# Patient Record
Sex: MALE | Race: WHITE | NOT HISPANIC OR LATINO | Employment: FULL TIME | URBAN - METROPOLITAN AREA
[De-identification: names, ages, dates, MRNs, and addresses within clinical notes are randomized per-mention and may not be internally consistent; named-entity substitution may affect disease eponyms.]

---

## 2017-01-06 ENCOUNTER — GENERIC CONVERSION - ENCOUNTER (OUTPATIENT)
Dept: PULMONOLOGY | Facility: CLINIC | Age: 57
End: 2017-01-06

## 2017-09-07 ENCOUNTER — ALLSCRIPTS OFFICE VISIT (OUTPATIENT)
Dept: OTHER | Facility: OTHER | Age: 57
End: 2017-09-07

## 2017-09-07 ENCOUNTER — TRANSCRIBE ORDERS (OUTPATIENT)
Dept: ADMINISTRATIVE | Facility: HOSPITAL | Age: 57
End: 2017-09-07

## 2017-09-07 DIAGNOSIS — R05.9 COUGH: Primary | ICD-10-CM

## 2017-09-07 DIAGNOSIS — R93.89 ABNORMAL RADIOLOGICAL FINDINGS IN SKIN AND SUBCUTANEOUS TISSUE: ICD-10-CM

## 2017-09-15 ENCOUNTER — HOSPITAL ENCOUNTER (OUTPATIENT)
Dept: RADIOLOGY | Facility: HOSPITAL | Age: 57
Discharge: HOME | End: 2017-09-15
Attending: RADIOLOGY
Payer: COMMERCIAL

## 2017-09-15 DIAGNOSIS — Z76.89 REFERRAL OF PATIENT WITHOUT EXAMINATION OR TREATMENT: ICD-10-CM

## 2017-09-18 ENCOUNTER — GENERIC CONVERSION - ENCOUNTER (OUTPATIENT)
Dept: PULMONOLOGY | Facility: CLINIC | Age: 57
End: 2017-09-18

## 2017-09-25 ENCOUNTER — HOSPITAL ENCOUNTER (OUTPATIENT)
Dept: PULMONOLOGY | Facility: HOSPITAL | Age: 57
Discharge: HOME | End: 2017-09-25
Attending: INTERNAL MEDICINE
Payer: COMMERCIAL

## 2017-09-25 ENCOUNTER — GENERIC CONVERSION - ENCOUNTER (OUTPATIENT)
Dept: OTHER | Facility: OTHER | Age: 57
End: 2017-09-25

## 2017-09-25 DIAGNOSIS — R05.9 COUGH: ICD-10-CM

## 2017-09-25 PROCEDURE — 94729 DIFFUSING CAPACITY: CPT

## 2017-09-25 PROCEDURE — 94070 EVALUATION OF WHEEZING: CPT

## 2017-09-25 PROCEDURE — 94726 PLETHYSMOGRAPHY LUNG VOLUMES: CPT

## 2017-09-25 PROCEDURE — 94760 N-INVAS EAR/PLS OXIMETRY 1: CPT

## 2017-09-25 RX ORDER — ALBUTEROL SULFATE 2.5 MG/3ML
2.5 SOLUTION RESPIRATORY (INHALATION) ONCE
Status: COMPLETED | OUTPATIENT
Start: 2017-09-25 | End: 2017-09-25

## 2017-09-25 RX ADMIN — ALBUTEROL SULFATE 2.5 MG: 2.5 SOLUTION RESPIRATORY (INHALATION) at 14:09

## 2017-09-25 RX ADMIN — METHACHOLINE CHLORIDE 5 BREATH: 100 POWDER, FOR SOLUTION RESPIRATORY (INHALATION) at 14:07

## 2017-09-28 ENCOUNTER — GENERIC CONVERSION - ENCOUNTER (OUTPATIENT)
Dept: OTHER | Facility: OTHER | Age: 57
End: 2017-09-28

## 2017-10-17 ENCOUNTER — HOSPITAL ENCOUNTER (OUTPATIENT)
Dept: RADIOLOGY | Age: 57
Discharge: HOME | End: 2017-10-17
Payer: COMMERCIAL

## 2017-10-17 ENCOUNTER — APPOINTMENT (OUTPATIENT)
Dept: LAB | Age: 57
End: 2017-10-17
Payer: COMMERCIAL

## 2017-10-17 ENCOUNTER — APPOINTMENT (OUTPATIENT)
Dept: RADIOLOGY | Age: 57
End: 2017-10-17
Payer: COMMERCIAL

## 2017-10-17 ENCOUNTER — TRANSCRIBE ORDERS (OUTPATIENT)
Dept: URGENT CARE | Age: 57
End: 2017-10-17

## 2017-10-17 ENCOUNTER — OFFICE VISIT (OUTPATIENT)
Dept: LAB | Age: 57
End: 2017-10-17
Payer: COMMERCIAL

## 2017-10-17 DIAGNOSIS — R59.1 LYMPHADENOPATHY: ICD-10-CM

## 2017-10-17 DIAGNOSIS — D38.1 NEOPLASM OF UNCERTAIN BEHAVIOR OF TRACHEA, BRONCHUS, AND LUNG: ICD-10-CM

## 2017-10-17 DIAGNOSIS — R59.1 LYMPHADENOPATHY: Primary | ICD-10-CM

## 2017-10-17 LAB
ALBUMIN SERPL BCP-MCNC: 3.6 G/DL (ref 3.5–5)
ALP SERPL-CCNC: 133 U/L (ref 46–116)
ALT SERPL W P-5'-P-CCNC: 23 U/L (ref 12–78)
ANION GAP SERPL CALCULATED.3IONS-SCNC: 8 MMOL/L (ref 4–13)
AST SERPL W P-5'-P-CCNC: 17 U/L (ref 5–45)
ATRIAL RATE: 71 BPM
BASOPHILS # BLD AUTO: 0.03 THOUSANDS/ΜL (ref 0–0.1)
BASOPHILS NFR BLD AUTO: 1 % (ref 0–1)
BILIRUB SERPL-MCNC: 0.49 MG/DL (ref 0.2–1)
BUN SERPL-MCNC: 22 MG/DL (ref 5–25)
CALCIUM ALBUM COR SERPL-MCNC: 10.6 MG/DL (ref 8.3–10.1)
CALCIUM SERPL-MCNC: 10.3 MG/DL (ref 8.3–10.1)
CHLORIDE SERPL-SCNC: 104 MMOL/L (ref 100–108)
CO2 SERPL-SCNC: 28 MMOL/L (ref 21–32)
CREAT SERPL-MCNC: 1.03 MG/DL (ref 0.6–1.3)
EOSINOPHIL # BLD AUTO: 0.28 THOUSAND/ΜL (ref 0–0.61)
EOSINOPHIL NFR BLD AUTO: 5 % (ref 0–6)
ERYTHROCYTE [DISTWIDTH] IN BLOOD BY AUTOMATED COUNT: 13.5 % (ref 11.6–15.1)
GFR SERPL CREATININE-BSD FRML MDRD: 81 ML/MIN/1.73SQ M
GLUCOSE P FAST SERPL-MCNC: 85 MG/DL (ref 65–99)
GLUCOSE SERPL-MCNC: 74 MG/DL (ref 65–140)
HCT VFR BLD AUTO: 42.6 % (ref 36.5–49.3)
HGB BLD-MCNC: 14.1 G/DL (ref 12–17)
LDH SERPL-CCNC: 174 U/L (ref 81–234)
LYMPHOCYTES # BLD AUTO: 1.25 THOUSANDS/ΜL (ref 0.6–4.47)
LYMPHOCYTES NFR BLD AUTO: 21 % (ref 14–44)
MCH RBC QN AUTO: 30.3 PG (ref 26.8–34.3)
MCHC RBC AUTO-ENTMCNC: 33.1 G/DL (ref 31.4–37.4)
MCV RBC AUTO: 92 FL (ref 82–98)
MONOCYTES # BLD AUTO: 0.85 THOUSAND/ΜL (ref 0.17–1.22)
MONOCYTES NFR BLD AUTO: 15 % (ref 4–12)
NEUTROPHILS # BLD AUTO: 3.46 THOUSANDS/ΜL (ref 1.85–7.62)
NEUTS SEG NFR BLD AUTO: 58 % (ref 43–75)
NRBC BLD AUTO-RTO: 0 /100 WBCS
P AXIS: 47 DEGREES
PLATELET # BLD AUTO: 285 THOUSANDS/UL (ref 149–390)
PMV BLD AUTO: 10.6 FL (ref 8.9–12.7)
POTASSIUM SERPL-SCNC: 4.1 MMOL/L (ref 3.5–5.3)
PR INTERVAL: 162 MS
PROT SERPL-MCNC: 7.7 G/DL (ref 6.4–8.2)
QRS AXIS: 9 DEGREES
QRSD INTERVAL: 94 MS
QT INTERVAL: 370 MS
QTC INTERVAL: 402 MS
RBC # BLD AUTO: 4.65 MILLION/UL (ref 3.88–5.62)
SODIUM SERPL-SCNC: 140 MMOL/L (ref 136–145)
T WAVE AXIS: 37 DEGREES
VENTRICULAR RATE: 71 BPM
WBC # BLD AUTO: 5.88 THOUSAND/UL (ref 4.31–10.16)

## 2017-10-17 PROCEDURE — 85025 COMPLETE CBC W/AUTO DIFF WBC: CPT

## 2017-10-17 PROCEDURE — 93005 ELECTROCARDIOGRAM TRACING: CPT

## 2017-10-17 PROCEDURE — 80053 COMPREHEN METABOLIC PANEL: CPT

## 2017-10-17 PROCEDURE — 71020 HB CHEST X-RAY 2VW FRONTAL&LATL: CPT

## 2017-10-17 PROCEDURE — 78815 PET IMAGE W/CT SKULL-THIGH: CPT

## 2017-10-17 PROCEDURE — 82164 ANGIOTENSIN I ENZYME TEST: CPT

## 2017-10-17 PROCEDURE — 82948 REAGENT STRIP/BLOOD GLUCOSE: CPT

## 2017-10-17 PROCEDURE — A9552 F18 FDG: HCPCS

## 2017-10-17 PROCEDURE — 83615 LACTATE (LD) (LDH) ENZYME: CPT

## 2017-10-17 PROCEDURE — 36415 COLL VENOUS BLD VENIPUNCTURE: CPT

## 2017-10-17 RX ADMIN — IOHEXOL 5 ML: 240 INJECTION, SOLUTION INTRATHECAL; INTRAVASCULAR; INTRAVENOUS; ORAL at 13:20

## 2017-10-18 LAB — ACE SERPL-CCNC: 138 U/L (ref 14–82)

## 2017-10-20 ENCOUNTER — HOSPITAL ENCOUNTER (EMERGENCY)
Facility: HOSPITAL | Age: 57
Discharge: HOME | End: 2017-10-21
Attending: EMERGENCY MEDICINE | Admitting: EMERGENCY MEDICINE
Payer: COMMERCIAL

## 2017-10-20 DIAGNOSIS — R79.89 ELEVATED SERUM CREATININE: ICD-10-CM

## 2017-10-20 DIAGNOSIS — R10.9 LEFT FLANK PAIN: Primary | ICD-10-CM

## 2017-10-21 VITALS
TEMPERATURE: 97.9 F | HEART RATE: 74 BPM | DIASTOLIC BLOOD PRESSURE: 78 MMHG | WEIGHT: 176 LBS | RESPIRATION RATE: 18 BRPM | OXYGEN SATURATION: 97 % | SYSTOLIC BLOOD PRESSURE: 130 MMHG

## 2017-10-21 LAB
ANION GAP SERPL CALCULATED.3IONS-SCNC: 5 MMOL/L (ref 4–13)
BACTERIA UR QL AUTO: ABNORMAL /HPF
BASOPHILS # BLD AUTO: 0.02 THOUSANDS/ΜL (ref 0–0.1)
BASOPHILS NFR BLD AUTO: 0 % (ref 0–1)
BILIRUB UR QL STRIP: ABNORMAL
BUN SERPL-MCNC: 20 MG/DL (ref 5–25)
CALCIUM SERPL-MCNC: 11.8 MG/DL (ref 8.3–10.1)
CAOX CRY URNS QL MICRO: ABNORMAL /HPF
CHLORIDE SERPL-SCNC: 101 MMOL/L (ref 100–108)
CLARITY UR: CLEAR
CO2 SERPL-SCNC: 34 MMOL/L (ref 21–32)
COLOR UR: YELLOW
COLOR, POC: YELLOW
CREAT SERPL-MCNC: 1.31 MG/DL (ref 0.6–1.3)
EOSINOPHIL # BLD AUTO: 0.28 THOUSAND/ΜL (ref 0–0.61)
EOSINOPHIL NFR BLD AUTO: 5 % (ref 0–6)
ERYTHROCYTE [DISTWIDTH] IN BLOOD BY AUTOMATED COUNT: 13.6 % (ref 11.6–15.1)
GFR SERPL CREATININE-BSD FRML MDRD: 61 ML/MIN/1.73SQ M
GLUCOSE SERPL-MCNC: 150 MG/DL (ref 65–140)
GLUCOSE UR STRIP-MCNC: ABNORMAL MG/DL
HCT VFR BLD AUTO: 42 % (ref 36.5–49.3)
HGB BLD-MCNC: 14.4 G/DL (ref 12–17)
HGB UR QL STRIP.AUTO: ABNORMAL
KETONES UR STRIP-MCNC: ABNORMAL MG/DL
LEUKOCYTE ESTERASE UR QL STRIP: NEGATIVE
LYMPHOCYTES # BLD AUTO: 1.42 THOUSANDS/ΜL (ref 0.6–4.47)
LYMPHOCYTES NFR BLD AUTO: 23 % (ref 14–44)
MCH RBC QN AUTO: 31.4 PG (ref 26.8–34.3)
MCHC RBC AUTO-ENTMCNC: 34.3 G/DL (ref 31.4–37.4)
MCV RBC AUTO: 92 FL (ref 82–98)
MONOCYTES # BLD AUTO: 0.75 THOUSAND/ΜL (ref 0.17–1.22)
MONOCYTES NFR BLD AUTO: 12 % (ref 4–12)
NEUTROPHILS # BLD AUTO: 3.61 THOUSANDS/ΜL (ref 1.85–7.62)
NEUTS SEG NFR BLD AUTO: 60 % (ref 43–75)
NITRITE UR QL STRIP: NEGATIVE
NON-SQ EPI CELLS URNS QL MICRO: ABNORMAL /HPF
NRBC BLD AUTO-RTO: 0 /100 WBCS
PH UR STRIP.AUTO: 5.5 [PH] (ref 4.5–8)
PLATELET # BLD AUTO: 272 THOUSANDS/UL (ref 149–390)
PMV BLD AUTO: 10.3 FL (ref 8.9–12.7)
POTASSIUM SERPL-SCNC: 3.4 MMOL/L (ref 3.5–5.3)
PROT UR STRIP-MCNC: ABNORMAL MG/DL
RBC # BLD AUTO: 4.58 MILLION/UL (ref 3.88–5.62)
RBC #/AREA URNS AUTO: ABNORMAL /HPF
SODIUM SERPL-SCNC: 140 MMOL/L (ref 136–145)
SP GR UR STRIP.AUTO: >=1.03 (ref 1–1.03)
UROBILINOGEN UR QL STRIP.AUTO: 0.2 E.U./DL
WBC # BLD AUTO: 6.08 THOUSAND/UL (ref 4.31–10.16)
WBC #/AREA URNS AUTO: ABNORMAL /HPF

## 2017-10-21 PROCEDURE — 87086 URINE CULTURE/COLONY COUNT: CPT

## 2017-10-21 PROCEDURE — 81002 URINALYSIS NONAUTO W/O SCOPE: CPT | Performed by: EMERGENCY MEDICINE

## 2017-10-21 PROCEDURE — 96361 HYDRATE IV INFUSION ADD-ON: CPT

## 2017-10-21 PROCEDURE — 96374 THER/PROPH/DIAG INJ IV PUSH: CPT

## 2017-10-21 PROCEDURE — 85025 COMPLETE CBC W/AUTO DIFF WBC: CPT | Performed by: EMERGENCY MEDICINE

## 2017-10-21 PROCEDURE — 80048 BASIC METABOLIC PNL TOTAL CA: CPT | Performed by: EMERGENCY MEDICINE

## 2017-10-21 PROCEDURE — 81001 URINALYSIS AUTO W/SCOPE: CPT

## 2017-10-21 PROCEDURE — 36415 COLL VENOUS BLD VENIPUNCTURE: CPT | Performed by: EMERGENCY MEDICINE

## 2017-10-21 PROCEDURE — 99284 EMERGENCY DEPT VISIT MOD MDM: CPT

## 2017-10-21 RX ORDER — OXYCODONE HYDROCHLORIDE AND ACETAMINOPHEN 5; 325 MG/1; MG/1
1 TABLET ORAL EVERY 4 HOURS PRN
Qty: 15 TABLET | Refills: 0 | Status: SHIPPED | OUTPATIENT
Start: 2017-10-21 | End: 2017-11-01 | Stop reason: ALTCHOICE

## 2017-10-21 RX ADMIN — HYDROMORPHONE HYDROCHLORIDE 1 MG: 1 INJECTION, SOLUTION INTRAMUSCULAR; INTRAVENOUS; SUBCUTANEOUS at 00:18

## 2017-10-21 RX ADMIN — SODIUM CHLORIDE 1000 ML: 0.9 INJECTION, SOLUTION INTRAVENOUS at 00:18

## 2017-10-21 NOTE — ED NOTES
Patient unable to provide urine sample at this time  Will continue to monitor       Mikhail Núñez RN  10/21/17 5571

## 2017-10-21 NOTE — DISCHARGE INSTRUCTIONS
Renal Colic   WHAT YOU NEED TO KNOW:   What is renal colic? Renal colic is severe pain in your lower back or sides  The pain is usually on one side, but may be on both sides of your lower back  Renal colic may start quickly, come and go, and become worse over time  What causes renal colic? Renal colic is caused by a blockage in your urinary tract  The urinary tract includes your kidneys, ureters, bladder, and urethra  Ureters carry urine from your kidneys to your bladder  The urethra carries urine to the outside when you urinate  The most common cause of a blockage in the urinary tract is a kidney stone  Blood clots, ureter spasms, and dead tissue may also block your urinary tract  What other signs and symptoms may occur with renal colic? · Severe low back, abdominal, or groin pain    · Pain when you urinate    · Nausea and vomiting    · Feeling the need to urinate often, or right away    · Urinating less than what is normal for you, or not at all    · Fever  How is renal colic diagnosed? · Blood and urine tests  may show infection or kidney function  · An x-ray, ultrasound, CT, or MRI  may show a kidney stone or other causes of your pain  You may be given contrast liquid to help your urinary tract show up better in the pictures  Tell the healthcare provider if you have ever had an allergic reaction to contrast liquid  Do not enter the MRI room with anything metal  Metal can cause serious injury  Tell the healthcare provider if you have any metal in or on your body  How is renal colic treated? · Medicines  may help decrease pain and muscle spasms  You may also need medicine to calm your stomach and stop vomiting  · Surgery  may be needed to remove a blockage  Surgery may also be needed if your kidneys are not working properly  How can I manage my symptoms? · Drink liquids as directed  to help decrease pain and flush blockages from your urinary tract   Ask how much liquid to drink each day and which liquids are best for you  You may need to drink about 3 liters (12 glasses) of liquids each day  Half of your total daily liquids should be water  Limit coffee, tea, and soda to 2 cups daily  Your urine should be pale and clear  · Strain your urine every time you urinate  Urinate into a strainer (funnel with a fine mesh on the bottom) or glass jar to collect kidney stones  Give the kidney stones to your healthcare provider at your next visit  · Eat a variety of healthy foods  Healthy foods include fruits, vegetables, whole-grain breads, low-fat dairy products, beans, lean meats, and fish  You may need to increase the amount of citrus fruit you eat, such as oranges  Ask your healthcare provider how much salt, calcium, and protein you should eat  · Avoid activity in hot temperatures  Heat may cause you to become dehydrated and urinate less  When should I seek immediate care? · You cannot stop vomiting  · You see new or increased bleeding when you urinate  · You are urinating less than usual, or not at all  · Your pain is not getting better even after treatment  When should I contact my healthcare provider? · You have fever  · You need to urinate more often than usual, or right away  · You see a stone in your urine strainer after you urinate  · You have questions or concerns about your condition or care  CARE AGREEMENT:   You have the right to help plan your care  Learn about your health condition and how it may be treated  Discuss treatment options with your caregivers to decide what care you want to receive  You always have the right to refuse treatment  The above information is an  only  It is not intended as medical advice for individual conditions or treatments  Talk to your doctor, nurse or pharmacist before following any medical regimen to see if it is safe and effective for you    © 2017 [employer] Information is for End User's use only and may not be sold, redistributed or otherwise used for commercial purposes  All illustrations and images included in CareNotes® are the copyrighted property of A [employer] M [employer]  or [employer]

## 2017-10-21 NOTE — ED PROVIDER NOTES
History  Chief Complaint   Patient presents with    Flank Pain     Pt has left sided flank pain; hx of kidney stones        History provided by:  Patient   used: No    Flank Pain   Pain location:  L flank  Pain quality: aching    Pain radiates to:  Does not radiate  Pain severity:  Mild  Onset quality:  Sudden  Duration:  1 hour  Timing:  Constant  Progression:  Waxing and waning  Chronicity:  Recurrent  Context comment:  Hx of Kidney stones  Relieved by:  Nothing  Worsened by:  Nothing  Ineffective treatments:  None tried  Associated symptoms: no chest pain, no chills, no cough, no diarrhea, no dysuria, no fever, no nausea, no shortness of breath, no sore throat and no vomiting    Risk factors comment:  Hx of Kidney stones      None       Past Medical History:   Diagnosis Date    Diabetes mellitus ([facility] [address] )        Past Surgical History:   Procedure Laterality Date    ABDOMINAL SURGERY      KNEE SURGERY Right     SHOULDER SURGERY         History reviewed  No pertinent family history  I have reviewed and agree with the history as documented  Social History   Substance Use Topics    Smoking status: Never Smoker    Smokeless tobacco: Not on file    Alcohol use No        Review of Systems   Constitutional: Negative for activity change, chills and fever  HENT: Negative for facial swelling, sore throat and trouble swallowing  Eyes: Negative for pain and visual disturbance  Respiratory: Negative for cough, chest tightness and shortness of breath  Cardiovascular: Negative for chest pain and leg swelling  Gastrointestinal: Negative for abdominal pain, blood in stool, diarrhea, nausea and vomiting  Genitourinary: Positive for flank pain  Negative for dysuria  Musculoskeletal: Negative for back pain, neck pain and neck stiffness  Skin: Negative for pallor and rash  Allergic/Immunologic: Negative for environmental allergies and immunocompromised state     Neurological: Negative for dizziness and headaches  Hematological: Negative for adenopathy  Does not bruise/bleed easily  Psychiatric/Behavioral: Negative for agitation and behavioral problems  All other systems reviewed and are negative  Physical Exam  ED Triage Vitals [10/20/17 2358]   Temperature Pulse Respirations Blood Pressure SpO2   97 9 °F (36 6 °C) 86 22 155/83 99 %      Temp Source Heart Rate Source Patient Position - Orthostatic VS BP Location FiO2 (%)   Temporal Monitor -- Right arm --      Pain Score       Worst Possible Pain           Physical Exam   Constitutional: He is oriented to person, place, and time  He appears well-developed and well-nourished  No distress  HENT:   Head: Normocephalic and atraumatic  Eyes: EOM are normal    Neck: Normal range of motion  Neck supple  Cardiovascular: Normal rate, regular rhythm, normal heart sounds and intact distal pulses  Pulmonary/Chest: Effort normal and breath sounds normal    Abdominal: Soft  Bowel sounds are normal  There is no tenderness  There is no rebound and no guarding  Left CVA tenderness   Musculoskeletal: Normal range of motion  Neurological: He is alert and oriented to person, place, and time  Skin: Skin is warm and dry  Psychiatric: He has a normal mood and affect  Nursing note and vitals reviewed        ED Medications  Medications   sodium chloride 0 9 % bolus 1,000 mL (1,000 mL Intravenous New Bag 10/21/17 0018)   HYDROmorphone (DILAUDID) 1 mg/mL injection 1 mg (1 mg Intravenous Given 10/21/17 0018)       Diagnostic Studies  Labs Reviewed   BASIC METABOLIC PANEL - Abnormal        Result Value Ref Range Status    Potassium 3 4 (*) 3 5 - 5 3 mmol/L Final    CO2 34 (*) 21 - 32 mmol/L Final    Creatinine 1 31 (*) 0 60 - 1 30 mg/dL Final    Comment: Standardized to IDMS reference method    Glucose 150 (*) 65 - 140 mg/dL Final    Comment:   If the patient is fasting, the ADA then defines impaired fasting glucose as > 100 mg/dL and diabetes as > or equal to 123 mg/dL  Specimen collection should occur prior to Sulfasalazine administration due to the potential for falsely depressed results  Specimen collection should occur prior to Sulfapyridine administration due to the potential for falsely elevated results  Calcium 11 8 (*) 8 3 - 10 1 mg/dL Final    Sodium 140  136 - 145 mmol/L Final    Chloride 101  100 - 108 mmol/L Final    Anion Gap 5  4 - 13 mmol/L Final    BUN 20  5 - 25 mg/dL Final    eGFR 61  ml/min/1 73sq m Final    Narrative:     National Kidney Disease Education Program recommendations are as follows:  GFR calculation is accurate only with a steady state creatinine  Chronic Kidney disease less than 60 ml/min/1 73 sq  meters  Kidney failure less than 15 ml/min/1 73 sq  meters  URINE MICROSCOPIC - Abnormal     RBC, UA 10-20 (*) None Seen, 0-5 /hpf Final    WBC, UA 10-20 (*) None Seen, 0-5, 5-55, 5-65 /hpf Final    Ca Oxalate Chen, UA Occasional (*) None Seen /hpf Final    Epithelial Cells Occasional  None Seen, Occasional /hpf Final    Bacteria, UA Occasional  None Seen, Occasional /hpf Final   POCT URINALYSIS DIPSTICK - Abnormal     Color, UA yellow   Final   ED URINE MACROSCOPIC - Abnormal     Protein, UA 30 (1+) (*) Negative mg/dl Final    Glucose,  (1/10%) (*) Negative mg/dl Final    Ketones, UA Trace (*) Negative mg/dl Final    Bilirubin, UA Interference- unable to analyze (*) Negative Final    Comment: The dipstick result may be falsely positive do to interfering substances  We recommend reliance upon serum bilirubin, liver & kidney function tests to guide patient care if clinically indicated      Blood, UA Large (*) Negative Final    Color, UA Yellow   Final    Clarity, UA Clear   Final    pH, UA 5 5  4 5 - 8 0 Final    Leukocytes, UA Negative  Negative Final    Nitrite, UA Negative  Negative Final    Urobilinogen, UA 0 2  0 2, 1 0 E U /dl E U /dl Final    Specific Gravity, UA >=1 030  1 003 - 1 030 Final    Narrative: CLINITEK RESULT   CBC AND DIFFERENTIAL - Normal    WBC 6 08  4 31 - 10 16 Thousand/uL Final    RBC 4 58  3 88 - 5 62 Million/uL Final    Hemoglobin 14 4  12 0 - 17 0 g/dL Final    Hematocrit 42 0  36 5 - 49 3 % Final    MCV 92  82 - 98 fL Final    MCH 31 4  26 8 - 34 3 pg Final    MCHC 34 3  31 4 - 37 4 g/dL Final    RDW 13 6  11 6 - 15 1 % Final    MPV 10 3  8 9 - 12 7 fL Final    Platelets 823  455 - 390 Thousands/uL Final    nRBC 0  /100 WBCs Final    Neutrophils Relative 60  43 - 75 % Final    Lymphocytes Relative 23  14 - 44 % Final    Monocytes Relative 12  4 - 12 % Final    Eosinophils Relative 5  0 - 6 % Final    Basophils Relative 0  0 - 1 % Final    Neutrophils Absolute 3 61  1 85 - 7 62 Thousands/µL Final    Lymphocytes Absolute 1 42  0 60 - 4 47 Thousands/µL Final    Monocytes Absolute 0 75  0 17 - 1 22 Thousand/µL Final    Eosinophils Absolute 0 28  0 00 - 0 61 Thousand/µL Final    Basophils Absolute 0 02  0 00 - 0 10 Thousands/µL Final   URINE CULTURE       No orders to display       Procedures  Procedures      Phone Contacts  ED Phone Contact    ED Course  ED Course as of Oct 21 0204   Sat Oct 21, 2017   0056 IVF running, patient re--evaluated, feels better, pain 5/10  Creatinine: (!) 1 31   0118 Leukocytes, UA: Negative   0122 Nitrite, UA: Negative   0122 No infection noted in UA  Patient is afebrile, WBC normal  Blood, UA: (!) Large   0155 Patient re-evaluated, feels better, ok going home; we will give pain meds, follow up with Urology  MDM  Number of Diagnoses or Management Options  Elevated serum creatinine: new and requires workup  Left flank pain: new and requires workup  Diagnosis management comments: Patient with c/o left flank pain, started about 1 hour back; has known 4 mm left renal calculus seen 5 days back on PET/CT on 10/17/17; denies fever, dysuria, chills, chest or anterior abdominl pain   On exam, patient appears uncomfortable, holding his left flank; has left CVA Tenderness  Impression: Left Ureteric colic, known 4 mm calculus on PET/CT for Lymphadenopathy workup  We will check labs, cbc, bmp, urine, give IVF, Pain meds  Patient has had CTs recently and would like to avoid further radiation; since he just had PET scan showing the stone 5 days back, new CT not indicated at this point  Amount and/or Complexity of Data Reviewed  Clinical lab tests: ordered and reviewed  Tests in the medicine section of CPT®: ordered and reviewed      CritCare Time    Disposition  Final diagnoses:   Left flank pain   Elevated serum creatinine     ED Disposition     ED Disposition Condition Comment    Discharge  [address] discharge to home/self care  Condition at discharge: Stable        Follow-up Information     Follow up With Specialties Details Why 65 Cheryl Cano MD    [facility]       [facility] [address]  [phone]        Patient's Medications   Discharge Prescriptions    OXYCODONE-ACETAMINOPHEN (PERCOCET) 5-325 MG PER TABLET    Take 1 tablet by mouth every 4 (four) hours as needed for moderate pain for up to 10 days Max Daily Amount: 6 tablets       Start Date: 10/21/2017End Date: 10/31/2017       Order Dose: 1 tablet       Quantity: 15 tablet    Refills: 0       Outpatient Discharge Orders  Basic metabolic panel   Standing Status: Future  Standing Exp   Date: 10/21/18         ED Provider  Electronically Signed by       Denis Pascal MD  10/21/17 9406

## 2017-10-21 NOTE — ED NOTES
Patient o2 stat noted to be 88%  Patient resting in room  Placed on 2L nasal cannula  Tolerating well at 96%  Dr Can made aware, will continue to monitor        Daniel Gonzalez RN  10/21/17 3397

## 2017-10-22 LAB — BACTERIA UR CULT: NORMAL

## 2017-10-23 ENCOUNTER — GENERIC CONVERSION - ENCOUNTER (OUTPATIENT)
Dept: PULMONOLOGY | Facility: CLINIC | Age: 57
End: 2017-10-23

## 2017-10-23 DIAGNOSIS — R93.89 ABNORMAL FINDINGS ON DIAGNOSTIC IMAGING OF OTHER SPECIFIED BODY STRUCTURES: ICD-10-CM

## 2017-10-26 NOTE — CONSULTS
Assessment  1  Abnormal chest CT (793 2) (R93 8)   2  Chronic cough (786 2) (R05)   3  RESENDIZ (dyspnea on exertion) (786 09) (R06 09)   4  Postnasal drip (784 91) (R09 82)   5  Snoring (786 09) (R06 83)    Plan  Chronic cough    · Complete PFT W Bronchoprovocation Testing Methacholine Challenge; Status:Hold For -  Scheduling; Requested NPQ:[id number];    Perform:[facility]; [id number]; Ordered; For:Chronic cough; Ordered By:Martin Chaves;   · Follow-up visit in 3 months Evaluation and Treatment  Follow-up  Status: Hold For -  Scheduling  Requested for: [id number]   Ordered; For: Chronic cough; Ordered By: Linsey Issa Performed:  Due: [id number]  Postnasal drip    · Fluticasone Propionate 50 MCG/ACT Nasal Suspension; USE 1 SPRAY IN EACH  NOSTRIL TWICE DAILY   Rx By: Linsey Issa; Dispense: 0 Days ; #:1 X 15 8 ML Bottle; Refill: 6;For: Postnasal drip; BRIAN = N; Verified Transmission to [facility]  [id number]; Last Updated By: System, [employer]; 9/7/2017 2:26:01 PM    Discussion/Summary  Discussion Summary:   - Abnormal chest CT scan, not available to review of this time, as per patient probably he had mediastinal lymphadenopathy, we requested records and the CD to review and then I will discuss with patients further workup or surveillance  But in general patient is at low risk for malignancy  Chronic cough with decision exertion, this could be a compliant asthma and also postnasal drips, I will start with Flonase BID and also check PFTs with methacholine challenge test  In future consider daily PPI if no improvement  Snorting with mild daytime sleepiness, will discuss the symptoms and future for possible sleep study if They become more prominent  Patient's Capacity to Self-Care: Patient is able to Self-Care  Medication SE Review and Pt Understands Tx: Possible side effects of new medications were reviewed with the patient/guardian today  The treatment plan was reviewed with the patient/guardian   The patient/guardian understands and agrees with the treatment plan      Chief Complaint  Chief Complaint Free Text Note Form: Abnormal CT scan of the chest      History of Present Illness  HPI: This is a 60-year-old gentleman with past medical history of diabetes, and no history of pulmonary disease was sent for evaluation of abnormal chest CT scan done at outside facility  Unfortunately at the time holding counter we didn't have the CT scan report or the CD available for review  As per patient he was told that he has large lymph nodes  He denies any family history of sarcoidosis or lymphoma  states that he had the CT scan done because of chronic cough  Patient has been having chronic cough for 2-3 years, intermittent during the day or even at night, triggered by talking or changing position specially bending down or laying down to sleep, when he lays down he has bowel off prolonged cough until it settles down than he is able to sleep and then after that the Cough doesn't wake him up, Sometimes his cough is productive of white sputum with no history of hemoptysis  Patient has also chronic dyspnea on exertion and sometimes associated with the cough  He denies any orthopnea or legs edema or paroxysmal nocturnal dyspnea  He denies any fever or chills or night sweats  He denies any weight loss  No history of cardiac or pulmonary disease  has what he calls hayfever with allergic rhinitis and congestion during the full-time, and also he has postnasal drips it has been worsening in the past several months without any treatment  has intermittent mild heartburn/reflux symptoms and he takes PRN omeprazole  denies any dysphagia or difficulty swallowing but sometimes he coughs when drinking liquids  never smoked cigarettes, does not drink alcohol, no history of recreational drugs, he works as inventory  with no history of occupational exposure  lives alone, he has snoring, sometimes he has daytime sleepiness and fatigue  father had asthma  Review of Systems  Complete-Male Pulm:   Constitutional: No fever or chills, feels well, no tiredness, no recent weight gain or weight loss  Eyes: no complaints of vision problems  ENT: as noted in HPI  Cardiovascular: no palpitations, no chest pain  Respiratory: as noted in HPI  Gastrointestinal: no complaints of esophageal reflux, no abdominal pain  Genitourinary: no urinary retention  Musculoskeletal: no arthralgias, no joint swelling, no myalgias  Integumentary: no rash, no lesions  Neurological: no headache, no fainting, no weakness  Psychiatric: no anxiety, no depression  Hematologic/Lymphatic: no complaints of swollen glands  ROS Reviewed:   ROS reviewed  Past Medical History  Active Problems And Past Medical History Reviewed: The active problems and past medical history were reviewed and updated today  Surgical History  1  History of Arthroscopy Knee Left   2  History of Exploratory Laparotomy   3  History of Shoulder Surgery  Surgical History Reviewed: The surgical history was reviewed and updated today  Family History  Mother    1  Family history of asthma (V17 5) (Z82 5)   2  Family history of lung cancer (V16 1) (Z80 1)   3  Family history of malignant neoplasm of breast (V16 3) (Z80 3)  Father    4  Family history of asthma (V17 5) (Z82 5)   5  Family history of lung cancer (V16 1) (Z80 1)  Sister    10  Family history of asthma (V17 5) (Z82 5)   7  Family history of lung cancer (V16 1) (Z80 1)   8  Family history of malignant neoplasm of breast (V16 3) (Z80 3)  Family History    9  Family history of lung cancer (V16 1) (Z80 1)  Family History Reviewed: The family history was reviewed and updated today  Social History   · Caffeine use (V49 89) (F15 90)   · Never a smoker   · No alcohol use   · No illicit drug use  Social History Reviewed: The social history was reviewed and updated today   The social history was reviewed and is unchanged  Current Meds   1  MetFORMIN HCl - 1000 MG Oral Tablet; Therapy: (Recorded:[id number]) to Recorded   2  Simvastatin 10 MG Oral Tablet; Therapy: (Recorded:07Sep2017) to Recorded  Medication List Reviewed: The medication list was reviewed and updated today  Allergies  1  No Known Drug Allergies    Vitals  Vital Signs    Recorded: 07Sep2017 01:43PM   Temperature 97 3 F   Heart Rate 70   Respiration 18   Systolic 161   Diastolic 64   Height 6 ft 1 in   Weight 183 lb 2 oz   BMI Calculated 24 16   BSA Calculated 2 07   O2 Saturation 100, Non-Rebreather     Physical Exam    Constitutional   General appearance: No acute distress, well appearing and well nourished  Ears, Nose, Mouth, and Throat   Nasal mucosa, septum, and turbinates: Normal without edema or erythema  Lips, teeth, and gums: Normal, good dentition  Oropharynx: Normal with no erythema, edema, exudate or lesions  Neck   Neck: Supple, symmetric, trachea midline, no masses  Jugular veins: Normal     Pulmonary   Auscultation of lungs: Clear to auscultation, no rales, no crackles, no wheezing  Cardiovascular   Auscultation of heart: Normal rate and rhythm, normal S1 and S2, no murmurs  Examination of extremities for edema and/or varicosities: Normal     Abdomen   Abdomen: Soft, non-tender  Lymphatic   Palpation of lymph nodes in neck: No lymphadenopathy  Musculoskeletal   Gait and station: Normal     Digits and nails: Normal without clubbing or cyanosis  Neurologic   Mental Status: Normal  Not confused, no evidence of dementia, good comprehension, good concentration  Skin   Skin and subcutaneous tissue: Limited exam shows no rash  Psychiatric   Orientation to person, place and time: Normal     Mood and affect: Normal        Signatures   Electronically signed by :  JODI Corado ; Sep  7 2017  2:39PM EST                       (Author)

## 2017-10-27 ENCOUNTER — GENERIC CONVERSION - ENCOUNTER (OUTPATIENT)
Dept: OTHER | Facility: OTHER | Age: 57
End: 2017-10-27

## 2017-11-01 RX ORDER — INSULIN GLARGINE 100 [IU]/ML
32 INJECTION, SOLUTION SUBCUTANEOUS
COMMUNITY
End: 2018-12-12 | Stop reason: SDUPTHER

## 2017-11-01 RX ORDER — SIMVASTATIN 10 MG
10 TABLET ORAL
COMMUNITY

## 2017-11-01 RX ORDER — FLUTICASONE PROPIONATE 50 MCG
1 SPRAY, SUSPENSION (ML) NASAL DAILY
COMMUNITY
End: 2018-08-06

## 2017-11-01 RX ORDER — MULTIVITAMIN
1 TABLET ORAL DAILY
COMMUNITY
End: 2018-08-06

## 2017-11-01 RX ORDER — HYDROCHLOROTHIAZIDE 12.5 MG/1
12.5 TABLET ORAL DAILY
COMMUNITY
End: 2018-08-06

## 2017-11-02 ENCOUNTER — ANESTHESIA EVENT (OUTPATIENT)
Dept: PERIOP | Facility: HOSPITAL | Age: 57
End: 2017-11-02
Payer: COMMERCIAL

## 2017-11-02 ENCOUNTER — HOSPITAL ENCOUNTER (OUTPATIENT)
Facility: HOSPITAL | Age: 57
Setting detail: OUTPATIENT SURGERY
Discharge: HOME | End: 2017-11-02
Attending: SURGERY | Admitting: SURGERY
Payer: COMMERCIAL

## 2017-11-02 ENCOUNTER — ANESTHESIA (OUTPATIENT)
Dept: PERIOP | Facility: HOSPITAL | Age: 57
End: 2017-11-02
Payer: COMMERCIAL

## 2017-11-02 VITALS
DIASTOLIC BLOOD PRESSURE: 78 MMHG | OXYGEN SATURATION: 98 % | RESPIRATION RATE: 16 BRPM | HEART RATE: 74 BPM | BODY MASS INDEX: 22.93 KG/M2 | TEMPERATURE: 97 F | SYSTOLIC BLOOD PRESSURE: 128 MMHG | HEIGHT: 73 IN | WEIGHT: 173 LBS

## 2017-11-02 DIAGNOSIS — R59.1 LYMPHADENOPATHY: ICD-10-CM

## 2017-11-02 LAB — GLUCOSE SERPL-MCNC: 198 MG/DL (ref 65–140)

## 2017-11-02 PROCEDURE — 88333 PATH CONSLTJ SURG CYTO XM 1: CPT | Performed by: SURGERY

## 2017-11-02 PROCEDURE — 88185 FLOWCYTOMETRY/TC ADD-ON: CPT

## 2017-11-02 PROCEDURE — 82948 REAGENT STRIP/BLOOD GLUCOSE: CPT

## 2017-11-02 PROCEDURE — 88312 SPECIAL STAINS GROUP 1: CPT | Performed by: SURGERY

## 2017-11-02 PROCEDURE — 88184 FLOWCYTOMETRY/ TC 1 MARKER: CPT | Performed by: SURGERY

## 2017-11-02 PROCEDURE — 88262 CHROMOSOME ANALYSIS 15-20: CPT | Performed by: SURGERY

## 2017-11-02 PROCEDURE — 88305 TISSUE EXAM BY PATHOLOGIST: CPT | Performed by: SURGERY

## 2017-11-02 PROCEDURE — 88237 TISSUE CULTURE BONE MARROW: CPT | Performed by: SURGERY

## 2017-11-02 RX ORDER — FENTANYL CITRATE/PF 50 MCG/ML
25 SYRINGE (ML) INJECTION
Status: DISCONTINUED | OUTPATIENT
Start: 2017-11-02 | End: 2017-11-02 | Stop reason: HOSPADM

## 2017-11-02 RX ORDER — SODIUM CHLORIDE, SODIUM LACTATE, POTASSIUM CHLORIDE, CALCIUM CHLORIDE 600; 310; 30; 20 MG/100ML; MG/100ML; MG/100ML; MG/100ML
20 INJECTION, SOLUTION INTRAVENOUS CONTINUOUS
Status: CANCELLED | OUTPATIENT
Start: 2017-11-02

## 2017-11-02 RX ORDER — MEPERIDINE HYDROCHLORIDE 25 MG/ML
12.5 INJECTION INTRAMUSCULAR; INTRAVENOUS; SUBCUTANEOUS 2 TIMES DAILY PRN
Status: DISCONTINUED | OUTPATIENT
Start: 2017-11-02 | End: 2017-11-02 | Stop reason: HOSPADM

## 2017-11-02 RX ORDER — LIDOCAINE HYDROCHLORIDE 10 MG/ML
INJECTION, SOLUTION INFILTRATION; PERINEURAL AS NEEDED
Status: DISCONTINUED | OUTPATIENT
Start: 2017-11-02 | End: 2017-11-02 | Stop reason: SURG

## 2017-11-02 RX ORDER — MIDAZOLAM HYDROCHLORIDE 1 MG/ML
INJECTION INTRAMUSCULAR; INTRAVENOUS AS NEEDED
Status: DISCONTINUED | OUTPATIENT
Start: 2017-11-02 | End: 2017-11-02 | Stop reason: SURG

## 2017-11-02 RX ORDER — PROPOFOL 10 MG/ML
INJECTION, EMULSION INTRAVENOUS AS NEEDED
Status: DISCONTINUED | OUTPATIENT
Start: 2017-11-02 | End: 2017-11-02 | Stop reason: SURG

## 2017-11-02 RX ORDER — FENTANYL CITRATE 50 UG/ML
INJECTION, SOLUTION INTRAMUSCULAR; INTRAVENOUS AS NEEDED
Status: DISCONTINUED | OUTPATIENT
Start: 2017-11-02 | End: 2017-11-02 | Stop reason: SURG

## 2017-11-02 RX ORDER — BUPIVACAINE HYDROCHLORIDE AND EPINEPHRINE 2.5; 5 MG/ML; UG/ML
INJECTION, SOLUTION INFILTRATION; PERINEURAL AS NEEDED
Status: DISCONTINUED | OUTPATIENT
Start: 2017-11-02 | End: 2017-11-02 | Stop reason: HOSPADM

## 2017-11-02 RX ORDER — SODIUM CHLORIDE, SODIUM LACTATE, POTASSIUM CHLORIDE, CALCIUM CHLORIDE 600; 310; 30; 20 MG/100ML; MG/100ML; MG/100ML; MG/100ML
INJECTION, SOLUTION INTRAVENOUS CONTINUOUS PRN
Status: DISCONTINUED | OUTPATIENT
Start: 2017-11-02 | End: 2017-11-02 | Stop reason: SURG

## 2017-11-02 RX ORDER — ONDANSETRON 2 MG/ML
4 INJECTION INTRAMUSCULAR; INTRAVENOUS EVERY 4 HOURS PRN
Status: DISCONTINUED | OUTPATIENT
Start: 2017-11-02 | End: 2017-11-02 | Stop reason: HOSPADM

## 2017-11-02 RX ORDER — ONDANSETRON 2 MG/ML
INJECTION INTRAMUSCULAR; INTRAVENOUS AS NEEDED
Status: DISCONTINUED | OUTPATIENT
Start: 2017-11-02 | End: 2017-11-02 | Stop reason: SURG

## 2017-11-02 RX ORDER — FEXOFENADINE HCL 180 MG/1
180 TABLET ORAL DAILY
COMMUNITY
End: 2019-03-11 | Stop reason: ALTCHOICE

## 2017-11-02 RX ADMIN — DEXAMETHASONE SODIUM PHOSPHATE 10 MG: 10 INJECTION INTRAMUSCULAR; INTRAVENOUS at 15:29

## 2017-11-02 RX ADMIN — PROPOFOL 200 MG: 10 INJECTION, EMULSION INTRAVENOUS at 15:24

## 2017-11-02 RX ADMIN — ONDANSETRON 4 MG: 2 INJECTION INTRAMUSCULAR; INTRAVENOUS at 15:49

## 2017-11-02 RX ADMIN — METRONIDAZOLE 500 MG: 500 INJECTION, SOLUTION INTRAVENOUS at 15:28

## 2017-11-02 RX ADMIN — FENTANYL CITRATE 50 MCG: 50 INJECTION INTRAMUSCULAR; INTRAVENOUS at 15:32

## 2017-11-02 RX ADMIN — SODIUM CHLORIDE, SODIUM LACTATE, POTASSIUM CHLORIDE, AND CALCIUM CHLORIDE: .6; .31; .03; .02 INJECTION, SOLUTION INTRAVENOUS at 14:46

## 2017-11-02 RX ADMIN — LIDOCAINE HYDROCHLORIDE 50 MG: 10 INJECTION, SOLUTION INFILTRATION; PERINEURAL at 15:24

## 2017-11-02 RX ADMIN — MIDAZOLAM HYDROCHLORIDE 2 MG: 1 INJECTION, SOLUTION INTRAMUSCULAR; INTRAVENOUS at 15:21

## 2017-11-02 RX ADMIN — CEFAZOLIN SODIUM 2000 MG: 2 SOLUTION INTRAVENOUS at 15:27

## 2017-11-02 NOTE — ANESTHESIA PREPROCEDURE EVALUATION
Review of Systems/Medical History  Patient summary reviewed  Chart reviewed      Cardiovascular  Exercise tolerance: good,     Pulmonary  ,   Comment: Chronic cough     GI/Hepatic  Negative GI/hepatic ROS          Kidney stones,        Endo/Other  Diabetes well controlled type 2 Insulin,      GYN       Hematology  Negative hematology ROS      Musculoskeletal  Negative musculoskeletal ROS        Neurology  Negative neurology ROS      Psychology   Negative psychology ROS            Physical Exam    Airway    Mallampati score: I  TM Distance: >3 FB  Neck ROM: full     Dental   Comment: No loose,     Cardiovascular  Rhythm: regular, Rate: normal,     Pulmonary  Breath sounds clear to auscultation,     Other Findings        Anesthesia Plan  ASA Score- 2       Anesthesia Type- general with ASA Monitors  Additional Monitors:   Airway Plan:           Induction- intravenous  Informed Consent- Anesthetic plan and risks discussed with patient  I personally reviewed this patient with the CRNA  Discussed and agreed on the Anesthesia Plan with the CRNA  Marry Thomas

## 2017-11-02 NOTE — OP NOTE
OPERATIVE REPORT  PATIENT NAME: Rose Vee    :  1960  MRN: [id number]  Pt Location: AN OR ROOM 02    SURGERY DATE: 2017    Surgeon(s) and Role:     * RIZWAN Arrieta MD - Primary    Preop Diagnosis:  Lymphadenopathy [R59 1]    Post-Op Diagnosis Codes:     * Lymphadenopathy [R59 1]    Procedure(s) (LRB):  INGUINAL LYMPH NODE BIOPSY (Right)    Specimen(s):  ID Type Source Tests Collected by Time Destination   1 : RIGHT INGUINAL LYMPH NODE Tissue Lymph Node CHROMOSOME ANALYSIS, LEUKEMIALYMPHOMA, TISSUE EXAM, LEUKEMIA/LYMPHOMA FLOW CYTOMETRY RIZWAN Arrieta MD 2017 1548        Estimated Blood Loss:   Minimal    Drains:       Anesthesia Type:   General    Operative Indications:  Lymphadenopathy [R59 1]       Operative Findings:  Pathologic clearance for enlarged lymph nodes    Complications:   None    Procedure and Technique:  Patient was identified in the preoperative holding area but is pending conversation brought to the procedure room placed supine on the table after adequate induction of general anesthesia, right inguinal area was prepped and draped in a sterile fashion PTS area overlying the right lateral inguinal lymphadenopathy was copiously infiltrated with 0 25% Marcaine with epinephrine  A small incision was made deepened through the subcutaneous tissue and the deep fascia are fascia mirlande of the thigh to expose the lateral-most transfers lymph node from the inguinal groove  This was excised in its entirety hemostatically  Specimen was sent fresh for histopathologic examination and lymphoma protocol  No complications blood loss was minimal procedure was tolerated well  Subcutaneous tissue was approximated using interrupted Vicryl  Skin edges are approximated using 4 0 Monocryl  Dermabond and dry sterile dressings applied over the wound     I was present for the entire procedure    Patient Disposition:  hemodynamically stable    SIGNATURE: Aleck Mohs, MD  DATE: November 2, 2017  TIME: 4:05 PM

## 2017-11-02 NOTE — ANESTHESIA POSTPROCEDURE EVALUATION
Post-Op Assessment Note      CV Status:  Stable    Mental Status:  Alert and awake    Hydration Status:  Euvolemic    PONV Controlled:  Controlled    Airway Patency:  Patent    Post Op Vitals Reviewed: Yes          Staff: CRNA           BP (P) 119/73 (11/02/17 1604)    Temp (!) (P) 97 1 °F (36 2 °C) (11/02/17 1604)    Pulse (P) 67 (11/02/17 1604)   Resp (P) 16 (11/02/17 1604)    SpO2 (P) 100 % (11/02/17 1604)

## 2017-11-02 NOTE — DISCHARGE SUMMARY
Discharge Summary - Surgical Oncology   Raymond Nunes 65 y o  male MRN: [id number]  Unit/Bed#: OR POOL Encounter: [id number]    Admission Date: 11/2/2017     Admitting Diagnosis: Lymphadenopathy [R59 1]    HPI:  Generalized lymphadenopathy for right inguinal lymph node biopsy    Procedures Performed: No orders of the defined types were placed in this encounter  Hospital Course:  Uneven full surgical procedure  Significant Findings, Care, Treatment and Services Provided:  Pathologic lymphadenopathy    Lab Results: I have personally reviewed pertinent lab results  Complications:  None  Discharge Diagnosis:  Same  Condition at Discharge: stable     Discharge instructions/Information to patient and family:   See after visit summary for information provided to patient and family  Provisions for Follow-Up Care:  See after visit summary for information related to follow-up care and any pertinent home health orders  Disposition: Home    Planned Readmission: No          Discharge Medications:  See after visit summary for reconciled discharge medications provided to patient and family

## 2017-11-02 NOTE — DISCHARGE INSTRUCTIONS
Remove outer dressing in 48 hours    No stenous was activity     advance diet as tolerated    Call office to be seen in a week

## 2017-11-03 LAB
GLUCOSE SERPL-MCNC: 171 MG/DL (ref 65–140)
SCAN RESULT: NORMAL

## 2017-11-09 ENCOUNTER — GENERIC CONVERSION - ENCOUNTER (OUTPATIENT)
Dept: PULMONOLOGY | Facility: CLINIC | Age: 57
End: 2017-11-09

## 2017-11-14 LAB — SCAN RESULT: NORMAL

## 2017-12-08 ENCOUNTER — ALLSCRIPTS OFFICE VISIT (OUTPATIENT)
Dept: OTHER | Facility: OTHER | Age: 57
End: 2017-12-08

## 2017-12-08 DIAGNOSIS — E11.29 TYPE 2 DIABETES MELLITUS WITH OTHER DIABETIC KIDNEY COMPLICATION (CODE): ICD-10-CM

## 2017-12-08 DIAGNOSIS — R59.0 LOCALIZED ENLARGED LYMPH NODES: ICD-10-CM

## 2017-12-09 NOTE — PROGRESS NOTES
Assessment  1  Abnormal chest CT (793 2) (R93 8)   2  Chronic cough (786 2) (R05)   3  Lymphadenopathy, mediastinal (785 6) (R59 0)   4  Sarcoidosis (135) (D86 9)    Plan  Lymphadenopathy, mediastinal    · (1) CBC/PLT/DIFF; Status:Active; Requested for:08Dec2017;    Perform:[address]; Due:89Rhk9364; Ordered; For:Lymphadenopathy, mediastinal; Ordered By:Austin Chaves;   · (1) COMPREHENSIVE METABOLIC PANEL; Status:Active; Requested for:[id number];    Perform:[address]; Due:41Zjh6672; Ordered;mediastinal; Ordered By:Austin Chaves;  Lymphadenopathy, mediastinal, Type 2 diabetes mellitus with microalbuminuria,unspecified long term insulin use status    · (1) PT WITH INR; Status:Active; Requested for:08Dec2017;    Perform:[address]; Due:45Llx8439; Ordered;mediastinal, Type 2 diabetes mellitus with microalbuminuria, unspecified long term insulin use status; Ordered By:Nain Chaves; Need for influenza vaccination    · Fluzone Quadrivalent 9 MCG/STRAIN Intradermal SuspensionPen-injector   For: Need for influenza vaccination; Ordered By:Nain Chaves; Effective Date:08Dec2017; Administered by: Perfecto Dunlap MA: 12/8/2017 3:41:00 PM; Last Updated By: Perfecto Dunlap; 12/8/2017 3:42:12 PM    Results/Data  Results Free Text Form [facility]:   Results  Other Inguinal lymph node pathology report: Final Diagnosis <imgLoc,3,LAB_COMMENT, Â alt='This value has a comment  Click the procedure for details ' title='This value has a comment  Click the procedure for details ' style='vertical-align:middle; border:none;' >Right inguinal lymph node:  - Noncaseasting granulomatous inflammation consistent with sarcoidosis in the appropriate clinical setting     - Special stains for acid fast bacilli, fungal organisms and spirochetes are negative  - No malignancy identified   - Flow cytometric analysis ([facility] report [id number]; interpreted by KATHY Maya MD):  -- Interpretation: There is no phenotypic evidence of non-Hodgkin lymphoma    CT Scan chest CT scan from outside hospital reviewed on CD: Mediastinal and bilateral hilar lymphadenopathy  PET Scan PET scan Reviewed on PACs: IMPRESSION:Multiple FDG avid lymph nodes in the bilateral neck, chest, abdomen and pelvis as noted above compatible with hypermetabolic malignancy  Given the distribution lymphoproliferative disease should be considered  Innumerable pulmonary nodules some with mild FDG uptake  Activity may be underestimated due to their small size, pulmonary metastasis is not excluded  Small focus of FDG uptake in a subcutaneous nodule inferior to the right shoulder, question subcutaneous metastasis  Intense FDG uptake at the spleen for which splenic involvement should be considered  A few mild foci of FDG uptake in the osseous structures for which osseous metastasis is not excluded including of the right posterior 12th rib and T10 vertebral body  Mildly prominent left extra renal pelvis which contains a 4 mm calculus  This does not appear to be obstructing  PFT Results v2:     Spirometry:   Post Bronchodilator Spirometry:   Lung Volumes:   DLCO:   PFT Interpretation:  Normal spirometry with no obstruction, no bronchodilator response, normal lung volumes, normal diffusion capacity  Discussion/Summary  Discussion Summary:   - This patient has diffuse lymphadenopathy with high evidence T on PET scan, no symptoms for lymphoma or other malignancy, inguinal lymph node pathology consistent with noncaseating granuloma favoring sarcoidosis which could be a diagnosis but I discussed with patient that it is better to confirm diagnosis from the mediastinal lymph nodes, I will do EBUS and FNA from the subcarinal lymph nodes, discussed with patient and he agreed and he understands risks and benefits  He has chronic cough which could be related to sarcoidosis, he has some dyspnea on exertion and also to a shows of syncope/ near syncope   Although I doubt neurosarcoidosis or cardiomyopathy due to sarcoid but I told him to get me the report of the brain MRI and also echocardiogram from the outside facility  I will order general labs including CMP and CBC with differential and also INR specially with his diffuse case of questionable sarcoidosis  he will follow up with his ophthalmologist in a month or so and I asked him to mention this to him for evaluation  He had recent evaluation a month ago that was normal  also patient gets these skin rashes that it is gone now it could be nothing but if hepatis is again out consider biopsy  Counseling Documentation With Imm: The patient was counseled regarding diagnostic results,-- prognosis,-- risks and benefits of treatment options  Goals and Barriers: The patient has the current Goals: To have a diagnosis of his lymphadenopathy  The patent has the current Barriers: None  Patient's Capacity to Self-Care: Patient is able to Self-Care  Medication SE Review and Pt Understands Tx: The treatment plan was reviewed with the patient/guardian  The patient/guardian understands and agrees with the treatment plan      Active Problems    1  Abnormal chest CT (793 2) (R93 8)   2  BPH (benign prostatic hyperplasia) (600 00) (N40 0)   3  Chronic cough (786 2) (R05)   4  RESENDIZ (dyspnea on exertion) (786 09) (R06 09)   5  GERD (gastroesophageal reflux disease) (530 81) (K21 9)   6  Hay fever (477 9) (J30 1)   7  MVP (mitral valve prolapse) (424 0) (I34 1)   8  Postnasal drip (784 91) (R09 82)   9  Snoring (786 09) (R06 83)   10   Type 2 diabetes mellitus with microalbuminuria, unspecified long term insulin use  status (250 40,791 0) (E11 29,R80 9)    Chief Complaint  Chief Complaint Free Text Note Form: Cough and abnormal CT scan      History of Present Illness  HPI: this is a 81-year-old gentleman with past medical history of diabetes who presented to office few more months ago due to abnormal CT scan, and after investigation and getting some more information patient had mediastinal lymphadenopathy on the CT [place] the workup of syncopal episode  At that time he had also MRI of the brain and echocardiogram and he states that he was not told anything abnormal  Patient also has postnasal drips and chronic cough usually dry and he noticed that when he breathes from his mouth he coughs more, he denies any weight loss or fever or chills, denies any chest pain or wheezing, he has mild dyspnea on exertion  No night sweats  when I saw the patient I did have the CT scan and after I got the CT scan by meal and reviewed I discovered that his main issue was diffuse mediastinal/hilar lymphadenopathy and also other enlarged lymph nodes including bilateral axillary and cervical lymph nodes and also inguinal and also right supra clavicular  I spoke to his primary care physician and with him and we arranged for excisional lymph node biopsy that was done a month ago but it was done for an inguinal lymph node that showed noncaseating granuloma  he presents today for follow-up feeling otherwise fine except for the above mentioned symptoms  His cough did improve much on Flonase  He never smoked cigarettes  No occupational exposure  Review of Systems  Complete-Male Pulm:  Constitutional: No fever or chills, feels well, no tiredness, no recent weight gain or weight loss  Eyes: no complaints of vision problems  ENT: no rhinitis, no PND, no epistaxis  Cardiovascular: no palpitations, no chest pain  Respiratory: as noted in HPI  Gastrointestinal: no complaints of esophageal reflux, no abdominal pain  Genitourinary: no urinary retention  Musculoskeletal: no arthralgias, no joint swelling, no myalgias  Integumentary: no rash, no lesions  Neurological: no headache, no fainting, no weakness  Psychiatric: no anxiety, no depression  Hematologic/Lymphatic: no complaints of swollen glands  ROS Reviewed:   ROS reviewed        Surgical History    1  History of Arthroscopy Knee Left   2  History of Exploratory Laparotomy   3  History of Shoulder Surgery  Surgical History Reviewed: The surgical history was reviewed and updated today  Family History  Mother    1  Family history of asthma (V17 5) (Z82 5)   2  Family history of lung cancer (V16 1) (Z80 1)   3  Family history of malignant neoplasm of breast (V16 3) (Z80 3)  Father    4  Family history of asthma (V17 5) (Z82 5)   5  Family history of lung cancer (V16 1) (Z80 1)  Sister    10  Family history of asthma (V17 5) (Z82 5)   7  Family history of lung cancer (V16 1) (Z80 1)   8  Family history of malignant neoplasm of breast (V16 3) (Z80 3)  Family History    9  Family history of lung cancer (V16 1) (Z80 1)  Family History Reviewed: The family history was reviewed and updated today  Social History     · Caffeine use (V49 89) (F15 90)   · Never a smoker   · No alcohol use   · No illicit drug use  Social History Reviewed: The social history was reviewed and updated today  The social history was reviewed and is unchanged  Current Meds   1  Daily Multivitamin CAPS; take 1 capsule daily; Therapy: (Recorded:07Sep2017) to Recorded   2  Fluticasone Propionate 50 MCG/ACT Nasal Suspension; USE 1 SPRAY IN EACH NOSTRIL TWICE DAILY; Therapy: 07Sep2017 to (Last Rx:07Sep2017)  Requested for: 07Sep2017 Ordered   3  HydroCHLOROthiazide 12 5 MG Oral Tablet; TAKE 1 TABLET DAILY; Therapy: (Recorded:07Sep2017) to Recorded   4  Lantus SOLN; USE AS DIRECTED; Therapy: (Recorded:07Sep2017) to Recorded   5  MetFORMIN HCl - 1000 MG Oral Tablet; TAKE 1 TABLET EVERY 12 HOURS DAILY; Therapy: (Recorded:07Sep2017) to Recorded   6  MetFORMIN HCl - 1000 MG Oral Tablet; Therapy: (Recorded:07Sep2017) to Recorded   7  NovoLOG FlexPen SOLN; INJECT SUBCUTANEOUSLY AS DIRECTED; Therapy: (Recorded:07Sep2017) to Recorded   8  Omeprazole 20 MG Oral Capsule Delayed Release; TAKE 1 CAPSULE  PRN;  Therapy: (Recorded:93Wot9532) to Recorded   9  Simvastatin 10 MG Oral Tablet; TAKE 1 TABLET DAILY AS DIRECTED; Therapy: (Recorded:84Ajc3022) to Recorded   10  Simvastatin 10 MG Oral Tablet; Therapy: (Recorded:94Kpy2300) to Recorded  Medication List Reviewed: The medication list was reviewed and updated today  Allergies  1  No Known Drug Allergies    Vitals  Vital Signs    Recorded: [id number] 01:53PM   Temperature 97 5 F, Tympanic   Heart Rate 75   Respiration 16   Systolic 616, LUE, Sitting   Diastolic 66, LUE, Sitting   Height 6 ft 1 in   Weight 179 lb    BMI Calculated 23 62   BSA Calculated 2 05   O2 Saturation 100       Physical Exam   Constitutional  General appearance: No acute distress, well appearing and well nourished  Ears, Nose, Mouth, and Throat  Nasal mucosa, septum, and turbinates: Normal without edema or erythema  Lips, teeth, and gums: Normal, good dentition  Oropharynx: Normal with no erythema, edema, exudate or lesions  Neck  Neck: Supple, symmetric, trachea midline, no masses  Jugular veins: Normal    Pulmonary  Chest: Normal    Auscultation of lungs: Clear to auscultation, no rales, no crackles, no wheezing  Cardiovascular  Auscultation of heart: Normal rate and rhythm, normal S1 and S2, no murmurs  Carotid pulses: 2+ bilaterally  Examination of extremities for edema and/or varicosities: Normal    Abdomen  Abdomen: Soft, non-tender  Lymphatic  Palpation of lymph nodes in neck: No lymphadenopathy  Musculoskeletal  Gait and station: Normal    Digits and nails: Normal without clubbing or cyanosis  Neurologic  Mental Status: Normal  Not confused, no evidence of dementia, good comprehension, good concentration  Skin  Skin and subcutaneous tissue: Limited exam shows no rash  Psychiatric  Orientation to person, place and time: Normal    Mood and affect: Normal        Signatures   Electronically signed by :  JODI Briseno ; Dec  8 2017  3:48PM EST (Author)

## 2017-12-10 LAB
A/G RATIO (HISTORICAL): 1.3 (CALC) (ref 1–2.5)
ALBUMIN SERPL BCP-MCNC: 3.7 G/DL (ref 3.6–5.1)
ALP SERPL-CCNC: 75 U/L (ref 40–115)
ALT SERPL W P-5'-P-CCNC: 18 U/L (ref 9–46)
AST SERPL W P-5'-P-CCNC: 22 U/L (ref 10–35)
BASOPHILS # BLD AUTO: 0.6 %
BASOPHILS # BLD AUTO: 29 CELLS/UL (ref 0–200)
BILIRUB SERPL-MCNC: 0.5 MG/DL (ref 0.2–1.2)
BUN SERPL-MCNC: 19 MG/DL (ref 7–25)
BUN/CREA RATIO (HISTORICAL): ABNORMAL (CALC) (ref 6–22)
CALCIUM SERPL-MCNC: 10 MG/DL (ref 8.6–10.3)
CHLORIDE SERPL-SCNC: 102 MMOL/L (ref 98–110)
CO2 SERPL-SCNC: 32 MMOL/L (ref 20–31)
CREAT SERPL-MCNC: 0.96 MG/DL (ref 0.7–1.33)
DEPRECATED RDW RBC AUTO: 12.5 % (ref 11–15)
EGFR AFRICAN AMERICAN (HISTORICAL): 102 ML/MIN/1.73M2
EGFR-AMERICAN CALC (HISTORICAL): 88 ML/MIN/1.73M2
EOSINOPHIL # BLD AUTO: 221 CELLS/UL (ref 15–500)
EOSINOPHIL # BLD AUTO: 4.5 %
GAMMA GLOBULIN (HISTORICAL): 2.9 G/DL (CALC) (ref 1.9–3.7)
GLUCOSE (HISTORICAL): 152 MG/DL (ref 65–99)
HCT VFR BLD AUTO: 35 % (ref 38.5–50)
HGB BLD-MCNC: 12 G/DL (ref 13.2–17.1)
INR PPP: 1.1
LYMPHOCYTES # BLD AUTO: 14.7 %
LYMPHOCYTES # BLD AUTO: 720 CELLS/UL (ref 850–3900)
MCH RBC QN AUTO: 31.1 PG (ref 27–33)
MCHC RBC AUTO-ENTMCNC: 34.3 G/DL (ref 32–36)
MCV RBC AUTO: 90.7 FL (ref 80–100)
MONOCYTES # BLD AUTO: 632 CELLS/UL (ref 200–950)
MONOCYTES (HISTORICAL): 12.9 %
NEUTROPHILS # BLD AUTO: 3298 CELLS/UL (ref 1500–7800)
NEUTROPHILS # BLD AUTO: 67.3 %
PLATELET # BLD AUTO: 269 THOUSAND/UL (ref 140–400)
PMV BLD AUTO: 10.7 FL (ref 7.5–12.5)
POTASSIUM SERPL-SCNC: 4.2 MMOL/L (ref 3.5–5.3)
PROTHROMBIN TIME: 10.9 SEC (ref 9–11.5)
RBC # BLD AUTO: 3.86 MILLION/UL (ref 4.2–5.8)
SODIUM SERPL-SCNC: 140 MMOL/L (ref 135–146)
TOTAL PROTEIN (HISTORICAL): 6.6 G/DL (ref 6.1–8.1)
WBC # BLD AUTO: 4.9 THOUSAND/UL (ref 3.8–10.8)

## 2017-12-15 ENCOUNTER — GENERIC CONVERSION - ENCOUNTER (OUTPATIENT)
Dept: OTHER | Facility: OTHER | Age: 57
End: 2017-12-15

## 2018-01-10 NOTE — MISCELLANEOUS
Message  Patient brought a CT scan of the chest and upper abdomen done recently for my review  The patient was on 1 of his work trips and he had syncopal episode in [place] where he end up going to the emergency room and had a CT scan of the chest that showed multiple lung nodules and mediastinal lymphadenopathy and also axillary lymphadenopathy large renal cyst, and few broken ribs  I called patient today to discuss the CT scan with him and he told me that he already discussed that with his primary care physician Dr John Laguerre at [place] [address]  Patient states that he had a syncopal episode in April as well  He is scheduled to have MRI of the brain  I told the patient that I will call and discuss the case with his primary care physician then we will get back to him about workup, I believe he may need PET scan and also probably easiest way is to do biopsy of 1 of the axillary lymph nodes  Signatures   Electronically signed by :  JODI Vaughn ; Oct  2 2017  2:46PM EST                       (Author)

## 2018-01-12 NOTE — MISCELLANEOUS
Message  I cold Dr Tobi Rodriguez from [place] [address]  (the PCP of Mr  Shayy Boyer) to discuss the CT scan findings with him, I believe this may represent sarcoidosis but all we have to rule out of our etiologies including lymphoma versus metastatic cancer, we both agreed on doing the PET scan and also Dr Tobi Rodriguez will refer patient for axillary lymph node excision by a surgeon at [place] [address]  since it is easier to access  If no diagnosis could be reached after that then we will plan according to that PET scan, he may need EBUS         Signatures   Electronically signed by :  JODI Vaughn ; Sep 28 2017  4:50PM EST                       (Author)

## 2018-01-15 VITALS
TEMPERATURE: 97.3 F | RESPIRATION RATE: 18 BRPM | DIASTOLIC BLOOD PRESSURE: 64 MMHG | WEIGHT: 183.13 LBS | SYSTOLIC BLOOD PRESSURE: 122 MMHG | OXYGEN SATURATION: 100 % | BODY MASS INDEX: 24.27 KG/M2 | HEIGHT: 73 IN | HEART RATE: 70 BPM

## 2018-01-16 NOTE — RESULT NOTES
Verified Results  * NM PET CT SKULL BASE TO MID THIGH [id number] 12:58PM Austin Chaves     Test Name Result Flag Reference   NM PET CT SKULL BASE TO MID THIGH (Report)     PET/CT SCAN     INDICATION: D38 1: Neoplasm of uncertain behavior of trachea, bronchus and lung  History taken directly from the electronic ordering system  Neoplasm versus lordosis  MODIFIER: PI        COMPARISON: Outside chest CT from [facility] dated 6/5/2017     CELL TYPE: N/A     TECHNIQUE:  8 1 mCi F-18-FDG administered IV  Multiplanar attenuation corrected and non attenuation corrected PET images are available for interpretation, and contiguous, low dose, axial CT sections were obtained from the skull base through the femurs    following the administration of oral contrast material (7 5 cc Omnipaque-240 in 300 cc water)  Intravenous contrast material was not utilized  This examination, like all CT scans performed in the [facility], was performed utilizing    techniques to minimize radiation dose exposure, including the use of iterative reconstruction and automated exposure control  Fasting serum glucose: 74 mg/dl     FINDINGS:      VISUALIZED BRAIN:     No acute abnormalities are seen  HEAD/NECK:     Scattered FDG avid lymph nodes identified in the bilateral neck and right supraclavicular region  Left upper cervical chain lymph node demonstrates SUV max of 8 6 measuring 1 9 x 1 6 cm  Several left periparotid FDG avid lymph nodes identified, SUV max   of 4 6 measuring 1 6 x 1 2 cm  This soft tissue nodule is at the inferior aspect of the left parotid gland  Right supraclavicular focus demonstrates SUV max of 7 2 where there is a 2 0 x 0 7 cm lymph node on CT   CT images: No additional findings  CHEST:     Multiple FDG avid lymph nodes identified in the mediastinal, bilateral perihilar, internal mammary, pericardiac and axillary regions   FDG avid right axillary lymph node demonstrates SUV max of 15 4 measuring 2 1 x 1 4 cm in size  FDG avid pretracheal    lymph node demonstrates SUV max of 6 2 measuring 2 3 x 1 5 cm in size  Right perihilar lymph node demonstrated SUV max of 5 1  A left perihilar lymph node demonstrates a SUV maximum of 6 0  Anterior pericardiac focus demonstrates SUV max of 2 6,    subcentimeter on CT  There is a subcutaneous nodule inferior to the right shoulder, SUV max of 1 7 corresponding to a 7 mm soft tissue nodule on CT, image 89 series 3  Innumerable pulmonary nodules noted bilaterally some of which demonstrate mild FDG uptake  A 5 mm nodule in the left upper lobe posteriorly along the oblique fissure demonstrates SUV max of 1 4  Activity may be underestimated due to its small size  CT images: No additional findings  ABDOMEN/PELVIS:     Multiple FDG avid lymph nodes noted in the upper abdomen, retroperitoneum, pelvis and inguinal regions compatible with hypermetabolic malignancy  A portal caval lymph node demonstrates SUV max of 12 9, image 146 series 12, located posterior to the gastric antrum measuring 2 1 x 1 4 cm  Aortocaval lymph node demonstrates SUV max of 9 3, image 169, measuring 1 4 x 1 1 cm  Multiple small mesenteric lymph nodes with elevated FDG uptake  Mesenteric lymph node in the right hemiabdomen measures 1 6 x 1 0 cm with SUV max 5 1, image 205  A lymph node in the right lower quadrant demonstrates SUV max of 3 3 measuring 1 3 x 0 8 cm,   image 212 series 3  Intense splenic activity is noted, SUV max of 9 4 suspicious for splenic involvement  FDG avid bilateral iliac chain lymph nodes identified  A right external iliac chain lymph node demonstrates SUV max of 18 0 measuring 2 7 x 1 4 cm in size  A left inguinal lymph node demonstrates SUV max of 15 8 and measures 1 6 x 1 3 cm in size  CT images: Right lower pole renal cyst measures up to 6 6 cm in size  There is a mildly prominent left extra renal pelvis   Extra renal pelvis contains a 4 mm calculus  There is no malinda hydronephrosis  Surgical material noted at the anterior    abdominal wall  OSSEOUS STRUCTURES:   Focal FDG uptake identified at the right posterior 12th rib, SUV max of 2 7  No definite osseous lesion noted here on CT  Focal FDG uptake at the left T10 vertebral body on the left, SUV max of 3 7  Focal FDG uptake identified at the left lower ribs anterolaterally corresponding to rib fractures on CT  This includes the left 6th, 7th, 8th, 9th 10th and 11th ribs  CT images: No additional significant findings  IMPRESSION:     1  Multiple FDG avid lymph nodes in the bilateral neck, chest, abdomen and pelvis as noted above compatible with hypermetabolic malignancy  Given the distribution lymphoproliferative disease should be considered  2  Innumerable pulmonary nodules some with mild FDG uptake  Activity may be underestimated due to their small size, pulmonary metastasis is not excluded  3  Small focus of FDG uptake in a subcutaneous nodule inferior to the right shoulder, question subcutaneous metastasis  4  Intense FDG uptake at the spleen for which splenic involvement should be considered  5  A few mild foci of FDG uptake in the osseous structures for which osseous metastasis is not excluded including of the right posterior 12th rib and T10 vertebral body  6  Mildly prominent left extra renal pelvis which contains a 4 mm calculus  This does not appear to be obstructing         ##sigslh##sigslh       Workstation performed: [id number]     Signed by:   Emmanuel Cook MD   10/17/17

## 2018-01-23 VITALS
BODY MASS INDEX: 23.72 KG/M2 | HEART RATE: 75 BPM | TEMPERATURE: 97.5 F | SYSTOLIC BLOOD PRESSURE: 104 MMHG | WEIGHT: 179 LBS | OXYGEN SATURATION: 100 % | HEIGHT: 73 IN | RESPIRATION RATE: 16 BRPM | DIASTOLIC BLOOD PRESSURE: 66 MMHG

## 2018-01-23 NOTE — MISCELLANEOUS
Message  Called pt 's ins  co  to check if Tariq Martin is required for EBUS> [phone] and [phone]  No pre-cert required for codes [phone] and [id number]      Active Problems    1  Abnormal chest CT (793 2) (R93 8)   2  BPH (benign prostatic hyperplasia) (600 00) (N40 0)   3  Chronic cough (786 2) (R05)   4  RESENDIZ (dyspnea on exertion) (786 09) (R06 09)   5  GERD (gastroesophageal reflux disease) (530 81) (K21 9)   6  Hay fever (477 9) (J30 1)   7  Lymphadenopathy, mediastinal (785 6) (R59 0)   8  MVP (mitral valve prolapse) (424 0) (I34 1)   9  Need for influenza vaccination (V04 81) (Z23)   10  Postnasal drip (784 91) (R09 82)   11  Sarcoidosis (135) (D86 9)   12  Snoring (786 09) (R06 83)   13  Syncope (780 2) (R55)   14  Type 2 diabetes mellitus with microalbuminuria, unspecified long term insulin use status    (250 40,791 0) (E11 29,R80 9)    Current Meds   1  Arnuity Ellipta 200 MCG/ACT Inhalation Aerosol Powder Breath Activated; USE 1   INHALATION ONCE DAILY; Therapy: 21YSG0823 to (Evaluate:64Gog5244)  Requested for: 63TGW3960; Last   Rx:[id number] Ordered   2  Arnuity Ellipta 200 MCG/ACT Inhalation Aerosol Powder Breath Activated; USE 1   INHALATION ONCE DAILY; Therapy: 89NDB2818 to (Evaluate:01Czd8708)  Requested for: 65WCI5219; Last   Rx:[id number] Ordered   3  Daily Multivitamin CAPS; take 1 capsule daily; Therapy: (Recorded:07Sep2017) to Recorded   4  Fluticasone Propionate 50 MCG/ACT Nasal Suspension; USE 1 SPRAY IN EACH   NOSTRIL TWICE DAILY; Therapy: 68Pck1249 to (Last Rx:93Cop6559)  Requested for: 07Sep2017 Ordered   5  HydroCHLOROthiazide 12 5 MG Oral Tablet; TAKE 1 TABLET DAILY; Therapy: (Recorded:07Sep2017) to Recorded   6  Lantus SOLN; USE AS DIRECTED; Therapy: (Recorded:07Sep2017) to Recorded   7  MetFORMIN HCl - 1000 MG Oral Tablet; TAKE 1 TABLET EVERY 12 HOURS DAILY; Therapy: (Recorded:07Sep2017) to Recorded   8  MetFORMIN HCl - 1000 MG Oral Tablet;    Therapy: (Recorded:07Sep2017) to Recorded   9  NovoLOG FlexPen SOLN; INJECT SUBCUTANEOUSLY AS DIRECTED; Therapy: (Recorded:07Sep2017) to Recorded   10  Omeprazole 20 MG Oral Capsule Delayed Release; TAKE 1 CAPSULE  PRN; Therapy: (Recorded:07Sep2017) to Recorded   11  Simvastatin 10 MG Oral Tablet; TAKE 1 TABLET DAILY AS DIRECTED; Therapy: (Recorded:07Sep2017) to Recorded   12  Simvastatin 10 MG Oral Tablet; Therapy: (Recorded:07Sep2017) to Recorded    Allergies    1   No Known Drug Allergies    Plan  Sarcoidosis, Syncope    · 1 - Lupillo Cummings DO  (Cardiology) Co-Management  recurrent syncope, new Dx of  nodular and lymphadenopathic sarcoidosis  Status: Active  Requested for: 70Jkk3188  08:00AM  Care Summary provided  : Yes    Signatures   Electronically signed by : Cira Saucedo, ; Dec 21 2017  9:08AM EST                       (Author)

## 2018-01-23 NOTE — MISCELLANEOUS
Message  I called patient and I told them that I discussed his case with my colleagues and we decided that the inguinal lymph node biopsy is enough to confirm his diagnosis of sarcoidosis, at this point his symptoms are mild and I do not want to start him on systemic steroids so I decided to give him inhaled corticosteroids and observe  I am concerned about his frequent syncopal episodes so I ordered referral to see electrophysiology Dr Walters Needs  Patient understands and he is going to follow up with Cardiology/EPS      Plan  Chronic cough    · Arnuity Ellipta 200 MCG/ACT Inhalation Aerosol Powder Breath Activated; USE 1  INHALATION ONCE DAILY  Sarcoidosis, Syncope    · 1 - Lupillo Gomez DO  (Cardiology) Co-Management  recurrent syncope, new Dx of  nodular and lymphadenopathic sarcoidosis  Status: Active  Requested for: 82MOD2134  Care Summary provided  : Yes    Signatures   Electronically signed by :  JODI Storm ; Dec 15 2017  2:59PM EST                       (Author)

## 2018-01-26 RX ORDER — SIMVASTATIN 10 MG
1 TABLET ORAL DAILY
COMMUNITY
End: 2018-01-29 | Stop reason: SDUPTHER

## 2018-01-26 RX ORDER — MV-MIN/FOLIC/VIT K/LYCOP/COQ10 200-100MCG
1 CAPSULE ORAL DAILY
COMMUNITY
End: 2018-01-29 | Stop reason: SDUPTHER

## 2018-01-26 RX ORDER — INSULIN GLARGINE 100 [IU]/ML
1 INJECTION, SOLUTION SUBCUTANEOUS DAILY
Refills: 1 | COMMUNITY
Start: 2018-01-04

## 2018-01-26 RX ORDER — FLUTICASONE PROPIONATE 50 MCG
1 SPRAY, SUSPENSION (ML) NASAL 2 TIMES DAILY
COMMUNITY
Start: 2017-09-07 | End: 2018-01-29 | Stop reason: SDUPTHER

## 2018-01-26 RX ORDER — FLUTICASONE FUROATE 200 UG/1
200 POWDER RESPIRATORY (INHALATION) DAILY
Refills: 6 | COMMUNITY
Start: 2018-01-10 | End: 2018-08-06

## 2018-01-26 RX ORDER — HYDROCHLOROTHIAZIDE 12.5 MG/1
12.5 CAPSULE, GELATIN COATED ORAL DAILY
Refills: 2 | COMMUNITY
Start: 2018-01-10 | End: 2018-01-29 | Stop reason: SDUPTHER

## 2018-01-29 ENCOUNTER — OFFICE VISIT (OUTPATIENT)
Dept: CARDIOLOGY CLINIC | Facility: CLINIC | Age: 58
End: 2018-01-29
Payer: COMMERCIAL

## 2018-01-29 VITALS
DIASTOLIC BLOOD PRESSURE: 70 MMHG | HEART RATE: 85 BPM | SYSTOLIC BLOOD PRESSURE: 112 MMHG | BODY MASS INDEX: 23.11 KG/M2 | OXYGEN SATURATION: 98 % | HEIGHT: 73 IN | WEIGHT: 174.4 LBS

## 2018-01-29 DIAGNOSIS — I34.1 MVP (MITRAL VALVE PROLAPSE): ICD-10-CM

## 2018-01-29 DIAGNOSIS — R55 SYNCOPE AND COLLAPSE: Primary | ICD-10-CM

## 2018-01-29 DIAGNOSIS — R06.00 DOE (DYSPNEA ON EXERTION): ICD-10-CM

## 2018-01-29 PROBLEM — K21.9 GERD (GASTROESOPHAGEAL REFLUX DISEASE): Status: ACTIVE | Noted: 2017-09-07

## 2018-01-29 PROBLEM — R80.9 TYPE 2 DIABETES MELLITUS WITH MICROALBUMINURIA (HCC): Status: ACTIVE | Noted: 2017-09-07

## 2018-01-29 PROBLEM — E11.29 TYPE 2 DIABETES MELLITUS WITH MICROALBUMINURIA (HCC): Status: ACTIVE | Noted: 2017-09-07

## 2018-01-29 PROBLEM — R06.09 DOE (DYSPNEA ON EXERTION): Status: ACTIVE | Noted: 2017-09-07

## 2018-01-29 PROBLEM — D86.9 SARCOIDOSIS: Status: ACTIVE | Noted: 2017-12-08

## 2018-01-29 PROCEDURE — 99244 OFF/OP CNSLTJ NEW/EST MOD 40: CPT | Performed by: INTERNAL MEDICINE

## 2018-01-29 PROCEDURE — 93000 ELECTROCARDIOGRAM COMPLETE: CPT | Performed by: INTERNAL MEDICINE

## 2018-01-29 RX ORDER — OMEPRAZOLE 20 MG/1
20 CAPSULE, DELAYED RELEASE ORAL DAILY
COMMUNITY
End: 2018-08-06

## 2018-01-29 NOTE — PATIENT INSTRUCTIONS
Syncope   AMBULATORY CARE:   Syncope  is also called fainting or passing out  Syncope is a sudden, temporary loss of consciousness, followed by a fall from a standing or sitting position  Syncope ranges from not serious to a sign of a more serious condition that needs to be treated  You can control some health conditions that cause syncope  Your healthcare providers can help you create a plan to manage syncope and prevent episodes  Signs and symptoms that may occur before syncope include the following:   · Cold, clammy, and sweaty skin     · Fast breathing and a racing, pounding heartbeat     · Feeling more tired than usual     · Nausea, a warm feeling, and sweating    · A headache, or feeling lightheaded or dizzy    · Tingling sensation or numbness     · Spots in front of your eyes, blurred vision, or double vision  Seek care immediately if:   · You are bleeding because you hit your head when you fainted  · You suddenly have double vision, difficulty speaking, numbness, and cannot move your arms or legs  · You have chest pain and trouble breathing  · You vomit blood or material that looks like coffee grounds  · You see blood in your bowel movement  Contact your healthcare provider if:   · You have new or worsening symptoms  · You have another syncope episode  · You have a headache, fast heartbeat, or feel too dizzy to stand up  · You have questions or concerns about your condition or care  Treatment for syncope  depends on the cause of your syncope  To prevent syncope from happening again, you may  need any of the following:  · Medicines  may be needed to treat any medical conditions that are causing your syncope  These may include medicines to help your heart pump strongly and regularly  Your healthcare provider may also make changes to any medicines that are causing syncope  · Tilt training  involves training yourself to stand for 10 to 30 minutes each day against a wall   This helps your body decrease the effects of posture changes and reduces the number of fainting spells  Manage syncope:   · Keep a record of your syncope episodes  Include your symptoms and your activity before and after the episode  The record can help your healthcare provider find the cause of your syncope and help you manage episodes  · Sit or lie down when needed  This includes when you feel dizzy, your throat is getting tight, and your vision changes  Raise your legs above the level of your heart  · Take slow, deep breaths if you start to breathe faster with anxiety or fear  This can help decrease dizziness and the feeling that you might faint  · Check your blood pressure often  This is important if you take medicine to lower your blood pressure  Check your blood pressure when you are lying down and when you are standing  Ask how often to check during the day  Keep a record of your blood pressure numbers  Your healthcare provider may use the record to help plan your treatment  Prevent a syncope episode:   · Move slowly and let yourself get used to one position before you move to another position  This is very important when you change from a lying or sitting position to a standing position  Take some deep breaths before you stand up from a lying position  Stand up slowly  Sudden movements may cause a fainting spell  Sit on the side of the bed or couch for a few minutes before you stand up  If you are on bedrest, try to be upright for about 2 hours each day, or as directed  Do not lock your legs if you are standing for a long period of time  Move your legs and bend your knees to keep blood flowing  · Follow your healthcare provider's recommendations  Your provider may  recommend that you drink more liquids to prevent dehydration  You may also need to have more salt to keep your blood pressure from dropping too low and causing syncope   Your provider will tell you how much liquid and sodium to have each day  · Watch for signs of low blood sugar  These include hunger, nervousness, sweating, and fast or fluttery heartbeats  Talk with your healthcare provider about ways to keep your blood sugar level steady  · Do not strain if you are constipated  You may faint if you strain to have a bowel movement  Walking is the best way to get your bowels moving  Eat foods high in fiber to make it easier to have a bowel movement  Good examples are high-fiber cereals, beans, vegetables, and whole-grain breads  Prune juice may help make bowel movements softer  · Be careful in hot weather  Heat can cause a syncope episode  Limit activity done outside on hot days  Physical activity in hot weather can lead to dehydration  This can cause an episode  Follow up with your healthcare provider as directed:  Write down your questions so you remember to ask them during your visits  © 2017 [employer]  Information is for End User's use only and may not be sold, redistributed or otherwise used for commercial purposes  All illustrations and images included in CareNotes® are the copyrighted property of A [employer]  or [employer]  The above information is an  only  It is not intended as medical advice for individual conditions or treatments  Talk to your doctor, nurse or pharmacist before following any medical regimen to see if it is safe and effective for you

## 2018-01-29 NOTE — PROGRESS NOTES
Cardiology Consultation     Ronak Goncalves  [id number]  1960  HEART & VASCULAR [facility] [address]    1  Syncope and collapse  POCT ECG   2  MVP (mitral valve prolapse)     3  RESENDIZ (dyspnea on exertion)       HPI: patient is here for evaluation and management of syncope  Last April, he woke up in the middle of the night and felt like he had a low sugar and need to go to the bathroom - got up and fainted  BS was 180's at the time  This happened one other time in the middle of the night when he gets out of bed  He wakes up feeling lightheaded and dizzy  He had no diaphoresis, shortness of breath, or chest pain at the time  He does not perspire well normally  No current exercise regimen  He tries to watch his carbs and his BS control has been pretty good  Diagnosed with MVP in 1980 - has had it looked at, since then, most recently last June at [address] [place] [address]  He also had a stress echo at the time as well - reportedly normal   He had a Holter monitor done as well - which was also reportedly normal     Patient Active Problem List   Diagnosis    RESENDIZ (dyspnea on exertion)    GERD (gastroesophageal reflux disease)    MVP (mitral valve prolapse)    Sarcoidosis    Type 2 diabetes mellitus with microalbuminuria ([facility] )     Past Medical History:   Diagnosis Date    Diabetes mellitus ([facility] )     Hyperlipidemia      Social History     Social History    Marital status: Single     Spouse name: N/A    Number of children: N/A    Years of education: N/A     Occupational History    Not on file  Social History Main Topics    Smoking status: Never Smoker    Smokeless tobacco: Never Used    Alcohol use No    Drug use: No    Sexual activity: Not on file     Other Topics Concern    Not on file     Social History Narrative    No narrative on file      No family history on file    Past Surgical History:   Procedure Laterality Date    ABDOMINAL SURGERY      CYSTOSCOPY W/ URETERAL STENT PLACEMENT Left     KNEE SURGERY Right     WA BIOPSY/EXCISION, LYMPH NODE(S) Right 11/2/2017    Procedure: INGUINAL LYMPH NODE BIOPSY;  Surgeon: Sarmad Boss MD;  Location: [facility];  Service: General    SHOULDER SURGERY         Current Outpatient Prescriptions:     ARNUITY ELLIPTA 200 MCG/ACT AEPB, Take 200 mcg by mouth daily, Disp: , Rfl: 6    fluticasone (FLONASE) 50 mcg/act nasal spray, 1 spray into each nostril daily, Disp: , Rfl:     hydrochlorothiazide (HYDRODIURIL) 12 5 mg tablet, Take 12 5 mg by mouth daily, Disp: , Rfl:     insulin aspart (NovoLOG) 100 units/mL injection, Inject under the skin 3 (three) times a day before meals, Disp: , Rfl:     insulin glargine (LANTUS) 100 units/mL subcutaneous injection, Inject 32 Units under the skin daily at bedtime, Disp: , Rfl:     LANTUS SOLOSTAR injection pen 100 units/mL, Inject 1 pen into the skin daily, Disp: , Rfl: 1    metFORMIN (GLUCOPHAGE) 1000 MG tablet, Take 1,000 mg by mouth 2 (two) times a day with meals, Disp: , Rfl:     Multiple Vitamin (MULTIVITAMIN) tablet, Take 1 tablet by mouth daily, Disp: , Rfl:     omeprazole (PriLOSEC) 20 mg delayed release capsule, Take 20 mg by mouth daily, Disp: , Rfl:     fexofenadine (ALLEGRA) 180 MG tablet, Take 180 mg by mouth daily, Disp: , Rfl:     simvastatin (ZOCOR) 10 mg tablet, Take 10 mg by mouth daily at bedtime, Disp: , Rfl:   Allergies   Allergen Reactions    Percocet [Oxycodone-Acetaminophen]      Angioedema on finger tips       Vitals:    01/29/18 0804   BP: 112/70   Pulse: 85   SpO2: 98%   Weight: 79 1 kg (174 lb 6 4 oz)   Height: 6' 1" (1 854 m)       Labs:  Admission on 11/02/2017, Discharged on 11/02/2017   Component Date Value    POC Glucose 11/02/2017 198*    Scan Result 11/02/2017 SEE WRITTEN REPORT     Case Report 11/02/2017                      Value:Surgical Pathology Report Case: [id number]                                   Authorizing Provider:  Katey Suarez MD      Collected:           11/02/2017 1548              Ordering Location:     [facility]        Received:            11/02/2017 [facility]                                                      Pathologist:           Belem Curry MD                                                         Intraop:               Belem Curry MD                                                         Specimen:    Lymph Node, RIGHT INGUINAL LYMPH NODE                                                      Final Diagnosis 11/02/2017                      Value: This result contains rich text formatting which cannot be displayed here   Additional Information 11/02/2017                      Value: This result contains rich text formatting which cannot be displayed here   Intraoperative Consultat* 11/02/2017                      Value: This result contains rich text formatting which cannot be displayed here  [employer] [employer] Description 11/02/2017                      Value: This result contains rich text formatting which cannot be displayed here      Scan Result 11/02/2017 SEE WRITTEN REPORT     POC Glucose 11/02/2017 171*   Admission on 10/20/2017, Discharged on 10/21/2017   Component Date Value    Color, UA 10/21/2017 yellow     WBC 10/21/2017 6 08     RBC 10/21/2017 4 58     Hemoglobin 10/21/2017 14 4     Hematocrit 10/21/2017 42 0     MCV 10/21/2017 92     MCH 10/21/2017 31 4     MCHC 10/21/2017 34 3     RDW 10/21/2017 13 6     MPV 10/21/2017 10 3     Platelets 09/38/9055 272     nRBC 10/21/2017 0     Neutrophils Relative 10/21/2017 60     Lymphocytes Relative 10/21/2017 23     Monocytes Relative 10/21/2017 12     Eosinophils Relative 10/21/2017 5     Basophils Relative 10/21/2017 0     Neutrophils Absolute 10/21/2017 3 61     Lymphocytes Absolute 10/21/2017 1 42     Monocytes Absolute 10/21/2017 0 75     Eosinophils Absolute 10/21/2017 0 28     Basophils Absolute 10/21/2017 0 02     Sodium 10/21/2017 140     Potassium 10/21/2017 3 4*    Chloride 10/21/2017 101     CO2 10/21/2017 34*    Anion Gap 10/21/2017 5     BUN 10/21/2017 20     Creatinine 10/21/2017 1 31*    Glucose 10/21/2017 150*    Calcium 10/21/2017 11 8*    eGFR 10/21/2017 61     Color, UA 10/21/2017 Yellow     Clarity, UA 10/21/2017 Clear     pH, UA 10/21/2017 5 5     Leukocytes, UA 10/21/2017 Negative     Nitrite, UA 10/21/2017 Negative     Protein, UA 10/21/2017 30 (1+)*    Glucose, UA 10/21/2017 100 (1/10%)*    Ketones, UA 10/21/2017 Trace*    Urobilinogen, UA 10/21/2017 0 2     Bilirubin, UA 10/21/2017 Interference- unable to analyze*    Blood, UA 10/21/2017 Large*    Specific Gravity, UA 10/21/2017 >=1 030     RBC, UA 10/21/2017 10-20*    WBC, UA 10/21/2017 10-20*    Epithelial Cells 10/21/2017 Occasional     Bacteria, UA 10/21/2017 Occasional     Ca Oxalate Chen, UA 10/21/2017 Occasional*    Urine Culture 10/21/2017 No Growth <1000 cfu/mL    Hospital Outpatient Visit on 10/17/2017   Component Date Value    POC Glucose 10/17/2017 74    Office Visit on 10/17/2017   Component Date Value    Ventricular Rate 10/17/2017 71     Atrial Rate 10/17/2017 71     WA Interval 10/17/2017 162     QRSD Interval 10/17/2017 94     QT Interval 10/17/2017 370     QTC Interval 10/17/2017 402     P Axis 10/17/2017 47     QRS Axis 10/17/2017 9     T Wave Axis 10/17/2017 37     LD 10/17/2017 174     WBC 10/17/2017 5 88     RBC 10/17/2017 4 65     Hemoglobin 10/17/2017 14 1     Hematocrit 10/17/2017 42 6     MCV 10/17/2017 92     MCH 10/17/2017 30 3     MCHC 10/17/2017 33 1     RDW 10/17/2017 13 5     MPV 10/17/2017 10 6     Platelets 68/21/9970 285     nRBC 10/17/2017 0     Neutrophils Relative 10/17/2017 58     Lymphocytes Relative 10/17/2017 21     Monocytes Relative 10/17/2017 15*    Eosinophils Relative 10/17/2017 5     Basophils Relative 10/17/2017 1     Neutrophils Absolute 10/17/2017 3 46     Lymphocytes Absolute 10/17/2017 1 25     Monocytes Absolute 10/17/2017 0 85     Eosinophils Absolute 10/17/2017 0 28     Basophils Absolute 10/17/2017 0 03     Sodium 10/17/2017 140     Potassium 10/17/2017 4 1     Chloride 10/17/2017 104     CO2 10/17/2017 28     Anion Gap 10/17/2017 8     BUN 10/17/2017 22     Creatinine 10/17/2017 1 03     Glucose, Fasting 10/17/2017 85     Calcium 10/17/2017 10 3*    Corrected Calcium 10/17/2017 10 6*    AST 10/17/2017 17     ALT 10/17/2017 23     Alkaline Phosphatase 10/17/2017 133*    Total Protein 10/17/2017 7 7     Albumin 10/17/2017 3 6     Total Bilirubin 10/17/2017 0 49     eGFR 10/17/2017 81     Angio Convert Enzyme 10/17/2017 138*     No results found for: CHOL, TRIG, HDL, LDLDIRECT  Imaging: No results found  EKG: Normal sinus rhythm  Nonspecific STT changes    Review of Systems:  Review of Systems   Constitutional: Negative for activity change, appetite change, fatigue and fever  HENT: Negative for nosebleeds and sore throat  Eyes: Negative for photophobia and visual disturbance  Respiratory: Positive for cough and shortness of breath  Negative for chest tightness and wheezing  Cardiovascular: Negative for chest pain, palpitations and leg swelling  Gastrointestinal: Negative for abdominal pain, diarrhea, nausea and vomiting  Endocrine: Negative for polyuria  Genitourinary: Negative for dysuria, frequency and hematuria  Musculoskeletal: Negative for arthralgias, back pain and gait problem  Skin: Negative for pallor and rash  Neurological: Positive for syncope  Negative for dizziness, speech difficulty and light-headedness  Hematological: Does not bruise/bleed easily  Psychiatric/Behavioral: Negative for agitation, behavioral problems and confusion         Physical Exam:  Physical Exam   Constitutional: He is oriented to person, place, and time  He appears well-developed and well-nourished  No distress  HENT:   Head: Normocephalic and atraumatic  Nose: Nose normal    Eyes: EOM are normal  Pupils are equal, round, and reactive to light  No scleral icterus  Neck: Normal range of motion  Neck supple  No JVD present  Cardiovascular: Normal rate, regular rhythm, S1 normal and S2 normal   Exam reveals no gallop, no S3, no distant heart sounds and no friction rub  No murmur heard  No systolic murmur is present   Pulmonary/Chest: Effort normal and breath sounds normal  No respiratory distress  He has no wheezes  He has no rales  Abdominal: Soft  Bowel sounds are normal  He exhibits no distension  Musculoskeletal: He exhibits no edema or deformity  Neurological: He is alert and oriented to person, place, and time  No cranial nerve deficit  Skin: Skin is warm and dry  He is not diaphoretic  No erythema  Psychiatric: He has a normal mood and affect  His behavior is normal    Vitals reviewed  Discussion/Summary:  1)Syncope: unclear etiology  Has risk factors of male, age >49, T2DM, HLD  He was screened for cardiac conditions with a stress test that ruled out ischemic etiology  He also had an echocardiogram that ruled out structural heart disease  He also had a Holter monitor to rule out arrhythmic cause  No further testing recommended at this time - will attempt to get records of prior testing  2) RESENDIZ:  As above  3) Mitral Valve Prolapse: recently evaluated severity with an echocardiogram within the past year

## 2018-08-01 LAB
BUN SERPL-MCNC: 28 MG/DL (ref 7–25)
BUN/CREAT SERPL: 16 (CALC) (ref 6–22)
CALCIUM SERPL-MCNC: 9.5 MG/DL (ref 8.6–10.3)
CHLORIDE SERPL-SCNC: 105 MMOL/L (ref 98–110)
CO2 SERPL-SCNC: 30 MMOL/L (ref 20–31)
CREAT SERPL-MCNC: 1.7 MG/DL (ref 0.7–1.33)
GLUCOSE SERPL-MCNC: 127 MG/DL (ref 65–139)
POTASSIUM SERPL-SCNC: 3.4 MMOL/L (ref 3.5–5.3)
SL AMB EGFR AFRICAN AMERICAN: 51 ML/MIN/1.73M2
SL AMB EGFR NON AFRICAN AMERICAN: 44 ML/MIN/1.73M2
SODIUM SERPL-SCNC: 143 MMOL/L (ref 135–146)

## 2018-08-02 ENCOUNTER — TELEPHONE (OUTPATIENT)
Dept: PULMONOLOGY | Facility: CLINIC | Age: 58
End: 2018-08-02

## 2018-08-02 NOTE — TELEPHONE ENCOUNTER
Called patient to see what day he wants to come in but could not get a good time due to his traveling schedule   Told him I will call again tomorrow

## 2018-08-02 NOTE — TELEPHONE ENCOUNTER
Called patient and he stated that this week is not good   Told him I will talk to the doctor and find out when he wants him to come in and will call him back tomorrow

## 2018-08-03 RX ORDER — FLASH GLUCOSE SENSOR
KIT MISCELLANEOUS
Refills: 0 | COMMUNITY
Start: 2018-06-16

## 2018-08-03 RX ORDER — BLOOD SUGAR DIAGNOSTIC
STRIP MISCELLANEOUS
Refills: 4 | COMMUNITY
Start: 2018-06-11 | End: 2021-12-13

## 2018-08-03 RX ORDER — FLASH GLUCOSE SENSOR
KIT MISCELLANEOUS
Refills: 0 | COMMUNITY
Start: 2018-07-09

## 2018-08-06 ENCOUNTER — OFFICE VISIT (OUTPATIENT)
Dept: PULMONOLOGY | Facility: CLINIC | Age: 58
End: 2018-08-06
Payer: COMMERCIAL

## 2018-08-06 VITALS
HEART RATE: 77 BPM | BODY MASS INDEX: 22.8 KG/M2 | WEIGHT: 172 LBS | TEMPERATURE: 97.2 F | HEIGHT: 73 IN | SYSTOLIC BLOOD PRESSURE: 115 MMHG | RESPIRATION RATE: 18 BRPM | OXYGEN SATURATION: 99 % | DIASTOLIC BLOOD PRESSURE: 62 MMHG

## 2018-08-06 DIAGNOSIS — D86.9 SARCOIDOSIS: Primary | ICD-10-CM

## 2018-08-06 DIAGNOSIS — R05.3 CHRONIC COUGH: ICD-10-CM

## 2018-08-06 DIAGNOSIS — E83.52 HYPERCALCEMIA: ICD-10-CM

## 2018-08-06 DIAGNOSIS — N17.9 AKI (ACUTE KIDNEY INJURY) (HCC): ICD-10-CM

## 2018-08-06 PROBLEM — R93.89 ABNORMAL CHEST CT: Status: ACTIVE | Noted: 2017-09-07

## 2018-08-06 PROCEDURE — 99215 OFFICE O/P EST HI 40 MIN: CPT | Performed by: INTERNAL MEDICINE

## 2018-08-06 RX ORDER — SULFAMETHOXAZOLE AND TRIMETHOPRIM 800; 160 MG/1; MG/1
1 TABLET ORAL 3 TIMES WEEKLY
Qty: 12 TABLET | Refills: 3 | Status: SHIPPED | OUTPATIENT
Start: 2018-08-06 | End: 2018-11-14 | Stop reason: SDUPTHER

## 2018-08-06 RX ORDER — PREDNISONE 20 MG/1
40 TABLET ORAL DAILY
Qty: 60 TABLET | Refills: 3 | Status: SHIPPED | OUTPATIENT
Start: 2018-08-06 | End: 2018-12-12

## 2018-08-06 RX ORDER — PREDNISONE 20 MG/1
20 TABLET ORAL
COMMUNITY
Start: 2018-05-18 | End: 2018-08-06

## 2018-08-06 NOTE — ASSESSMENT & PLAN NOTE
This is secondary to sarcoidosis, will treat with prednisone and monitor closely with laps  Also patient  Is counseled to keep himself hydrated with at least 2 L of fluids daily  And to avoid any nephrotoxic medications

## 2018-08-06 NOTE — ASSESSMENT & PLAN NOTE
Improved initially with Arnuity then resolved with prednisone as per patient  I believe it may be related to lymphadenopathy from sarcoidosis    At this time I will stop Arnuity  Since patient will be on high-dose steroids

## 2018-08-06 NOTE — PROGRESS NOTES
Progress note - Pulmonary Medicine   Janis Davalos 65 y o  male MRN: [id number]       Impression & Plan:     Sarcoidosis   Patient has extrapulmonary sarcoidosis with diffuse lymphadenopathy and now complicated with hypercalcemia leading to renal failure  He is currently on prednisone 40 mg and improving  We had long discussion about sarcoid management and manifestations  My plan will include the following:  -   Continue prednisone 40 mg for at least 6-8 weeks and then improving we will start tapering down very slowly by 5 mg every 2-4 weeks and then I will maintain him on lower dose for at least 1 year total of corticosteroids  Will decide on the lower dose needed to control his disease  - will check BMP next week and also in 6 weeks prior to his next visit  -  Will check ACE-inhibitor level next week and this may help monitoring his response to treatment  -  I asked him to notify his ophthalmologist next visit to check him for any sarcoidosis ophthalmic  Manifestations  -  Will start on Bactrim 3 times weekly for PCP prophylaxis and will keep monitoring his kidney function  Closely  -  I still would like to see echocardiogram report and I asked him to get me that to have it on records specially with his history of syncopal episode  If he has any syncope in the future I will consider doing cardiac MRI to rule out  Cardiac sarcoidosis  -  We can monitor PFTs next year    Hypercalcemia   This is secondary to sarcoidosis, will treat with prednisone and monitor closely with laps  Also patient  Is counseled to keep himself hydrated with at least 2 L of fluids daily  And to avoid any nephrotoxic medications  Chronic cough    Improved initially with Arnuity then resolved with prednisone as per patient  I believe it may be related to lymphadenopathy from sarcoidosis    At this time I will stop Arnuity  Since patient will be on high-dose steroids    BJ (acute kidney injury) ([facility] [address] )   Will continue to monitor his kidney function and worked together with his nephrologist   Continue prednisone for now  I cold is nephrologist office and spoke to Vinod Zamorano  His nurse practitioner and notified her about starting Bactrim 3 times weekly for  PCP prophylaxis and she  Agreed  Patient will call in the next 4-6 weeks to schedule a follow-up appointment, I told him I can see him whenever he is in the area  I spent more than 50 minutes with this patient reviewing his records and discussing /indicating him about his condition and also explaining management plan and also calling his nephrologist   ______________________________________________________________________    HPI:    Dariel Mccormick presents today for follow-up of Sarcoidosis with recent complications of renal failure and hypercalcemia  patient is known to our practice for the past several months initially presented with cough  That was initially attributed to postnasal drip/ GERD but also yet CT scan done  At outside facility and the report came back later with mediastinal lymph nodes, I sent him for PET scan that showed diffuse lymphadenopathy and he had inguinal lymph node excisional biopsy that came positive with noncaseating granulomatous disease consistent with sarcoidosis  Last visit was in January 2018 when patient was completely asymptomatic with a history of to syncopal episodes but had some workup outside facility but at that time I did have records with echocardiogram and also had MRI of the brain that was not available to me at that time  His pulmonary function test was completely normal   At that time I chose to treat him with inhaled corticosteroid with our new T4 his chronic cough that help decreasing the cough somehow as he states    Around May he had increased fatigue and lethargy and felt slightly confused and found to have hypercalcemia with calcium more than 13 and acute renal failure creatinine more than 3, he was admitted to the hospital and treated with IV fluids and started on steroids and was discharged on prednisone 20 mg daily  In mid July had lab work that showed persistent hypercalcemia and he was admitted to the hospital at [facility] incur and  Increase his prednisone to 40 mg daily  Since then he feels fine and he denies any complaints at this point  His cough is resolve completely, denies any shortness of breath or dyspnea on exertion, denies any weight loss, denies any chest pain or fevers or chills  Patient denies any skin lesions  He denies any headache or visual changes or any focal numbness or weakness  He denies any dizziness or syncopal episodes  Couple of weeks ago he noticed a lymph node in  The right side of his neck that resolved  Currently has been on prednisone 40 mg for about 3 weeks  He never smoked cigarettes  Review of Systems:  Review of Systems   Constitutional: Negative  HENT: Negative  Eyes: Negative  Respiratory: Negative  Cardiovascular: Negative  Gastrointestinal: Negative  Endocrine: Negative  Genitourinary: Negative  Musculoskeletal: Negative  Skin: Negative  Allergic/Immunologic: Negative  Neurological: Negative  Hematological: Negative  Psychiatric/Behavioral: Negative            Past medical history, surgical history, and family history were reviewed and updated as appropriate    Social history updates:  History   Smoking Status    Never Smoker   Smokeless Tobacco    Never Used       PhysicalExamination:  Vitals:   /62 (BP Location: Left arm, Patient Position: Sitting, Cuff Size: Adult)   Pulse 77   Temp (!) 97 2 °F (36 2 °C) (Tympanic)   Resp 18   Ht 6' 1" (1 854 m)   Wt 78 kg (172 lb)   SpO2 99%   BMI 22 69 kg/m²     General: alert, not in acute distress  HEENT: PERRL, no icteric sclera or cyanosis, no thrush  Neck:  Supple, no lymphadenopathy or thyromegaly, no JVD  Lungs:  Equal breath sounds and clear auscultations bilaterally, no wheezing or crackles  Heart: S1S2 regular, no murmures or gallops  Abdomen: soft, non-tender, bowel sounds  present  Extrimities: no edema, no clubbing or cyanosis  Neuro: Alert and oriented x 3, no focal neurodeficits   Skin: intact, no rashes    Diagnostic Data:  Labs: I personally reviewed the most recent laboratory data pertinent to today's visit    Lab Results   Component Value Date    WBC 4 9 12/09/2017    HGB 12 0 (L) 12/09/2017    HCT 35 0 (L) 12/09/2017    MCV 90 7 12/09/2017     12/09/2017     Lab Results   Component Value Date    GLUCOSE 150 (H) 10/21/2017    CALCIUM 10 0 12/09/2017     12/09/2017    K 4 2 12/09/2017    CO2 32 (H) 12/09/2017     12/09/2017    BUN 19 12/09/2017    CREATININE 0 96 12/09/2017     No results found for: IGE  Lab Results   Component Value Date    ALT 18 12/09/2017    AST 22 12/09/2017    ALKPHOS 75 12/09/2017    BILITOT 0 5 12/09/2017       PFT results: The most recent pulmonary function tests were reviewed  Completely normal pulmonary function test done in the past    Imaging:  I personally reviewed the images on the [facility] system pertinent to today's visit   reviewed CT scan with patient on PACs: IMPRESSION:   1   Multiple FDG avid lymph nodes in the bilateral neck, chest, abdomen and pelvis as noted above compatible with hypermetabolic malignancy  Given the distribution lymphoproliferative disease should be considered  2   Innumerable pulmonary nodules some with mild FDG uptake  Activity may be underestimated due to their small size, pulmonary metastasis is not excluded  3   Small focus of FDG uptake in a subcutaneous nodule inferior to the right shoulder, question subcutaneous metastasis  4  Intense FDG uptake at the spleen for which splenic involvement should be considered    5   A few mild foci of FDG uptake in the osseous structures for which osseous metastasis is not excluded including of the right posterior 12th rib and T10 vertebral body  6   Mildly prominent left extra renal pelvis which contains a 4 mm calculus    This does not appear to be obstructing        MRI of brain at outside facility report stating normal MRI with no abnormalities    Other studies:    EKG from February 2018 reviewed:  Normal sinus rhythm, no acute or specific ST /T-wave abnormalities, no conduction delay or abnormalities    Benjamín Francis MD

## 2018-08-06 NOTE — ASSESSMENT & PLAN NOTE
Patient has extrapulmonary sarcoidosis with diffuse lymphadenopathy and now complicated with hypercalcemia leading to renal failure  He is currently on prednisone 40 mg and improving  We had long discussion about sarcoid management and manifestations  My plan will include the following:  -   Continue prednisone 40 mg for at least 6-8 weeks and then improving we will start tapering down very slowly by 5 mg every 2-4 weeks and then I will maintain him on lower dose for at least 1 year total of corticosteroids  Will decide on the lower dose needed to control his disease  - will check BMP next week and also in 6 weeks prior to his next visit  -  Will check ACE-inhibitor level next week and this may help monitoring his response to treatment  -  I asked him to notify his ophthalmologist next visit to check him for any sarcoidosis ophthalmic  Manifestations  -  Will start on Bactrim 3 times weekly for PCP prophylaxis and will keep monitoring his kidney function  Closely  -  I still would like to see echocardiogram report and I asked him to get me that to have it on records specially with his history of syncopal episode  If he has any syncope in the future I will consider doing cardiac MRI to rule out  Cardiac sarcoidosis    -  We can monitor PFTs next year

## 2018-08-06 NOTE — ASSESSMENT & PLAN NOTE
Will continue to monitor his kidney function and worked together with his nephrologist   Continue prednisone for now  I cold is nephrologist office and spoke to Kayode Moya  His nurse practitioner and notified her about starting Bactrim 3 times weekly for  PCP prophylaxis and she  Agreed

## 2018-08-11 ENCOUNTER — APPOINTMENT (OUTPATIENT)
Dept: LAB | Facility: HOSPITAL | Age: 58
End: 2018-08-11
Attending: INTERNAL MEDICINE
Payer: COMMERCIAL

## 2018-08-11 DIAGNOSIS — D86.9 SARCOIDOSIS: ICD-10-CM

## 2018-08-11 DIAGNOSIS — N17.9 AKI (ACUTE KIDNEY INJURY) (HCC): ICD-10-CM

## 2018-08-11 DIAGNOSIS — E83.52 HYPERCALCEMIA: ICD-10-CM

## 2018-08-11 LAB
ANION GAP SERPL CALCULATED.3IONS-SCNC: 2 MMOL/L (ref 4–13)
BUN SERPL-MCNC: 26 MG/DL (ref 5–25)
CALCIUM SERPL-MCNC: 9.6 MG/DL (ref 8.3–10.1)
CHLORIDE SERPL-SCNC: 102 MMOL/L (ref 100–108)
CO2 SERPL-SCNC: 31 MMOL/L (ref 21–32)
CREAT SERPL-MCNC: 1.97 MG/DL (ref 0.6–1.3)
GFR SERPL CREATININE-BSD FRML MDRD: 37 ML/MIN/1.73SQ M
GLUCOSE P FAST SERPL-MCNC: 219 MG/DL (ref 65–99)
POTASSIUM SERPL-SCNC: 4.2 MMOL/L (ref 3.5–5.3)
SODIUM SERPL-SCNC: 135 MMOL/L (ref 136–145)

## 2018-08-11 PROCEDURE — 82164 ANGIOTENSIN I ENZYME TEST: CPT

## 2018-08-11 PROCEDURE — 36415 COLL VENOUS BLD VENIPUNCTURE: CPT

## 2018-08-11 PROCEDURE — 80048 BASIC METABOLIC PNL TOTAL CA: CPT

## 2018-08-13 LAB — ACE SERPL-CCNC: 59 U/L (ref 14–82)

## 2018-09-14 ENCOUNTER — OFFICE VISIT (OUTPATIENT)
Dept: PULMONOLOGY | Facility: CLINIC | Age: 58
End: 2018-09-14
Payer: COMMERCIAL

## 2018-09-14 VITALS
OXYGEN SATURATION: 99 % | WEIGHT: 170 LBS | HEIGHT: 73 IN | TEMPERATURE: 97.8 F | BODY MASS INDEX: 22.53 KG/M2 | DIASTOLIC BLOOD PRESSURE: 74 MMHG | SYSTOLIC BLOOD PRESSURE: 120 MMHG | HEART RATE: 70 BPM

## 2018-09-14 DIAGNOSIS — R80.9 TYPE 2 DIABETES MELLITUS WITH MICROALBUMINURIA, UNSPECIFIED WHETHER LONG TERM INSULIN USE (HCC): ICD-10-CM

## 2018-09-14 DIAGNOSIS — E11.29 TYPE 2 DIABETES MELLITUS WITH MICROALBUMINURIA, UNSPECIFIED WHETHER LONG TERM INSULIN USE (HCC): ICD-10-CM

## 2018-09-14 DIAGNOSIS — N17.9 AKI (ACUTE KIDNEY INJURY) (HCC): ICD-10-CM

## 2018-09-14 DIAGNOSIS — R05.3 CHRONIC COUGH: ICD-10-CM

## 2018-09-14 DIAGNOSIS — D86.9 SARCOIDOSIS: Primary | ICD-10-CM

## 2018-09-14 DIAGNOSIS — E83.52 HYPERCALCEMIA: ICD-10-CM

## 2018-09-14 PROCEDURE — 99214 OFFICE O/P EST MOD 30 MIN: CPT | Performed by: INTERNAL MEDICINE

## 2018-09-14 NOTE — PROGRESS NOTES
Progress note - Pulmonary Medicine   Anju Ramirez 65 y o  male MRN: [id number]       Impression & Plan:     Type 2 diabetes mellitus with microalbuminuria ([facility] [address] )  Currently with steroid induced hyperglycemia as well  I will try to wean steroids faster than I planned before and patient will continue his insulin and monitoring his blood sugar        BJ (acute kidney injury) ([facility] [address] )  Will follow on blood work in the near future when he sees his nephrologist and also I ordered BMP for early November prior to his next follow-up visit  I encouraged him to continue hydrating himself with at least 2-3 L of fluid every day    Sarcoidosis  Currently on corticosteroids with prednisone 40 mg daily  He finished 6-8 weeks of 40 mg and with significant hyperglycemia will start tapering to 30 mg daily starting tomorrow for another 4 weeks  In mid October he will start tapering to 20 mg for 2 weeks then 10 mg after that and he will stay on that until I see him next  If his kidney function and calcium remain controlled on 10 mg then will keep him on that dose for few months if possible given his hyperglycemia  Otherwise either we have to go up on the prednisone then to consider a steroid sparing agent such as methotrexate  Hypercalcemia  Last lab work his calcium was normal, continue to monitor while on steroids    Chronic cough  Resolved completely, probably was related to his sarcoidosis and improved currently on steroids  ______________________________________________________________________    HPI:    Anju Ramirez presents today for follow-up of sarcoidosis with lymphadenopathy and also complicated with hypercalcemia and acute kidney injury  Patient presents today for follow-up, he is doing fine, denies any complaints of shortness of breath or chest pain, denies any weight loss or weight gain, denies any fever or chills or night sweats  He used to have chronic cough that improved completely    Currently he is on prednisone 40 mg daily and this has been going for about 6-8 weeks  He has diabetes and his blood sugar has been running between 200-300 mg/dL while getting Lantus anyway between 35-45 units at night and sliding scale insulin  He is trying to stay hydrated with drinking at least 2 L of water every day plus some lemon juice that was prescribed to him by his urologist to prevent kidney stones  Otherwise patient denies any arthralgias or rashes, denies any blurred vision, denies any dizziness or syncopal episodes, denies any chest pain or palpitation or shortness of Breath  He has follow up with Nephrology in the next few weeks  He never smoked cigarettes  Review of Systems:  Review of Systems   Constitutional: Negative  HENT: Negative  Eyes: Negative  Respiratory:        As HPI   Cardiovascular: Negative  Gastrointestinal: Negative  Endocrine: Negative  Genitourinary: Negative  Musculoskeletal: Negative  Skin: Negative  Allergic/Immunologic: Negative  Neurological: Negative  Hematological: Negative  Psychiatric/Behavioral: Negative            Past medical history, surgical history, and family history were reviewed and updated as appropriate    Social history updates:  History   Smoking Status    Never Smoker   Smokeless Tobacco    Never Used       PhysicalExamination:  Vitals:   /74 (BP Location: Left arm, Patient Position: Sitting, Cuff Size: Standard)   Pulse 70   Temp 97 8 °F (36 6 °C) (Tympanic)   Ht 6' 1" (1 854 m)   Wt 77 1 kg (170 lb)   SpO2 99%   BMI 22 43 kg/m²     General: alert, not in acute distress  HEENT: PERRL, no icteric sclera or cyanosis, no thrush, mild moon facies   Neck:  Supple, no lymphadenopathy or thyromegaly, no JVD  Lungs:  Equal breath sounds and clear auscultations bilaterally, no wheezing or crackles  Heart: S1S2 regular, 2/6 systolic murmur at apex  Abdomen: soft, non-tender, bowel sounds  present  Extrimities: no edema, no clubbing or cyanosis  Neuro: Alert and oriented x 3, no focal neurodeficits   Skin: intact, no rashes    Diagnostic Data:  Labs:   I personally reviewed the most recent laboratory data pertinent to today's visit    Lab Results   Component Value Date    WBC 4 9 12/09/2017    HGB 12 0 (L) 12/09/2017    HCT 35 0 (L) 12/09/2017    MCV 90 7 12/09/2017     12/09/2017     Lab Results   Component Value Date    CALCIUM 9 6 08/11/2018     (L) 08/11/2018    K 4 2 08/11/2018    CO2 31 08/11/2018     08/11/2018    BUN 26 (H) 08/11/2018    CREATININE 1 97 (H) 08/11/2018     No results found for: IGE  Lab Results   Component Value Date    ALT 18 12/09/2017    AST 22 12/09/2017    ALKPHOS 75 12/09/2017    BILITOT 0 5 12/09/2017           Jose Mclean MD

## 2018-09-14 NOTE — ASSESSMENT & PLAN NOTE
Will follow on blood work in the near future when he sees his nephrologist and also I ordered BMP for early November prior to his next follow-up visit  I encouraged him to continue hydrating himself with at least 2-3 L of fluid every day

## 2018-09-14 NOTE — ASSESSMENT & PLAN NOTE
Currently with steroid induced hyperglycemia as well    I will try to wean steroids faster than I planned before and patient will continue his insulin and monitoring his blood sugar

## 2018-09-14 NOTE — ASSESSMENT & PLAN NOTE
Currently on corticosteroids with prednisone 40 mg daily  He finished 6-8 weeks of 40 mg and with significant hyperglycemia will start tapering to 30 mg daily starting tomorrow for another 4 weeks  In mid October he will start tapering to 20 mg for 2 weeks then 10 mg after that and he will stay on that until I see him next  If his kidney function and calcium remain controlled on 10 mg then will keep him on that dose for few months if possible given his hyperglycemia  Otherwise either we have to go up on the prednisone then to consider a steroid sparing agent such as methotrexate

## 2018-10-01 LAB
ALBUMIN SERPL-MCNC: 3.9 G/DL (ref 3.6–5.1)
APPEARANCE UR: CLEAR
BACTERIA UR QL AUTO: NORMAL /HPF
BASOPHILS # BLD AUTO: 31 CELLS/UL (ref 0–200)
BASOPHILS NFR BLD AUTO: 0.5 %
BILIRUB UR QL STRIP: NEGATIVE
BUN SERPL-MCNC: 33 MG/DL (ref 7–25)
BUN/CREAT SERPL: 20 (CALC) (ref 6–22)
CALCIUM SERPL-MCNC: 9.9 MG/DL (ref 8.6–10.3)
CALCIUM SERPL-MCNC: 9.9 MG/DL (ref 8.6–10.3)
CHLORIDE SERPL-SCNC: 104 MMOL/L (ref 98–110)
CO2 SERPL-SCNC: 29 MMOL/L (ref 20–32)
COLOR UR: YELLOW
CREAT SERPL-MCNC: 1.61 MG/DL (ref 0.7–1.33)
CREAT UR-MCNC: 131 MG/DL (ref 20–320)
EOSINOPHIL # BLD AUTO: 118 CELLS/UL (ref 15–500)
EOSINOPHIL NFR BLD AUTO: 1.9 %
ERYTHROCYTE [DISTWIDTH] IN BLOOD BY AUTOMATED COUNT: 12.4 % (ref 11–15)
GLUCOSE SERPL-MCNC: 147 MG/DL (ref 65–139)
GLUCOSE UR QL STRIP: NEGATIVE
HCT VFR BLD AUTO: 39.7 % (ref 38.5–50)
HGB BLD-MCNC: 13.3 G/DL (ref 13.2–17.1)
HGB UR QL STRIP: NEGATIVE
HYALINE CASTS #/AREA URNS LPF: NORMAL /LPF
KETONES UR QL STRIP: NEGATIVE
LEUKOCYTE ESTERASE UR QL STRIP: NEGATIVE
LYMPHOCYTES # BLD AUTO: 1637 CELLS/UL (ref 850–3900)
LYMPHOCYTES NFR BLD AUTO: 26.4 %
MAGNESIUM SERPL-MCNC: 1.9 MG/DL (ref 1.5–2.5)
MCH RBC QN AUTO: 31.5 PG (ref 27–33)
MCHC RBC AUTO-ENTMCNC: 33.5 G/DL (ref 32–36)
MCV RBC AUTO: 94.1 FL (ref 80–100)
MONOCYTES # BLD AUTO: 490 CELLS/UL (ref 200–950)
MONOCYTES NFR BLD AUTO: 7.9 %
NEUTROPHILS # BLD AUTO: 3925 CELLS/UL (ref 1500–7800)
NEUTROPHILS NFR BLD AUTO: 63.3 %
NITRITE UR QL STRIP: NEGATIVE
PH UR STRIP: 6.5 [PH] (ref 5–8)
PHOSPHATE SERPL-MCNC: 3 MG/DL (ref 2.5–4.5)
PLATELET # BLD AUTO: 276 THOUSAND/UL (ref 140–400)
PMV BLD REES-ECKER: 10.1 FL (ref 7.5–12.5)
POTASSIUM SERPL-SCNC: 3.5 MMOL/L (ref 3.5–5.3)
PROT UR QL STRIP: NEGATIVE
PROT UR-MCNC: 16 MG/DL (ref 5–25)
PROT/CREAT UR: 122 MG/G CREAT (ref 22–128)
PTH-INTACT SERPL-MCNC: 23 PG/ML (ref 14–64)
RBC # BLD AUTO: 4.22 MILLION/UL (ref 4.2–5.8)
RBC #/AREA URNS HPF: NORMAL /HPF
SL AMB EGFR AFRICAN AMERICAN: 55 ML/MIN/1.73M2
SL AMB EGFR NON AFRICAN AMERICAN: 47 ML/MIN/1.73M2
SODIUM SERPL-SCNC: 142 MMOL/L (ref 135–146)
SP GR UR STRIP: 1.02 (ref 1–1.03)
SQUAMOUS #/AREA URNS HPF: NORMAL /HPF
WBC # BLD AUTO: 6.2 THOUSAND/UL (ref 3.8–10.8)
WBC #/AREA URNS HPF: NORMAL /HPF

## 2018-11-14 DIAGNOSIS — E83.52 HYPERCALCEMIA: ICD-10-CM

## 2018-11-14 DIAGNOSIS — D86.9 SARCOIDOSIS: ICD-10-CM

## 2018-11-14 RX ORDER — SULFAMETHOXAZOLE AND TRIMETHOPRIM 800; 160 MG/1; MG/1
1 TABLET ORAL 3 TIMES WEEKLY
Qty: 12 TABLET | Refills: 0 | Status: SHIPPED | OUTPATIENT
Start: 2018-11-14 | End: 2018-12-12

## 2018-12-12 ENCOUNTER — OFFICE VISIT (OUTPATIENT)
Dept: PULMONOLOGY | Facility: CLINIC | Age: 58
End: 2018-12-12
Payer: COMMERCIAL

## 2018-12-12 VITALS
WEIGHT: 178 LBS | TEMPERATURE: 97.7 F | HEIGHT: 73 IN | SYSTOLIC BLOOD PRESSURE: 138 MMHG | RESPIRATION RATE: 18 BRPM | HEART RATE: 77 BPM | BODY MASS INDEX: 23.59 KG/M2 | DIASTOLIC BLOOD PRESSURE: 78 MMHG | OXYGEN SATURATION: 99 %

## 2018-12-12 DIAGNOSIS — R94.8 ABNORMAL POSITRON EMISSION TOMOGRAPHY (PET) SCAN: ICD-10-CM

## 2018-12-12 DIAGNOSIS — N17.9 AKI (ACUTE KIDNEY INJURY) (HCC): ICD-10-CM

## 2018-12-12 DIAGNOSIS — D86.9 SARCOIDOSIS: Primary | ICD-10-CM

## 2018-12-12 DIAGNOSIS — R05.3 CHRONIC COUGH: ICD-10-CM

## 2018-12-12 DIAGNOSIS — E83.52 HYPERCALCEMIA: ICD-10-CM

## 2018-12-12 DIAGNOSIS — Z23 NEED FOR INFLUENZA VACCINATION: ICD-10-CM

## 2018-12-12 DIAGNOSIS — R06.00 DOE (DYSPNEA ON EXERTION): ICD-10-CM

## 2018-12-12 PROBLEM — K21.9 GERD (GASTROESOPHAGEAL REFLUX DISEASE): Status: RESOLVED | Noted: 2017-09-07 | Resolved: 2018-12-12

## 2018-12-12 PROBLEM — R06.09 DOE (DYSPNEA ON EXERTION): Status: RESOLVED | Noted: 2017-09-07 | Resolved: 2018-12-12

## 2018-12-12 PROCEDURE — 99215 OFFICE O/P EST HI 40 MIN: CPT | Performed by: INTERNAL MEDICINE

## 2018-12-12 PROCEDURE — 90471 IMMUNIZATION ADMIN: CPT

## 2018-12-12 PROCEDURE — 90682 RIV4 VACC RECOMBINANT DNA IM: CPT

## 2018-12-12 RX ORDER — INSULIN ASPART 100 [IU]/ML
INJECTION, SOLUTION INTRAVENOUS; SUBCUTANEOUS
Refills: 2 | COMMUNITY
Start: 2018-11-05 | End: 2018-12-12 | Stop reason: SDUPTHER

## 2018-12-12 RX ORDER — PREDNISONE 10 MG/1
10 TABLET ORAL DAILY
Qty: 30 TABLET | Refills: 6 | Status: SHIPPED | OUTPATIENT
Start: 2018-12-12 | End: 2019-03-11 | Stop reason: SDUPTHER

## 2018-12-12 NOTE — ASSESSMENT & PLAN NOTE
Patient is asymptomatic currently with prednisone 20 mg, last lab work was 3 months ago that showed improvement in calcium and creatinine  I will order BMP and calcium for today, planning to decrease his prednisone to 10 mg daily and remained on that until next visit and he can stop the Bactrim for PCP prophylaxis  I will monitor his calcium and BMP prior to next visit in 3 months  I doubt any cardiac or neuro- sarcoid but  Will monitor his right foot numbness and if it gets worse he may need Neurology evaluation  I doubt his lightheadedness is related to sarcoidosis

## 2018-12-12 NOTE — PROGRESS NOTES
Progress note - Pulmonary Medicine   Alba Conn 63 y o  male MRN: [id number]       Impression & Plan:     BJ (acute kidney injury) [facility])   Last lab work showed some improvement in creatinine, will continue to monitor, I will send patient for BMP today and also in 3 months to monitor stability on 10 mg of prednisone  Continue to follow with Nephrology  Sarcoidosis    Patient is asymptomatic currently with prednisone 20 mg, last lab work was 3 months ago that showed improvement in calcium and creatinine  I will order BMP and calcium for today, planning to decrease his prednisone to 10 mg daily and remained on that until next visit and he can stop the Bactrim for PCP prophylaxis  I will monitor his calcium and BMP prior to next visit in 3 months  I doubt any cardiac or neuro- sarcoid but  Will monitor his right foot numbness and if it gets worse he may need Neurology evaluation  I doubt his lightheadedness is related to sarcoidosis  Hypercalcemia    Continue prednisone and monitor calcium as described above  Need for influenza vaccination    Will give influenza vaccination today    Abnormal positron emission tomography (PET) scan   I believe his diffuse lymphadenopathy secondary to sarcoidosis but also patient had some  PET positive bone lesions  Which may be secondary to sarcoidosis but also cannot rule out other etiology  I will repeat the PET scan to confirm stability/ possible resolution on treatment of sarcoidosis        ______________________________________________________________________    HPI:    Alba Conn presents today for follow-up of Sarcoidosis with diffuse lymphadenopathy complicated with hypercalcemia and acute renal failure     patient presents today feeling better, denying any complaints of shortness of breath and his cough is completely resolved, he denies any chest pain or fever or chills, denies any chest pain or syncopal episodes as in the past   He sometimes has mild dizziness when he stands up,  And also he has this feeling of " had fullness" after heavy meal sometimes  Otherwise he sometimes complain of chronic mild numbness at a small area in his  Right sole that gets worse after squatting for long time due to his work  Denies any chronic headache or blurry vision, denies any focal numbness or weakness in his extremities or face  Apart from what described  Above  Patient does not smoke cigarettes  He denies weight loss, actually he gained a few lb in the past few months  He denies any sleep disturbance  He has diabetes and currently on Lantus and aspirate insulin with fluctuating blood glucose  Review of Systems:  Review of Systems   Constitutional: Negative  HENT: Negative  Eyes: Negative  Respiratory: Negative  Cardiovascular: Negative  Gastrointestinal: Negative  Endocrine: Negative  Genitourinary: Negative  Musculoskeletal: Negative  Skin: Negative  Allergic/Immunologic: Negative  Neurological:          As HPI   Hematological: Negative  Psychiatric/Behavioral: Negative            Past medical history, surgical history, and family history were reviewed and updated as appropriate    Social history updates:  History   Smoking Status    Never Smoker   Smokeless Tobacco    Never Used       PhysicalExamination:  Vitals:   /78 (BP Location: Right arm, Patient Position: Sitting, Cuff Size: Adult)   Pulse 77   Temp 97 7 °F (36 5 °C) (Tympanic)   Resp 18   Ht 6' 1" (1 854 m)   Wt 80 7 kg (178 lb)   SpO2 99% Comment: ROOM AIR  BMI 23 48 kg/m²      General: alert, not in acute distress  HEENT: PERRL, no icteric sclera or cyanosis, no thrush  Neck:  Supple, no lymphadenopathy or thyromegaly, no JVD  Lungs:  Equal breath sounds and clear auscultations bilaterally, no wheezing or crackles  Heart: S1S2 regular, no murmures or gallops  Abdomen: soft, non-tender, bowel sounds  present  Extrimities: no edema, no clubbing or cyanosis  Neuro: Alert and oriented x 3, no focal neurodeficits   Skin: intact, no rashes    Diagnostic Data:  Labs: I personally reviewed the most recent laboratory data pertinent to today's visit    Lab Results   Component Value Date    WBC 6 2 09/29/2018    HGB 13 3 09/29/2018    HCT 39 7 09/29/2018    MCV 94 1 09/29/2018     09/29/2018     Lab Results   Component Value Date    SODIUM 142 09/29/2018    K 3 5 09/29/2018    CO2 29 09/29/2018     09/29/2018    BUN 33 (H) 09/29/2018    CREATININE 1 97 (H) 08/11/2018    CALCIUM 9 9 09/29/2018    CALCIUM 9 9 09/29/2018     No results found for: IGE  Lab Results   Component Value Date    ALT 18 12/09/2017    AST 22 12/09/2017    ALKPHOS 75 12/09/2017    BILITOT 0 5 12/09/2017      creatinine on 9/29/2018 was 1 61     PFT results: The most recent pulmonary function tests were reviewed  · Normal lung volumes  · Normal airflow on vital capacity  · Normal diffusion capacity  · Normal airway resistance as indicated by the specific airway conductance  · No bronchial hyperresponsiveness on the methacholine challenge test     Imaging:  I personally reviewed the images on the [facility] system pertinent to today's visit  IMPRESSION:   1   Multiple FDG avid lymph nodes in the bilateral neck, chest, abdomen and pelvis as noted above compatible with hypermetabolic malignancy  Given the distribution lymphoproliferative disease should be considered  2   Innumerable pulmonary nodules some with mild FDG uptake  Activity may be underestimated due to their small size, pulmonary metastasis is not excluded  3   Small focus of FDG uptake in a subcutaneous nodule inferior to the right shoulder, question subcutaneous metastasis  4  Intense FDG uptake at the spleen for which splenic involvement should be considered    5   A few mild foci of FDG uptake in the osseous structures for which osseous metastasis is not excluded including of the right posterior 12th rib and T10 vertebral body  6   Mildly prominent left extra renal pelvis which contains a 4 mm calculus  This does not appear to be obstructing        Zhanna Guidry MD

## 2018-12-12 NOTE — ASSESSMENT & PLAN NOTE
Last lab work showed some improvement in creatinine, will continue to monitor, I will send patient for BMP today and also in 3 months to monitor stability on 10 mg of prednisone  Continue to follow with Nephrology

## 2018-12-12 NOTE — ASSESSMENT & PLAN NOTE
I believe his diffuse lymphadenopathy secondary to sarcoidosis but also patient had some  PET positive bone lesions  Which may be secondary to sarcoidosis but also cannot rule out other etiology     I will repeat the PET scan to confirm stability/ possible resolution on treatment of sarcoidosis

## 2018-12-13 LAB
BUN SERPL-MCNC: 25 MG/DL (ref 7–25)
BUN/CREAT SERPL: 17 (CALC) (ref 6–22)
CA-I SERPL-MCNC: 5.6 MG/DL (ref 4.8–5.6)
CALCIUM SERPL-MCNC: 9.9 MG/DL (ref 8.6–10.3)
CHLORIDE SERPL-SCNC: 103 MMOL/L (ref 98–110)
CO2 SERPL-SCNC: 33 MMOL/L (ref 20–32)
CREAT SERPL-MCNC: 1.44 MG/DL (ref 0.7–1.33)
GLUCOSE SERPL-MCNC: 247 MG/DL (ref 65–139)
POTASSIUM SERPL-SCNC: 4.6 MMOL/L (ref 3.5–5.3)
SL AMB EGFR AFRICAN AMERICAN: 62 ML/MIN/1.73M2
SL AMB EGFR NON AFRICAN AMERICAN: 54 ML/MIN/1.73M2
SODIUM SERPL-SCNC: 139 MMOL/L (ref 135–146)

## 2018-12-20 ENCOUNTER — HOSPITAL ENCOUNTER (OUTPATIENT)
Dept: RADIOLOGY | Age: 58
Discharge: HOME | End: 2018-12-20

## 2018-12-20 DIAGNOSIS — R94.8 ABNORMAL POSITRON EMISSION TOMOGRAPHY (PET) SCAN: ICD-10-CM

## 2018-12-20 DIAGNOSIS — D86.9 SARCOIDOSIS: ICD-10-CM

## 2019-02-18 ENCOUNTER — HOSPITAL ENCOUNTER (OUTPATIENT)
Dept: RADIOLOGY | Age: 59
Discharge: HOME | End: 2019-02-18
Payer: COMMERCIAL

## 2019-02-18 LAB
BUN SERPL-MCNC: 24 MG/DL (ref 7–25)
BUN/CREAT SERPL: 15 (CALC) (ref 6–22)
CA-I SERPL-MCNC: 5.4 MG/DL (ref 4.8–5.6)
CALCIUM SERPL-MCNC: 9.6 MG/DL (ref 8.6–10.3)
CHLORIDE SERPL-SCNC: 104 MMOL/L (ref 98–110)
CO2 SERPL-SCNC: 29 MMOL/L (ref 20–32)
CREAT SERPL-MCNC: 1.62 MG/DL (ref 0.7–1.33)
GLUCOSE SERPL-MCNC: 197 MG/DL (ref 65–99)
GLUCOSE SERPL-MCNC: 215 MG/DL (ref 65–140)
GLUCOSE SERPL-MCNC: 219 MG/DL (ref 65–140)
POTASSIUM SERPL-SCNC: 3.9 MMOL/L (ref 3.5–5.3)
SL AMB EGFR AFRICAN AMERICAN: 54 ML/MIN/1.73M2
SL AMB EGFR NON AFRICAN AMERICAN: 46 ML/MIN/1.73M2
SODIUM SERPL-SCNC: 140 MMOL/L (ref 135–146)

## 2019-02-18 PROCEDURE — A9552 F18 FDG: HCPCS

## 2019-02-18 PROCEDURE — 78815 PET IMAGE W/CT SKULL-THIGH: CPT

## 2019-02-18 PROCEDURE — 82948 REAGENT STRIP/BLOOD GLUCOSE: CPT

## 2019-03-11 ENCOUNTER — OFFICE VISIT (OUTPATIENT)
Dept: PULMONOLOGY | Facility: CLINIC | Age: 59
End: 2019-03-11
Payer: COMMERCIAL

## 2019-03-11 VITALS
TEMPERATURE: 97.4 F | OXYGEN SATURATION: 97 % | WEIGHT: 179 LBS | HEIGHT: 73 IN | BODY MASS INDEX: 23.72 KG/M2 | HEART RATE: 72 BPM | RESPIRATION RATE: 18 BRPM | SYSTOLIC BLOOD PRESSURE: 98 MMHG | DIASTOLIC BLOOD PRESSURE: 62 MMHG

## 2019-03-11 DIAGNOSIS — E83.52 HYPERCALCEMIA: ICD-10-CM

## 2019-03-11 DIAGNOSIS — D86.9 SARCOIDOSIS: Primary | ICD-10-CM

## 2019-03-11 DIAGNOSIS — R94.8 ABNORMAL POSITRON EMISSION TOMOGRAPHY (PET) SCAN: ICD-10-CM

## 2019-03-11 DIAGNOSIS — N17.9 AKI (ACUTE KIDNEY INJURY) (HCC): ICD-10-CM

## 2019-03-11 PROCEDURE — 99215 OFFICE O/P EST HI 40 MIN: CPT | Performed by: INTERNAL MEDICINE

## 2019-03-11 RX ORDER — PREDNISONE 10 MG/1
10 TABLET ORAL DAILY
Qty: 30 TABLET | Refills: 6 | Status: SHIPPED | OUTPATIENT
Start: 2019-03-11 | End: 2019-09-16

## 2019-03-11 NOTE — ASSESSMENT & PLAN NOTE
Lymphadenopathy with bone lesions that is hypermetabolic on PET scan improved with prednisone therapy and consistent with the diagnosis of sarcoidosis

## 2019-03-11 NOTE — PROGRESS NOTES
Progress note - Pulmonary Medicine   Moshe Palacios 63 y o  male MRN: [id number]       Impression & Plan:     Sarcoidosis  Improved with prednisone therapy as evidenced by improvement of lymphadenopathy/bone lesions on PET scan and also clinical improvement  His calcium has been stable and creatinine is been stable but did not go to normal, which may be a new baseline for the patient  Will continue prednisone 10 mg daily for now for another 3-4 months to complete 1 year of prednisone therapy then to consider tapering off versus adding Plaquenil  Discussed plan with patient  Abnormal positron emission tomography (PET) scan  Lymphadenopathy with bone lesions that is hypermetabolic on PET scan improved with prednisone therapy and consistent with the diagnosis of sarcoidosis  Hypercalcemia  Stable currently on prednisone  Patient has follow up with Nephrology today and he will discuss with them adding potassium citrate as suggested by Urology  Continue low calcium diet  BJ (acute kidney injury) ([facility] [address] )  Stable creatinine, probably a new baseline for the patient, secondary to hypercalcemia from sarcoidosis but also possible diabetic nephropathy  Patient has follow up with Nephrology today  Will continue prednisone 10 mg daily  Will repeat BMP in 3 months prior to next visit  Encourage fluid intake         ______________________________________________________________________    HPI:    Moshe Palacios presents today for follow-up of sarcoidosis and hypercalcemia  Patient was diagnosed with sarcoidosis with lymphadenopathy and bone involvement and complicated with hypercalcemia, kidney stones and mild BJ, and he was started on prednisone that was tapered down to 10 mg and has been on 10 mg daily for the past 3-4 months, his calcium has been stable on this dose but his creatinine did not improve to baseline although remained stable    Patient initially had chronic cough and mild dyspnea on exertion but does symptoms resolved, he denies dyspnea on exertion or shortness of breath, he has very minimal sporadic intermittent nonproductive cough but otherwise denies any other complaints, denies chest pain or palpitation, denies fever or chills or night sweats, denies weight loss  Patient denies any dizziness or vertigo or syncopal episodes, denies any numbness or weakness in extremities except for the focal numbness at his right sole that he attributes to bone callus/position from work and this numbness has been stable and is very focal/small area  Patient follows with Urology were kidney stones and has been seen recently  Also has an appointment Nephrology later today  Patient has diabetes and his blood sugar has been running between 180-200 and he is currently on insulin and follows with Endocrinology  Review of Systems:  Review of Systems   Constitutional: Negative  HENT: Negative  Eyes: Negative  Respiratory:        As HPI   Cardiovascular: Negative  Gastrointestinal: Negative  Endocrine: Negative  Genitourinary: Negative  Musculoskeletal: Negative  Skin: Negative  Allergic/Immunologic: Negative  Neurological: Negative  Hematological: Negative  Psychiatric/Behavioral: Negative            Past medical history, surgical history, and family history were reviewed and updated as appropriate    Social history updates:  Social History     Tobacco Use   Smoking Status Never Smoker   Smokeless Tobacco Never Used       PhysicalExamination:  Vitals:   BP 98/62 (BP Location: Left arm, Patient Position: Sitting, Cuff Size: Standard)   Pulse 72   Temp (!) 97 4 °F (36 3 °C) (Tympanic)   Resp 18   Ht 6' 1" (1 854 m)   Wt 81 2 kg (179 lb)   SpO2 97%   BMI 23 62 kg/m²     General: alert, not in acute distress  HEENT: PERRL, no icteric sclera or cyanosis, no thrush  Neck:  Supple, no lymphadenopathy or thyromegaly, no JVD  Lungs:  Equal breath sounds and clear auscultations bilaterally, no wheezing or crackles  Heart: S1S2 regular, no murmures or gallops  Abdomen: soft, non-tender, bowel sounds  present  Extrimities: no edema, no clubbing or cyanosis  Neuro: Alert and oriented x 3, no focal neurodeficits   Skin: intact, no rashes    Diagnostic Data:  Labs: I personally reviewed the most recent laboratory data pertinent to today's visit    Lab Results   Component Value Date    WBC 6 2 09/29/2018    HGB 13 3 09/29/2018    HCT 39 7 09/29/2018    MCV 94 1 09/29/2018     09/29/2018     Lab Results   Component Value Date    SODIUM 140 02/16/2019    K 3 9 02/16/2019    CO2 29 02/16/2019     02/16/2019    BUN 24 02/16/2019    CREATININE 1 97 (H) 08/11/2018    CALCIUM 9 6 02/16/2019     No results found for: IGE  Lab Results   Component Value Date    ALT 18 12/09/2017    AST 22 12/09/2017    ALKPHOS 75 12/09/2017    BILITOT 0 5 12/09/2017       PFT results: The most recent pulmonary function tests were reviewed  · Normal lung volumes  · Normal airflow on vital capacity  · Normal diffusion capacity  · Normal airway resistance as indicated by the specific airway conductance  · No bronchial hyperresponsiveness on the methacholine challenge test     Imaging:  I personally reviewed the images on the [facility] system pertinent to today's visit  CT scan reviewed on PACs and compared to prior PET scan from 2017 and also showed to the patient in the room:  Significant improvement/resolution of hypermetabolism in lymphadenopathy and also resolution of hypermetabolism in bone lesions          Marly Novak MD

## 2019-03-11 NOTE — ASSESSMENT & PLAN NOTE
Stable creatinine, probably a new baseline for the patient, secondary to hypercalcemia from sarcoidosis but also possible diabetic nephropathy  Patient has follow up with Nephrology today  Will continue prednisone 10 mg daily  Will repeat BMP in 3 months prior to next visit  Encourage fluid intake

## 2019-03-11 NOTE — ASSESSMENT & PLAN NOTE
Stable currently on prednisone  Patient has follow up with Nephrology today and he will discuss with them adding potassium citrate as suggested by Urology  Continue low calcium diet

## 2019-03-11 NOTE — ASSESSMENT & PLAN NOTE
Improved with prednisone therapy as evidenced by improvement of lymphadenopathy/bone lesions on PET scan and also clinical improvement  His calcium has been stable and creatinine is been stable but did not go to normal, which may be a new baseline for the patient  Will continue prednisone 10 mg daily for now for another 3-4 months to complete 1 year of prednisone therapy then to consider tapering off versus adding Plaquenil  Discussed plan with patient

## 2019-06-09 LAB
BUN SERPL-MCNC: 29 MG/DL (ref 7–25)
BUN/CREAT SERPL: 17 (CALC) (ref 6–22)
CALCIUM SERPL-MCNC: 9.5 MG/DL (ref 8.6–10.3)
CHLORIDE SERPL-SCNC: 104 MMOL/L (ref 98–110)
CO2 SERPL-SCNC: 29 MMOL/L (ref 20–32)
CREAT SERPL-MCNC: 1.71 MG/DL (ref 0.7–1.33)
GLUCOSE SERPL-MCNC: 233 MG/DL (ref 65–99)
POTASSIUM SERPL-SCNC: 4.3 MMOL/L (ref 3.5–5.3)
SL AMB EGFR AFRICAN AMERICAN: 50 ML/MIN/1.73M2
SL AMB EGFR NON AFRICAN AMERICAN: 43 ML/MIN/1.73M2
SODIUM SERPL-SCNC: 140 MMOL/L (ref 135–146)

## 2019-06-24 ENCOUNTER — DOCUMENTATION (OUTPATIENT)
Dept: PULMONOLOGY | Facility: CLINIC | Age: 59
End: 2019-06-24

## 2019-06-24 ENCOUNTER — OFFICE VISIT (OUTPATIENT)
Dept: PULMONOLOGY | Facility: CLINIC | Age: 59
End: 2019-06-24
Payer: COMMERCIAL

## 2019-06-24 VITALS
DIASTOLIC BLOOD PRESSURE: 70 MMHG | BODY MASS INDEX: 23.88 KG/M2 | HEART RATE: 71 BPM | HEIGHT: 73 IN | WEIGHT: 180.2 LBS | RESPIRATION RATE: 18 BRPM | TEMPERATURE: 97.4 F | SYSTOLIC BLOOD PRESSURE: 130 MMHG | OXYGEN SATURATION: 98 %

## 2019-06-24 DIAGNOSIS — R20.2 PARESTHESIAS: ICD-10-CM

## 2019-06-24 DIAGNOSIS — R80.9 TYPE 2 DIABETES MELLITUS WITH MICROALBUMINURIA, UNSPECIFIED WHETHER LONG TERM INSULIN USE (HCC): ICD-10-CM

## 2019-06-24 DIAGNOSIS — N20.0 NEPHROLITHIASIS: ICD-10-CM

## 2019-06-24 DIAGNOSIS — N18.30 CKD (CHRONIC KIDNEY DISEASE) STAGE 3, GFR 30-59 ML/MIN (HCC): ICD-10-CM

## 2019-06-24 DIAGNOSIS — E83.52 HYPERCALCEMIA: ICD-10-CM

## 2019-06-24 DIAGNOSIS — E11.29 TYPE 2 DIABETES MELLITUS WITH MICROALBUMINURIA, UNSPECIFIED WHETHER LONG TERM INSULIN USE (HCC): ICD-10-CM

## 2019-06-24 DIAGNOSIS — D86.9 SARCOIDOSIS: Primary | ICD-10-CM

## 2019-06-24 PROBLEM — G45.9 TIA (TRANSIENT ISCHEMIC ATTACK): Status: ACTIVE | Noted: 2019-06-24

## 2019-06-24 PROCEDURE — 99215 OFFICE O/P EST HI 40 MIN: CPT | Performed by: INTERNAL MEDICINE

## 2019-06-24 RX ORDER — HYDROXYCHLOROQUINE SULFATE 200 MG/1
200 TABLET, FILM COATED ORAL
Qty: 90 TABLET | Refills: 3 | Status: SHIPPED | OUTPATIENT
Start: 2019-06-24 | End: 2021-01-03

## 2019-06-24 RX ORDER — TAMSULOSIN HYDROCHLORIDE 0.4 MG/1
CAPSULE ORAL
Refills: 0 | COMMUNITY
Start: 2019-04-03 | End: 2019-06-24 | Stop reason: ALTCHOICE

## 2019-06-24 RX ORDER — ASPIRIN 81 MG/1
81 TABLET ORAL DAILY
Qty: 90 TABLET | Refills: 3 | Status: SHIPPED | OUTPATIENT
Start: 2019-06-24 | End: 2019-09-16 | Stop reason: ALTCHOICE

## 2019-07-04 DIAGNOSIS — D86.9 SARCOIDOSIS: ICD-10-CM

## 2019-07-04 DIAGNOSIS — E83.52 HYPERCALCEMIA: ICD-10-CM

## 2019-07-08 RX ORDER — PREDNISONE 10 MG/1
TABLET ORAL
Qty: 30 TABLET | Refills: 0 | Status: SHIPPED | OUTPATIENT
Start: 2019-07-08 | End: 2019-09-16

## 2019-07-17 ENCOUNTER — TELEPHONE (OUTPATIENT)
Dept: PULMONOLOGY | Facility: CLINIC | Age: 59
End: 2019-07-17

## 2019-07-17 NOTE — TELEPHONE ENCOUNTER
Patient calling saying in the past he got angioedema and it was from medications  He said he started taking Plaquenil and Aspirin about 3 weeks ago that was ordered by Dr Tali Leija  He said on Monday he woke up with angioedema  He said it went away throughout the day and has not returned  He is still taking the medications  He wants to know which one it could be from and what to do

## 2019-07-17 NOTE — TELEPHONE ENCOUNTER
Can you let him know there is no way to know which it is from if he was taking them both together, but more likely would be from Plaquenil rather than ASA  May have just been his body adjusting to new medication and that's why it has not come back  Only option at present would be to stop Plaquenil and await Dr David Najera return to discuss other medication options  If this is the case, then he would need a sooner appt with him to discuss

## 2019-07-23 ENCOUNTER — TELEPHONE (OUTPATIENT)
Dept: PULMONOLOGY | Facility: CLINIC | Age: 59
End: 2019-07-23

## 2019-07-25 NOTE — TELEPHONE ENCOUNTER
I called patient to discuss with him the reported angioedema  He states that he had swelling in his thumb and he attributes that to aspirin as he had similar reaction to aspirin in the past   He stopped the aspirin on his own and he continued his Plaquenil without any issues  He has no complaints  He did not have airway edema or mouth/face edema

## 2019-08-26 ENCOUNTER — TELEPHONE (OUTPATIENT)
Dept: PULMONOLOGY | Facility: CLINIC | Age: 59
End: 2019-08-26

## 2019-08-26 NOTE — TELEPHONE ENCOUNTER
Kevin Galicia calling saying he finished his long steroid taper about a month ago  He was on 10 mg daily, then 5 mg daily, then 5 mg every other day  He was doing fine for a while  Last week he noticed his cough is coming back  He will cough a few times throughout the night and during the  Cough is non productive  Denies any other symptoms

## 2019-08-27 NOTE — TELEPHONE ENCOUNTER
With you please tell him to continue with the same plan with Plaquenil and stay off steroids until next appointment in few weeks    Thank you

## 2019-09-16 ENCOUNTER — OFFICE VISIT (OUTPATIENT)
Dept: PULMONOLOGY | Facility: CLINIC | Age: 59
End: 2019-09-16
Payer: COMMERCIAL

## 2019-09-16 VITALS
TEMPERATURE: 97.1 F | BODY MASS INDEX: 23.86 KG/M2 | HEIGHT: 73 IN | OXYGEN SATURATION: 97 % | HEART RATE: 85 BPM | SYSTOLIC BLOOD PRESSURE: 116 MMHG | DIASTOLIC BLOOD PRESSURE: 70 MMHG | RESPIRATION RATE: 18 BRPM | WEIGHT: 180 LBS

## 2019-09-16 DIAGNOSIS — N18.30 CKD (CHRONIC KIDNEY DISEASE) STAGE 3, GFR 30-59 ML/MIN (HCC): ICD-10-CM

## 2019-09-16 DIAGNOSIS — R60.0 EDEMA OF BOTH LEGS: ICD-10-CM

## 2019-09-16 DIAGNOSIS — D86.9 SARCOIDOSIS: Primary | ICD-10-CM

## 2019-09-16 DIAGNOSIS — N20.0 NEPHROLITHIASIS: ICD-10-CM

## 2019-09-16 DIAGNOSIS — R20.2 PARESTHESIAS: ICD-10-CM

## 2019-09-16 DIAGNOSIS — E83.52 HYPERCALCEMIA: ICD-10-CM

## 2019-09-16 PROCEDURE — 99215 OFFICE O/P EST HI 40 MIN: CPT | Performed by: INTERNAL MEDICINE

## 2019-09-16 NOTE — ASSESSMENT & PLAN NOTE
Continue low calcium/oxalate diet and follow with Urology as needed  Will check calcium level on BMP  And continue to hydrate with water

## 2019-09-16 NOTE — ASSESSMENT & PLAN NOTE
Has resolved completely but I told patient if he has any more paresthesias to contact his insurance and get referral to a neurologist that is accepted by his insurance  He verbalized understanding

## 2019-09-16 NOTE — ASSESSMENT & PLAN NOTE
This could be secondary to worsening kidney function versus proteinuria and low serum protein as he had protein urea in the past   I will send patient for BMP and urinalysis as well  He will follow with Nephrology if there is any abnormality  Meanwhile I told patient to restrict his sodium intake and we talked about salt loaded food to avoid

## 2019-09-16 NOTE — PROGRESS NOTES
Progress note - Pulmonary Medicine   Natasha Barahona 63 y o  male MRN: [id number]       Impression & Plan:     Sarcoidosis  Significant improvement with prednisone as evidence on PET scan improvement of lymph nodes and bone lesions but also improvement in hypercalcemia and resolution of cough  Now the cough is returning which could be related to sarcoidosis and what worries me is having to kidney stones recently  The patient was due for BMP to check his calcium but he has not done so I sent him again for BMP  For now will continue hydroxychloroquine and I gave patient inhaled corticosteroids with Asmanex 220 mcg to use as 1 puff twice daily then he can increase to 2 puffs twice daily and rinse his mouth if his cough does not improve  He also has inhaled corticosteroids at home  In the future if the sarcoid is relapsing then to consider switching to methotrexate but he may need a course of prednisone again  Hypercalcemia  Check BMP, continue hydroxychloroquine  Paresthesias  Has resolved completely but I told patient if he has any more paresthesias to contact his insurance and get referral to a neurologist that is accepted by his insurance  He verbalized understanding  Edema of both legs  This could be secondary to worsening kidney function versus proteinuria and low serum protein as he had protein urea in the past   I will send patient for BMP and urinalysis as well  He will follow with Nephrology if there is any abnormality  Meanwhile I told patient to restrict his sodium intake and we talked about salt loaded food to avoid  Nephrolithiasis  Continue low calcium/oxalate diet and follow with Urology as needed  Will check calcium level on BMP  And continue to hydrate with water  CKD (chronic kidney disease) stage 3, GFR 30-59 ml/min ([facility])  Follow with BMP and also Nephrology        Diagnoses and all orders for this visit:    Sarcoidosis  -     Basic metabolic panel; Future    Nephrolithiasis    CKD (chronic kidney disease) stage 3, GFR 30-59 ml/min ([facility])    Hypercalcemia  -     Basic metabolic panel; Future    Paresthesias    Edema of both legs  -     UA (URINE) with reflex to Microscopic  -     Basic metabolic panel; Future      ______________________________________________________________________    HPI:    Po Reed presents today for follow-up of sarcoidosis with hypercalcemia  Patient was diagnosed with sarcoidosis with a biopsy of lymph node after found to have diffuse lymphadenopathy and bone lesions on CT scan/PET scan  He had history of chronic cough that improved with inhalers initially but also had hypercalcemia secondary to sarcoidosis and also complicated with chronic kidney disease and multiple renal calculi  Patient was treated with about 1 year course of prednisone that was tapered off and his symptoms resolved including his cough and also his hypercalcemia  He was switched to hydro chloroquine few months ago which she tolerated well  He presents today complaining that his cough has returned recently, complains of mild intermittent dry cough but sometimes productive of clear sputum  Sometimes he has mild dyspnea but in general the cough is the only symptom  He denies fever or chills or night sweats  He denies wheezing  Denies chest pain  He denies weight loss  He denies orthopnea but he notice some edema in his feet  He denies postnasal drips or GERD symptoms  Patient states that he passed to renal stones over the past few months and he follows with Urology  He is currently following a low calcium/ oxalate diet as well  Last visit patient was complaining of right-sided paresthesias and I referred him for Neurology evaluation given his sarcoidosis, also he has aspirin and possibility of atherosclerotic vascular disease  I started him on aspirin but he could not tolerate any had mild angioedema similar to before    His appointment with Neurology was canceled due to insurance issue  But he reports today that his paresthesias has completely resolved and never had any other episodes  Current Medications:    Current Outpatient Medications:     ACCU-CHEK SATYA PLUS test strip, TEST TID, Disp: , Rfl: 4    Continuous Blood Gluc  (FREESTYLE RILEY READER) RADHA, Use as directed, Disp: , Rfl: 0    Continuous Blood Gluc Sensor ([address] [place] [address]) [facility], Use as directed, Disp: , Rfl: 0    hydroxychloroquine (PLAQUENIL) 200 mg tablet, Take 1 tablet (200 mg total) by mouth daily with breakfast for 90 days, Disp: 90 tablet, Rfl: 3    insulin aspart (NovoLOG) 100 units/mL injection, Inject under the skin 3 (three) times a day before meals, Disp: , Rfl:     LANTUS SOLOSTAR injection pen 100 units/mL, Inject 1 pen into the skin daily, Disp: , Rfl: 1    simvastatin (ZOCOR) 10 mg tablet, Take 10 mg by mouth daily at bedtime, Disp: , Rfl:     predniSONE 10 mg tablet, Take 1 tablet (10 mg total) by mouth daily (Patient not taking: Reported on 9/16/2019), Disp: 30 tablet, Rfl: 6    predniSONE 10 mg tablet, TAKE 1 TABLET(10 MG) BY MOUTH DAILY (Patient not taking: Reported on 9/16/2019), Disp: 30 tablet, Rfl: 0    Review of Systems:  Review of Systems   Constitutional: Negative  HENT: Negative  Eyes: Negative  Respiratory:        As HPI   Cardiovascular: Negative  Gastrointestinal: Negative  Endocrine: Negative  Genitourinary: Negative  Musculoskeletal: Negative  Skin: Negative  Allergic/Immunologic: Negative  Neurological: Negative  Hematological: Negative  Psychiatric/Behavioral: Negative          Past medical history, surgical history, and family history were reviewed and updated as appropriate    Social history updates:  Social History     Tobacco Use   Smoking Status Never Smoker   Smokeless Tobacco Never Used       PhysicalExamination:  Vitals:   /70 (BP Location: Right arm, Patient Position: Sitting, Cuff Size: Adult)   Pulse 85   Temp (!) 97 1 °F (36 2 °C) (Tympanic)   Resp 18   Ht 6' 1" (1 854 m)   Wt 81 6 kg (180 lb)   SpO2 97%   BMI 23 75 kg/m²     General: alert, not in acute distress  HEENT: PERRL, no icteric sclera or cyanosis, no thrush  Neck:  Supple, no lymphadenopathy or thyromegaly, no JVD  Lungs:  Equal breath sounds and clear auscultations bilaterally, no wheezing or crackles  Heart: S1S2 regular, no murmures or gallops  Abdomen: soft, non-tender, bowel sounds  present  Extrimities: + edema, no clubbing or cyanosis  Neuro: Alert and oriented x 3, no focal neurodeficits   Skin: intact, no rashes    Diagnostic Data:  Labs: I personally reviewed the most recent laboratory data pertinent to today's visit    Lab Results   Component Value Date    WBC 6 2 09/29/2018    HGB 13 3 09/29/2018    HCT 39 7 09/29/2018    MCV 94 1 09/29/2018     09/29/2018     Lab Results   Component Value Date    SODIUM 140 06/08/2019    K 4 3 06/08/2019    CO2 29 06/08/2019     06/08/2019    BUN 29 (H) 06/08/2019    CREATININE 1 71 (H) 06/08/2019    CALCIUM 9 5 06/08/2019       PFT results: The most recent pulmonary function tests were reviewed  Normal spirometry with no obstruction or restriction, normal vital capacity, normal lung volumes, normal diffusion capacity  No bronchial hyperresponsiveness on methacholine challenge test    Imaging:  I personally reviewed the images on the [facility] system pertinent to today's visit  PET scan from earlier this year:  Decrease size and hypermetabolism of diffused lymphadenopathy and resolution of bone lesions        Mike Cannon MD

## 2019-09-16 NOTE — ASSESSMENT & PLAN NOTE
Significant improvement with prednisone as evidence on PET scan improvement of lymph nodes and bone lesions but also improvement in hypercalcemia and resolution of cough  Now the cough is returning which could be related to sarcoidosis and what worries me is having to kidney stones recently  The patient was due for BMP to check his calcium but he has not done so I sent him again for BMP  For now will continue hydroxychloroquine and I gave patient inhaled corticosteroids with Asmanex 220 mcg to use as 1 puff twice daily then he can increase to 2 puffs twice daily and rinse his mouth if his cough does not improve  He also has inhaled corticosteroids at home  In the future if the sarcoid is relapsing then to consider switching to methotrexate but he may need a course of prednisone again

## 2019-09-21 LAB
APPEARANCE UR: CLEAR
BILIRUB UR QL STRIP: NEGATIVE
BUN SERPL-MCNC: 20 MG/DL (ref 7–25)
BUN/CREAT SERPL: 13 (CALC) (ref 6–22)
CALCIUM SERPL-MCNC: 10.4 MG/DL (ref 8.6–10.3)
CHLORIDE SERPL-SCNC: 104 MMOL/L (ref 98–110)
CO2 SERPL-SCNC: 31 MMOL/L (ref 20–32)
COLOR UR: YELLOW
CREAT SERPL-MCNC: 1.56 MG/DL (ref 0.7–1.33)
GLUCOSE SERPL-MCNC: 108 MG/DL (ref 65–99)
GLUCOSE UR QL STRIP: NEGATIVE
HGB UR QL STRIP: NEGATIVE
KETONES UR QL STRIP: NEGATIVE
LEUKOCYTE ESTERASE UR QL STRIP: NEGATIVE
NITRITE UR QL STRIP: NEGATIVE
PH UR STRIP: 5.5 [PH] (ref 5–8)
POTASSIUM SERPL-SCNC: 4 MMOL/L (ref 3.5–5.3)
PROT UR QL STRIP: NEGATIVE
SL AMB EGFR AFRICAN AMERICAN: 56 ML/MIN/1.73M2
SL AMB EGFR NON AFRICAN AMERICAN: 49 ML/MIN/1.73M2
SODIUM SERPL-SCNC: 141 MMOL/L (ref 135–146)
SP GR UR STRIP: 1.02 (ref 1–1.03)

## 2019-09-23 ENCOUNTER — TELEPHONE (OUTPATIENT)
Dept: PULMONOLOGY | Facility: CLINIC | Age: 59
End: 2019-09-23

## 2019-09-23 DIAGNOSIS — D86.9 SARCOIDOSIS: Primary | ICD-10-CM

## 2019-09-23 NOTE — TELEPHONE ENCOUNTER
I spoke to patient about his lab results showing hypercalcemia with stable creatinine/slightly improved from before  Also he has the relapsing mild dry cough  I believe his sarcoidosis activities relapsing and failed Plaquenil treatment  My next step will be starting on methotrexate but initially I will start prednisone 20 mg daily until I see him and 4-5 weeks, will order baseline CBC and LFTs and BMP

## 2019-10-15 DIAGNOSIS — J44.9 CHRONIC OBSTRUCTIVE PULMONARY DISEASE, UNSPECIFIED COPD TYPE (HCC): Primary | ICD-10-CM

## 2019-10-15 RX ORDER — PREDNISONE 20 MG/1
20 TABLET ORAL DAILY
Qty: 15 TABLET | Refills: 0 | Status: SHIPPED | OUTPATIENT
Start: 2019-10-15 | End: 2019-10-28 | Stop reason: SDUPTHER

## 2019-10-28 ENCOUNTER — OFFICE VISIT (OUTPATIENT)
Dept: PULMONOLOGY | Facility: CLINIC | Age: 59
End: 2019-10-28
Payer: COMMERCIAL

## 2019-10-28 VITALS
WEIGHT: 178 LBS | HEIGHT: 73 IN | DIASTOLIC BLOOD PRESSURE: 60 MMHG | OXYGEN SATURATION: 100 % | HEART RATE: 63 BPM | SYSTOLIC BLOOD PRESSURE: 110 MMHG | BODY MASS INDEX: 23.59 KG/M2 | TEMPERATURE: 97.8 F

## 2019-10-28 DIAGNOSIS — N18.30 CKD (CHRONIC KIDNEY DISEASE) STAGE 3, GFR 30-59 ML/MIN (HCC): ICD-10-CM

## 2019-10-28 DIAGNOSIS — R80.9 TYPE 2 DIABETES MELLITUS WITH MICROALBUMINURIA, UNSPECIFIED WHETHER LONG TERM INSULIN USE (HCC): ICD-10-CM

## 2019-10-28 DIAGNOSIS — J44.9 CHRONIC OBSTRUCTIVE PULMONARY DISEASE, UNSPECIFIED COPD TYPE (HCC): ICD-10-CM

## 2019-10-28 DIAGNOSIS — E83.52 HYPERCALCEMIA: ICD-10-CM

## 2019-10-28 DIAGNOSIS — N20.0 NEPHROLITHIASIS: ICD-10-CM

## 2019-10-28 DIAGNOSIS — D86.9 SARCOIDOSIS: Primary | ICD-10-CM

## 2019-10-28 DIAGNOSIS — E11.29 TYPE 2 DIABETES MELLITUS WITH MICROALBUMINURIA, UNSPECIFIED WHETHER LONG TERM INSULIN USE (HCC): ICD-10-CM

## 2019-10-28 DIAGNOSIS — Z23 NEED FOR INFLUENZA VACCINATION: ICD-10-CM

## 2019-10-28 PROCEDURE — 99214 OFFICE O/P EST MOD 30 MIN: CPT | Performed by: INTERNAL MEDICINE

## 2019-10-28 PROCEDURE — 90471 IMMUNIZATION ADMIN: CPT

## 2019-10-28 PROCEDURE — 90682 RIV4 VACC RECOMBINANT DNA IM: CPT

## 2019-10-28 RX ORDER — PREDNISONE 20 MG/1
20 TABLET ORAL DAILY
Qty: 30 TABLET | Refills: 0 | Status: SHIPPED | OUTPATIENT
Start: 2019-10-28 | End: 2020-09-25 | Stop reason: ALTCHOICE

## 2019-10-28 RX ORDER — PREDNISONE 1 MG/1
15 TABLET ORAL DAILY
Qty: 90 TABLET | Refills: 1 | Status: SHIPPED | OUTPATIENT
Start: 2019-12-01 | End: 2020-01-26

## 2019-10-30 NOTE — ASSESSMENT & PLAN NOTE
Improved symptoms with 20 mg prednisone  And also improved hypercalcemia and renal failure  At this time will continue prednisone 20 mg daily for another month then will taper to 15 mg daily starting December 1st until I see him next visit  I believe if we can control his symptoms with a low-dose of prednisone for longer period of time and probably weaned to 10 mg in the future then this will be the plan otherwise I will consider starting methotrexate  I will send patient for labs including CMP and CBC to have it as baseline before the next visit before starting methotrexate    We discussed the hyperglycemia due to steroids, patient has elevated A1c, he may need additional dose of insulin and probably to start low-dose of Lantus which is considered by his endocrinologist

## 2019-10-30 NOTE — ASSESSMENT & PLAN NOTE
As per patient his A1c was more than 8  He is currently on NovoLog insulin but his endocrinologist is considering to start low-dose Lantus in the morning

## 2019-10-30 NOTE — ASSESSMENT & PLAN NOTE
Multiple stones in the past due to hypercalcemia, continue hydration and low calcium/oxalate diet, and follow with Urology as needed

## 2019-10-30 NOTE — ASSESSMENT & PLAN NOTE
Most likely secondary to diabetes but fluctuation in renal failure could be secondary to hypercalcemia  Will continue to hydrate and treat sarcoidosis and follow with Nephrology and also Urology for his nephrolithiasis

## 2019-10-30 NOTE — PROGRESS NOTES
Progress note - Pulmonary Medicine   Joni Person 63 y o  male MRN: [id number]       Impression & Plan:     Sarcoidosis  Improved symptoms with 20 mg prednisone  And also improved hypercalcemia and renal failure  At this time will continue prednisone 20 mg daily for another month then will taper to 15 mg daily starting December 1st until I see him next visit  I believe if we can control his symptoms with a low-dose of prednisone for longer period of time and probably weaned to 10 mg in the future then this will be the plan otherwise I will consider starting methotrexate  I will send patient for labs including CMP and CBC to have it as baseline before the next visit before starting methotrexate  We discussed the hyperglycemia due to steroids, patient has elevated A1c, he may need additional dose of insulin and probably to start low-dose of Lantus which is considered by his endocrinologist     Hypercalcemia  Secondary to sarcoidosis, Improved with prednisone, will continue for now  CKD (chronic kidney disease) stage 3, GFR 30-59 ml/min (HCC)  Most likely secondary to diabetes but fluctuation in renal failure could be secondary to hypercalcemia  Will continue to hydrate and treat sarcoidosis and follow with Nephrology and also Urology for his nephrolithiasis  Type 2 diabetes mellitus with microalbuminuria (HCC)    As per patient his A1c was more than 8  He is currently on NovoLog insulin but his endocrinologist is considering to start low-dose Lantus in the morning  Need for influenza vaccination  Given influenza vaccine today    Nephrolithiasis  Multiple stones in the past due to hypercalcemia, continue hydration and low calcium/oxalate diet, and follow with Urology as needed  Diagnoses and all orders for this visit:    Sarcoidosis  -     predniSONE 5 mg tablet; Take 3 tablets (15 mg total) by mouth daily  -     Hepatic function panel; Future  -     Basic metabolic panel;  Future  - Calcium; Future  -     CBC and differential; Future    CKD (chronic kidney disease) stage 3, GFR 30-59 ml/min ([facility])  -     Basic metabolic panel; Future    Type 2 diabetes mellitus with microalbuminuria, unspecified whether long term insulin use ([facility])    Hypercalcemia  -     predniSONE 5 mg tablet; Take 3 tablets (15 mg total) by mouth daily  -     Basic metabolic panel; Future  -     Calcium; Future    Chronic obstructive pulmonary disease, unspecified COPD type ([facility])  -     predniSONE 20 mg tablet; Take 1 tablet (20 mg total) by mouth daily    Need for influenza vaccination  -     influenza vaccine, [id number], quadrivalent, recombinant, PF, 0 5 mL, for patients 18 yr+ ([facility])    Nephrolithiasis      ______________________________________________________________________    HPI:    Fly Briggs presents today for follow-up of sarcoidosis with hyperglycemia  Last visit patient started to have complaints of relapsing off dry cough and also his blood work recently showed relapsing of his hypercalcemia  He was treated with Plaquenil for the past few months, and that was stopped last visit due to relapse of symptoms and started again on prednisone 20 mg daily  Patient presents today feeling much better, his cough resolved completely, he denies any fatigue or body aches, denies any fever or chills or night sweats, denies any shortness of breath or chest pain  He denies any skin lesions or rashes  He had lab work done outside that showed improvement of his calcium  Otherwise his blood sugar has increased slightly on prednisone but he is currently on insulin regimen and follow up with his endocrinologist today  In the past patient had some paresthesias that resolved completely as he states today      Current Medications:    Current Outpatient Medications:     ACCU-CHEK SATYA PLUS test strip, TEST TID, Disp: , Rfl: 4    Continuous Blood Gluc  (FREESTYLE RILEY READER) RADHA, Use as directed, Disp: , Rfl: 0    Continuous Blood Gluc Sensor (FREESTYLE RILEY SENSOR SYSTEM) [facility], Use as directed, Disp: , Rfl: 0    insulin aspart (NovoLOG) 100 units/mL injection, Inject under the skin 3 (three) times a day before meals, Disp: , Rfl:     LANTUS SOLOSTAR injection pen 100 units/mL, Inject 1 pen into the skin daily, Disp: , Rfl: 1    predniSONE 20 mg tablet, Take 1 tablet (20 mg total) by mouth daily, Disp: 30 tablet, Rfl: 0    simvastatin (ZOCOR) 10 mg tablet, Take 10 mg by mouth daily at bedtime, Disp: , Rfl:     hydroxychloroquine (PLAQUENIL) 200 mg tablet, Take 1 tablet (200 mg total) by mouth daily with breakfast for 90 days, Disp: 90 tablet, Rfl: 3    [START ON 12/1/2019] predniSONE 5 mg tablet, Take 3 tablets (15 mg total) by mouth daily, Disp: 90 tablet, Rfl: 1    Review of Systems:  Review of Systems   Constitutional: Negative  HENT: Negative  Eyes: Negative  Respiratory:        As HPI   Cardiovascular: Negative  Gastrointestinal: Negative  Endocrine: Negative  Genitourinary: Negative  Musculoskeletal: Negative  Skin: Negative  Allergic/Immunologic: Negative  Neurological: Negative  Hematological: Negative  Psychiatric/Behavioral: Negative          Past medical history, surgical history, and family history were reviewed and updated as appropriate    Social history updates:  Social History     Tobacco Use   Smoking Status Never Smoker   Smokeless Tobacco Never Used       PhysicalExamination:  Vitals:   /60 (BP Location: Left arm, Patient Position: Sitting)   Pulse 63   Temp 97 8 °F (36 6 °C) (Tympanic)   Ht 6' 1" (1 854 m)   Wt 80 7 kg (178 lb)   SpO2 100%   BMI 23 48 kg/m²     General: alert, not in acute distress  HEENT: PERRL, no icteric sclera or cyanosis, no thrush  Neck:  Supple, no lymphadenopathy or thyromegaly, no JVD  Lungs:  Equal breath sounds and clear auscultations bilaterally, no wheezing or crackles  Heart: S1S2 regular, no murmures or gallops  Abdomen: soft, non-tender, bowel sounds  present  Extrimities: no edema, no clubbing or cyanosis  Neuro: Alert and oriented x 3, no focal neurodeficits   Skin: intact, no rashes    Diagnostic Data:  Labs:   I personally reviewed the most recent laboratory data pertinent to today's visit    Lab Results   Component Value Date    WBC 6 2 09/29/2018    HGB 13 3 09/29/2018    HCT 39 7 09/29/2018    MCV 94 1 09/29/2018     09/29/2018     Lab Results   Component Value Date    SODIUM 141 09/20/2019    K 4 0 09/20/2019    CO2 31 09/20/2019     09/20/2019    BUN 20 09/20/2019    CREATININE 1 56 (H) 09/20/2019    CALCIUM 10 4 (H) 09/20/2019       Lab work from outside facility reviewed on 10/24:  Creatinine 1 41, BUN 18, calcium 9 4, albumin 4 0        Gerome Cockayne, MD

## 2020-01-26 DIAGNOSIS — E83.52 HYPERCALCEMIA: ICD-10-CM

## 2020-01-26 DIAGNOSIS — D86.9 SARCOIDOSIS: ICD-10-CM

## 2020-01-26 RX ORDER — PREDNISONE 1 MG/1
TABLET ORAL
Qty: 90 TABLET | Refills: 0 | Status: SHIPPED | OUTPATIENT
Start: 2020-01-26 | End: 2020-02-24

## 2020-02-22 LAB — HBA1C MFR BLD HPLC: 9 %

## 2020-02-23 DIAGNOSIS — E83.52 HYPERCALCEMIA: ICD-10-CM

## 2020-02-23 DIAGNOSIS — D86.9 SARCOIDOSIS: ICD-10-CM

## 2020-02-23 LAB
ALBUMIN SERPL-MCNC: 4.1 G/DL (ref 3.6–5.1)
ALBUMIN/GLOB SERPL: 1.8 (CALC) (ref 1–2.5)
ALP SERPL-CCNC: 67 U/L (ref 35–144)
ALT SERPL-CCNC: 13 U/L (ref 9–46)
AST SERPL-CCNC: 13 U/L (ref 10–35)
BASOPHILS # BLD AUTO: 34 CELLS/UL (ref 0–200)
BASOPHILS NFR BLD AUTO: 0.4 %
BILIRUB DIRECT SERPL-MCNC: 0.2 MG/DL
BILIRUB INDIRECT SERPL-MCNC: 0.5 MG/DL (CALC) (ref 0.2–1.2)
BILIRUB SERPL-MCNC: 0.7 MG/DL (ref 0.2–1.2)
CALCIUM SERPL-MCNC: 9.8 MG/DL (ref 8.6–10.3)
EOSINOPHIL # BLD AUTO: 118 CELLS/UL (ref 15–500)
EOSINOPHIL NFR BLD AUTO: 1.4 %
ERYTHROCYTE [DISTWIDTH] IN BLOOD BY AUTOMATED COUNT: 12.2 % (ref 11–15)
GLOBULIN SER CALC-MCNC: 2.3 G/DL (CALC) (ref 1.9–3.7)
HCT VFR BLD AUTO: 45.1 % (ref 38.5–50)
HGB BLD-MCNC: 15.3 G/DL (ref 13.2–17.1)
LYMPHOCYTES # BLD AUTO: 1949 CELLS/UL (ref 850–3900)
LYMPHOCYTES NFR BLD AUTO: 23.2 %
MCH RBC QN AUTO: 31.6 PG (ref 27–33)
MCHC RBC AUTO-ENTMCNC: 33.9 G/DL (ref 32–36)
MCV RBC AUTO: 93.2 FL (ref 80–100)
MONOCYTES # BLD AUTO: 664 CELLS/UL (ref 200–950)
MONOCYTES NFR BLD AUTO: 7.9 %
NEUTROPHILS # BLD AUTO: 5636 CELLS/UL (ref 1500–7800)
NEUTROPHILS NFR BLD AUTO: 67.1 %
PLATELET # BLD AUTO: 232 THOUSAND/UL (ref 140–400)
PMV BLD REES-ECKER: 10.6 FL (ref 7.5–12.5)
PROT SERPL-MCNC: 6.4 G/DL (ref 6.1–8.1)
RBC # BLD AUTO: 4.84 MILLION/UL (ref 4.2–5.8)
WBC # BLD AUTO: 8.4 THOUSAND/UL (ref 3.8–10.8)

## 2020-02-24 RX ORDER — PREDNISONE 1 MG/1
TABLET ORAL
Qty: 90 TABLET | Refills: 0 | Status: SHIPPED | OUTPATIENT
Start: 2020-02-24 | End: 2020-09-25 | Stop reason: ALTCHOICE

## 2020-03-16 ENCOUNTER — TELEMEDICINE (OUTPATIENT)
Dept: PULMONOLOGY | Facility: CLINIC | Age: 60
End: 2020-03-16

## 2020-03-16 DIAGNOSIS — R80.9 TYPE 2 DIABETES MELLITUS WITH MICROALBUMINURIA, UNSPECIFIED WHETHER LONG TERM INSULIN USE (HCC): ICD-10-CM

## 2020-03-16 DIAGNOSIS — D86.9 SARCOIDOSIS: Primary | ICD-10-CM

## 2020-03-16 DIAGNOSIS — N18.30 CKD (CHRONIC KIDNEY DISEASE) STAGE 3, GFR 30-59 ML/MIN (HCC): ICD-10-CM

## 2020-03-16 DIAGNOSIS — N20.0 NEPHROLITHIASIS: ICD-10-CM

## 2020-03-16 DIAGNOSIS — E83.52 HYPERCALCEMIA: ICD-10-CM

## 2020-03-16 DIAGNOSIS — E11.29 TYPE 2 DIABETES MELLITUS WITH MICROALBUMINURIA, UNSPECIFIED WHETHER LONG TERM INSULIN USE (HCC): ICD-10-CM

## 2020-03-16 PROBLEM — R05.9 COUGH: Status: ACTIVE | Noted: 2020-03-16

## 2020-03-16 PROCEDURE — 99442 PR PHYS/QHP TELEPHONE EVALUATION 11-20 MIN: CPT | Performed by: INTERNAL MEDICINE

## 2020-03-16 RX ORDER — PREDNISONE 10 MG/1
10 TABLET ORAL DAILY
Qty: 30 TABLET | Refills: 2 | Status: SHIPPED | OUTPATIENT
Start: 2020-03-16 | End: 2020-06-29

## 2020-03-16 NOTE — PROGRESS NOTES
Virtual Check-in Visit    Reason for visit is follow-up on sarcoidosis associated with CKD and hypercalcemia and bone lesions  This virtual check-in was done via telephone  Encounter provider Silvia Young MD    Provider located at [address] [place] [address] [address] [facility] [id number]      Recent Visits  No visits were found meeting these conditions  Showing recent visits within past 7 days and meeting all other requirements     Future Appointments  No visits were found meeting these conditions  Showing future appointments within next 150 days and meeting all other requirements        Patient agrees to participate in a virtual check in via telephone or video visit instead of presenting to the office to address urgent/immediate medical needs  Patient is aware this is a billable service but at a lesser fee than in person care  After connecting through telephone, the patient was identified by name and date of birth  Bishop Zuñiga was informed that this was a telemedicine visit and that the exam was being conducted confidentially over secure lines  My office door was closed  No one else was in the room  He acknowledged consent and understanding of privacy and security of the virtual check-in visit  I informed the patient that I have reviewed his record in Epic and presented the opportunity for him to ask any questions regarding the visit today  The patient initiated communication and agreed to participate  Subjective  Bishop Zuñiga is a 63 y o  male with history of sarcoidosis mainly in the bone at associated also with chronic cough in the past and hypercalcemia with multiple kidney stones and chronic kidney disease  Patient was treated with corticosteroids for more than 1 5 years, and when we started tapering his prednisone and at 10 mg he started to have symptoms    In the past also we tried Plaquenil but it did not work and patient continued to have symptoms including recurrent hypercalcemia  Currently patient is on 15 mg daily of prednisone for the past few months and he reports no symptoms  He denies shortness of breath or cough  He denies any other complaints of chest pain or fever or chills or night sweats, denies weight loss  He has diabetes and continues to have hyperglycemia on insulin and he has follow-up with his Endocrinology today  Patient denies any recurrent episodes of syncope or near-syncope  Denies any facial numbness  He still has the right foot tingling sensation sometimes as before          Past Medical History:   Diagnosis Date    Diabetes mellitus ([address] )     Hyperlipidemia        Past Surgical History:   Procedure Laterality Date    ABDOMINAL SURGERY  1962    COLONOSCOPY  2017    CYSTOSCOPY W/ URETERAL STENT PLACEMENT Left     KNEE SURGERY Right 1997    [place] BIOPSY/EXCISION, LYMPH NODE(S) Right 11/2/2017    Procedure: INGUINAL LYMPH NODE BIOPSY;  Surgeon: Louie Garner MD;  [facility]: [facility];  Service: General    SHOULDER SURGERY  2005       Current Outpatient Medications   Medication Sig Dispense Refill    ACCU-CHEK SATYA PLUS test strip TEST TID  4    Continuous Blood Gluc  (FREESTYLE RILEY READER) RADHA Use as directed  0    Continuous Blood Gluc Sensor ([address] [place] [address]) MISC Use as directed  0    hydroxychloroquine (PLAQUENIL) 200 mg tablet Take 1 tablet (200 mg total) by mouth daily with breakfast for 90 days 90 tablet 3    insulin aspart (NovoLOG) 100 units/mL injection Inject under the skin 3 (three) times a day before meals      LANTUS SOLOSTAR injection pen 100 units/mL Inject 1 pen into the skin daily  1    predniSONE 20 mg tablet Take 1 tablet (20 mg total) by mouth daily 30 tablet 0    predniSONE 5 mg tablet TAKE 3 TABLETS(15 MG) BY MOUTH DAILY 90 tablet 0    simvastatin (ZOCOR) 10 mg tablet Take 10 mg by mouth daily at bedtime       No current facility-administered medications for this visit  Allergies   Allergen Reactions    Acetaminophen Swelling     tylenol    Dust Mite Extract     Molds & Smuts     Other     Percocet [Oxycodone-Acetaminophen]      Angioedema on finger tips         Assessment    Juan telephone assessment is Stable   Progress note - Pulmonary Medicine   Yosvany Reza 63 y o  male MRN: [id number]       Impression & Plan:     Sarcoidosis  with bone sarcoidosis:  Currently controlled with prednisone 50 mg  Patient was advised to taper to 10 mg and he will report if he has symptoms that will include his cough and also any changes in his lab work as below  If he is asymptomatic after follow-up then I may consider continue to taper prednisone completely  With any recurrence of symptoms I believe we should start methotrexate      Hypercalcemia   will monitor blood work at that is scheduled in a month from now, and patient has a follow-up with Nephrology after that as well    CKD (chronic kidney disease) stage 3, GFR 30-59 ml/min ([facility] )   will monitor blood work at that is scheduled in a month from now, and patient has a follow-up with Nephrology after that as well    Cough   currently resolved, secondary to sarcoidosis    Nephrolithiasis  probably secondary to hypercalcemia and sarcoidosis, follow with Urology as needed    Type 2 diabetes mellitus with microalbuminuria ([facility] )    Lab Results   Component Value Date    HGBA1C 9 0 02/22/2020    with hyperglycemia, hopefully will improve with tapering corticosteroids and patient will continue his insulin and follow with Endocrinology today        ____________________________________________________________________  Current Medications:    Current Outpatient Medications:     ACCU-CHEK SATYA PLUS test strip, TEST TID, Disp: , Rfl: 4    Continuous Blood Gluc  (FREESTYLE RILEY READER) RADHA, Use as directed, Disp: , Rfl: 0    Continuous Blood Gluc Sensor (FREESTYLE RILEY SENSOR SYSTEM) MISC, Use as directed, Disp: , Rfl: 0    hydroxychloroquine (PLAQUENIL) 200 mg tablet, Take 1 tablet (200 mg total) by mouth daily with breakfast for 90 days, Disp: 90 tablet, Rfl: 3    insulin aspart (NovoLOG) 100 units/mL injection, Inject under the skin 3 (three) times a day before meals, Disp: , Rfl:     LANTUS SOLOSTAR injection pen 100 units/mL, Inject 1 pen into the skin daily, Disp: , Rfl: 1    predniSONE 20 mg tablet, Take 1 tablet (20 mg total) by mouth daily, Disp: 30 tablet, Rfl: 0    predniSONE 5 mg tablet, TAKE 3 TABLETS(15 MG) BY MOUTH DAILY, Disp: 90 tablet, Rfl: 0    simvastatin (ZOCOR) 10 mg tablet, Take 10 mg by mouth daily at bedtime, Disp: , Rfl:     Review of Systems:  Review of Systems   Constitutional: Negative  HENT: Negative  Eyes: Negative  Respiratory: Negative  Cardiovascular: Negative  Gastrointestinal: Negative  Endocrine: Negative  Genitourinary: Negative  Musculoskeletal: Negative  Skin: Negative  Allergic/Immunologic: Negative  Neurological: Negative  Hematological: Negative  Psychiatric/Behavioral: Negative  Past medical history, surgical history, and family history were reviewed and updated as appropriate    Social history updates:  Social History     Tobacco Use   Smoking Status Never Smoker   Smokeless Tobacco Never Used         Diagnostic Data:  Labs: I personally reviewed the most recent laboratory data pertinent to today's visit    Lab Results   Component Value Date    WBC 8 4 02/22/2020    HGB 15 3 02/22/2020    HCT 45 1 02/22/2020    MCV 93 2 02/22/2020     02/22/2020     Lab Results   Component Value Date    SODIUM 141 09/20/2019    K 4 0 09/20/2019    CO2 31 09/20/2019     09/20/2019    BUN 20 09/20/2019    CREATININE 1 56 (H) 09/20/2019    CALCIUM 9 8 02/22/2020       Ramone Crespo MD    1  Sarcoidosis with bone sarcoidosis:  Currently controlled with prednisone 50 mg   Patient was advised to taper to 10 mg and he will report if he has symptoms that will include his cough and also any changes in his lab work as below  If he is asymptomatic after follow-up then I may consider continue to taper prednisone completely  With any recurrence of symptoms I believe we should start methotrexate  2  Hypercalcemia with CKD, will monitor blood work at that is scheduled in a month from now, and patient has a follow-up with Nephrology after that as well  3  Cough, currently resolved, secondary to sarcoidosis  4  Kidney stones probably secondary to hypercalcemia and sarcoidosis, follow with Urology as needed  5  Diabetes with hyperglycemia, hopefully will improve with tapering corticosteroids and patient will continue his insulin and follow with Endocrinology today       Disposition:    Follow-up in 6 weeks    I spent 20 minutes with the patient during this virtual check-in visit  COVID-19 Virtual Visit     This virtual check-in was done via telephone  Encounter provider Russell Cortez MD    Provider located at [address] [place] [id number]    Recent Visits  No visits were found meeting these conditions  Showing recent visits within past 7 days and meeting all other requirements     Future Appointments  No visits were found meeting these conditions  Showing future appointments within next 150 days and meeting all other requirements        Patient agrees to participate in a virtual check in via telephone or video visit instead of presenting to the office to address urgent/immediate medical needs  Patient is aware this is a billable service but at a lesser fee than in person care  After connecting through telephone, the patient was identified by name and date of birth  Wendy Saravia was informed that this was a telemedicine visit and that the exam was being conducted confidentially over secure lines  My office door was closed  No one else was in the room  Itzel Wesley acknowledged consent and understanding of privacy and security of the telemedicine visit  I informed the patient that I have reviewed his record in Epic and presented the opportunity for him to ask any questions regarding the visit today  The patient agreed to participate  Past Medical History:   Diagnosis Date    Diabetes mellitus ([address] )     Hyperlipidemia        Past Surgical History:   Procedure Laterality Date    ABDOMINAL SURGERY  1962    COLONOSCOPY  2017    CYSTOSCOPY W/ URETERAL STENT PLACEMENT Left     KNEE SURGERY Right 1997    [place] BIOPSY/EXCISION, LYMPH NODE(S) Right 11/2/2017    Procedure: INGUINAL LYMPH NODE BIOPSY;  Surgeon: Natasha Burks MD;  [facility];  Service: General    SHOULDER SURGERY  2005       Current Outpatient Medications   Medication Sig Dispense Refill    ACCU-CHEK SATYA PLUS test strip TEST TID  4    Continuous Blood Gluc  (FREESTYLE RILEY READER) RADHA Use as directed  0    Continuous Blood Gluc Sensor ([address] [place] [address]) MISC Use as directed  0    hydroxychloroquine (PLAQUENIL) 200 mg tablet Take 1 tablet (200 mg total) by mouth daily with breakfast for 90 days 90 tablet 3    insulin aspart (NovoLOG) 100 units/mL injection Inject under the skin 3 (three) times a day before meals      LANTUS SOLOSTAR injection pen 100 units/mL Inject 1 pen into the skin daily  1    predniSONE 20 mg tablet Take 1 tablet (20 mg total) by mouth daily 30 tablet 0    predniSONE 5 mg tablet TAKE 3 TABLETS(15 MG) BY MOUTH DAILY 90 tablet 0    simvastatin (ZOCOR) 10 mg tablet Take 10 mg by mouth daily at bedtime       No current facility-administered medications for this visit           Allergies   Allergen Reactions    Acetaminophen Swelling     tylenol    Dust Mite Extract     Molds & Smuts     Other     Percocet [Oxycodone-Acetaminophen]      Angioedema on finger tips

## 2020-03-16 NOTE — ASSESSMENT & PLAN NOTE
with bone sarcoidosis:  Currently controlled with prednisone 50 mg  Patient was advised to taper to 10 mg and he will report if he has symptoms that will include his cough and also any changes in his lab work as below  If he is asymptomatic after follow-up then I may consider continue to taper prednisone completely  With any recurrence of symptoms I believe we should start methotrexate

## 2020-03-16 NOTE — ASSESSMENT & PLAN NOTE
Lab Results   Component Value Date    HGBA1C 9 0 02/22/2020    with hyperglycemia, hopefully will improve with tapering corticosteroids and patient will continue his insulin and follow with Endocrinology today

## 2020-03-16 NOTE — ASSESSMENT & PLAN NOTE
will monitor blood work at that is scheduled in a month from now, and patient has a follow-up with Nephrology after that as well

## 2020-03-17 ENCOUNTER — TELEPHONE (OUTPATIENT)
Dept: PULMONOLOGY | Facility: CLINIC | Age: 60
End: 2020-03-17

## 2020-03-17 NOTE — TELEPHONE ENCOUNTER
LVM to pt, Dr Kathy Israel did a telemed call yesterday (3/16) & pt needs to schedule a 6 wk follow up appt

## 2020-06-26 DIAGNOSIS — D86.9 SARCOIDOSIS: ICD-10-CM

## 2020-06-29 RX ORDER — PREDNISONE 10 MG/1
TABLET ORAL
Qty: 30 TABLET | Refills: 2 | Status: SHIPPED | OUTPATIENT
Start: 2020-06-29 | End: 2020-09-25 | Stop reason: ALTCHOICE

## 2020-08-17 LAB
LEFT EYE DIABETIC RETINOPATHY: NORMAL
RIGHT EYE DIABETIC RETINOPATHY: NORMAL

## 2020-09-25 DIAGNOSIS — D86.9 SARCOIDOSIS: ICD-10-CM

## 2020-09-25 RX ORDER — PREDNISONE 10 MG/1
10 TABLET ORAL DAILY
Qty: 30 TABLET | Refills: 5 | Status: SHIPPED | OUTPATIENT
Start: 2020-09-25 | End: 2021-01-12 | Stop reason: HOSPADM

## 2020-12-28 ENCOUNTER — TELEPHONE (OUTPATIENT)
Dept: PULMONOLOGY | Facility: CLINIC | Age: 60
End: 2020-12-28

## 2020-12-28 DIAGNOSIS — Z20.822 ENCOUNTER FOR LABORATORY TESTING FOR COVID-19 VIRUS: Primary | ICD-10-CM

## 2020-12-31 DIAGNOSIS — Z20.822 ENCOUNTER FOR LABORATORY TESTING FOR COVID-19 VIRUS: ICD-10-CM

## 2020-12-31 PROCEDURE — U0003 INFECTIOUS AGENT DETECTION BY NUCLEIC ACID (DNA OR RNA); SEVERE ACUTE RESPIRATORY SYNDROME CORONAVIRUS 2 (SARS-COV-2) (CORONAVIRUS DISEASE [COVID-19]), AMPLIFIED PROBE TECHNIQUE, MAKING USE OF HIGH THROUGHPUT TECHNOLOGIES AS DESCRIBED BY CMS-2020-01-R: HCPCS | Performed by: INTERNAL MEDICINE

## 2020-12-31 RX ORDER — PREDNISONE 10 MG/1
10 TABLET ORAL DAILY
Qty: 20 TABLET | Refills: 0 | Status: SHIPPED | OUTPATIENT
Start: 2020-12-31 | End: 2021-01-12 | Stop reason: HOSPADM

## 2021-01-01 LAB — SARS-COV-2 RNA SPEC QL NAA+PROBE: DETECTED

## 2021-01-03 ENCOUNTER — APPOINTMENT (EMERGENCY)
Dept: RADIOLOGY | Facility: HOSPITAL | Age: 61
End: 2021-01-03
Payer: COMMERCIAL

## 2021-01-03 ENCOUNTER — TELEPHONE (OUTPATIENT)
Dept: OTHER | Facility: OTHER | Age: 61
End: 2021-01-03

## 2021-01-03 ENCOUNTER — TELEPHONE (OUTPATIENT)
Dept: PULMONOLOGY | Facility: CLINIC | Age: 61
End: 2021-01-03

## 2021-01-03 ENCOUNTER — HOSPITAL ENCOUNTER (EMERGENCY)
Facility: HOSPITAL | Age: 61
Discharge: HOME | End: 2021-01-03
Attending: EMERGENCY MEDICINE
Payer: COMMERCIAL

## 2021-01-03 VITALS
HEART RATE: 85 BPM | OXYGEN SATURATION: 95 % | DIASTOLIC BLOOD PRESSURE: 66 MMHG | SYSTOLIC BLOOD PRESSURE: 106 MMHG | TEMPERATURE: 101.6 F | RESPIRATION RATE: 16 BRPM

## 2021-01-03 DIAGNOSIS — U07.1 PNEUMONIA DUE TO COVID-19 VIRUS: Primary | ICD-10-CM

## 2021-01-03 DIAGNOSIS — R53.83 FATIGUE: ICD-10-CM

## 2021-01-03 DIAGNOSIS — J12.82 PNEUMONIA DUE TO COVID-19 VIRUS: Primary | ICD-10-CM

## 2021-01-03 DIAGNOSIS — R06.02 SOB (SHORTNESS OF BREATH): ICD-10-CM

## 2021-01-03 LAB
ABO GROUP BLD: NORMAL
ALBUMIN SERPL BCP-MCNC: 3.2 G/DL (ref 3.5–5)
ALP SERPL-CCNC: 95 U/L (ref 46–116)
ALT SERPL W P-5'-P-CCNC: 27 U/L (ref 12–78)
ANION GAP SERPL CALCULATED.3IONS-SCNC: 8 MMOL/L (ref 4–13)
AST SERPL W P-5'-P-CCNC: 28 U/L (ref 5–45)
ATRIAL RATE: 78 BPM
BASOPHILS # BLD AUTO: 0 THOUSANDS/ΜL (ref 0–0.1)
BASOPHILS NFR BLD AUTO: 0 % (ref 0–1)
BILIRUB SERPL-MCNC: 0.82 MG/DL (ref 0.2–1)
BUN SERPL-MCNC: 20 MG/DL (ref 5–25)
CALCIUM ALBUM COR SERPL-MCNC: 9.3 MG/DL (ref 8.3–10.1)
CALCIUM SERPL-MCNC: 8.7 MG/DL (ref 8.3–10.1)
CHLORIDE SERPL-SCNC: 98 MMOL/L (ref 100–108)
CK SERPL-CCNC: 50 U/L (ref 39–308)
CO2 SERPL-SCNC: 28 MMOL/L (ref 21–32)
CREAT SERPL-MCNC: 1.55 MG/DL (ref 0.6–1.3)
CRP SERPL QL: 23.6 MG/L
D DIMER PPP FEU-MCNC: 0.56 UG/ML FEU
EOSINOPHIL # BLD AUTO: 0 THOUSAND/ΜL (ref 0–0.61)
EOSINOPHIL NFR BLD AUTO: 0 % (ref 0–6)
ERYTHROCYTE [DISTWIDTH] IN BLOOD BY AUTOMATED COUNT: 12.5 % (ref 11.6–15.1)
FERRITIN SERPL-MCNC: 305 NG/ML (ref 8–388)
GFR SERPL CREATININE-BSD FRML MDRD: 48 ML/MIN/1.73SQ M
GLUCOSE SERPL-MCNC: 212 MG/DL (ref 65–140)
HBV CORE AB SER QL: NORMAL
HBV CORE IGM SER QL: NORMAL
HBV SURFACE AG SER QL: NORMAL
HCT VFR BLD AUTO: 47 % (ref 36.5–49.3)
HCV AB SER QL: NORMAL
HGB BLD-MCNC: 15.4 G/DL (ref 12–17)
HIV 1+2 AB+HIV1 P24 AG SERPL QL IA: NORMAL
HIV1 P24 AG SER QL: NORMAL
IMM GRANULOCYTES # BLD AUTO: 0.03 THOUSAND/UL (ref 0–0.2)
IMM GRANULOCYTES NFR BLD AUTO: 1 % (ref 0–2)
LACTATE SERPL-SCNC: 1.5 MMOL/L (ref 0.5–2)
LYMPHOCYTES # BLD AUTO: 0.61 THOUSANDS/ΜL (ref 0.6–4.47)
LYMPHOCYTES NFR BLD AUTO: 16 % (ref 14–44)
MCH RBC QN AUTO: 30.7 PG (ref 26.8–34.3)
MCHC RBC AUTO-ENTMCNC: 32.8 G/DL (ref 31.4–37.4)
MCV RBC AUTO: 94 FL (ref 82–98)
MONOCYTES # BLD AUTO: 0.3 THOUSAND/ΜL (ref 0.17–1.22)
MONOCYTES NFR BLD AUTO: 8 % (ref 4–12)
NEUTROPHILS # BLD AUTO: 2.78 THOUSANDS/ΜL (ref 1.85–7.62)
NEUTS SEG NFR BLD AUTO: 75 % (ref 43–75)
NRBC BLD AUTO-RTO: 0 /100 WBCS
NT-PROBNP SERPL-MCNC: 51 PG/ML
P AXIS: 33 DEGREES
PLATELET # BLD AUTO: 159 THOUSANDS/UL (ref 149–390)
PMV BLD AUTO: 9.9 FL (ref 8.9–12.7)
POTASSIUM SERPL-SCNC: 3.6 MMOL/L (ref 3.5–5.3)
PR INTERVAL: 140 MS
PROCALCITONIN SERPL-MCNC: 0.09 NG/ML
PROT SERPL-MCNC: 7.1 G/DL (ref 6.4–8.2)
QRS AXIS: 27 DEGREES
QRSD INTERVAL: 78 MS
QT INTERVAL: 354 MS
QTC INTERVAL: 403 MS
RBC # BLD AUTO: 5.01 MILLION/UL (ref 3.88–5.62)
RH BLD: POSITIVE
SODIUM SERPL-SCNC: 134 MMOL/L (ref 136–145)
T WAVE AXIS: -5 DEGREES
TROPONIN I SERPL-MCNC: <0.02 NG/ML
VENTRICULAR RATE: 78 BPM
WBC # BLD AUTO: 3.72 THOUSAND/UL (ref 4.31–10.16)

## 2021-01-03 PROCEDURE — 87340 HEPATITIS B SURFACE AG IA: CPT | Performed by: EMERGENCY MEDICINE

## 2021-01-03 PROCEDURE — 85379 FIBRIN DEGRADATION QUANT: CPT | Performed by: EMERGENCY MEDICINE

## 2021-01-03 PROCEDURE — 86705 HEP B CORE ANTIBODY IGM: CPT | Performed by: EMERGENCY MEDICINE

## 2021-01-03 PROCEDURE — 71045 X-RAY EXAM CHEST 1 VIEW: CPT

## 2021-01-03 PROCEDURE — 86803 HEPATITIS C AB TEST: CPT | Performed by: EMERGENCY MEDICINE

## 2021-01-03 PROCEDURE — 86140 C-REACTIVE PROTEIN: CPT | Performed by: EMERGENCY MEDICINE

## 2021-01-03 PROCEDURE — 86901 BLOOD TYPING SEROLOGIC RH(D): CPT | Performed by: EMERGENCY MEDICINE

## 2021-01-03 PROCEDURE — 80053 COMPREHEN METABOLIC PANEL: CPT | Performed by: EMERGENCY MEDICINE

## 2021-01-03 PROCEDURE — 93005 ELECTROCARDIOGRAM TRACING: CPT

## 2021-01-03 PROCEDURE — 84145 PROCALCITONIN (PCT): CPT | Performed by: EMERGENCY MEDICINE

## 2021-01-03 PROCEDURE — 87040 BLOOD CULTURE FOR BACTERIA: CPT | Performed by: EMERGENCY MEDICINE

## 2021-01-03 PROCEDURE — 85025 COMPLETE CBC W/AUTO DIFF WBC: CPT | Performed by: EMERGENCY MEDICINE

## 2021-01-03 PROCEDURE — 87806 HIV AG W/HIV1&2 ANTB W/OPTIC: CPT | Performed by: EMERGENCY MEDICINE

## 2021-01-03 PROCEDURE — 86900 BLOOD TYPING SEROLOGIC ABO: CPT | Performed by: EMERGENCY MEDICINE

## 2021-01-03 PROCEDURE — 83605 ASSAY OF LACTIC ACID: CPT | Performed by: EMERGENCY MEDICINE

## 2021-01-03 PROCEDURE — 83880 ASSAY OF NATRIURETIC PEPTIDE: CPT | Performed by: EMERGENCY MEDICINE

## 2021-01-03 PROCEDURE — 99285 EMERGENCY DEPT VISIT HI MDM: CPT | Performed by: EMERGENCY MEDICINE

## 2021-01-03 PROCEDURE — 82550 ASSAY OF CK (CPK): CPT | Performed by: EMERGENCY MEDICINE

## 2021-01-03 PROCEDURE — 84484 ASSAY OF TROPONIN QUANT: CPT | Performed by: EMERGENCY MEDICINE

## 2021-01-03 PROCEDURE — 82728 ASSAY OF FERRITIN: CPT | Performed by: EMERGENCY MEDICINE

## 2021-01-03 PROCEDURE — 36415 COLL VENOUS BLD VENIPUNCTURE: CPT | Performed by: EMERGENCY MEDICINE

## 2021-01-03 PROCEDURE — 93010 ELECTROCARDIOGRAM REPORT: CPT | Performed by: INTERNAL MEDICINE

## 2021-01-03 PROCEDURE — 86704 HEP B CORE ANTIBODY TOTAL: CPT | Performed by: EMERGENCY MEDICINE

## 2021-01-03 PROCEDURE — 99285 EMERGENCY DEPT VISIT HI MDM: CPT

## 2021-01-03 RX ORDER — SUB-Q INSULIN DEVICE, 40 UNIT
EACH MISCELLANEOUS
COMMUNITY

## 2021-01-03 RX ORDER — IBUPROFEN 600 MG/1
600 TABLET ORAL ONCE
Status: COMPLETED | OUTPATIENT
Start: 2021-01-03 | End: 2021-01-03

## 2021-01-03 RX ADMIN — IBUPROFEN 600 MG: 600 TABLET, FILM COATED ORAL at 10:37

## 2021-01-03 NOTE — DISCHARGE INSTRUCTIONS

## 2021-01-03 NOTE — TELEPHONE ENCOUNTER
Pt called at around 7a today  Pt had mild to moderate difficulty with breathing  Still febrile up to 100 7 per him  Pulse ox reading @ 95% per him  I recommended ED evaluation for SOB      Rose Ca MD

## 2021-01-03 NOTE — TELEPHONE ENCOUNTER
Pt: Benjamin Arias : [birth year] # [phone] Reason: Pt is requested pulmonary o-ncall, he is stating his oxygen is at 93 his blood sugar is at 62, fever of 100 7 and he has diarrhea   Patient is stating he does not want to go to the emergency room and wants a call back from on-call

## 2021-01-03 NOTE — ED NOTES
Pt ambulated about 15 feet with a steady gait  Starting SpO2 was 94% w/ a pulse of 99  Ending SpO2 was 95% w/ a pulse of 105  Pt denied CP, SOB, and dizziness  Pt states he feels "tired"        Xiomara García  01/03/21 1842

## 2021-01-03 NOTE — ED PROVIDER NOTES
History  Chief Complaint   Patient presents with    Shortness of Breath     Patient reports SOB and low blood sugar readings for the last week  Covid test positive from thursday  This is a 62-year-old male with a history of diabetes, hyperlipidemia, sarcoidosis who presents with shortness of breath  Approximately 1 week ago, the patient has been experiencing shortness of breath specially on exertion, dry cough, fatigue, body aches  He called his family doctor and he received a COVID test on 12/31/20  The test has subsequently come back positive  The patient was unaware of this  Starting yesterday, the patient has been spiking fevers as high as 100 7  Also starting yesterday, the patient has been experiencing intermittent episodes of diarrhea and low blood sugar  States that his blood sugar was as low as 50 last night  Denies nausea/vomiting, lightheadedness/dizziness, numbness/weakness, headache, change in vision, neck pain, chest pain, palpitations, back pain, flank pain, abdominal pain, hematochezia, melena, dysuria, hematuria  Upon entering the room, the patient is sitting comfortably on the stretcher, playing with his phone  No respiratory distress or tachypnea when entering the room  However, when I start talking to the patient, he started to become tachypneic  Prior to Admission Medications   Prescriptions Last Dose Informant Patient Reported? Taking?    ACCU-CHEK SATYA PLUS test strip  Self Yes No   Sig: TEST TID   Continuous Blood Gluc  (FREESTYLE RILEY READER) RADHA  Self Yes No   Sig: Use as directed   Continuous Blood Gluc Sensor ([address] [place] [address]) MISC  Self Yes No   Sig: Use as directed   Insulin Disposable Pump (V-Go 20) KIT   Yes Yes   Sig: Use   LANTUS SOLOSTAR injection pen 100 units/mL  Self Yes No   Sig: Inject 1 pen into the skin daily   insulin aspart (NovoLOG) 100 units/mL injection  Self Yes No   Sig: Inject under the skin 3 (three) times a day before meals   metFORMIN (GLUCOPHAGE) 500 mg tablet   Yes Yes   Sig: Take 500 mg by mouth 2 (two) times a day with meals   predniSONE 10 mg tablet   No Yes   Sig: Take 1 tablet (10 mg total) by mouth daily   Patient taking differently: Take 40 mg by mouth daily Until 1/5/21   predniSONE 10 mg tablet   No Yes   Sig: Take 1 tablet (10 mg total) by mouth daily   simvastatin (ZOCOR) 10 mg tablet  Self Yes Yes   Sig: Take 10 mg by mouth daily at bedtime      Facility-Administered Medications: None       Past Medical History:   Diagnosis Date    Diabetes mellitus ([address] )     Hyperlipidemia     Sarcoidosis        Past Surgical History:   Procedure Laterality Date    ABDOMINAL SURGERY  1962    COLONOSCOPY  2017    CYSTOSCOPY W/ URETERAL STENT PLACEMENT Left     KNEE SURGERY Right 1997    NC BIOPSY/EXCISION, LYMPH NODE(S) Right 11/2/2017    Procedure: INGUINAL LYMPH NODE BIOPSY;  Surgeon: Jason Muir MD;  [facility];  Service: General    SHOULDER SURGERY  2005       Family History   Problem Relation Age of Onset    Heart disease Father     Diabetes Father     Hyperlipidemia Father     Cancer Mother     Arthritis Mother     Diabetes Sister     Cancer Sister     Hyperlipidemia Brother      I have reviewed and agree with the history as documented  E-Cigarette/Vaping     E-Cigarette/Vaping Substances     Social History     Tobacco Use    Smoking status: Never Smoker    Smokeless tobacco: Never Used   Substance Use Topics    Alcohol use: No    Drug use: No       Review of Systems   Constitutional: Positive for chills, fatigue and fever  Body aches   HENT: Negative for rhinorrhea, sore throat and trouble swallowing  Eyes: Negative for photophobia and visual disturbance  Respiratory: Positive for cough and shortness of breath  Negative for chest tightness  Cardiovascular: Negative for chest pain, palpitations and leg swelling  Gastrointestinal: Positive for diarrhea   Negative for abdominal pain, blood in stool, nausea and vomiting  Endocrine: Negative for polyuria  Genitourinary: Negative for dysuria, flank pain and hematuria  Musculoskeletal: Negative for back pain and neck pain  Skin: Negative for color change and rash  Allergic/Immunologic: Negative for immunocompromised state  Neurological: Negative for dizziness, weakness, light-headedness, numbness and headaches  All other systems reviewed and are negative  Physical Exam  Physical Exam  Constitutional:       General: He is not in acute distress  Appearance: Normal appearance  He is well-developed  HENT:      Mouth/Throat:      Pharynx: Uvula midline  Eyes:      General: Lids are normal       Conjunctiva/sclera: Conjunctivae normal       Pupils: Pupils are equal, round, and reactive to light  Neck:      Thyroid: No thyroid mass or thyromegaly  Trachea: Trachea normal    Cardiovascular:      Rate and Rhythm: Normal rate and regular rhythm  Pulses: Normal pulses  Heart sounds: Normal heart sounds  No murmur  Pulmonary:      Effort: Pulmonary effort is normal  Tachypnea present  Breath sounds: Normal breath sounds  Comments: Patient able to speak in full sentences, but does intermittently become tachypneic  Abdominal:      General: Bowel sounds are normal       Palpations: Abdomen is soft  Tenderness: There is no abdominal tenderness  There is no guarding or rebound  Negative signs include Stearns's sign  Skin:     General: Skin is warm and dry  Neurological:      Mental Status: He is alert  Psychiatric:         Speech: Speech normal          Behavior: Behavior normal  Behavior is cooperative  Thought Content:  Thought content normal          Vital Signs  ED Triage Vitals   Temperature Pulse Respirations Blood Pressure SpO2   01/03/21 0756 01/03/21 0756 01/03/21 0756 01/03/21 0756 01/03/21 0756   97 7 °F (36 5 °C) 84 (!) 30 124/78 98 %      Temp Source Heart Rate Source Patient Position - Orthostatic VS BP Location FiO2 (%)   01/03/21 0756 01/03/21 0756 01/03/21 0756 01/03/21 0756 --   Temporal Monitor Lying Left arm       Pain Score       01/03/21 0904       6           Vitals:    01/03/21 0904 01/03/21 0930 01/03/21 1014 01/03/21 1045   BP: 145/77  132/81 107/62   Pulse: 80 78 81 84   Patient Position - Orthostatic VS: Lying  Lying Lying         Visual Acuity      ED Medications  Medications   ibuprofen (MOTRIN) tablet 600 mg (600 mg Oral Given 1/3/21 1037)       Diagnostic Studies  Results Reviewed     Procedure Component Value Units Date/Time    C-reactive protein [[id number]]  (Abnormal) Collected: 01/03/21 0929    Lab Status: Final result Specimen: Blood from Arm, Right Updated: 01/03/21 1048     CRP 23 6 mg/L     NT-BNP PRO [[id number]]  (Normal) Collected: 01/03/21 0929    Lab Status: Final result Specimen: Blood from Arm, Right Updated: 01/03/21 1048     NT-proBNP 51 pg/mL     CK (with reflex to MB) [[id number]]  (Normal) Collected: 01/03/21 0929    Lab Status: Final result Specimen: Blood from Arm, Right Updated: 01/03/21 1045     Total CK 50 U/L     Rapid HIV 1/2 AB-AG Combo [[id number]]  (Normal) Collected: 01/03/21 0930    Lab Status: Final result Specimen: Blood from Arm, Right Updated: 01/03/21 1028     Rapid HIV 1 AND 2 Non-Reactive     HIV-1 P24 Ag Screen Non-Reactive    Narrative:      Negative for HIV-1 p24 Antigen  Negative for HIV-1 and/or HIV-2 Antibody  D-dimer, quantitative [[id number]]  (Abnormal) Collected: 01/03/21 0930    Lab Status: Final result Specimen: Blood from Arm, Right Updated: 01/03/21 1017     D-Dimer, Quant 0 56 ug/ml FEU     Lactic acid, plasma [[id number]]  (Normal) Collected: 01/03/21 0929    Lab Status: Final result Specimen: Blood from Arm, Right Updated: 01/03/21 1016     LACTIC ACID 1 5 mmol/L     Narrative:      Result may be elevated if tourniquet was used during collection      Comprehensive metabolic panel [[id number]] (Abnormal) Collected: 01/03/21 0929    Lab Status: Final result Specimen: Blood from Arm, Right Updated: 01/03/21 1007     Sodium 134 mmol/L      Potassium 3 6 mmol/L      Chloride 98 mmol/L      CO2 28 mmol/L      ANION GAP 8 mmol/L      BUN 20 mg/dL      Creatinine 1 55 mg/dL      Glucose 212 mg/dL      Calcium 8 7 mg/dL      Corrected Calcium 9 3 mg/dL      AST 28 U/L      ALT 27 U/L      Alkaline Phosphatase 95 U/L      Total Protein 7 1 g/dL      Albumin 3 2 g/dL      Total Bilirubin 0 82 mg/dL      eGFR 48 ml/min/1 73sq m     Narrative:      [facility] guidelines for Chronic Kidney Disease (CKD):     Stage 1 with normal or high GFR (GFR > 90 mL/min/1 73 square meters)    Stage 2 Mild CKD (GFR = 60-89 mL/min/1 73 square meters)    Stage 3A Moderate CKD (GFR = 45-59 mL/min/1 73 square meters)    Stage 3B Moderate CKD (GFR = 30-44 mL/min/1 73 square meters)    Stage 4 Severe CKD (GFR = 15-29 mL/min/1 73 square meters)    Stage 5 End Stage CKD (GFR <15 mL/min/1 73 square meters)  Note: GFR calculation is accurate only with a steady state creatinine    Troponin I [[id number]]  (Normal) Collected: 01/03/21 0929    Lab Status: Final result Specimen: Blood from Arm, Right Updated: 01/03/21 1007     Troponin I <0 02 ng/mL     CBC and differential [[id number]]  (Abnormal) Collected: 01/03/21 0929    Lab Status: Final result Specimen: Blood from Arm, Right Updated: 01/03/21 0949     WBC 3 72 Thousand/uL      RBC 5 01 Million/uL      Hemoglobin 15 4 g/dL      Hematocrit 47 0 %      MCV 94 fL      MCH 30 7 pg      MCHC 32 8 g/dL      RDW 12 5 %      MPV 9 9 fL      Platelets 780 Thousands/uL      nRBC 0 /100 WBCs      Neutrophils Relative 75 %      Immat GRANS % 1 %      Lymphocytes Relative 16 %      Monocytes Relative 8 %      Eosinophils Relative 0 %      Basophils Relative 0 %      Neutrophils Absolute 2 78 Thousands/µL      Immature Grans Absolute 0 03 Thousand/uL      Lymphocytes Absolute 0 61 Thousands/µL      Monocytes Absolute 0 30 Thousand/µL      Eosinophils Absolute 0 00 Thousand/µL      Basophils Absolute 0 00 Thousands/µL     Ferritin [[id number]] Collected: 01/03/21 0929    Lab Status: In process Specimen: Blood from Arm, Right Updated: 01/03/21 0946    Chronic Hepatitis Panel [[id number]] Collected: 01/03/21 0929    Lab Status: In process Specimen: Blood from Arm, Right Updated: 01/03/21 0946    Procalcitonin with AM Reflex [[id number]] Collected: 01/03/21 0929    Lab Status: In process Specimen: Blood from Arm, Right Updated: 01/03/21 0946    Blood culture #2 [[id number]] Collected: 01/03/21 0930    Lab Status: In process Specimen: Blood from Hand, Right Updated: 01/03/21 0944    Blood culture #1 [[id number]] Collected: 01/03/21 0918    Lab Status: In process Specimen: Blood from Hand, Left Updated: 01/03/21 0931                 XR chest 1 view portable    (Results Pending)              Procedures  ECG 12 Lead Documentation Only    Date/Time: 1/3/2021 9:31 AM  Performed by: Amari Bianchi MD  Authorized by: Amari Bianchi MD     ECG reviewed by me, the ED Provider: yes    Patient location:  ED  Previous ECG:     Previous ECG:  Compared to current    Comparison ECG info:  10/17/17    Similarity:  No change    Comparison to cardiac monitor: Yes    Interpretation:     Interpretation: non-specific    Rate:     ECG rate:  78    ECG rate assessment: normal    Rhythm:     Rhythm: sinus rhythm    Ectopy:     Ectopy: none    QRS:     QRS axis:  Normal    QRS intervals:  Normal  Conduction:     Conduction: normal    ST segments:     ST segments:  Normal  T waves:     T waves: normal               ED Course  ED Course as of Jan 03 1137   Sun Jan 03, 2021   1002 Re-evaluated patient  Currently, sleeping comfortably  Saturating well on room air while sleeping  1007 Stable from 1 year ago   Creatinine(!): 1 55   1018 No concern for PE  Inflammatory marker for COVID    However, under limit for age adjusted D-dimer  We will use an age-adjusted d-dimer cut off to rule out PE     ADJUST-PE Trial (TIMO  2014;311(11):5401-7679  Doi:10 1001/timo  8052 2540)   [address] [facility] 2016 Feb;67(2):249-57  doi: 10 1016/j annemergmed  2015 07 026  Epub 2015 Aug 29 )     D-Dimer, Quant(!): 0 56                                           MDM  Number of Diagnoses or Management Options  Diagnosis management comments: Symptoms related to COVID  Unsure if his tachypnea is related to a component of anxiety given his level of comfort when I entered the room  Vitals all unremarkable except for respiratory rate intermittently  Will check labs, EKG, chest x-ray  If unremarkable, will check ambulatory pulse ox  Disposition pending results  Disposition  Final diagnoses:   Pneumonia due to COVID-19 virus   SOB (shortness of breath)   Fatigue     Time reflects when diagnosis was documented in both MDM as applicable and the Disposition within this note     Time User Action Codes Description Comment    1/3/2021 11:36 AM Mirian Guaman P Add [U07 1,  J12 82] Pneumonia due to COVID-19 virus     1/3/2021 11:36 AM Lila Arauz Add [R06 02] SOB (shortness of breath)     1/3/2021 11:36 AM Sangeeta Dobson Add [R53 83] Fatigue       ED Disposition     ED Disposition Condition Date/Time Comment    Discharge Stable Sun Pelon 3, 2021 11:36 AM Elly Wilson discharge to home/self care              Follow-up Information     Follow up With Specialties Details Why Contact Info Additional [address], DO Internal Medicine Schedule an appointment as soon as possible for a visit   [address] [place] [address]  [address] [place] [address],[address]  [facility] [address]  [phone]       [address] [facility] Emergency Department Emergency Medicine Go to  If symptoms worsen [address] [address] [phone] [place] ED, [address]  , [facility], [place], [address]          Patient's Medications   Discharge Prescriptions    No medications on file     No discharge procedures on file      PDMP Review     None          ED Provider  Electronically Signed by           Roderick Johnson MD  01/03/21 3597

## 2021-01-05 ENCOUNTER — HOSPITAL ENCOUNTER (INPATIENT)
Facility: HOSPITAL | Age: 61
LOS: 7 days | Discharge: HOME | DRG: 871 | End: 2021-01-12
Attending: INTERNAL MEDICINE | Admitting: INTERNAL MEDICINE
Payer: COMMERCIAL

## 2021-01-05 ENCOUNTER — HOSPITAL ENCOUNTER (EMERGENCY)
Facility: HOSPITAL | Age: 61
End: 2021-01-05
Attending: EMERGENCY MEDICINE | Admitting: EMERGENCY MEDICINE
Payer: COMMERCIAL

## 2021-01-05 ENCOUNTER — APPOINTMENT (EMERGENCY)
Dept: RADIOLOGY | Facility: HOSPITAL | Age: 61
End: 2021-01-05
Payer: COMMERCIAL

## 2021-01-05 VITALS
HEART RATE: 72 BPM | OXYGEN SATURATION: 95 % | TEMPERATURE: 98.3 F | RESPIRATION RATE: 20 BRPM | DIASTOLIC BLOOD PRESSURE: 62 MMHG | SYSTOLIC BLOOD PRESSURE: 106 MMHG

## 2021-01-05 DIAGNOSIS — J12.82 PNEUMONIA DUE TO COVID-19 VIRUS: Primary | ICD-10-CM

## 2021-01-05 DIAGNOSIS — U07.1 PNEUMONIA DUE TO COVID-19 VIRUS: Primary | ICD-10-CM

## 2021-01-05 DIAGNOSIS — R09.02 HYPOXIA: ICD-10-CM

## 2021-01-05 DIAGNOSIS — R06.02 SOB (SHORTNESS OF BREATH): ICD-10-CM

## 2021-01-05 DIAGNOSIS — D86.9 SARCOIDOSIS: ICD-10-CM

## 2021-01-05 PROBLEM — J96.01 ACUTE RESPIRATORY FAILURE WITH HYPOXIA (HCC): Status: ACTIVE | Noted: 2021-01-05

## 2021-01-05 PROBLEM — A41.9 SEPSIS (HCC): Status: ACTIVE | Noted: 2021-01-05

## 2021-01-05 LAB
ABO GROUP BLD: NORMAL
ALBUMIN SERPL BCP-MCNC: 3.3 G/DL (ref 3.5–5)
ALP SERPL-CCNC: 104 U/L (ref 46–116)
ALT SERPL W P-5'-P-CCNC: 25 U/L (ref 12–78)
ANION GAP SERPL CALCULATED.3IONS-SCNC: 10 MMOL/L (ref 4–13)
AST SERPL W P-5'-P-CCNC: 25 U/L (ref 5–45)
ATRIAL RATE: 83 BPM
BASOPHILS # BLD AUTO: 0.01 THOUSANDS/ΜL (ref 0–0.1)
BASOPHILS NFR BLD AUTO: 0 % (ref 0–1)
BILIRUB SERPL-MCNC: 0.63 MG/DL (ref 0.2–1)
BLD GP AB SCN SERPL QL: NEGATIVE
BUN SERPL-MCNC: 23 MG/DL (ref 5–25)
CALCIUM ALBUM COR SERPL-MCNC: 9.5 MG/DL (ref 8.3–10.1)
CALCIUM SERPL-MCNC: 8.9 MG/DL (ref 8.3–10.1)
CHLORIDE SERPL-SCNC: 99 MMOL/L (ref 100–108)
CK SERPL-CCNC: 62 U/L (ref 39–308)
CO2 SERPL-SCNC: 26 MMOL/L (ref 21–32)
CREAT SERPL-MCNC: 1.51 MG/DL (ref 0.6–1.3)
CRP SERPL QL: 58.4 MG/L
D DIMER PPP FEU-MCNC: 11.82 UG/ML FEU
EOSINOPHIL # BLD AUTO: 0 THOUSAND/ΜL (ref 0–0.61)
EOSINOPHIL NFR BLD AUTO: 0 % (ref 0–6)
ERYTHROCYTE [DISTWIDTH] IN BLOOD BY AUTOMATED COUNT: 12.6 % (ref 11.6–15.1)
FERRITIN SERPL-MCNC: 440 NG/ML (ref 8–388)
GFR SERPL CREATININE-BSD FRML MDRD: 50 ML/MIN/1.73SQ M
GLUCOSE SERPL-MCNC: 151 MG/DL (ref 65–140)
GLUCOSE SERPL-MCNC: 153 MG/DL (ref 65–140)
GLUCOSE SERPL-MCNC: 155 MG/DL (ref 65–140)
GLUCOSE SERPL-MCNC: 197 MG/DL (ref 65–140)
HCT VFR BLD AUTO: 48.1 % (ref 36.5–49.3)
HGB BLD-MCNC: 15.9 G/DL (ref 12–17)
IMM GRANULOCYTES # BLD AUTO: 0.02 THOUSAND/UL (ref 0–0.2)
IMM GRANULOCYTES NFR BLD AUTO: 0 % (ref 0–2)
LYMPHOCYTES # BLD AUTO: 0.59 THOUSANDS/ΜL (ref 0.6–4.47)
LYMPHOCYTES NFR BLD AUTO: 11 % (ref 14–44)
MAGNESIUM SERPL-MCNC: 2.1 MG/DL (ref 1.6–2.6)
MCH RBC QN AUTO: 30.9 PG (ref 26.8–34.3)
MCHC RBC AUTO-ENTMCNC: 33.1 G/DL (ref 31.4–37.4)
MCV RBC AUTO: 93 FL (ref 82–98)
MONOCYTES # BLD AUTO: 0.24 THOUSAND/ΜL (ref 0.17–1.22)
MONOCYTES NFR BLD AUTO: 4 % (ref 4–12)
NEUTROPHILS # BLD AUTO: 4.7 THOUSANDS/ΜL (ref 1.85–7.62)
NEUTS SEG NFR BLD AUTO: 85 % (ref 43–75)
NRBC BLD AUTO-RTO: 0 /100 WBCS
P AXIS: 52 DEGREES
PHOSPHATE SERPL-MCNC: 2 MG/DL (ref 2.7–4.5)
PLATELET # BLD AUTO: 183 THOUSANDS/UL (ref 149–390)
PMV BLD AUTO: 10.3 FL (ref 8.9–12.7)
POTASSIUM SERPL-SCNC: 3.9 MMOL/L (ref 3.5–5.3)
PR INTERVAL: 142 MS
PROT SERPL-MCNC: 7.3 G/DL (ref 6.4–8.2)
QRS AXIS: 42 DEGREES
QRSD INTERVAL: 80 MS
QT INTERVAL: 354 MS
QTC INTERVAL: 415 MS
RBC # BLD AUTO: 5.15 MILLION/UL (ref 3.88–5.62)
RH BLD: POSITIVE
SODIUM SERPL-SCNC: 135 MMOL/L (ref 136–145)
SPECIMEN EXPIRATION DATE: NORMAL
T WAVE AXIS: 13 DEGREES
TROPONIN I SERPL-MCNC: <0.02 NG/ML
VENTRICULAR RATE: 83 BPM
WBC # BLD AUTO: 5.56 THOUSAND/UL (ref 4.31–10.16)

## 2021-01-05 PROCEDURE — 96361 HYDRATE IV INFUSION ADD-ON: CPT

## 2021-01-05 PROCEDURE — 96365 THER/PROPH/DIAG IV INF INIT: CPT

## 2021-01-05 PROCEDURE — 99223 1ST HOSP IP/OBS HIGH 75: CPT | Performed by: INTERNAL MEDICINE

## 2021-01-05 PROCEDURE — 99285 EMERGENCY DEPT VISIT HI MDM: CPT

## 2021-01-05 PROCEDURE — 86901 BLOOD TYPING SEROLOGIC RH(D): CPT | Performed by: EMERGENCY MEDICINE

## 2021-01-05 PROCEDURE — 93010 ELECTROCARDIOGRAM REPORT: CPT | Performed by: INTERNAL MEDICINE

## 2021-01-05 PROCEDURE — 86140 C-REACTIVE PROTEIN: CPT | Performed by: EMERGENCY MEDICINE

## 2021-01-05 PROCEDURE — 71045 X-RAY EXAM CHEST 1 VIEW: CPT

## 2021-01-05 PROCEDURE — 82728 ASSAY OF FERRITIN: CPT | Performed by: EMERGENCY MEDICINE

## 2021-01-05 PROCEDURE — 84484 ASSAY OF TROPONIN QUANT: CPT | Performed by: EMERGENCY MEDICINE

## 2021-01-05 PROCEDURE — 86850 RBC ANTIBODY SCREEN: CPT | Performed by: EMERGENCY MEDICINE

## 2021-01-05 PROCEDURE — 82948 REAGENT STRIP/BLOOD GLUCOSE: CPT

## 2021-01-05 PROCEDURE — 82550 ASSAY OF CK (CPK): CPT | Performed by: EMERGENCY MEDICINE

## 2021-01-05 PROCEDURE — 83735 ASSAY OF MAGNESIUM: CPT | Performed by: EMERGENCY MEDICINE

## 2021-01-05 PROCEDURE — 80053 COMPREHEN METABOLIC PANEL: CPT | Performed by: EMERGENCY MEDICINE

## 2021-01-05 PROCEDURE — XW033E5 INTRODUCTION OF REMDESIVIR ANTI-INFECTIVE INTO PERIPHERAL VEIN, PERCUTANEOUS APPROACH, NEW TECHNOLOGY GROUP 5: ICD-10-PCS | Performed by: INTERNAL MEDICINE

## 2021-01-05 PROCEDURE — 85025 COMPLETE CBC W/AUTO DIFF WBC: CPT | Performed by: EMERGENCY MEDICINE

## 2021-01-05 PROCEDURE — 99285 EMERGENCY DEPT VISIT HI MDM: CPT | Performed by: EMERGENCY MEDICINE

## 2021-01-05 PROCEDURE — 84100 ASSAY OF PHOSPHORUS: CPT | Performed by: EMERGENCY MEDICINE

## 2021-01-05 PROCEDURE — 36415 COLL VENOUS BLD VENIPUNCTURE: CPT | Performed by: EMERGENCY MEDICINE

## 2021-01-05 PROCEDURE — 86900 BLOOD TYPING SEROLOGIC ABO: CPT | Performed by: EMERGENCY MEDICINE

## 2021-01-05 PROCEDURE — 93005 ELECTROCARDIOGRAM TRACING: CPT

## 2021-01-05 PROCEDURE — 85379 FIBRIN DEGRADATION QUANT: CPT | Performed by: EMERGENCY MEDICINE

## 2021-01-05 PROCEDURE — 96375 TX/PRO/DX INJ NEW DRUG ADDON: CPT

## 2021-01-05 RX ORDER — ASCORBIC ACID 500 MG
1000 TABLET ORAL ONCE
Status: COMPLETED | OUTPATIENT
Start: 2021-01-05 | End: 2021-01-05

## 2021-01-05 RX ORDER — INSULIN GLARGINE 100 [IU]/ML
38 INJECTION, SOLUTION SUBCUTANEOUS EVERY MORNING
Status: DISCONTINUED | OUTPATIENT
Start: 2021-01-05 | End: 2021-01-05

## 2021-01-05 RX ORDER — ONDANSETRON 2 MG/ML
4 INJECTION INTRAMUSCULAR; INTRAVENOUS ONCE
Status: COMPLETED | OUTPATIENT
Start: 2021-01-05 | End: 2021-01-05

## 2021-01-05 RX ORDER — MELATONIN
2000 ONCE
Status: COMPLETED | OUTPATIENT
Start: 2021-01-05 | End: 2021-01-05

## 2021-01-05 RX ORDER — ZINC SULFATE 50(220)MG
220 CAPSULE ORAL DAILY
Status: COMPLETED | OUTPATIENT
Start: 2021-01-05 | End: 2021-01-11

## 2021-01-05 RX ORDER — DEXAMETHASONE SODIUM PHOSPHATE 4 MG/ML
6 INJECTION, SOLUTION INTRA-ARTICULAR; INTRALESIONAL; INTRAMUSCULAR; INTRAVENOUS; SOFT TISSUE ONCE
Status: COMPLETED | OUTPATIENT
Start: 2021-01-05 | End: 2021-01-05

## 2021-01-05 RX ORDER — PRAVASTATIN SODIUM 20 MG
20 TABLET ORAL
Status: DISCONTINUED | OUTPATIENT
Start: 2021-01-05 | End: 2021-01-05

## 2021-01-05 RX ORDER — INSULIN GLARGINE 100 [IU]/ML
10 INJECTION, SOLUTION SUBCUTANEOUS EVERY MORNING
Status: DISCONTINUED | OUTPATIENT
Start: 2021-01-05 | End: 2021-01-12 | Stop reason: HOSPADM

## 2021-01-05 RX ORDER — DEXAMETHASONE SODIUM PHOSPHATE 10 MG/ML
6 INJECTION, SOLUTION INTRAMUSCULAR; INTRAVENOUS EVERY 24 HOURS
Status: DISCONTINUED | OUTPATIENT
Start: 2021-01-05 | End: 2021-01-12 | Stop reason: HOSPADM

## 2021-01-05 RX ORDER — INSULIN GLARGINE 100 [IU]/ML
10 INJECTION, SOLUTION SUBCUTANEOUS EVERY MORNING
Status: DISCONTINUED | OUTPATIENT
Start: 2021-01-06 | End: 2021-01-05

## 2021-01-05 RX ORDER — ZINC SULFATE 50(220)MG
220 CAPSULE ORAL ONCE
Status: COMPLETED | OUTPATIENT
Start: 2021-01-05 | End: 2021-01-05

## 2021-01-05 RX ORDER — MELATONIN
2000 DAILY
Status: DISCONTINUED | OUTPATIENT
Start: 2021-01-05 | End: 2021-01-12 | Stop reason: HOSPADM

## 2021-01-05 RX ORDER — FAMOTIDINE 20 MG/1
20 TABLET, FILM COATED ORAL ONCE
Status: COMPLETED | OUTPATIENT
Start: 2021-01-05 | End: 2021-01-05

## 2021-01-05 RX ORDER — ASCORBIC ACID 500 MG
1000 TABLET ORAL EVERY 12 HOURS SCHEDULED
Status: DISPENSED | OUTPATIENT
Start: 2021-01-05 | End: 2021-01-12

## 2021-01-05 RX ADMIN — INSULIN LISPRO 10 UNITS: 100 INJECTION, SOLUTION INTRAVENOUS; SUBCUTANEOUS at 16:35

## 2021-01-05 RX ADMIN — FAMOTIDINE 20 MG: 20 TABLET ORAL at 03:45

## 2021-01-05 RX ADMIN — INSULIN LISPRO 10 UNITS: 100 INJECTION, SOLUTION INTRAVENOUS; SUBCUTANEOUS at 12:08

## 2021-01-05 RX ADMIN — INSULIN GLARGINE 10 UNITS: 100 INJECTION, SOLUTION SUBCUTANEOUS at 12:23

## 2021-01-05 RX ADMIN — ENOXAPARIN SODIUM 30 MG: 100 INJECTION SUBCUTANEOUS at 21:03

## 2021-01-05 RX ADMIN — ONDANSETRON 4 MG: 2 INJECTION INTRAMUSCULAR; INTRAVENOUS at 02:13

## 2021-01-05 RX ADMIN — REMDESIVIR 200 MG: 100 INJECTION, POWDER, LYOPHILIZED, FOR SOLUTION INTRAVENOUS at 03:51

## 2021-01-05 RX ADMIN — ZINC SULFATE 220 MG (50 MG) CAPSULE 220 MG: CAPSULE at 03:45

## 2021-01-05 RX ADMIN — ZINC SULFATE 220 MG (50 MG) CAPSULE 220 MG: CAPSULE at 11:58

## 2021-01-05 RX ADMIN — ENOXAPARIN SODIUM 30 MG: 100 INJECTION SUBCUTANEOUS at 11:59

## 2021-01-05 RX ADMIN — INSULIN LISPRO 1 UNITS: 100 INJECTION, SOLUTION INTRAVENOUS; SUBCUTANEOUS at 16:35

## 2021-01-05 RX ADMIN — OXYCODONE HYDROCHLORIDE AND ACETAMINOPHEN 1000 MG: 500 TABLET ORAL at 21:03

## 2021-01-05 RX ADMIN — DEXAMETHASONE SODIUM PHOSPHATE 6 MG: 10 INJECTION, SOLUTION INTRAMUSCULAR; INTRAVENOUS at 11:59

## 2021-01-05 RX ADMIN — DEXAMETHASONE SODIUM PHOSPHATE 6 MG: 4 INJECTION, SOLUTION INTRAMUSCULAR; INTRAVENOUS at 03:46

## 2021-01-05 RX ADMIN — Medication 2000 UNITS: at 03:45

## 2021-01-05 RX ADMIN — INSULIN LISPRO 1 UNITS: 100 INJECTION, SOLUTION INTRAVENOUS; SUBCUTANEOUS at 21:03

## 2021-01-05 RX ADMIN — INSULIN LISPRO 1 UNITS: 100 INJECTION, SOLUTION INTRAVENOUS; SUBCUTANEOUS at 12:08

## 2021-01-05 RX ADMIN — SODIUM CHLORIDE 1000 ML: 0.9 INJECTION, SOLUTION INTRAVENOUS at 02:13

## 2021-01-05 RX ADMIN — OXYCODONE HYDROCHLORIDE AND ACETAMINOPHEN 1000 MG: 500 TABLET ORAL at 03:45

## 2021-01-05 NOTE — PLAN OF CARE
Problem: Potential for Falls  Goal: Patient will remain free of falls  Description: INTERVENTIONS:  - Assess patient frequently for physical needs  -  Identify cognitive and physical deficits and behaviors that affect risk of falls    -  [place] fall precautions as indicated by assessment   - Educate patient/family on patient safety including physical limitations  - Instruct patient to call for assistance with activity based on assessment  - Modify environment to reduce risk of injury  - Consider OT/PT consult to assist with strengthening/mobility  1/5/2021 1341 by Kevin Petit RN  Outcome: Progressing  1/5/2021 1013 by Kevin Petit RN  Outcome: Progressing     Problem: NEUROSENSORY - ADULT  Goal: Achieves stable or improved neurological status  Description: INTERVENTIONS  - Monitor and report changes in neurological status  - Monitor vital signs such as temperature, blood pressure, glucose, and any other labs ordered   - Initiate measures to prevent increased intracranial pressure  - Monitor for seizure activity and implement precautions if appropriate      1/5/2021 1341 by Kevin Petit RN  Outcome: Progressing  1/5/2021 1013 by Kevin Petit RN  Outcome: Progressing  Goal: Remains free of injury related to seizures activity  Description: INTERVENTIONS  - Maintain airway, patient safety  and administer oxygen as ordered  - Monitor patient for seizure activity, document and report duration and description of seizure to physician/advanced practitioner  - If seizure occurs,  ensure patient safety during seizure  - Reorient patient post seizure  - Seizure pads on all 4 side rails  - Instruct patient/family to notify RN of any seizure activity including if an aura is experienced  - Instruct patient/family to call for assistance with activity based on nursing assessment  - Administer anti-seizure medications if ordered    1/5/2021 1341 by Kevin Petit RN  Outcome: Progressing  1/5/2021 1013 by Kevin Petit RN  Outcome: Progressing  Goal: Achieves maximal functionality and self care  Description: INTERVENTIONS  - Monitor swallowing and airway patency with patient fatigue and changes in neurological status  - Encourage and assist patient to increase activity and self care     - Encourage visually impaired, hearing impaired and aphasic patients to use assistive/communication devices  1/5/2021 1341 by Karina Lovelace RN  Outcome: Progressing  1/5/2021 1013 by Karina Lovelace RN  Outcome: Progressing     Problem: CARDIOVASCULAR - ADULT  Goal: Maintains optimal cardiac output and hemodynamic stability  Description: INTERVENTIONS:  - Monitor I/O, vital signs and rhythm  - Monitor for S/S and trends of decreased cardiac output  - Administer and titrate ordered vasoactive medications to optimize hemodynamic stability  - Assess quality of pulses, skin color and temperature  - Assess for signs of decreased coronary artery perfusion  - Instruct patient to report change in severity of symptoms  1/5/2021 1341 by Karina Lovelace RN  Outcome: Progressing  1/5/2021 1013 by Karina Lovelace RN  Outcome: Progressing  Goal: Absence of cardiac dysrhythmias or at baseline rhythm  Description: INTERVENTIONS:  - Continuous cardiac monitoring, vital signs, obtain 12 lead EKG if ordered  - Administer antiarrhythmic and heart rate control medications as ordered  - Monitor electrolytes and administer replacement therapy as ordered  1/5/2021 1341 by Karina Lovelace RN  Outcome: Progressing  1/5/2021 1013 by Karina Lovelace RN  Outcome: Progressing     Problem: RESPIRATORY - ADULT  Goal: Achieves optimal ventilation and oxygenation  Description: INTERVENTIONS:  - Assess for changes in respiratory status  - Assess for changes in mentation and behavior  - Position to facilitate oxygenation and minimize respiratory effort  - Oxygen administered by appropriate delivery if ordered  - Initiate smoking cessation education as indicated  - Encourage broncho-pulmonary hygiene including cough, deep breathe, Incentive Spirometry  - Assess the need for suctioning and aspirate as needed  - Assess and instruct to report SOB or any respiratory difficulty  - Respiratory Therapy support as indicated  1/5/2021 1341 by Twan Ramirez RN  Outcome: Progressing  1/5/2021 1013 by Twan Ramirez RN  Outcome: Progressing     Problem: GASTROINTESTINAL - ADULT  Goal: Minimal or absence of nausea and/or vomiting  Description: INTERVENTIONS:  - Administer IV fluids if ordered to ensure adequate hydration  - Maintain NPO status until nausea and vomiting are resolved  - Nasogastric tube if ordered  - Administer ordered antiemetic medications as needed  - Provide nonpharmacologic comfort measures as appropriate  - Advance diet as tolerated, if ordered  - Consider nutrition services referral to assist patient with adequate nutrition and appropriate food choices  1/5/2021 1341 by Twan Ramirez RN  Outcome: Progressing  1/5/2021 1013 by Twan Ramirez RN  Outcome: Progressing  Goal: Maintains or returns to baseline bowel function  Description: INTERVENTIONS:  - Assess bowel function  - Encourage oral fluids to ensure adequate hydration  - Administer IV fluids if ordered to ensure adequate hydration  - Administer ordered medications as needed  - Encourage mobilization and activity  - Consider nutritional services referral to assist patient with adequate nutrition and appropriate food choices  1/5/2021 1341 by Twan Ramirez RN  Outcome: Progressing  1/5/2021 1013 by Twan Ramirez RN  Outcome: Progressing  Goal: Maintains adequate nutritional intake  Description: INTERVENTIONS:  - Monitor percentage of each meal consumed  - Identify factors contributing to decreased intake, treat as appropriate  - Assist with meals as needed  - Monitor I&O, weight, and lab values if indicated  - Obtain nutrition services referral as needed  1/5/2021 1341 by Twan Ramirez RN  Outcome: Progressing  1/5/2021 1013 by Mac Novak RN  Outcome: Progressing  Goal: Establish and maintain optimal ostomy function  Description: INTERVENTIONS:  - Assess bowel function  - Encourage oral fluids to ensure adequate hydration  - Administer IV fluids if ordered to ensure adequate hydration   - Administer ordered medications as needed  - Encourage mobilization and activity  - Nutrition services referral to assist patient with appropriate food choices  - Assess stoma site  - Consider wound care consult   1/5/2021 1341 by Mac Novak RN  Outcome: Progressing  1/5/2021 1013 by Mac Novak RN  Outcome: Progressing     Problem: GENITOURINARY - ADULT  Goal: Maintains or returns to baseline urinary function  Description: INTERVENTIONS:  - Assess urinary function  - Encourage oral fluids to ensure adequate hydration if ordered  - Administer IV fluids as ordered to ensure adequate hydration  - Administer ordered medications as needed  - Offer frequent toileting  - Follow urinary retention protocol if ordered  1/5/2021 1341 by Mac Novak RN  Outcome: Progressing  1/5/2021 1013 by Mac Novak RN  Outcome: Progressing  Goal: Absence of urinary retention  Description: INTERVENTIONS:  - Assess patients ability to void and empty bladder  - Monitor I/O  - Bladder scan as needed  - Discuss with physician/AP medications to alleviate retention as needed  - Discuss catheterization for long term situations as appropriate  1/5/2021 1341 by Mac Novak RN  Outcome: Progressing  1/5/2021 1013 by Mac Novak RN  Outcome: Progressing  Goal: Urinary catheter remains patent  Description: INTERVENTIONS:  - Assess patency of urinary catheter  - If patient has a chronic solorzano, consider changing catheter if non-functioning  - Follow guidelines for intermittent irrigation of non-functioning urinary catheter  1/5/2021 1341 by Mac Novak RN  Outcome: Progressing  1/5/2021 1013 by Mac Novak RN  Outcome: Progressing Problem: METABOLIC, FLUID AND ELECTROLYTES - ADULT  Goal: Electrolytes maintained within normal limits  Description: INTERVENTIONS:  - Monitor labs and assess patient for signs and symptoms of electrolyte imbalances  - Administer electrolyte replacement as ordered  - Monitor response to electrolyte replacements, including repeat lab results as appropriate  - Instruct patient on fluid and nutrition as appropriate  1/5/2021 1341 by Patsy Crane RN  Outcome: Progressing  1/5/2021 1013 by Patsy Crane RN  Outcome: Progressing  Goal: Fluid balance maintained  Description: INTERVENTIONS:  - Monitor labs   - Monitor I/O and WT  - Instruct patient on fluid and nutrition as appropriate  - Assess for signs & symptoms of volume excess or deficit  1/5/2021 1341 by Patsy Crane RN  Outcome: Progressing  1/5/2021 1013 by Patsy Crane RN  Outcome: Progressing  Goal: Glucose maintained within target range  Description: INTERVENTIONS:  - Monitor Blood Glucose as ordered  - Assess for signs and symptoms of hyperglycemia and hypoglycemia  - Administer ordered medications to maintain glucose within target range  - Assess nutritional intake and initiate nutrition service referral as needed  1/5/2021 1341 by Patsy Crane RN  Outcome: Progressing  1/5/2021 1013 by Patsy Crane RN  Outcome: Progressing     Problem: SKIN/TISSUE INTEGRITY - ADULT  Goal: Skin integrity remains intact  Description: INTERVENTIONS  - Identify patients at risk for skin breakdown  - Assess and monitor skin integrity  - Assess and monitor nutrition and hydration status  - Monitor labs (i e  albumin)  - Assess for incontinence   - Turn and reposition patient  - Assist with mobility/ambulation  - Relieve pressure over bony prominences  - Avoid friction and shearing  - Provide appropriate hygiene as needed including keeping skin clean and dry  - Evaluate need for skin moisturizer/barrier cream  - Collaborate with interdisciplinary team (i e  Nutrition, Rehabilitation, etc )   - Patient/family teaching  1/5/2021 1341 by Karina Lovelace RN  Outcome: Progressing  1/5/2021 1013 by Karina Lovelace RN  Outcome: Progressing  Goal: Incision(s), wounds(s) or drain site(s) healing without S/S of infection  Description: INTERVENTIONS  - Assess and document risk factors for skin impairment   - Assess and document dressing, incision, wound bed, drain sites and surrounding tissue  - Consider nutrition services referral as needed  - Oral mucous membranes remain intact  - Provide patient/ family education  1/5/2021 1341 by Karina Lovelace RN  Outcome: Progressing  1/5/2021 1013 by Karina Lovelace RN  Outcome: Progressing  Goal: Oral mucous membranes remain intact  Description: INTERVENTIONS  - Assess oral mucosa and hygiene practices  - Implement preventative oral hygiene regimen  - Implement oral medicated treatments as ordered  - Initiate Nutrition services referral as needed  1/5/2021 1341 by Karina Lovelace RN  Outcome: Progressing  1/5/2021 1013 by Karina Lovelace RN  Outcome: Progressing     Problem: HEMATOLOGIC - ADULT  Goal: Maintains hematologic stability  Description: INTERVENTIONS  - Assess for signs and symptoms of bleeding or hemorrhage  - Monitor labs  - Administer supportive blood products/factors as ordered and appropriate  1/5/2021 1341 by Karina Lovelace RN  Outcome: Progressing  1/5/2021 1013 by Karina Lovelace RN  Outcome: Progressing     Problem: MUSCULOSKELETAL - ADULT  Goal: Maintain or return mobility to safest level of function  Description: INTERVENTIONS:  - Assess patient's ability to carry out ADLs; assess patient's baseline for ADL function and identify physical deficits which impact ability to perform ADLs (bathing, care of mouth/teeth, toileting, grooming, dressing, etc )  - Assess/evaluate cause of self-care deficits   - Assess range of motion  - Assess patient's mobility  - Assess patient's need for assistive devices and provide as appropriate  - Encourage maximum independence but intervene and supervise when necessary  - Involve family in performance of ADLs  - Assess for home care needs following discharge   - Consider OT consult to assist with ADL evaluation and planning for discharge  - Provide patient education as appropriate  1/5/2021 1341 by Twan Ramirez RN  Outcome: Progressing  1/5/2021 1013 by Twan Ramirez RN  Outcome: Progressing  Goal: Maintain proper alignment of affected body part  Description: INTERVENTIONS:  - Support, maintain and protect limb and body alignment  - Provide patient/ family with appropriate education  1/5/2021 1341 by Twan Ramirez RN  Outcome: Progressing  1/5/2021 1013 by Twan Ramirez RN  Outcome: Progressing     Problem: Nutrition/Hydration-ADULT  Goal: Nutrient/Hydration intake appropriate for improving, restoring or maintaining nutritional needs  Description: Monitor and assess patient's nutrition/hydration status for malnutrition  Collaborate with interdisciplinary team and initiate plan and interventions as ordered  Monitor patient's weight and dietary intake as ordered or per policy  Utilize nutrition screening tool and intervene as necessary  Determine patient's food preferences and provide high-protein, high-caloric foods as appropriate       INTERVENTIONS:  - Monitor oral intake, urinary output, labs, and treatment plans  - Assess nutrition and hydration status and recommend course of action  - Evaluate amount of meals eaten  - Assist patient with eating if necessary   - Allow adequate time for meals  - Recommend/ encourage appropriate diets, oral nutritional supplements, and vitamin/mineral supplements  - Order, calculate, and assess calorie counts as needed  - Recommend, monitor, and adjust tube feedings and TPN/PPN based on assessed needs  - Assess need for intravenous fluids  - Provide specific nutrition/hydration education as appropriate  - Include patient/family/caregiver in decisions related to nutrition  Outcome: Progressing

## 2021-01-05 NOTE — ASSESSMENT & PLAN NOTE
Lab Results   Component Value Date    HGBA1C 9 0 02/22/2020       No results for input(s): POCGLU in the last 72 hours  Blood Sugar Average: Last 72 hrs:     Patient has insulin pump   Hold off on insulin pump    lantus 10 units am  humalog 10 units TID with meals

## 2021-01-05 NOTE — H&P
H&P- Riva Shone 1960, 62 y o  male MRN: [id number]    Unit/Bed#: 7T [facility] 716-01 Encounter: [id number]    Primary Care Provider: Mayte Garcia DO   Date and time admitted to hospital: 1/5/2021 10:01 AM        * Pneumonia due to COVID-19 virus  Assessment & Plan  Diagnosed with COVID on 12/31  Presented now hypoxic requiring 2 L nasal cannula  Currently receiving intravenous steroids, day 1 out of ten  Currently receiving intravenous remdesivir day 1 out of 5  Continue treated for mild COVID pathway-continue zinc, vitamin-C  Will hold the vitamin-D given patient has hypercalcemia from sarcoidosis  Titrate down oxygen as needed  D-dimer-11 9  No suspicion for PE  Continue Lovenox  If D-dimer is elevated will obtain CTA    Acute respiratory failure with hypoxia [facility])  Assessment & Plan  Requiring 2L NC  Currently no on any home O2  Titrate down as able  Sepsis [facility])  Assessment & Plan  Resolved  No growth from blood cultures x2 for 48 hours  No role in antibiotics at this time  Continue treatment for COVID pneumonia    CKD (chronic kidney disease) stage 3, GFR 30-59 ml/min [facility])  Assessment & Plan  Lab Results   Component Value Date    EGFR 50 01/05/2021    EGFR 48 01/03/2021    EGFR 37 08/11/2018    CREATININE 1 51 (H) 01/05/2021    CREATININE 1 55 (H) 01/03/2021    CREATININE 1 56 (H) 09/20/2019       At baseline    Type 2 diabetes mellitus with microalbuminuria [facility])  Assessment & Plan  Lab Results   Component Value Date    HGBA1C 9 0 02/22/2020       No results for input(s): POCGLU in the last 72 hours  Blood Sugar Average: Last 72 hrs:     Patient has insulin pump  Hold off on insulin pump    lantus 10 units am  humalog 10 units TID with meals     Sarcoidosis  Assessment & Plan  Patient takes prednisone for sarcoidosis has been stable   Avoid vitamin d due to hypercalcemia      VTE Prophylaxis: Enoxaparin (Lovenox)  / sequential compression device   Code Status: Level 1   POLST: POLST form is not discussed and not completed at this time  Discussion with family: none    Anticipated Length of Stay:  Patient will be admitted on an Inpatient basis with an anticipated length of stay of  more 2 midnights  Justification for Hospital Stay:   Total Time for Visit, including Counseling / Coordination of Care: 1 hour  Greater than 50% of this total time spent on direct patient counseling and coordination of care  Chief Complaint:       History of Present Illness:     Riva Shone is a 62 y o  male who presents with shortness of breath, cough and chest pain  Patient says that he was 1st diagnosed on 12/31st   He was sent off with steroids  Patient states that he is normally on steroids for his sarcoidosis  Patient's that he on more short of breath  Patient has a pulse ox and measured 85% in the pulse ox  Patient felt very short of breath  Patient called the ambulance notes presented for hypoxia  Patient was started on reduction severe as well as intravenous steroids patient still says he has cough, shortness of breath  He has poor appetite  Other than that  Patient had loose stools  Review of Systems:    Review of Systems   Constitutional: Negative for chills and fever  HENT: Negative for ear pain and sore throat  Eyes: Negative for pain and visual disturbance  Respiratory: Positive for cough and shortness of breath  Cardiovascular: Negative for chest pain and palpitations  Gastrointestinal: Negative for abdominal pain and vomiting  Genitourinary: Negative for dysuria and hematuria  Musculoskeletal: Negative for arthralgias and back pain  Skin: Negative for color change and rash  Neurological: Negative for seizures and syncope  All other systems reviewed and are negative        Past Medical and Surgical History:     Past Medical History:   Diagnosis Date    Diabetes mellitus ([facility] [address] )     Hyperlipidemia     Sarcoidosis        Past Surgical History: Procedure Laterality Date    ABDOMINAL SURGERY  1962    COLONOSCOPY  2017    CYSTOSCOPY W/ URETERAL STENT PLACEMENT Left     KNEE SURGERY Right 1997    [place] BIOPSY/EXCISION, LYMPH NODE(S) Right 11/2/2017    Procedure: INGUINAL LYMPH NODE BIOPSY;  Surgeon: Jackelin Rivera MD;  [facility];  Service: General    SHOULDER SURGERY  2005       Meds/Allergies:    Prior to Admission medications    Medication Sig Start Date End Date Taking? Authorizing Provider   ACCU-CHEK SATYA PLUS test strip TEST TID 6/11/18  Yes Historical Provider, MD   Continuous Blood Gluc  (FREESTYLE RILEY READER) RADHA Use as directed 6/16/18  Yes Historical Provider, MD   Continuous Blood Gluc Sensor ([address] [place] [address]) MISC Use as directed 7/9/18  Yes Historical Provider, MD   insulin aspart (NovoLOG) 100 units/mL injection Inject under the skin 3 (three) times a day before meals   Yes Historical Provider, MD   Insulin Disposable Pump (V-Go 20) KIT Use   Yes Historical Provider, MD   LANTUS SOLOSTAR injection pen 100 units/mL Inject 1 pen into the skin daily 1/4/18  Yes Historical Provider, MD   metFORMIN (GLUCOPHAGE) 500 mg tablet Take 500 mg by mouth 2 (two) times a day with meals    Historical Provider, MD   predniSONE 10 mg tablet Take 1 tablet (10 mg total) by mouth daily  Patient taking differently: Take 40 mg by mouth daily Until 1/5/21 9/25/20   GUSTAVO Butler   predniSONE 10 mg tablet Take 1 tablet (10 mg total) by mouth daily 12/31/20   GUSTAVO Livingston   simvastatin (ZOCOR) 10 mg tablet Take 10 mg by mouth daily at bedtime    Historical Provider, MD     I have reviewed home medications with patient personally  Allergies:    Allergies   Allergen Reactions    Acetaminophen Swelling     tylenol    Dust Mite Extract     Molds & Smuts     Other     Percocet [Oxycodone-Acetaminophen]      Angioedema on finger tips         Social History:     Marital Status: Single   Patient Pre-hospital Living Situation: independent   Patient Pre-hospital Level of Mobility: independent   Patient Pre-hospital Diet Restrictions: regular   Substance Use History:   Social History     Substance and Sexual Activity   Alcohol Use Never    Frequency: Never     Social History     Tobacco Use   Smoking Status Never Smoker   Smokeless Tobacco Never Used     Social History     Substance and Sexual Activity   Drug Use No       Family History:    non-contributory    Physical Exam:     Vitals:   Blood Pressure: 104/68 (01/05/21 1500)  Pulse: 73 (01/05/21 1500)  Temperature: (!) 96 5 °F (35 8 °C) (01/05/21 1500)  Temp Source: Temporal (01/05/21 1500)  Respirations: 18 (01/05/21 1500)  Height: 6' 1" (185 4 cm) (01/05/21 1006)  Weight - Scale: 80 6 kg (177 lb 11 1 oz) (01/05/21 1006)  SpO2: 96 % (01/05/21 1500)    Physical Exam  Constitutional:       General: He is not in acute distress  Appearance: Normal appearance  HENT:      Head: Normocephalic and atraumatic  Eyes:      General:         Right eye: No discharge  Left eye: No discharge  Cardiovascular:      Rate and Rhythm: Normal rate and regular rhythm  Pulses: Normal pulses  Heart sounds: No murmur  Pulmonary:      Effort: Pulmonary effort is normal  No respiratory distress  Breath sounds: Rhonchi present  No wheezing  Musculoskeletal:      Right lower leg: No edema  Left lower leg: No edema  Skin:     General: Skin is warm and dry  Neurological:      General: No focal deficit present  Mental Status: He is alert and oriented to person, place, and time  Mental status is at baseline  Psychiatric:         Mood and Affect: Mood normal          Additional Data:     Lab Results: I have personally reviewed pertinent reports        Results from last 7 days   Lab Units 01/05/21  0213   WBC Thousand/uL 5 56   HEMOGLOBIN g/dL 15 9   HEMATOCRIT % 48 1   PLATELETS Thousands/uL 183   NEUTROS PCT % 85*   LYMPHS PCT % 11* MONOS PCT % 4   EOS PCT % 0     Results from last 7 days   Lab Units 01/05/21  0213   SODIUM mmol/L 135*   POTASSIUM mmol/L 3 9   CHLORIDE mmol/L 99*   CO2 mmol/L 26   BUN mg/dL 23   CREATININE mg/dL 1 51*   ANION GAP mmol/L 10   CALCIUM mg/dL 8 9   ALBUMIN g/dL 3 3*   TOTAL BILIRUBIN mg/dL 0 63   ALK PHOS U/L 104   ALT U/L 25   AST U/L 25   GLUCOSE RANDOM mg/dL 197*         Results from last 7 days   Lab Units 01/05/21  1558 01/05/21  1152   POC GLUCOSE mg/dl 153* 151*         Results from last 7 days   Lab Units 01/03/21  0929   LACTIC ACID mmol/L 1 5   PROCALCITONIN ng/ml 0 09       Imaging: I have personally reviewed pertinent reports  No orders to display       EKG, Pathology, and Other Studies Reviewed on Admission:   · EKG: none    Allscripts / Epic Records Reviewed: Yes     ** Please Note: This note has been constructed using a voice recognition system   **

## 2021-01-05 NOTE — PLAN OF CARE
Problem: Potential for Falls  Goal: Patient will remain free of falls  Description: INTERVENTIONS:  - Assess patient frequently for physical needs  -  Identify cognitive and physical deficits and behaviors that affect risk of falls  -  [place] fall precautions as indicated by assessment   - Educate patient/family on patient safety including physical limitations  - Instruct patient to call for assistance with activity based on assessment  - Modify environment to reduce risk of injury  - Consider OT/PT consult to assist with strengthening/mobility  Outcome: Progressing     Problem: NEUROSENSORY - ADULT  Goal: Achieves stable or improved neurological status  Description: INTERVENTIONS  - Monitor and report changes in neurological status  - Monitor vital signs such as temperature, blood pressure, glucose, and any other labs ordered   - Initiate measures to prevent increased intracranial pressure  - Monitor for seizure activity and implement precautions if appropriate      Outcome: Progressing  Goal: Remains free of injury related to seizures activity  Description: INTERVENTIONS  - Maintain airway, patient safety  and administer oxygen as ordered  - Monitor patient for seizure activity, document and report duration and description of seizure to physician/advanced practitioner  - If seizure occurs,  ensure patient safety during seizure  - Reorient patient post seizure  - Seizure pads on all 4 side rails  - Instruct patient/family to notify RN of any seizure activity including if an aura is experienced  - Instruct patient/family to call for assistance with activity based on nursing assessment  - Administer anti-seizure medications if ordered    Outcome: Progressing  Goal: Achieves maximal functionality and self care  Description: INTERVENTIONS  - Monitor swallowing and airway patency with patient fatigue and changes in neurological status  - Encourage and assist patient to increase activity and self care     - Encourage visually impaired, hearing impaired and aphasic patients to use assistive/communication devices  Outcome: Progressing     Problem: CARDIOVASCULAR - ADULT  Goal: Maintains optimal cardiac output and hemodynamic stability  Description: INTERVENTIONS:  - Monitor I/O, vital signs and rhythm  - Monitor for S/S and trends of decreased cardiac output  - Administer and titrate ordered vasoactive medications to optimize hemodynamic stability  - Assess quality of pulses, skin color and temperature  - Assess for signs of decreased coronary artery perfusion  - Instruct patient to report change in severity of symptoms  Outcome: Progressing  Goal: Absence of cardiac dysrhythmias or at baseline rhythm  Description: INTERVENTIONS:  - Continuous cardiac monitoring, vital signs, obtain 12 lead EKG if ordered  - Administer antiarrhythmic and heart rate control medications as ordered  - Monitor electrolytes and administer replacement therapy as ordered  Outcome: Progressing     Problem: RESPIRATORY - ADULT  Goal: Achieves optimal ventilation and oxygenation  Description: INTERVENTIONS:  - Assess for changes in respiratory status  - Assess for changes in mentation and behavior  - Position to facilitate oxygenation and minimize respiratory effort  - Oxygen administered by appropriate delivery if ordered  - Initiate smoking cessation education as indicated  - Encourage broncho-pulmonary hygiene including cough, deep breathe, Incentive Spirometry  - Assess the need for suctioning and aspirate as needed  - Assess and instruct to report SOB or any respiratory difficulty  - Respiratory Therapy support as indicated  Outcome: Progressing     Problem: GASTROINTESTINAL - ADULT  Goal: Minimal or absence of nausea and/or vomiting  Description: INTERVENTIONS:  - Administer IV fluids if ordered to ensure adequate hydration  - Maintain NPO status until nausea and vomiting are resolved  - Nasogastric tube if ordered  - Administer ordered antiemetic medications as needed  - Provide nonpharmacologic comfort measures as appropriate  - Advance diet as tolerated, if ordered  - Consider nutrition services referral to assist patient with adequate nutrition and appropriate food choices  Outcome: Progressing  Goal: Maintains or returns to baseline bowel function  Description: INTERVENTIONS:  - Assess bowel function  - Encourage oral fluids to ensure adequate hydration  - Administer IV fluids if ordered to ensure adequate hydration  - Administer ordered medications as needed  - Encourage mobilization and activity  - Consider nutritional services referral to assist patient with adequate nutrition and appropriate food choices  Outcome: Progressing  Goal: Maintains adequate nutritional intake  Description: INTERVENTIONS:  - Monitor percentage of each meal consumed  - Identify factors contributing to decreased intake, treat as appropriate  - Assist with meals as needed  - Monitor I&O, weight, and lab values if indicated  - Obtain nutrition services referral as needed  Outcome: Progressing  Goal: Establish and maintain optimal ostomy function  Description: INTERVENTIONS:  - Assess bowel function  - Encourage oral fluids to ensure adequate hydration  - Administer IV fluids if ordered to ensure adequate hydration   - Administer ordered medications as needed  - Encourage mobilization and activity  - Nutrition services referral to assist patient with appropriate food choices  - Assess stoma site  - Consider wound care consult   Outcome: Progressing     Problem: GENITOURINARY - ADULT  Goal: Maintains or returns to baseline urinary function  Description: INTERVENTIONS:  - Assess urinary function  - Encourage oral fluids to ensure adequate hydration if ordered  - Administer IV fluids as ordered to ensure adequate hydration  - Administer ordered medications as needed  - Offer frequent toileting  - Follow urinary retention protocol if ordered  Outcome: Progressing  Goal: Absence of urinary retention  Description: INTERVENTIONS:  - Assess patients ability to void and empty bladder  - Monitor I/O  - Bladder scan as needed  - Discuss with physician/AP medications to alleviate retention as needed  - Discuss catheterization for long term situations as appropriate  Outcome: Progressing  Goal: Urinary catheter remains patent  Description: INTERVENTIONS:  - Assess patency of urinary catheter  - If patient has a chronic solorzano, consider changing catheter if non-functioning  - Follow guidelines for intermittent irrigation of non-functioning urinary catheter  Outcome: Progressing     Problem: METABOLIC, FLUID AND ELECTROLYTES - ADULT  Goal: Electrolytes maintained within normal limits  Description: INTERVENTIONS:  - Monitor labs and assess patient for signs and symptoms of electrolyte imbalances  - Administer electrolyte replacement as ordered  - Monitor response to electrolyte replacements, including repeat lab results as appropriate  - Instruct patient on fluid and nutrition as appropriate  Outcome: Progressing  Goal: Fluid balance maintained  Description: INTERVENTIONS:  - Monitor labs   - Monitor I/O and WT  - Instruct patient on fluid and nutrition as appropriate  - Assess for signs & symptoms of volume excess or deficit  Outcome: Progressing  Goal: Glucose maintained within target range  Description: INTERVENTIONS:  - Monitor Blood Glucose as ordered  - Assess for signs and symptoms of hyperglycemia and hypoglycemia  - Administer ordered medications to maintain glucose within target range  - Assess nutritional intake and initiate nutrition service referral as needed  Outcome: Progressing     Problem: SKIN/TISSUE INTEGRITY - ADULT  Goal: Skin integrity remains intact  Description: INTERVENTIONS  - Identify patients at risk for skin breakdown  - Assess and monitor skin integrity  - Assess and monitor nutrition and hydration status  - Monitor labs (i e  albumin)  - Assess for incontinence - Turn and reposition patient  - Assist with mobility/ambulation  - Relieve pressure over bony prominences  - Avoid friction and shearing  - Provide appropriate hygiene as needed including keeping skin clean and dry  - Evaluate need for skin moisturizer/barrier cream  - Collaborate with interdisciplinary team (i e  Nutrition, Rehabilitation, etc )   - Patient/family teaching  Outcome: Progressing  Goal: Incision(s), wounds(s) or drain site(s) healing without S/S of infection  Description: INTERVENTIONS  - Assess and document risk factors for skin impairment   - Assess and document dressing, incision, wound bed, drain sites and surrounding tissue  - Consider nutrition services referral as needed  - Oral mucous membranes remain intact  - Provide patient/ family education  Outcome: Progressing  Goal: Oral mucous membranes remain intact  Description: INTERVENTIONS  - Assess oral mucosa and hygiene practices  - Implement preventative oral hygiene regimen  - Implement oral medicated treatments as ordered  - Initiate Nutrition services referral as needed  Outcome: Progressing     Problem: HEMATOLOGIC - ADULT  Goal: Maintains hematologic stability  Description: INTERVENTIONS  - Assess for signs and symptoms of bleeding or hemorrhage  - Monitor labs  - Administer supportive blood products/factors as ordered and appropriate  Outcome: Progressing     Problem: MUSCULOSKELETAL - ADULT  Goal: Maintain or return mobility to safest level of function  Description: INTERVENTIONS:  - Assess patient's ability to carry out ADLs; assess patient's baseline for ADL function and identify physical deficits which impact ability to perform ADLs (bathing, care of mouth/teeth, toileting, grooming, dressing, etc )  - Assess/evaluate cause of self-care deficits   - Assess range of motion  - Assess patient's mobility  - Assess patient's need for assistive devices and provide as appropriate  - Encourage maximum independence but intervene and supervise when necessary  - Involve family in performance of ADLs  - Assess for home care needs following discharge   - Consider OT consult to assist with ADL evaluation and planning for discharge  - Provide patient education as appropriate  Outcome: Progressing  Goal: Maintain proper alignment of affected body part  Description: INTERVENTIONS:  - Support, maintain and protect limb and body alignment  - Provide patient/ family with appropriate education  Outcome: Progressing

## 2021-01-05 NOTE — ED PROVIDER NOTES
History  Chief Complaint   Patient presents with    Respiratory Distress     Increasing dyspnea and hypoxia since recent Covid dx  SaO2 in the low 80s on RA for EMS  63 yo male who lives alone and began with concerning symptoms for COVID just after chepe, tested LEIDY and found out he was positive 12 when he came to ER due to fever and continued SOB, especially on exertion, dry cough, fatigue, body aches  Pt was evaluated and did not meet criteria for admission  Sent home and has continued with SOB, worsened all day and called EMS  Sats for EMS "low 80's" and place on 2 L NC - sats 95-97% on 2L but remains conversationally dyspneic  History provided by:  Patient and EMS personnel   used: No    Shortness of Breath  Severity:  Moderate  Onset quality:  Gradual  Duration:  9 days  Timing:  Constant  Progression:  Worsening  Chronicity:  New  Relieved by:  Nothing  Worsened by:  Exertion  Ineffective treatments:  None tried  Associated symptoms: cough    Associated symptoms: no abdominal pain, no chest pain, no fever, no headaches, no neck pain, no rash, no sore throat, no vomiting and no wheezing    Cough:     Cough characteristics:  Dry    Severity:  Mild    Duration:  9 days      Prior to Admission Medications   Prescriptions Last Dose Informant Patient Reported? Taking?    ACCU-CHEK SATYA PLUS test strip  Self Yes No   Sig: TEST TID   Continuous Blood Gluc  (FREESTYLE RILEY READER) RADHA  Self Yes No   Sig: Use as directed   Continuous Blood Gluc Sensor ([address] [place] [address]) [facility] [place]  Self Yes No   Sig: Use as directed   Insulin Disposable Pump (V-Go 20) KIT   Yes No   Sig: Use   LANTUS SOLOSTAR injection pen 100 units/mL  Self Yes No   Sig: Inject 1 pen into the skin daily   insulin aspart (NovoLOG) 100 units/mL injection  Self Yes No   Sig: Inject under the skin 3 (three) times a day before meals   metFORMIN (GLUCOPHAGE) 500 mg tablet   Yes No   Sig: Take 500 mg by mouth 2 (two) times a day with meals   predniSONE 10 mg tablet   No No   Sig: Take 1 tablet (10 mg total) by mouth daily   Patient taking differently: Take 40 mg by mouth daily Until 1/5/21   predniSONE 10 mg tablet   No No   Sig: Take 1 tablet (10 mg total) by mouth daily   simvastatin (ZOCOR) 10 mg tablet  Self Yes No   Sig: Take 10 mg by mouth daily at bedtime      Facility-Administered Medications: None       Past Medical History:   Diagnosis Date    Diabetes mellitus ([address] )     Hyperlipidemia     Sarcoidosis        Past Surgical History:   Procedure Laterality Date    ABDOMINAL SURGERY  1962    COLONOSCOPY  2017    CYSTOSCOPY W/ URETERAL STENT PLACEMENT Left     KNEE SURGERY Right 1997    MO BIOPSY/EXCISION, LYMPH NODE(S) Right 11/2/2017    Procedure: INGUINAL LYMPH NODE BIOPSY;  Surgeon: Laurel Salas MD;  [facility];  Service: General    SHOULDER SURGERY  2005       Family History   Problem Relation Age of Onset    Heart disease Father     Diabetes Father     Hyperlipidemia Father     Cancer Mother     Arthritis Mother     Diabetes Sister     Cancer Sister     Hyperlipidemia Brother      I have reviewed and agree with the history as documented  E-Cigarette/Vaping     E-Cigarette/Vaping Substances     Social History     Tobacco Use    Smoking status: Never Smoker    Smokeless tobacco: Never Used   Substance Use Topics    Alcohol use: No    Drug use: No       Review of Systems   Constitutional: Positive for appetite change and fatigue  Negative for chills and fever  HENT: Negative for congestion and sore throat  Eyes: Negative for visual disturbance  Respiratory: Positive for cough and shortness of breath  Negative for wheezing  Cardiovascular: Negative for chest pain and palpitations  Gastrointestinal: Negative for abdominal pain, diarrhea, nausea and vomiting  Genitourinary: Negative for dysuria  Musculoskeletal: Negative for neck pain and neck stiffness  Skin: Negative for pallor and rash  Neurological: Negative for headaches  Psychiatric/Behavioral: Negative for confusion  All other systems reviewed and are negative  Physical Exam  Physical Exam  Vitals signs and nursing note reviewed  Constitutional:       General: He is in acute distress (mild resp distress)  Appearance: He is well-developed  HENT:      Head: Normocephalic and atraumatic  Right Ear: External ear normal       Left Ear: External ear normal       Mouth/Throat:      Comments: MM tachy  Eyes:      Extraocular Movements: Extraocular movements intact  Pupils: Pupils are equal, round, and reactive to light  Neck:      Musculoskeletal: Neck supple  Cardiovascular:      Rate and Rhythm: Normal rate and regular rhythm  Heart sounds: No murmur  Pulmonary:      Effort: Respiratory distress (mild conversational dyspnea and tachypnea noted) present  Breath sounds: Normal breath sounds  Abdominal:      General: Bowel sounds are normal  There is no distension  Palpations: Abdomen is soft  Tenderness: There is no abdominal tenderness  Musculoskeletal: Normal range of motion  Skin:     General: Skin is warm  Coloration: Skin is not pale  Findings: No rash  Neurological:      Mental Status: He is alert and oriented to person, place, and time     Psychiatric:         Behavior: Behavior normal          Vital Signs  ED Triage Vitals [01/05/21 0141]   Temperature Pulse Respirations Blood Pressure SpO2   98 3 °F (36 8 °C) 82 22 136/72 97 %      Temp Source Heart Rate Source Patient Position - Orthostatic VS BP Location FiO2 (%)   Oral Monitor Lying Right arm --      Pain Score       3           Vitals:    01/05/21 0141   BP: 136/72   Pulse: 82   Patient Position - Orthostatic VS: Lying         Visual Acuity      ED Medications  Medications   cholecalciferol (VITAMIN D3) tablet 2,000 Units (has no administration in time range)   ascorbic acid (VITAMIN C) tablet 1,000 mg (has no administration in time range)   zinc sulfate (ZINCATE) capsule 220 mg (has no administration in time range)   famotidine (PEPCID) tablet 20 mg (has no administration in time range)   dexamethasone (DECADRON) injection 6 mg (has no administration in time range)   remdesivir (Veklury) 200 mg in sodium chloride 0 9 % 250 mL IVPB (has no administration in time range)     Followed by   remdesivir Bretta Jean Carlos) 100 mg in sodium chloride 0 9 % 250 mL IVPB (has no administration in time range)   ondansetron (ZOFRAN) injection 4 mg (4 mg Intravenous Given 1/5/21 0213)   sodium chloride 0 9 % bolus 1,000 mL (1,000 mL Intravenous New Bag 1/5/21 0213)       Diagnostic Studies  Results Reviewed     Procedure Component Value Units Date/Time    D-Dimer [[id number]]  (Abnormal) Collected: 01/05/21 0213    Lab Status: Final result Specimen: Blood from Arm, Left Updated: 01/05/21 0250     D-Dimer, Quant 11 82 ug/ml FEU     Troponin I [[id number]]  (Normal) Collected: 01/05/21 0213    Lab Status: Final result Specimen: Blood from Arm, Left Updated: 01/05/21 0248     Troponin I <0 02 ng/mL     Comprehensive metabolic panel [[id number]]  (Abnormal) Collected: 01/05/21 0213    Lab Status: Final result Specimen: Blood from Arm, Left Updated: 01/05/21 0245     Sodium 135 mmol/L      Potassium 3 9 mmol/L      Chloride 99 mmol/L      CO2 26 mmol/L      ANION GAP 10 mmol/L      BUN 23 mg/dL      Creatinine 1 51 mg/dL      Glucose 197 mg/dL      Calcium 8 9 mg/dL      Corrected Calcium 9 5 mg/dL      AST 25 U/L      ALT 25 U/L      Alkaline Phosphatase 104 U/L      Total Protein 7 3 g/dL      Albumin 3 3 g/dL      Total Bilirubin 0 63 mg/dL      eGFR 50 ml/min/1 73sq m     Soraya:      Lee guidelines for Chronic Kidney Disease (CKD):     Stage 1 with normal or high GFR (GFR > 90 mL/min/1 73 square meters)    Stage 2 Mild CKD (GFR = 60-89 mL/min/1 73 square meters)    Stage 3A Moderate CKD (GFR = 45-59 mL/min/1 73 square meters)    Stage 3B Moderate CKD (GFR = 30-44 mL/min/1 73 square meters)    Stage 4 Severe CKD (GFR = 15-29 mL/min/1 73 square meters)    Stage 5 End Stage CKD (GFR <15 mL/min/1 73 square meters)  Note: GFR calculation is accurate only with a steady state creatinine    CBC and differential [[id number]]  (Abnormal) Collected: 01/05/21 0213    Lab Status: Final result Specimen: Blood from Arm, Left Updated: 01/05/21 0225     WBC 5 56 Thousand/uL      RBC 5 15 Million/uL      Hemoglobin 15 9 g/dL      Hematocrit 48 1 %      MCV 93 fL      MCH 30 9 pg      MCHC 33 1 g/dL      RDW 12 6 %      MPV 10 3 fL      Platelets 712 Thousands/uL      nRBC 0 /100 WBCs      Neutrophils Relative 85 %      Immat GRANS % 0 %      Lymphocytes Relative 11 %      Monocytes Relative 4 %      Eosinophils Relative 0 %      Basophils Relative 0 %      Neutrophils Absolute 4 70 Thousands/µL      Immature Grans Absolute 0 02 Thousand/uL      Lymphocytes Absolute 0 59 Thousands/µL      Monocytes Absolute 0 24 Thousand/µL      Eosinophils Absolute 0 00 Thousand/µL      Basophils Absolute 0 01 Thousands/µL     Ferritin [[id number]] Collected: 01/05/21 4262    Lab Status: In process Specimen: Blood from Arm, Left Updated: 01/05/21 0222    Phosphorus [[id number]] Collected: 01/05/21 0213    Lab Status: In process Specimen: Blood from Arm, Left Updated: 01/05/21 0222    Magnesium [[id number]] Collected: 01/05/21 0213    Lab Status: In process Specimen: Blood from Arm, Left Updated: 01/05/21 0222    CK Total with Reflex CKMB [[id number]] Collected: 01/05/21 1708    Lab Status: In process Specimen: Blood from Arm, Left Updated: 01/05/21 0222    C-reactive protein [[id number]] Collected: 01/05/21 0368    Lab Status:  In process Specimen: Blood from Arm, Left Updated: 01/05/21 0222                 XR chest 1 view portable    (Results Pending)              Procedures  ECG 12 Lead Documentation Only    Date/Time: 1/5/2021 1:48 AM  Performed by: Nacho Tabor DO  Authorized by: Nacho Tabor DO     Indications / Diagnosis:  SOB  Patient location:  ED  Previous ECG:     Previous ECG:  Compared to current    Comparison ECG info:  Pelon 3 2021    Similarity:  No change  Interpretation:     Interpretation: normal    Rate:     ECG rate:  83    ECG rate assessment: normal    Rhythm:     Rhythm: sinus rhythm    Ectopy:     Ectopy: none    QRS:     QRS axis:  Normal    QRS intervals:  Normal  Conduction:     Conduction: normal    ST segments:     ST segments:  Non-specific  T waves:     T waves: non-specific               ED Course  ED Course as of Jan 05 0316   Tue Jan 05, 2021   0136 Pt seen and examined  63 yo male who lives alone and began with concerning symptoms for COVID just after chepe, tested LEIDY and found out he was positive 12 when he came to ER due to fever and continued SOB, especially on exertion, dry cough, fatigue, body aches  Pt was evaluated and did not meet criteria for admission  Sent home and has continued with SOB, worsened all day and called EMS  Sats for EMS "low 80's" and place on 2 L NC - sats 95-97% on 2L but remains conversationally dyspneic  Will check mild pathway COViD labs, repeat chest xray and continue supplemental O2  Will also give IVF and zofran as pt appears dehydrated, admits to mild nausea and not eating or drinking well today  0245 Chest xray c/w COVID pneumonia  CBC WNL   Creat 1 5 - baseline for pt        0302 Spoke with [facility] [place] - will transfer pt to [facility] when bed opens after 0700 this am                                               MDM    Disposition  Final diagnoses:   Pneumonia due to COVID-19 virus   Hypoxia   SOB (shortness of breath)     Time reflects when diagnosis was documented in both MDM as applicable and the Disposition within this note     Time User Action Codes Description Comment    1/5/2021  3:01 AM Perry ZAPATA Add [U07 1  J12 82] Pneumonia due to COVID-19 virus     1/5/2021  3:02 AM Edinson ZAPATA Add [R09 02] Hypoxia     1/5/2021  3:02 AM Edinson ZAPATA Add [R06 02] SOB (shortness of breath)       ED Disposition     ED Disposition Condition Date/Time Comment    Transfer to Another Facility-In Network  Tue Jan 5, 2021  3:02 AM Fly Briggs should be transferred out to [facility]  Follow-up Information    None         Patient's Medications   Discharge Prescriptions    No medications on file     No discharge procedures on file      PDMP Review     None          ED Provider  Electronically Signed by           Shayne Rivas DO  01/05/21 0227

## 2021-01-05 NOTE — LETTER
[address] & [facility]TH[facility] SURGICAL UNIT  [address] [place] [address]  [phone]  Dept: [phone]    January 12, 2021     Patient: Itzel Wesley   YOB: 1960   Date of Visit: 1/5/2021       To Whom it May Concern:    Pamela Bernard is under my professional care  He was seen in the hospital from 1/5/2021   to 01/12/21  He may return to work on 01/16/2021 without limitations  If you have any questions or concerns, please don't hesitate to call           Sincerely,          Jayy Cyr MD

## 2021-01-05 NOTE — ED NOTES
Attempted to call report twice to [facility] heart       Carlos Bryson RN  01/05/21 5576

## 2021-01-05 NOTE — ASSESSMENT & PLAN NOTE
Lab Results   Component Value Date    EGFR 50 01/05/2021    EGFR 48 01/03/2021    EGFR 37 08/11/2018    CREATININE 1 51 (H) 01/05/2021    CREATININE 1 55 (H) 01/03/2021    CREATININE 1 56 (H) 09/20/2019       At baseline

## 2021-01-05 NOTE — EMTALA/ACUTE CARE TRANSFER
[facility]  [address] [id number]  Dept: [phone]      EMTALA TRANSFER CONSENT    NAME Renetta Boykin                                         1960                              MRN [id number]    I have been informed of my rights regarding examination, treatment, and transfer   by Dr Declan Finnegan DO    Benefits: Other benefits (Include comment)_______________________(bed availability)    Risks: Potential deterioration of medical condition      Consent for Transfer:  I acknowledge that my medical condition has been evaluated and explained to me by the emergency department physician or other qualified medical person and/or my attending physician, who has recommended that I be transferred to the service of  Accepting Physician: Jaydon Bingham at [facility] Name, [facility] 41 : [facility]  The above potential benefits of such transfer, the potential risks associated with such transfer, and the probable risks of not being transferred have been explained to me, and I fully understand them  The doctor has explained that, in my case, the benefits of transfer outweigh the risks  I agree to be transferred  I authorize the performance of emergency medical procedures and treatments upon me in both transit and upon arrival at the receiving facility  Additionally, I authorize the release of any and all medical records to the receiving facility and request they be transported with me, if possible  I understand that the safest mode of transportation during a medical emergency is an ambulance and that the Hospital advocates the use of this mode of transport  Risks of traveling to the receiving facility by car, including absence of medical control, life sustaining equipment, such as oxygen, and medical personnel has been explained to me and I fully understand them      (CELESTINA CORRECT BOX BELOW)  [ x ]  I consent to the stated transfer and to be transported by ambulance/helicopter  [  ]  I consent to the stated transfer, but refuse transportation by ambulance and accept full responsibility for my transportation by car  I understand the risks of non-ambulance transfers and I exonerate the Hospital and its staff from any deterioration in my condition that results from this refusal     X___________________________________________    DATE  21  TIME________  Signature of patient or legally responsible individual signing on patient behalf           RELATIONSHIP TO PATIENT_________________________          Provider Certification    NAME Jama Martin                                         1960                              MRN [id number]    A medical screening exam was performed on the above named patient  Based on the examination:    Condition Necessitating Transfer The primary encounter diagnosis was Pneumonia due to COVID-19 virus  Diagnoses of Hypoxia and SOB (shortness of breath) were also pertinent to this visit  Patient Condition: The patient has been stabilized such that within reasonable medical probability, no material deterioration of the patient condition or the condition of the unborn child(hayden) is likely to result from the transfer    Reason for Transfer: No bed available at level of patient's needs    Transfer Requirements: Facility [facility]   · Space available and qualified personnel available for treatment as acknowledged by Yeimi Fung  · Agreed to accept transfer and to provide appropriate medical treatment as acknowledged by       Krysta Elias  · Appropriate medical records of the examination and treatment of the patient are provided at the time of transfer   [address], [address] _______  · Transfer will be performed by qualified personnel from    and appropriate transfer equipment as required, including the use of necessary and appropriate life support measures      Provider Certification: I have examined the patient and explained the following risks and benefits of being transferred/refusing transfer to the patient/family:  Risk of worsening condition      Based on these reasonable risks and benefits to the patient and/or the unborn child(hayden), and based upon the information available at the time of the patients examination, I certify that the medical benefits reasonably to be expected from the provision of appropriate medical treatments at another medical facility outweigh the increasing risks, if any, to the individuals medical condition, and in the case of labor to the unborn child, from effecting the transfer      X____________________________________________ DATE 01/05/21        TIME_______      ORIGINAL - SEND TO MEDICAL RECORDS   COPY - SEND WITH PATIENT DURING TRANSFER

## 2021-01-05 NOTE — ED NOTES
Pt's transfer time 46,   [facility] bed 716  Report to be called to Pa Singh RN  01/05/21 0665

## 2021-01-05 NOTE — ASSESSMENT & PLAN NOTE
Diagnosed with COVID on 12/31  Presented now hypoxic requiring 2 L nasal cannula  Currently receiving intravenous steroids, day 1 out of ten  Currently receiving intravenous remdesivir day 1 out of 5  Continue treated for mild COVID pathway-continue zinc, vitamin-C  Will hold the vitamin-D given patient has hypercalcemia from sarcoidosis  Titrate down oxygen as needed  D-dimer-11 9  No suspicion for PE  Continue Lovenox    If D-dimer is elevated will obtain CTA

## 2021-01-05 NOTE — NURSING NOTE
Pt has his own insulin pump and the FigCard sensor reader for his blood sugar checks  Pt removed insulin pump due to not having any more cartilages here at the hospital  Explained to pt we will administer his insulin  Also educated pt about fluctuations between personal and hospital grade blood sugar monitors, so we will need to use the [facility] glucometer during his stay  Pt agreeable and reports understanding

## 2021-01-05 NOTE — ASSESSMENT & PLAN NOTE
Resolved    No growth from blood cultures x2 for 48 hours  No role in antibiotics at this time  Continue treatment for COVID pneumonia

## 2021-01-06 LAB
ALBUMIN SERPL BCP-MCNC: 3.1 G/DL (ref 3–5.2)
ALP SERPL-CCNC: 78 U/L (ref 43–122)
ALT SERPL W P-5'-P-CCNC: 23 U/L (ref 9–52)
ANION GAP SERPL CALCULATED.3IONS-SCNC: 7 MMOL/L (ref 5–14)
AST SERPL W P-5'-P-CCNC: 28 U/L (ref 17–59)
BILIRUB SERPL-MCNC: 0.6 MG/DL
BUN SERPL-MCNC: 31 MG/DL (ref 5–25)
CALCIUM ALBUM COR SERPL-MCNC: 9.2 MG/DL (ref 8.3–10.1)
CALCIUM SERPL-MCNC: 8.5 MG/DL (ref 8.4–10.2)
CHLORIDE SERPL-SCNC: 103 MMOL/L (ref 97–108)
CO2 SERPL-SCNC: 26 MMOL/L (ref 22–30)
CREAT SERPL-MCNC: 1.15 MG/DL (ref 0.7–1.5)
D DIMER PPP FEU-MCNC: 0.35 UG/ML FEU
GFR SERPL CREATININE-BSD FRML MDRD: 69 ML/MIN/1.73SQ M
GLUCOSE SERPL-MCNC: 139 MG/DL (ref 65–140)
GLUCOSE SERPL-MCNC: 143 MG/DL (ref 70–99)
GLUCOSE SERPL-MCNC: 149 MG/DL (ref 65–140)
GLUCOSE SERPL-MCNC: 162 MG/DL (ref 65–140)
GLUCOSE SERPL-MCNC: 239 MG/DL (ref 65–140)
POTASSIUM SERPL-SCNC: 4.4 MMOL/L (ref 3.6–5)
PROCALCITONIN SERPL-MCNC: 0.55 NG/ML
PROT SERPL-MCNC: 5.9 G/DL (ref 5.9–8.4)
SODIUM SERPL-SCNC: 136 MMOL/L (ref 137–147)

## 2021-01-06 PROCEDURE — 84145 PROCALCITONIN (PCT): CPT | Performed by: INTERNAL MEDICINE

## 2021-01-06 PROCEDURE — 80053 COMPREHEN METABOLIC PANEL: CPT | Performed by: INTERNAL MEDICINE

## 2021-01-06 PROCEDURE — 82948 REAGENT STRIP/BLOOD GLUCOSE: CPT

## 2021-01-06 PROCEDURE — 85379 FIBRIN DEGRADATION QUANT: CPT | Performed by: INTERNAL MEDICINE

## 2021-01-06 PROCEDURE — 99232 SBSQ HOSP IP/OBS MODERATE 35: CPT | Performed by: INTERNAL MEDICINE

## 2021-01-06 RX ORDER — ATORVASTATIN CALCIUM 40 MG/1
40 TABLET, FILM COATED ORAL
Status: DISCONTINUED | OUTPATIENT
Start: 2021-01-06 | End: 2021-01-12 | Stop reason: HOSPADM

## 2021-01-06 RX ADMIN — INSULIN GLARGINE 10 UNITS: 100 INJECTION, SOLUTION SUBCUTANEOUS at 08:26

## 2021-01-06 RX ADMIN — ZINC SULFATE 220 MG (50 MG) CAPSULE 220 MG: CAPSULE at 08:26

## 2021-01-06 RX ADMIN — REMDESIVIR 100 MG: 100 INJECTION, POWDER, LYOPHILIZED, FOR SOLUTION INTRAVENOUS at 02:48

## 2021-01-06 RX ADMIN — INSULIN LISPRO 10 UNITS: 100 INJECTION, SOLUTION INTRAVENOUS; SUBCUTANEOUS at 08:27

## 2021-01-06 RX ADMIN — OXYCODONE HYDROCHLORIDE AND ACETAMINOPHEN 1000 MG: 500 TABLET ORAL at 08:26

## 2021-01-06 RX ADMIN — INSULIN LISPRO 10 UNITS: 100 INJECTION, SOLUTION INTRAVENOUS; SUBCUTANEOUS at 16:23

## 2021-01-06 RX ADMIN — INSULIN LISPRO 3 UNITS: 100 INJECTION, SOLUTION INTRAVENOUS; SUBCUTANEOUS at 16:23

## 2021-01-06 RX ADMIN — ENOXAPARIN SODIUM 30 MG: 100 INJECTION SUBCUTANEOUS at 08:26

## 2021-01-06 RX ADMIN — INSULIN LISPRO 3 UNITS: 100 INJECTION, SOLUTION INTRAVENOUS; SUBCUTANEOUS at 11:35

## 2021-01-06 RX ADMIN — ATORVASTATIN CALCIUM 40 MG: 40 TABLET, FILM COATED ORAL at 16:24

## 2021-01-06 RX ADMIN — Medication 2000 UNITS: at 08:26

## 2021-01-06 RX ADMIN — DEXAMETHASONE SODIUM PHOSPHATE 6 MG: 10 INJECTION, SOLUTION INTRAMUSCULAR; INTRAVENOUS at 11:34

## 2021-01-06 RX ADMIN — INSULIN LISPRO 10 UNITS: 100 INJECTION, SOLUTION INTRAVENOUS; SUBCUTANEOUS at 11:35

## 2021-01-06 RX ADMIN — OXYCODONE HYDROCHLORIDE AND ACETAMINOPHEN 1000 MG: 500 TABLET ORAL at 21:53

## 2021-01-06 RX ADMIN — ENOXAPARIN SODIUM 30 MG: 100 INJECTION SUBCUTANEOUS at 21:53

## 2021-01-06 NOTE — ASSESSMENT & PLAN NOTE
Diagnosed with COVID on 12/31  Presented hypoxic requiring 2 L nasal cannula  · Currently receiving intravenous steroids, day 2/10  · Currently receiving intravenous remdesivir day 1/5  · Continue treated for mild COVID pathway-continue zinc, vitamin-C  Will hold the vitamin-D given patient has hypercalcemia from sarcoidosis  · Titrate down oxygen as needed  · D-dimer-11 9-> 0 35 (N)  No suspicion for PE  Continue Lovenox  · Procalcitonin pending

## 2021-01-06 NOTE — UTILIZATION REVIEW
Notification of Inpatient Admission/Inpatient Authorization Request   This is a Notification of Inpatient Admission for [address]  Be advised that this patient was admitted to our facility under Inpatient Status  Contact Betty Barlow at [phone] for additional admission information  [address]   DEPT DEDICATED [facility] [phone]  Patient Name:   Alfreda Maria   YOB: 1960       [address]   [address]:   [address]  Tax ID: [id number]  NPI: [id number] Attending Provider/NPI:  Phone:  Address: Anish Stiles [[id number]]  [phone]  Same as Facility   Place of Service Code: 24 Place of Service Name: [facility]   Start Date: 1/5/21 1001 Discharge Date & Time: No discharge date for patient encounter  Type of Admission: Inpatient Status Discharge Disposition (if discharged): [address] [place] [address]   Patient Diagnoses: Pneumonia due to COVID-19 virus [U07 1, J12 82]     Orders: Admission Orders (From admission, onward)     Ordered        01/05/21 1131  Inpatient Admission  Once                    Assigned Utilization Review Contact: Betty Barlow  Utilization   Network Utilization Review Department  Phone: [phone]; Fax [phone]  Email: [email or web address]   ATTENTION PAYERS: Please call the assigned Utilization  directly with any questions or concerns ALL voicemails in the department are confidential  Send all requests for admission clinical reviews, approved or denied determinations and any other requests to dedicated fax number belonging to the campus where the patient is receiving treatment

## 2021-01-06 NOTE — ASSESSMENT & PLAN NOTE
Lab Results   Component Value Date    HGBA1C 9 0 02/22/2020       Recent Labs     01/05/21  1152 01/05/21  1558 01/05/21 2034 01/06/21  0605   POCGLU 151* 153* 155* 149*       Blood Sugar Average: Last 72 hrs:  (P) 963 [id number]   Patient has insulin pump   Hold off on insulin pump    lantus 10 units am  humalog 10 units TID with meals

## 2021-01-06 NOTE — PROGRESS NOTES
Progress Note - Fly Briggs 1960, 62 y o  male MRN: [id number]    Unit/Bed#: 7T [facility] 716-01 Encounter: [id number]    Primary Care Provider: Laura Roberts DO   Date and time admitted to hospital: 1/5/2021 10:01 AM        * Pneumonia due to COVID-19 virus  Assessment & Plan  Diagnosed with COVID on 12/31  Presented hypoxic requiring 2 L nasal cannula  · Currently receiving intravenous steroids, day 2/10  · Currently receiving intravenous remdesivir day 1/5  · Continue treated for mild COVID pathway-continue zinc, vitamin-C  Will hold the vitamin-D given patient has hypercalcemia from sarcoidosis  · Titrate down oxygen as needed  · D-dimer-11 9-> 0 35 (N)  No suspicion for PE  Continue Lovenox  · Procalcitonin pending  Acute respiratory failure with hypoxia (HCC)  Assessment & Plan  Requiring 2L NC  Currently no on any home O2  Titrate down as able  Sepsis [facility])  Assessment & Plan  Resolved  No growth from blood cultures x2 for 48 hours  No role in antibiotics at this time  Continue treatment for COVID pneumonia    CKD (chronic kidney disease) stage 3, GFR 30-59 ml/min [facility])  Assessment & Plan  Lab Results   Component Value Date    EGFR 50 01/05/2021    EGFR 48 01/03/2021    EGFR 37 08/11/2018    CREATININE 1 51 (H) 01/05/2021    CREATININE 1 55 (H) 01/03/2021    CREATININE 1 56 (H) 09/20/2019       At baseline    Type 2 diabetes mellitus with microalbuminuria [facility])  Assessment & Plan  Lab Results   Component Value Date    HGBA1C 9 0 02/22/2020       Recent Labs     01/05/21  1152 01/05/21  1558 01/05/21  2034 01/06/21  0605   POCGLU 151* 153* 155* 149*       Blood Sugar Average: Last 72 hrs:  (P) 472 [id number]   Patient has insulin pump  Hold off on insulin pump    lantus 10 units am  humalog 10 units TID with meals     Sarcoidosis  Assessment & Plan  Patient takes prednisone for sarcoidosis has been stable   Avoid vitamin d due to hypercalcemia        VTE Pharmacologic Prophylaxis:   Pharmacologic: Enoxaparin (Lovenox)  Mechanical VTE Prophylaxis in Place: Yes    Patient Centered Rounds: I have performed bedside rounds with nursing staff today  Discussions with Specialists or Other Care Team Provider: none    Education and Discussions with Family / Patient: patient     Time Spent for Care: 20 minutes  More than 50% of total time spent on counseling and coordination of care as described above  Current Length of Stay: 1 day(s)    Current Patient Status: Inpatient   Certification Statement: The patient will continue to require additional inpatient hospital stay due to continue covid tx needs to be off o2 for 24 hours    Discharge Plan: continue covid tx need to be off O2 for 24 hours    Code Status: Level 1 - Full Code      Subjective:   Patient seen and examined  Patient feels sob and cough poor appetitite    Objective:     Vitals:   Temp (24hrs), Av 9 °F (36 1 °C), Min:96 4 °F (35 8 °C), Max:97 8 °F (36 6 °C)    Temp:  [96 4 °F (35 8 °C)-97 8 °F (36 6 °C)] 96 4 °F (35 8 °C)  HR:  [58-73] 63  Resp:  [18] 18  BP: (104-107)/(61-68) 107/61  SpO2:  [85 %-96 %] 94 %  Body mass index is 23 44 kg/m²  Input and Output Summary (last 24 hours): Intake/Output Summary (Last 24 hours) at 2021 1349  Last data filed at 2021 0900  Gross per 24 hour   Intake 360 ml   Output 650 ml   Net -290 ml       Physical Exam:     Physical Exam  Constitutional:       General: He is not in acute distress  Appearance: Normal appearance  He is ill-appearing  He is not toxic-appearing  HENT:      Head: Normocephalic and atraumatic  Eyes:      General:         Right eye: No discharge  Left eye: No discharge  Cardiovascular:      Rate and Rhythm: Normal rate and regular rhythm  Pulses: Normal pulses  Heart sounds: No murmur  Pulmonary:      Effort: Pulmonary effort is normal  No respiratory distress  Breath sounds: Rhonchi present  No wheezing  Musculoskeletal:      Right lower leg: No edema  Left lower leg: No edema  Skin:     General: Skin is warm and dry  Neurological:      General: No focal deficit present  Mental Status: He is alert and oriented to person, place, and time  Mental status is at baseline  Psychiatric:         Mood and Affect: Mood normal            Additional Data:     Labs:    Results from last 7 days   Lab Units 01/05/21  0213   WBC Thousand/uL 5 56   HEMOGLOBIN g/dL 15 9   HEMATOCRIT % 48 1   PLATELETS Thousands/uL 183   NEUTROS PCT % 85*   LYMPHS PCT % 11*   MONOS PCT % 4   EOS PCT % 0     Results from last 7 days   Lab Units 01/06/21  0628   SODIUM mmol/L 136*   POTASSIUM mmol/L 4 4   CHLORIDE mmol/L 103   CO2 mmol/L 26   BUN mg/dL 31*   CREATININE mg/dL 1 15   ANION GAP mmol/L 7   CALCIUM mg/dL 8 5   ALBUMIN g/dL 3 1   TOTAL BILIRUBIN mg/dL 0 60   ALK PHOS U/L 78   ALT U/L 23   AST U/L 28   GLUCOSE RANDOM mg/dL 143*         Results from last 7 days   Lab Units 01/06/21  1112 01/06/21  0605 01/05/21  2034 01/05/21  1558 01/05/21  1152   POC GLUCOSE mg/dl 239* 149* 155* 153* 151*         Results from last 7 days   Lab Units 01/03/21  0929   LACTIC ACID mmol/L 1 5   PROCALCITONIN ng/ml 0 09           * I Have Reviewed All Lab Data Listed Above  * Additional Pertinent Lab Tests Reviewed: All Labs Within Last 24 Hours Reviewed    Imaging:    Imaging Reports Reviewed Today Include: none  Recent Cultures (last 7 days):     Results from last 7 days   Lab Units 01/03/21  0930 01/03/21  0918   BLOOD CULTURE  No Growth at 72 hrs  No Growth at 72 hrs         Last 24 Hours Medication List:   Current Facility-Administered Medications   Medication Dose Route Frequency Provider Last Rate    ascorbic acid  1,000 mg Oral Q12H [facility] Sunday MD Kali      atorvastatin  40 mg Oral Daily With Mitchell Tabor MD      cholecalciferol  2,000 Units Oral Daily Sunday MD Kali      dexamethasone  6 mg Intravenous Q24H Fabi Garcia MD      enoxaparin  30 mg Subcutaneous Q12H [facility] Fabi Garcia MD      insulin glargine  10 Units Subcutaneous QAM Fabi Garcia MD      insulin lispro  1-6 Units Subcutaneous TID [facility] MD Daniel Rizzo insulin lispro  1-6 Units Subcutaneous HS MD Daniel Rizzo insulin lispro  10 Units Subcutaneous TID With Meals Fabi Garcia MD      zinc sulfate  220 mg Oral Daily Fabi Garcia MD      Followed by   Rani Jaquez ON 1/12/2021] multivitamin-minerals  1 tablet Oral Daily Fabi Garcia MD      remdesivir  100 mg Intravenous Q24H Fabi Garcia MD          Today, Patient Was Seen By: Fabi Garcia MD    ** Please Note: Dictation voice to text software may have been used in the creation of this document   **

## 2021-01-06 NOTE — PLAN OF CARE
Problem: Potential for Falls  Goal: Patient will remain free of falls  Description: INTERVENTIONS:  - Assess patient frequently for physical needs  -  Identify cognitive and physical deficits and behaviors that affect risk of falls  -  Addison fall precautions as indicated by assessment   - Educate patient/family on patient safety including physical limitations  - Instruct patient to call for assistance with activity based on assessment  - Modify environment to reduce risk of injury  - Consider OT/PT consult to assist with strengthening/mobility  Outcome: Progressing     Problem: NEUROSENSORY - ADULT  Goal: Achieves stable or improved neurological status  Description: INTERVENTIONS  - Monitor and report changes in neurological status  - Monitor vital signs such as temperature, blood pressure, glucose, and any other labs ordered   - Initiate measures to prevent increased intracranial pressure  - Monitor for seizure activity and implement precautions if appropriate      Outcome: Progressing  Goal: Remains free of injury related to seizures activity  Description: INTERVENTIONS  - Maintain airway, patient safety  and administer oxygen as ordered  - Monitor patient for seizure activity, document and report duration and description of seizure to physician/advanced practitioner  - If seizure occurs,  ensure patient safety during seizure  - Reorient patient post seizure  - Seizure pads on all 4 side rails  - Instruct patient/family to notify RN of any seizure activity including if an aura is experienced  - Instruct patient/family to call for assistance with activity based on nursing assessment  - Administer anti-seizure medications if ordered    Outcome: Progressing  Goal: Achieves maximal functionality and self care  Description: INTERVENTIONS  - Monitor swallowing and airway patency with patient fatigue and changes in neurological status  - Encourage and assist patient to increase activity and self care     - Encourage visually impaired, hearing impaired and aphasic patients to use assistive/communication devices  Outcome: Progressing     Problem: CARDIOVASCULAR - ADULT  Goal: Maintains optimal cardiac output and hemodynamic stability  Description: INTERVENTIONS:  - Monitor I/O, vital signs and rhythm  - Monitor for S/S and trends of decreased cardiac output  - Administer and titrate ordered vasoactive medications to optimize hemodynamic stability  - Assess quality of pulses, skin color and temperature  - Assess for signs of decreased coronary artery perfusion  - Instruct patient to report change in severity of symptoms  Outcome: Progressing  Goal: Absence of cardiac dysrhythmias or at baseline rhythm  Description: INTERVENTIONS:  - Continuous cardiac monitoring, vital signs, obtain 12 lead EKG if ordered  - Administer antiarrhythmic and heart rate control medications as ordered  - Monitor electrolytes and administer replacement therapy as ordered  Outcome: Progressing     Problem: RESPIRATORY - ADULT  Goal: Achieves optimal ventilation and oxygenation  Description: INTERVENTIONS:  - Assess for changes in respiratory status  - Assess for changes in mentation and behavior  - Position to facilitate oxygenation and minimize respiratory effort  - Oxygen administered by appropriate delivery if ordered  - Initiate smoking cessation education as indicated  - Encourage broncho-pulmonary hygiene including cough, deep breathe, Incentive Spirometry  - Assess the need for suctioning and aspirate as needed  - Assess and instruct to report SOB or any respiratory difficulty  - Respiratory Therapy support as indicated  Outcome: Progressing     Problem: GASTROINTESTINAL - ADULT  Goal: Minimal or absence of nausea and/or vomiting  Description: INTERVENTIONS:  - Administer IV fluids if ordered to ensure adequate hydration  - Maintain NPO status until nausea and vomiting are resolved  - Nasogastric tube if ordered  - Administer ordered antiemetic medications as needed  - Provide nonpharmacologic comfort measures as appropriate  - Advance diet as tolerated, if ordered  - Consider nutrition services referral to assist patient with adequate nutrition and appropriate food choices  Outcome: Progressing  Goal: Maintains or returns to baseline bowel function  Description: INTERVENTIONS:  - Assess bowel function  - Encourage oral fluids to ensure adequate hydration  - Administer IV fluids if ordered to ensure adequate hydration  - Administer ordered medications as needed  - Encourage mobilization and activity  - Consider nutritional services referral to assist patient with adequate nutrition and appropriate food choices  Outcome: Progressing  Goal: Maintains adequate nutritional intake  Description: INTERVENTIONS:  - Monitor percentage of each meal consumed  - Identify factors contributing to decreased intake, treat as appropriate  - Assist with meals as needed  - Monitor I&O, weight, and lab values if indicated  - Obtain nutrition services referral as needed  Outcome: Progressing  Goal: Establish and maintain optimal ostomy function  Description: INTERVENTIONS:  - Assess bowel function  - Encourage oral fluids to ensure adequate hydration  - Administer IV fluids if ordered to ensure adequate hydration   - Administer ordered medications as needed  - Encourage mobilization and activity  - Nutrition services referral to assist patient with appropriate food choices  - Assess stoma site  - Consider wound care consult   Outcome: Progressing     Problem: GENITOURINARY - ADULT  Goal: Maintains or returns to baseline urinary function  Description: INTERVENTIONS:  - Assess urinary function  - Encourage oral fluids to ensure adequate hydration if ordered  - Administer IV fluids as ordered to ensure adequate hydration  - Administer ordered medications as needed  - Offer frequent toileting  - Follow urinary retention protocol if ordered  Outcome: Progressing  Goal: Absence of urinary retention  Description: INTERVENTIONS:  - Assess patients ability to void and empty bladder  - Monitor I/O  - Bladder scan as needed  - Discuss with physician/AP medications to alleviate retention as needed  - Discuss catheterization for long term situations as appropriate  Outcome: Progressing  Goal: Urinary catheter remains patent  Description: INTERVENTIONS:  - Assess patency of urinary catheter  - If patient has a chronic solorzano, consider changing catheter if non-functioning  - Follow guidelines for intermittent irrigation of non-functioning urinary catheter  Outcome: Progressing     Problem: METABOLIC, FLUID AND ELECTROLYTES - ADULT  Goal: Electrolytes maintained within normal limits  Description: INTERVENTIONS:  - Monitor labs and assess patient for signs and symptoms of electrolyte imbalances  - Administer electrolyte replacement as ordered  - Monitor response to electrolyte replacements, including repeat lab results as appropriate  - Instruct patient on fluid and nutrition as appropriate  Outcome: Progressing  Goal: Fluid balance maintained  Description: INTERVENTIONS:  - Monitor labs   - Monitor I/O and WT  - Instruct patient on fluid and nutrition as appropriate  - Assess for signs & symptoms of volume excess or deficit  Outcome: Progressing  Goal: Glucose maintained within target range  Description: INTERVENTIONS:  - Monitor Blood Glucose as ordered  - Assess for signs and symptoms of hyperglycemia and hypoglycemia  - Administer ordered medications to maintain glucose within target range  - Assess nutritional intake and initiate nutrition service referral as needed  Outcome: Progressing     Problem: SKIN/TISSUE INTEGRITY - ADULT  Goal: Skin integrity remains intact  Description: INTERVENTIONS  - Identify patients at risk for skin breakdown  - Assess and monitor skin integrity  - Assess and monitor nutrition and hydration status  - Monitor labs (i e  albumin)  - Assess for incontinence - Turn and reposition patient  - Assist with mobility/ambulation  - Relieve pressure over bony prominences  - Avoid friction and shearing  - Provide appropriate hygiene as needed including keeping skin clean and dry  - Evaluate need for skin moisturizer/barrier cream  - Collaborate with interdisciplinary team (i e  Nutrition, Rehabilitation, etc )   - Patient/family teaching  Outcome: Progressing  Goal: Incision(s), wounds(s) or drain site(s) healing without S/S of infection  Description: INTERVENTIONS  - Assess and document risk factors for skin impairment   - Assess and document dressing, incision, wound bed, drain sites and surrounding tissue  - Consider nutrition services referral as needed  - Oral mucous membranes remain intact  - Provide patient/ family education  Outcome: Progressing  Goal: Oral mucous membranes remain intact  Description: INTERVENTIONS  - Assess oral mucosa and hygiene practices  - Implement preventative oral hygiene regimen  - Implement oral medicated treatments as ordered  - Initiate Nutrition services referral as needed  Outcome: Progressing     Problem: HEMATOLOGIC - ADULT  Goal: Maintains hematologic stability  Description: INTERVENTIONS  - Assess for signs and symptoms of bleeding or hemorrhage  - Monitor labs  - Administer supportive blood products/factors as ordered and appropriate  Outcome: Progressing     Problem: MUSCULOSKELETAL - ADULT  Goal: Maintain or return mobility to safest level of function  Description: INTERVENTIONS:  - Assess patient's ability to carry out ADLs; assess patient's baseline for ADL function and identify physical deficits which impact ability to perform ADLs (bathing, care of mouth/teeth, toileting, grooming, dressing, etc )  - Assess/evaluate cause of self-care deficits   - Assess range of motion  - Assess patient's mobility  - Assess patient's need for assistive devices and provide as appropriate  - Encourage maximum independence but intervene and supervise when necessary  - Involve family in performance of ADLs  - Assess for home care needs following discharge   - Consider OT consult to assist with ADL evaluation and planning for discharge  - Provide patient education as appropriate  Outcome: Progressing  Goal: Maintain proper alignment of affected body part  Description: INTERVENTIONS:  - Support, maintain and protect limb and body alignment  - Provide patient/ family with appropriate education  Outcome: Progressing     Problem: Nutrition/Hydration-ADULT  Goal: Nutrient/Hydration intake appropriate for improving, restoring or maintaining nutritional needs  Description: Monitor and assess patient's nutrition/hydration status for malnutrition  Collaborate with interdisciplinary team and initiate plan and interventions as ordered  Monitor patient's weight and dietary intake as ordered or per policy  Utilize nutrition screening tool and intervene as necessary  Determine patient's food preferences and provide high-protein, high-caloric foods as appropriate       INTERVENTIONS:  - Monitor oral intake, urinary output, labs, and treatment plans  - Assess nutrition and hydration status and recommend course of action  - Evaluate amount of meals eaten  - Assist patient with eating if necessary   - Allow adequate time for meals  - Recommend/ encourage appropriate diets, oral nutritional supplements, and vitamin/mineral supplements  - Order, calculate, and assess calorie counts as needed  - Recommend, monitor, and adjust tube feedings and TPN/PPN based on assessed needs  - Assess need for intravenous fluids  - Provide specific nutrition/hydration education as appropriate  - Include patient/family/caregiver in decisions related to nutrition  Outcome: Progressing

## 2021-01-06 NOTE — PLAN OF CARE
Problem: Potential for Falls  Goal: Patient will remain free of falls  Description: INTERVENTIONS:  - Assess patient frequently for physical needs  -  Identify cognitive and physical deficits and behaviors that affect risk of falls    -  [place] fall precautions as indicated by assessment   - Educate patient/family on patient safety including physical limitations  - Instruct patient to call for assistance with activity based on assessment  - Modify environment to reduce risk of injury  - Consider OT/PT consult to assist with strengthening/mobility  1/6/2021 0119 by Maciel Cee RN  Outcome: Progressing  1/6/2021 0119 by Maciel Cee RN  Outcome: Progressing     Problem: NEUROSENSORY - ADULT  Goal: Achieves stable or improved neurological status  Description: INTERVENTIONS  - Monitor and report changes in neurological status  - Monitor vital signs such as temperature, blood pressure, glucose, and any other labs ordered   - Initiate measures to prevent increased intracranial pressure  - Monitor for seizure activity and implement precautions if appropriate      1/6/2021 0119 by Maciel Cee RN  Outcome: Progressing  1/6/2021 0119 by Maciel Cee RN  Outcome: Progressing  Goal: Remains free of injury related to seizures activity  Description: INTERVENTIONS  - Maintain airway, patient safety  and administer oxygen as ordered  - Monitor patient for seizure activity, document and report duration and description of seizure to physician/advanced practitioner  - If seizure occurs,  ensure patient safety during seizure  - Reorient patient post seizure  - Seizure pads on all 4 side rails  - Instruct patient/family to notify RN of any seizure activity including if an aura is experienced  - Instruct patient/family to call for assistance with activity based on nursing assessment  - Administer anti-seizure medications if ordered    1/6/2021 0119 by Maciel Cee RN  Outcome: Progressing  1/6/2021 0119 by Maciel Cee RN  Outcome: Progressing  Goal: Achieves maximal functionality and self care  Description: INTERVENTIONS  - Monitor swallowing and airway patency with patient fatigue and changes in neurological status  - Encourage and assist patient to increase activity and self care     - Encourage visually impaired, hearing impaired and aphasic patients to use assistive/communication devices  1/6/2021 0119 by Ankit Odonnell RN  Outcome: Progressing  1/6/2021 0119 by Ankit Odonnell RN  Outcome: Progressing     Problem: CARDIOVASCULAR - ADULT  Goal: Maintains optimal cardiac output and hemodynamic stability  Description: INTERVENTIONS:  - Monitor I/O, vital signs and rhythm  - Monitor for S/S and trends of decreased cardiac output  - Administer and titrate ordered vasoactive medications to optimize hemodynamic stability  - Assess quality of pulses, skin color and temperature  - Assess for signs of decreased coronary artery perfusion  - Instruct patient to report change in severity of symptoms  1/6/2021 0119 by Ankit Odonnell RN  Outcome: Progressing  1/6/2021 0119 by Ankit Odonnell RN  Outcome: Progressing  Goal: Absence of cardiac dysrhythmias or at baseline rhythm  Description: INTERVENTIONS:  - Continuous cardiac monitoring, vital signs, obtain 12 lead EKG if ordered  - Administer antiarrhythmic and heart rate control medications as ordered  - Monitor electrolytes and administer replacement therapy as ordered  1/6/2021 0119 by Ankit Odonnell RN  Outcome: Progressing  1/6/2021 0119 by Ankit Odonnell RN  Outcome: Progressing     Problem: RESPIRATORY - ADULT  Goal: Achieves optimal ventilation and oxygenation  Description: INTERVENTIONS:  - Assess for changes in respiratory status  - Assess for changes in mentation and behavior  - Position to facilitate oxygenation and minimize respiratory effort  - Oxygen administered by appropriate delivery if ordered  - Initiate smoking cessation education as indicated  - Encourage broncho-pulmonary hygiene including cough, deep breathe, Incentive Spirometry  - Assess the need for suctioning and aspirate as needed  - Assess and instruct to report SOB or any respiratory difficulty  - Respiratory Therapy support as indicated  1/6/2021 0119 by Jim Jimenez RN  Outcome: Progressing  1/6/2021 0119 by Jim Jimenez RN  Outcome: Progressing     Problem: GASTROINTESTINAL - ADULT  Goal: Minimal or absence of nausea and/or vomiting  Description: INTERVENTIONS:  - Administer IV fluids if ordered to ensure adequate hydration  - Maintain NPO status until nausea and vomiting are resolved  - Nasogastric tube if ordered  - Administer ordered antiemetic medications as needed  - Provide nonpharmacologic comfort measures as appropriate  - Advance diet as tolerated, if ordered  - Consider nutrition services referral to assist patient with adequate nutrition and appropriate food choices  1/6/2021 0119 by Jim Jimenez RN  Outcome: Progressing  1/6/2021 0119 by Jim Jimenez RN  Outcome: Progressing  Goal: Maintains or returns to baseline bowel function  Description: INTERVENTIONS:  - Assess bowel function  - Encourage oral fluids to ensure adequate hydration  - Administer IV fluids if ordered to ensure adequate hydration  - Administer ordered medications as needed  - Encourage mobilization and activity  - Consider nutritional services referral to assist patient with adequate nutrition and appropriate food choices  1/6/2021 0119 by Jim Jimenez RN  Outcome: Progressing  1/6/2021 0119 by Jim Jimenez RN  Outcome: Progressing  Goal: Maintains adequate nutritional intake  Description: INTERVENTIONS:  - Monitor percentage of each meal consumed  - Identify factors contributing to decreased intake, treat as appropriate  - Assist with meals as needed  - Monitor I&O, weight, and lab values if indicated  - Obtain nutrition services referral as needed  1/6/2021 0119 by Jim Jimenez RN  Outcome: Progressing  1/6/2021 0119 by Jim Jimenez RN  Outcome: Progressing  Goal: Establish and maintain optimal ostomy function  Description: INTERVENTIONS:  - Assess bowel function  - Encourage oral fluids to ensure adequate hydration  - Administer IV fluids if ordered to ensure adequate hydration   - Administer ordered medications as needed  - Encourage mobilization and activity  - Nutrition services referral to assist patient with appropriate food choices  - Assess stoma site  - Consider wound care consult   1/6/2021 0119 by Gladis Boyle RN  Outcome: Progressing  1/6/2021 0119 by Gladis Boyle RN  Outcome: Progressing     Problem: GENITOURINARY - ADULT  Goal: Maintains or returns to baseline urinary function  Description: INTERVENTIONS:  - Assess urinary function  - Encourage oral fluids to ensure adequate hydration if ordered  - Administer IV fluids as ordered to ensure adequate hydration  - Administer ordered medications as needed  - Offer frequent toileting  - Follow urinary retention protocol if ordered  1/6/2021 0119 by Gladis Boyle RN  Outcome: Progressing  1/6/2021 0119 by Gladis Boyle RN  Outcome: Progressing  Goal: Absence of urinary retention  Description: INTERVENTIONS:  - Assess patients ability to void and empty bladder  - Monitor I/O  - Bladder scan as needed  - Discuss with physician/AP medications to alleviate retention as needed  - Discuss catheterization for long term situations as appropriate  1/6/2021 0119 by Galdis Boyle RN  Outcome: Progressing  1/6/2021 0119 by Gladis Boyle RN  Outcome: Progressing  Goal: Urinary catheter remains patent  Description: INTERVENTIONS:  - Assess patency of urinary catheter  - If patient has a chronic solorzano, consider changing catheter if non-functioning  - Follow guidelines for intermittent irrigation of non-functioning urinary catheter  1/6/2021 0119 by Gladis Boyle RN  Outcome: Progressing  1/6/2021 0119 by Gladis Boyle RN  Outcome: Progressing     Problem: METABOLIC, FLUID AND ELECTROLYTES - ADULT  Goal: Electrolytes maintained within normal limits  Description: INTERVENTIONS:  - Monitor labs and assess patient for signs and symptoms of electrolyte imbalances  - Administer electrolyte replacement as ordered  - Monitor response to electrolyte replacements, including repeat lab results as appropriate  - Instruct patient on fluid and nutrition as appropriate  1/6/2021 0119 by Lucrecia Trammell RN  Outcome: Progressing  1/6/2021 0119 by Lucrecia Trammell RN  Outcome: Progressing  Goal: Fluid balance maintained  Description: INTERVENTIONS:  - Monitor labs   - Monitor I/O and WT  - Instruct patient on fluid and nutrition as appropriate  - Assess for signs & symptoms of volume excess or deficit  1/6/2021 0119 by Lucrecia Trammell RN  Outcome: Progressing  1/6/2021 0119 by Lucrecia Trammell RN  Outcome: Progressing  Goal: Glucose maintained within target range  Description: INTERVENTIONS:  - Monitor Blood Glucose as ordered  - Assess for signs and symptoms of hyperglycemia and hypoglycemia  - Administer ordered medications to maintain glucose within target range  - Assess nutritional intake and initiate nutrition service referral as needed  1/6/2021 0119 by Lucrecia Trammell RN  Outcome: Progressing  1/6/2021 0119 by Lucrecia Trammell RN  Outcome: Progressing     Problem: SKIN/TISSUE INTEGRITY - ADULT  Goal: Skin integrity remains intact  Description: INTERVENTIONS  - Identify patients at risk for skin breakdown  - Assess and monitor skin integrity  - Assess and monitor nutrition and hydration status  - Monitor labs (i e  albumin)  - Assess for incontinence   - Turn and reposition patient  - Assist with mobility/ambulation  - Relieve pressure over bony prominences  - Avoid friction and shearing  - Provide appropriate hygiene as needed including keeping skin clean and dry  - Evaluate need for skin moisturizer/barrier cream  - Collaborate with interdisciplinary team (i e  Nutrition, Rehabilitation, etc )   - Patient/family teaching  1/6/2021 0119 by Lucrecia Trammell RN  Outcome: Progressing  1/6/2021 0119 by Dulce Boss RN  Outcome: Progressing  Goal: Incision(s), wounds(s) or drain site(s) healing without S/S of infection  Description: INTERVENTIONS  - Assess and document risk factors for skin impairment   - Assess and document dressing, incision, wound bed, drain sites and surrounding tissue  - Consider nutrition services referral as needed  - Oral mucous membranes remain intact  - Provide patient/ family education  1/6/2021 0119 by Dulce Boss RN  Outcome: Progressing  1/6/2021 0119 by Dulce Boss RN  Outcome: Progressing  Goal: Oral mucous membranes remain intact  Description: INTERVENTIONS  - Assess oral mucosa and hygiene practices  - Implement preventative oral hygiene regimen  - Implement oral medicated treatments as ordered  - Initiate Nutrition services referral as needed  1/6/2021 0119 by Dulce Boss RN  Outcome: Progressing  1/6/2021 0119 by Dulce Boss RN  Outcome: Progressing     Problem: HEMATOLOGIC - ADULT  Goal: Maintains hematologic stability  Description: INTERVENTIONS  - Assess for signs and symptoms of bleeding or hemorrhage  - Monitor labs  - Administer supportive blood products/factors as ordered and appropriate  1/6/2021 0119 by Dulce Boss RN  Outcome: Progressing  1/6/2021 0119 by Dulce Boss RN  Outcome: Progressing     Problem: MUSCULOSKELETAL - ADULT  Goal: Maintain or return mobility to safest level of function  Description: INTERVENTIONS:  - Assess patient's ability to carry out ADLs; assess patient's baseline for ADL function and identify physical deficits which impact ability to perform ADLs (bathing, care of mouth/teeth, toileting, grooming, dressing, etc )  - Assess/evaluate cause of self-care deficits   - Assess range of motion  - Assess patient's mobility  - Assess patient's need for assistive devices and provide as appropriate  - Encourage maximum independence but intervene and supervise when necessary  - Involve family in performance of ADLs  - Assess for home care needs following discharge   - Consider OT consult to assist with ADL evaluation and planning for discharge  - Provide patient education as appropriate  1/6/2021 0119 by Jim Jimenez RN  Outcome: Progressing  1/6/2021 0119 by Jim Jimenez RN  Outcome: Progressing  Goal: Maintain proper alignment of affected body part  Description: INTERVENTIONS:  - Support, maintain and protect limb and body alignment  - Provide patient/ family with appropriate education  1/6/2021 0119 by Jim Jimenez RN  Outcome: Progressing  1/6/2021 0119 by Jim Jimenez RN  Outcome: Progressing     Problem: Nutrition/Hydration-ADULT  Goal: Nutrient/Hydration intake appropriate for improving, restoring or maintaining nutritional needs  Description: Monitor and assess patient's nutrition/hydration status for malnutrition  Collaborate with interdisciplinary team and initiate plan and interventions as ordered  Monitor patient's weight and dietary intake as ordered or per policy  Utilize nutrition screening tool and intervene as necessary  Determine patient's food preferences and provide high-protein, high-caloric foods as appropriate       INTERVENTIONS:  - Monitor oral intake, urinary output, labs, and treatment plans  - Assess nutrition and hydration status and recommend course of action  - Evaluate amount of meals eaten  - Assist patient with eating if necessary   - Allow adequate time for meals  - Recommend/ encourage appropriate diets, oral nutritional supplements, and vitamin/mineral supplements  - Order, calculate, and assess calorie counts as needed  - Recommend, monitor, and adjust tube feedings and TPN/PPN based on assessed needs  - Assess need for intravenous fluids  - Provide specific nutrition/hydration education as appropriate  - Include patient/family/caregiver in decisions related to nutrition  1/6/2021 0119 by Jim Jimenez RN  Outcome: Progressing  1/6/2021 0119 by Jim Jimenez RN  Outcome: Progressing

## 2021-01-06 NOTE — PLAN OF CARE
Problem: Potential for Falls  Goal: Patient will remain free of falls  Description: INTERVENTIONS:  - Assess patient frequently for physical needs  -  Identify cognitive and physical deficits and behaviors that affect risk of falls  -  [place] fall precautions as indicated by assessment   - Educate patient/family on patient safety including physical limitations  - Instruct patient to call for assistance with activity based on assessment  - Modify environment to reduce risk of injury  - Consider OT/PT consult to assist with strengthening/mobility  Outcome: Progressing     Problem: NEUROSENSORY - ADULT  Goal: Achieves stable or improved neurological status  Description: INTERVENTIONS  - Monitor and report changes in neurological status  - Monitor vital signs such as temperature, blood pressure, glucose, and any other labs ordered   - Initiate measures to prevent increased intracranial pressure  - Monitor for seizure activity and implement precautions if appropriate      Outcome: Progressing  Goal: Remains free of injury related to seizures activity  Description: INTERVENTIONS  - Maintain airway, patient safety  and administer oxygen as ordered  - Monitor patient for seizure activity, document and report duration and description of seizure to physician/advanced practitioner  - If seizure occurs,  ensure patient safety during seizure  - Reorient patient post seizure  - Seizure pads on all 4 side rails  - Instruct patient/family to notify RN of any seizure activity including if an aura is experienced  - Instruct patient/family to call for assistance with activity based on nursing assessment  - Administer anti-seizure medications if ordered    Outcome: Progressing  Goal: Achieves maximal functionality and self care  Description: INTERVENTIONS  - Monitor swallowing and airway patency with patient fatigue and changes in neurological status  - Encourage and assist patient to increase activity and self care     - Encourage visually impaired, hearing impaired and aphasic patients to use assistive/communication devices  Outcome: Progressing     Problem: CARDIOVASCULAR - ADULT  Goal: Maintains optimal cardiac output and hemodynamic stability  Description: INTERVENTIONS:  - Monitor I/O, vital signs and rhythm  - Monitor for S/S and trends of decreased cardiac output  - Administer and titrate ordered vasoactive medications to optimize hemodynamic stability  - Assess quality of pulses, skin color and temperature  - Assess for signs of decreased coronary artery perfusion  - Instruct patient to report change in severity of symptoms  Outcome: Progressing  Goal: Absence of cardiac dysrhythmias or at baseline rhythm  Description: INTERVENTIONS:  - Continuous cardiac monitoring, vital signs, obtain 12 lead EKG if ordered  - Administer antiarrhythmic and heart rate control medications as ordered  - Monitor electrolytes and administer replacement therapy as ordered  Outcome: Progressing     Problem: RESPIRATORY - ADULT  Goal: Achieves optimal ventilation and oxygenation  Description: INTERVENTIONS:  - Assess for changes in respiratory status  - Assess for changes in mentation and behavior  - Position to facilitate oxygenation and minimize respiratory effort  - Oxygen administered by appropriate delivery if ordered  - Initiate smoking cessation education as indicated  - Encourage broncho-pulmonary hygiene including cough, deep breathe, Incentive Spirometry  - Assess the need for suctioning and aspirate as needed  - Assess and instruct to report SOB or any respiratory difficulty  - Respiratory Therapy support as indicated  Outcome: Progressing     Problem: GASTROINTESTINAL - ADULT  Goal: Minimal or absence of nausea and/or vomiting  Description: INTERVENTIONS:  - Administer IV fluids if ordered to ensure adequate hydration  - Maintain NPO status until nausea and vomiting are resolved  - Nasogastric tube if ordered  - Administer ordered antiemetic medications as needed  - Provide nonpharmacologic comfort measures as appropriate  - Advance diet as tolerated, if ordered  - Consider nutrition services referral to assist patient with adequate nutrition and appropriate food choices  Outcome: Progressing  Goal: Maintains or returns to baseline bowel function  Description: INTERVENTIONS:  - Assess bowel function  - Encourage oral fluids to ensure adequate hydration  - Administer IV fluids if ordered to ensure adequate hydration  - Administer ordered medications as needed  - Encourage mobilization and activity  - Consider nutritional services referral to assist patient with adequate nutrition and appropriate food choices  Outcome: Progressing  Goal: Maintains adequate nutritional intake  Description: INTERVENTIONS:  - Monitor percentage of each meal consumed  - Identify factors contributing to decreased intake, treat as appropriate  - Assist with meals as needed  - Monitor I&O, weight, and lab values if indicated  - Obtain nutrition services referral as needed  Outcome: Progressing  Goal: Establish and maintain optimal ostomy function  Description: INTERVENTIONS:  - Assess bowel function  - Encourage oral fluids to ensure adequate hydration  - Administer IV fluids if ordered to ensure adequate hydration   - Administer ordered medications as needed  - Encourage mobilization and activity  - Nutrition services referral to assist patient with appropriate food choices  - Assess stoma site  - Consider wound care consult   Outcome: Progressing     Problem: GENITOURINARY - ADULT  Goal: Maintains or returns to baseline urinary function  Description: INTERVENTIONS:  - Assess urinary function  - Encourage oral fluids to ensure adequate hydration if ordered  - Administer IV fluids as ordered to ensure adequate hydration  - Administer ordered medications as needed  - Offer frequent toileting  - Follow urinary retention protocol if ordered  Outcome: Progressing  Goal: Absence of urinary retention  Description: INTERVENTIONS:  - Assess patients ability to void and empty bladder  - Monitor I/O  - Bladder scan as needed  - Discuss with physician/AP medications to alleviate retention as needed  - Discuss catheterization for long term situations as appropriate  Outcome: Progressing  Goal: Urinary catheter remains patent  Description: INTERVENTIONS:  - Assess patency of urinary catheter  - If patient has a chronic solorzano, consider changing catheter if non-functioning  - Follow guidelines for intermittent irrigation of non-functioning urinary catheter  Outcome: Progressing     Problem: METABOLIC, FLUID AND ELECTROLYTES - ADULT  Goal: Electrolytes maintained within normal limits  Description: INTERVENTIONS:  - Monitor labs and assess patient for signs and symptoms of electrolyte imbalances  - Administer electrolyte replacement as ordered  - Monitor response to electrolyte replacements, including repeat lab results as appropriate  - Instruct patient on fluid and nutrition as appropriate  Outcome: Progressing  Goal: Fluid balance maintained  Description: INTERVENTIONS:  - Monitor labs   - Monitor I/O and WT  - Instruct patient on fluid and nutrition as appropriate  - Assess for signs & symptoms of volume excess or deficit  Outcome: Progressing  Goal: Glucose maintained within target range  Description: INTERVENTIONS:  - Monitor Blood Glucose as ordered  - Assess for signs and symptoms of hyperglycemia and hypoglycemia  - Administer ordered medications to maintain glucose within target range  - Assess nutritional intake and initiate nutrition service referral as needed  Outcome: Progressing     Problem: SKIN/TISSUE INTEGRITY - ADULT  Goal: Skin integrity remains intact  Description: INTERVENTIONS  - Identify patients at risk for skin breakdown  - Assess and monitor skin integrity  - Assess and monitor nutrition and hydration status  - Monitor labs (i e  albumin)  - Assess for incontinence - Turn and reposition patient  - Assist with mobility/ambulation  - Relieve pressure over bony prominences  - Avoid friction and shearing  - Provide appropriate hygiene as needed including keeping skin clean and dry  - Evaluate need for skin moisturizer/barrier cream  - Collaborate with interdisciplinary team (i e  Nutrition, Rehabilitation, etc )   - Patient/family teaching  Outcome: Progressing  Goal: Incision(s), wounds(s) or drain site(s) healing without S/S of infection  Description: INTERVENTIONS  - Assess and document risk factors for skin impairment   - Assess and document dressing, incision, wound bed, drain sites and surrounding tissue  - Consider nutrition services referral as needed  - Oral mucous membranes remain intact  - Provide patient/ family education  Outcome: Progressing  Goal: Oral mucous membranes remain intact  Description: INTERVENTIONS  - Assess oral mucosa and hygiene practices  - Implement preventative oral hygiene regimen  - Implement oral medicated treatments as ordered  - Initiate Nutrition services referral as needed  Outcome: Progressing     Problem: HEMATOLOGIC - ADULT  Goal: Maintains hematologic stability  Description: INTERVENTIONS  - Assess for signs and symptoms of bleeding or hemorrhage  - Monitor labs  - Administer supportive blood products/factors as ordered and appropriate  Outcome: Progressing     Problem: MUSCULOSKELETAL - ADULT  Goal: Maintain or return mobility to safest level of function  Description: INTERVENTIONS:  - Assess patient's ability to carry out ADLs; assess patient's baseline for ADL function and identify physical deficits which impact ability to perform ADLs (bathing, care of mouth/teeth, toileting, grooming, dressing, etc )  - Assess/evaluate cause of self-care deficits   - Assess range of motion  - Assess patient's mobility  - Assess patient's need for assistive devices and provide as appropriate  - Encourage maximum independence but intervene and supervise when necessary  - Involve family in performance of ADLs  - Assess for home care needs following discharge   - Consider OT consult to assist with ADL evaluation and planning for discharge  - Provide patient education as appropriate  Outcome: Progressing  Goal: Maintain proper alignment of affected body part  Description: INTERVENTIONS:  - Support, maintain and protect limb and body alignment  - Provide patient/ family with appropriate education  Outcome: Progressing     Problem: Nutrition/Hydration-ADULT  Goal: Nutrient/Hydration intake appropriate for improving, restoring or maintaining nutritional needs  Description: Monitor and assess patient's nutrition/hydration status for malnutrition  Collaborate with interdisciplinary team and initiate plan and interventions as ordered  Monitor patient's weight and dietary intake as ordered or per policy  Utilize nutrition screening tool and intervene as necessary  Determine patient's food preferences and provide high-protein, high-caloric foods as appropriate       INTERVENTIONS:  - Monitor oral intake, urinary output, labs, and treatment plans  - Assess nutrition and hydration status and recommend course of action  - Evaluate amount of meals eaten  - Assist patient with eating if necessary   - Allow adequate time for meals  - Recommend/ encourage appropriate diets, oral nutritional supplements, and vitamin/mineral supplements  - Order, calculate, and assess calorie counts as needed  - Recommend, monitor, and adjust tube feedings and TPN/PPN based on assessed needs  - Assess need for intravenous fluids  - Provide specific nutrition/hydration education as appropriate  - Include patient/family/caregiver in decisions related to nutrition  Outcome: Progressing

## 2021-01-06 NOTE — UTILIZATION REVIEW
Initial Clinical Review    Admission: Date/Time/Statement:   Admission Orders (From admission, onward)     Ordered        01/05/21 1131  Inpatient Admission  Once                   Orders Placed This Encounter   Procedures    Inpatient Admission     Standing Status:   Standing     Number of Occurrences:   1     Order Specific Question:   Admitting Physician     Answer:   Rios Oneal [[id number]]     Order Specific Question:   Level of Care     Answer:   Med Surg [[id number]]     Order Specific Question:   Estimated length of stay     Answer:   More than 2 Midnights     Order Specific Question:   Certification     Answer:   I certify that inpatient services are medically necessary for this patient for a duration of greater than two midnights  See H&P and MD Progress Notes for additional information about the patient's course of treatment  Assessment/Plan:  62 y o  male Transferred from ED @ [facility] to [facility] for admission due to no bed availability @ [facility]  Initially presented  to ED from home with worsening SOB, cough and chest pain  Patient says that he was 1st diagnosed with COVID on 12/31  He was seen in ED 1/3 - discharged home  Patient states that he is normally on steroids for his sarcoidosis  Home O2 sat on RA was 85% and he called EMS  Sats for EMS "low 80's" and place on 2 L NC - sats 95-97% on 2L but remains conversationally dyspneic On exam : in mild resp distress w/ mild conversational dyspnea and tachypnea noted  Admits to mild nausea and poor appetite in ED  CXR showed Slight worsening of bilateral ground glass opacity due to Covid 19 pneumonia  Elevated CRP, Ferritin & D-Dimer  He rec'd Vitc, d, zinc,IV Decadron & Remdesivir and IVF's in ED prior to transfer  Admitted to Inpatient with Pneumonia and Acute Respiratory Failure due to COVID-19   Plan for tx for mild covid pathway with remdesivir, decadron, Lovenox, Vit C& zinc, supplemental O2  Hold Vit d given patient has hypercalcemia from sarcoidosis  Pt has an Insulin pump for DM - hold and start Lantus 10 u qam & humalog 10 u tid  1/6 Currently remains on O2 @ 2L NC & Tx as below        Admission  Vitals [01/05/21 1006]   Temperature Pulse Respirations Blood Pressure SpO2   (!) 96 3 °F (35 7 °C) 75 20 103/72 93 %      Temp Source Heart Rate Source Patient Position - Orthostatic VS BP Location FiO2 (%)  2L NC   Temporal -- Lying Right arm --      Pain Score       3          Wt Readings from Last 1 Encounters:   01/05/21 80 6 kg (177 lb 11 1 oz)     Additional Vital Signs:   01/06/21 0900 96 4 °F (35 8 °C) 63 18 107/61 94 % 2 L/min Nasal cannula Lying   01/06/21 0700 -- -- -- -- 95 % 2 L/min Nasal cannula --   01/06/21 0500 -- -- -- -- 95 % 2 L/min Nasal cannula --   01/06/21 0300 -- -- -- -- 94 % 2 L/min Nasal cannula --   01/06/21 0106 97 8 °F (36 6 °C) 58 18 105/63 96 % 2 L/min Nasal cannula Lying   01/05/21 2124 -- -- -- -- 91 % 3 L/min Nasal cannula --   01/05/21 2100 -- -- -- -- 85 %Abnormal  1 L/min Nasal cannula --   01/05/21 1500 96 5 °F (35 8 °C) 73 18 104/68 96 % 1 L/min Nasal cannula --   01/05/21 1100 -- -- -- -- 93 % 2 L/min Nasal cannula --       Pertinent Labs/Diagnostic Test Results:   Results from last 7 days   Lab Units 12/31/20  0929   SARS-COV-2  Detected*     Results from last 7 days   Lab Units 01/05/21 0213 01/03/21  0929   WBC Thousand/uL 5 56 3 72*   HEMOGLOBIN g/dL 15 9 15 4   HEMATOCRIT % 48 1 47 0   PLATELETS Thousands/uL 183 159   NEUTROS ABS Thousands/µL 4 70 2 78     Results from last 7 days   Lab Units 01/06/21  0628 01/05/21 0213 01/03/21  0929   SODIUM mmol/L 136* 135* 134*   POTASSIUM mmol/L 4 4 3 9 3 6   CHLORIDE mmol/L 103 99* 98*   CO2 mmol/L 26 26 28   ANION GAP mmol/L 7 10 8   BUN mg/dL 31* 23 20   CREATININE mg/dL 1 15 1 51* 1 55*   EGFR ml/min/1 73sq m 69 50 48   CALCIUM mg/dL 8 5 8 9 8 7   MAGNESIUM mg/dL  --  2 1  --    PHOSPHORUS mg/dL  --  2 0*  --      Results from last 7 days Lab Units 01/06/21  0628 01/05/21  0213 01/03/21  0929   AST U/L 28 25 28   ALT U/L 23 25 27   ALK PHOS U/L 78 104 95   TOTAL PROTEIN g/dL 5 9 7 3 7 1   ALBUMIN g/dL 3 1 3 3* 3 2*   TOTAL BILIRUBIN mg/dL 0 60 0 63 0 82     Results from last 7 days   Lab Units 01/06/21  0605 01/05/21  2034 01/05/21  1558 01/05/21  1152   POC GLUCOSE mg/dl 149* 155* 153* 151*     Results from last 7 days   Lab Units 01/06/21  0628 01/05/21  0213 01/03/21  0929   GLUCOSE RANDOM mg/dL 143* 197* 212*     Results from last 7 days   Lab Units 01/05/21  0213 01/03/21  0929   CK TOTAL U/L 62 50     Results from last 7 days   Lab Units 01/05/21  0213 01/03/21  0929   TROPONIN I ng/mL <0 02 <0 02     Results from last 7 days   Lab Units 01/06/21  0628 01/05/21  0213 01/03/21  0930   D-DIMER QUANTITATIVE ug/ml FEU 0 35 11 82* 0 56*     Results from last 7 days   Lab Units 01/03/21  0929   LACTIC ACID mmol/L 1 5         Results from last 7 days   Lab Units 01/05/21  0213 01/03/21  0929   FERRITIN ng/mL 440* 305       Lab Units 01/05/21  0213   CRP mg/L 58 4*       1/5 CXR in ED:  Slight worsening of bilateral ground glass opacity due to Covid 19 pneumonia    1/5 EKG in ED : ECG rate:  83    ECG rate assessment: normal      Rhythm: sinus rhythm       Ectopy: none  :     QRS axis:  Normal    QRS intervals:  Normal:     Conduction: normal  :     ST segments:  Non-specific:     T waves: non-specific        Past Medical History:   Diagnosis Date    Diabetes mellitus ([facility] )     Hyperlipidemia     Sarcoidosis      Present on Admission:   CKD (chronic kidney disease) stage 3, GFR 30-59 ml/min ([facility])   Sarcoidosis   Type 2 diabetes mellitus with microalbuminuria ([facility])   Pneumonia due to COVID-19 virus   Sepsis ([facility] )   Acute respiratory failure with hypoxia ([facility])      Admitting Diagnosis: Pneumonia due to COVID-19 virus [U07 1, J12 82]  Age/Sex: 62 y o  male  Admission Orders:  Scheduled Medications:  ascorbic acid, 1,000 mg, Oral, Q12H [facility]  atorvastatin, 40 mg, Oral, Daily With Dinner  cholecalciferol, 2,000 Units, Oral, Daily  dexamethasone, 6 mg, Intravenous, Q24H  enoxaparin, 30 mg, Subcutaneous, Q12H [facility]  insulin glargine, 10 Units, Subcutaneous, QAM  insulin lispro, 1-6 Units, Subcutaneous, TID AC  insulin lispro, 1-6 Units, Subcutaneous, HS  insulin lispro, 10 Units, Subcutaneous, TID With Meals  zinc sulfate, 220 mg, Oral, Daily    Followed by  Hermelinda Phillips ON 1/12/2021] multivitamin-minerals, 1 tablet, Oral, Daily  remdesivir, 100 mg, Intravenous, Q24H    Continuous IV Infusions:  PRN Meds:    SCDs    Network Utilization Review Department  ATTENTION: Please call with any questions or concerns to [phone] and carefully listen to the prompts so that you are directed to the right person  All voicemails are confidential   Anel Hernandez all requests for admission clinical reviews, approved or denied determinations and any other requests to dedicated fax number below belonging to the campus where the patient is receiving treatment   List of dedicated fax numbers for the Facilities:  [address] DENIALS (Administrative/Medical Necessity) [phone]   [address] (Maternity/NICU/Pediatrics) [phone]   [address] Hernandez [facility] Elizabeth Franco [address] ([address]) [address] [place] [address] [address] Kolb [address] [place] [address] [phone]

## 2021-01-07 LAB
ANION GAP SERPL CALCULATED.3IONS-SCNC: 8 MMOL/L (ref 5–14)
BASOPHILS # BLD AUTO: 0 THOUSANDS/ΜL (ref 0–0.1)
BASOPHILS NFR BLD AUTO: 0 % (ref 0–1)
BUN SERPL-MCNC: 35 MG/DL (ref 5–25)
CALCIUM SERPL-MCNC: 8.9 MG/DL (ref 8.4–10.2)
CHLORIDE SERPL-SCNC: 104 MMOL/L (ref 97–108)
CO2 SERPL-SCNC: 25 MMOL/L (ref 22–30)
CREAT SERPL-MCNC: 1.18 MG/DL (ref 0.7–1.5)
EOSINOPHIL # BLD AUTO: 0 THOUSAND/ΜL (ref 0–0.4)
EOSINOPHIL NFR BLD AUTO: 0 % (ref 0–6)
ERYTHROCYTE [DISTWIDTH] IN BLOOD BY AUTOMATED COUNT: 13.6 %
GFR SERPL CREATININE-BSD FRML MDRD: 67 ML/MIN/1.73SQ M
GLUCOSE SERPL-MCNC: 123 MG/DL (ref 65–140)
GLUCOSE SERPL-MCNC: 179 MG/DL (ref 70–99)
GLUCOSE SERPL-MCNC: 188 MG/DL (ref 65–140)
GLUCOSE SERPL-MCNC: 268 MG/DL (ref 65–140)
GLUCOSE SERPL-MCNC: 284 MG/DL (ref 65–140)
HCT VFR BLD AUTO: 46.4 % (ref 41–53)
HGB BLD-MCNC: 15.4 G/DL (ref 13.5–17.5)
LYMPHOCYTES # BLD AUTO: 0.7 THOUSANDS/ΜL (ref 0.5–4)
LYMPHOCYTES NFR BLD AUTO: 10 % (ref 25–45)
MCH RBC QN AUTO: 30.5 PG (ref 26–34)
MCHC RBC AUTO-ENTMCNC: 33.1 G/DL (ref 31–36)
MCV RBC AUTO: 92 FL (ref 80–100)
MONOCYTES # BLD AUTO: 0.4 THOUSAND/ΜL (ref 0.2–0.9)
MONOCYTES NFR BLD AUTO: 6 % (ref 1–10)
NEUTROPHILS # BLD AUTO: 5.9 THOUSANDS/ΜL (ref 1.8–7.8)
NEUTS SEG NFR BLD AUTO: 84 % (ref 45–65)
PLATELET # BLD AUTO: 235 THOUSANDS/UL (ref 150–450)
PMV BLD AUTO: 8.6 FL (ref 8.9–12.7)
POTASSIUM SERPL-SCNC: 4.4 MMOL/L (ref 3.6–5)
PROCALCITONIN SERPL-MCNC: 0.28 NG/ML
RBC # BLD AUTO: 5.03 MILLION/UL (ref 4.5–5.9)
SODIUM SERPL-SCNC: 137 MMOL/L (ref 137–147)
WBC # BLD AUTO: 7 THOUSAND/UL (ref 4.5–11)

## 2021-01-07 PROCEDURE — 80048 BASIC METABOLIC PNL TOTAL CA: CPT | Performed by: INTERNAL MEDICINE

## 2021-01-07 PROCEDURE — 99232 SBSQ HOSP IP/OBS MODERATE 35: CPT | Performed by: INTERNAL MEDICINE

## 2021-01-07 PROCEDURE — 84145 PROCALCITONIN (PCT): CPT | Performed by: INTERNAL MEDICINE

## 2021-01-07 PROCEDURE — 82948 REAGENT STRIP/BLOOD GLUCOSE: CPT

## 2021-01-07 PROCEDURE — 85025 COMPLETE CBC W/AUTO DIFF WBC: CPT | Performed by: INTERNAL MEDICINE

## 2021-01-07 RX ORDER — DOXYCYCLINE HYCLATE 100 MG/1
100 CAPSULE ORAL EVERY 12 HOURS
Status: DISCONTINUED | OUTPATIENT
Start: 2021-01-07 | End: 2021-01-11

## 2021-01-07 RX ORDER — CEFTRIAXONE 1 G/50ML
1000 INJECTION, SOLUTION INTRAVENOUS EVERY 24 HOURS
Status: DISCONTINUED | OUTPATIENT
Start: 2021-01-07 | End: 2021-01-11

## 2021-01-07 RX ADMIN — Medication 2000 UNITS: at 08:27

## 2021-01-07 RX ADMIN — INSULIN LISPRO 10 UNITS: 100 INJECTION, SOLUTION INTRAVENOUS; SUBCUTANEOUS at 15:54

## 2021-01-07 RX ADMIN — INSULIN LISPRO 3 UNITS: 100 INJECTION, SOLUTION INTRAVENOUS; SUBCUTANEOUS at 11:55

## 2021-01-07 RX ADMIN — INSULIN GLARGINE 10 UNITS: 100 INJECTION, SOLUTION SUBCUTANEOUS at 08:28

## 2021-01-07 RX ADMIN — ATORVASTATIN CALCIUM 40 MG: 40 TABLET, FILM COATED ORAL at 15:53

## 2021-01-07 RX ADMIN — ENOXAPARIN SODIUM 30 MG: 100 INJECTION SUBCUTANEOUS at 08:28

## 2021-01-07 RX ADMIN — DEXAMETHASONE SODIUM PHOSPHATE 6 MG: 10 INJECTION, SOLUTION INTRAMUSCULAR; INTRAVENOUS at 11:45

## 2021-01-07 RX ADMIN — INSULIN LISPRO 4 UNITS: 100 INJECTION, SOLUTION INTRAVENOUS; SUBCUTANEOUS at 22:00

## 2021-01-07 RX ADMIN — DOXYCYCLINE 100 MG: 100 CAPSULE ORAL at 22:00

## 2021-01-07 RX ADMIN — INSULIN LISPRO 10 UNITS: 100 INJECTION, SOLUTION INTRAVENOUS; SUBCUTANEOUS at 11:55

## 2021-01-07 RX ADMIN — INSULIN LISPRO 10 UNITS: 100 INJECTION, SOLUTION INTRAVENOUS; SUBCUTANEOUS at 08:34

## 2021-01-07 RX ADMIN — OXYCODONE HYDROCHLORIDE AND ACETAMINOPHEN 1000 MG: 500 TABLET ORAL at 22:00

## 2021-01-07 RX ADMIN — INSULIN LISPRO 1 UNITS: 100 INJECTION, SOLUTION INTRAVENOUS; SUBCUTANEOUS at 08:34

## 2021-01-07 RX ADMIN — ENOXAPARIN SODIUM 30 MG: 100 INJECTION SUBCUTANEOUS at 22:00

## 2021-01-07 RX ADMIN — REMDESIVIR 100 MG: 100 INJECTION, POWDER, LYOPHILIZED, FOR SOLUTION INTRAVENOUS at 02:48

## 2021-01-07 RX ADMIN — OXYCODONE HYDROCHLORIDE AND ACETAMINOPHEN 1000 MG: 500 TABLET ORAL at 08:27

## 2021-01-07 RX ADMIN — ZINC SULFATE 220 MG (50 MG) CAPSULE 220 MG: CAPSULE at 08:27

## 2021-01-07 RX ADMIN — CEFTRIAXONE 1000 MG: 1 INJECTION, SOLUTION INTRAVENOUS at 11:44

## 2021-01-07 RX ADMIN — DOXYCYCLINE 100 MG: 100 CAPSULE ORAL at 11:46

## 2021-01-07 NOTE — PLAN OF CARE
Problem: Potential for Falls  Goal: Patient will remain free of falls  Description: INTERVENTIONS:  - Assess patient frequently for physical needs  -  Identify cognitive and physical deficits and behaviors that affect risk of falls  -  Floweree fall precautions as indicated by assessment   - Educate patient/family on patient safety including physical limitations  - Instruct patient to call for assistance with activity based on assessment  - Modify environment to reduce risk of injury  - Consider OT/PT consult to assist with strengthening/mobility  Outcome: Progressing     Problem: NEUROSENSORY - ADULT  Goal: Achieves stable or improved neurological status  Description: INTERVENTIONS  - Monitor and report changes in neurological status  - Monitor vital signs such as temperature, blood pressure, glucose, and any other labs ordered   - Initiate measures to prevent increased intracranial pressure  - Monitor for seizure activity and implement precautions if appropriate      Outcome: Progressing  Goal: Remains free of injury related to seizures activity  Description: INTERVENTIONS  - Maintain airway, patient safety  and administer oxygen as ordered  - Monitor patient for seizure activity, document and report duration and description of seizure to physician/advanced practitioner  - If seizure occurs,  ensure patient safety during seizure  - Reorient patient post seizure  - Seizure pads on all 4 side rails  - Instruct patient/family to notify RN of any seizure activity including if an aura is experienced  - Instruct patient/family to call for assistance with activity based on nursing assessment  - Administer anti-seizure medications if ordered    Outcome: Progressing  Goal: Achieves maximal functionality and self care  Description: INTERVENTIONS  - Monitor swallowing and airway patency with patient fatigue and changes in neurological status  - Encourage and assist patient to increase activity and self care     - Encourage visually impaired, hearing impaired and aphasic patients to use assistive/communication devices  Outcome: Progressing     Problem: CARDIOVASCULAR - ADULT  Goal: Maintains optimal cardiac output and hemodynamic stability  Description: INTERVENTIONS:  - Monitor I/O, vital signs and rhythm  - Monitor for S/S and trends of decreased cardiac output  - Administer and titrate ordered vasoactive medications to optimize hemodynamic stability  - Assess quality of pulses, skin color and temperature  - Assess for signs of decreased coronary artery perfusion  - Instruct patient to report change in severity of symptoms  Outcome: Progressing  Goal: Absence of cardiac dysrhythmias or at baseline rhythm  Description: INTERVENTIONS:  - Continuous cardiac monitoring, vital signs, obtain 12 lead EKG if ordered  - Administer antiarrhythmic and heart rate control medications as ordered  - Monitor electrolytes and administer replacement therapy as ordered  Outcome: Progressing     Problem: RESPIRATORY - ADULT  Goal: Achieves optimal ventilation and oxygenation  Description: INTERVENTIONS:  - Assess for changes in respiratory status  - Assess for changes in mentation and behavior  - Position to facilitate oxygenation and minimize respiratory effort  - Oxygen administered by appropriate delivery if ordered  - Initiate smoking cessation education as indicated  - Encourage broncho-pulmonary hygiene including cough, deep breathe, Incentive Spirometry  - Assess the need for suctioning and aspirate as needed  - Assess and instruct to report SOB or any respiratory difficulty  - Respiratory Therapy support as indicated  Outcome: Progressing     Problem: GASTROINTESTINAL - ADULT  Goal: Minimal or absence of nausea and/or vomiting  Description: INTERVENTIONS:  - Administer IV fluids if ordered to ensure adequate hydration  - Maintain NPO status until nausea and vomiting are resolved  - Nasogastric tube if ordered  - Administer ordered antiemetic medications as needed  - Provide nonpharmacologic comfort measures as appropriate  - Advance diet as tolerated, if ordered  - Consider nutrition services referral to assist patient with adequate nutrition and appropriate food choices  Outcome: Progressing  Goal: Maintains or returns to baseline bowel function  Description: INTERVENTIONS:  - Assess bowel function  - Encourage oral fluids to ensure adequate hydration  - Administer IV fluids if ordered to ensure adequate hydration  - Administer ordered medications as needed  - Encourage mobilization and activity  - Consider nutritional services referral to assist patient with adequate nutrition and appropriate food choices  Outcome: Progressing  Goal: Maintains adequate nutritional intake  Description: INTERVENTIONS:  - Monitor percentage of each meal consumed  - Identify factors contributing to decreased intake, treat as appropriate  - Assist with meals as needed  - Monitor I&O, weight, and lab values if indicated  - Obtain nutrition services referral as needed  Outcome: Progressing  Goal: Establish and maintain optimal ostomy function  Description: INTERVENTIONS:  - Assess bowel function  - Encourage oral fluids to ensure adequate hydration  - Administer IV fluids if ordered to ensure adequate hydration   - Administer ordered medications as needed  - Encourage mobilization and activity  - Nutrition services referral to assist patient with appropriate food choices  - Assess stoma site  - Consider wound care consult   Outcome: Progressing     Problem: GENITOURINARY - ADULT  Goal: Maintains or returns to baseline urinary function  Description: INTERVENTIONS:  - Assess urinary function  - Encourage oral fluids to ensure adequate hydration if ordered  - Administer IV fluids as ordered to ensure adequate hydration  - Administer ordered medications as needed  - Offer frequent toileting  - Follow urinary retention protocol if ordered  Outcome: Progressing  Goal: Absence of urinary retention  Description: INTERVENTIONS:  - Assess patients ability to void and empty bladder  - Monitor I/O  - Bladder scan as needed  - Discuss with physician/AP medications to alleviate retention as needed  - Discuss catheterization for long term situations as appropriate  Outcome: Progressing  Goal: Urinary catheter remains patent  Description: INTERVENTIONS:  - Assess patency of urinary catheter  - If patient has a chronic solorzano, consider changing catheter if non-functioning  - Follow guidelines for intermittent irrigation of non-functioning urinary catheter  Outcome: Progressing     Problem: METABOLIC, FLUID AND ELECTROLYTES - ADULT  Goal: Electrolytes maintained within normal limits  Description: INTERVENTIONS:  - Monitor labs and assess patient for signs and symptoms of electrolyte imbalances  - Administer electrolyte replacement as ordered  - Monitor response to electrolyte replacements, including repeat lab results as appropriate  - Instruct patient on fluid and nutrition as appropriate  Outcome: Progressing  Goal: Fluid balance maintained  Description: INTERVENTIONS:  - Monitor labs   - Monitor I/O and WT  - Instruct patient on fluid and nutrition as appropriate  - Assess for signs & symptoms of volume excess or deficit  Outcome: Progressing  Goal: Glucose maintained within target range  Description: INTERVENTIONS:  - Monitor Blood Glucose as ordered  - Assess for signs and symptoms of hyperglycemia and hypoglycemia  - Administer ordered medications to maintain glucose within target range  - Assess nutritional intake and initiate nutrition service referral as needed  Outcome: Progressing     Problem: SKIN/TISSUE INTEGRITY - ADULT  Goal: Skin integrity remains intact  Description: INTERVENTIONS  - Identify patients at risk for skin breakdown  - Assess and monitor skin integrity  - Assess and monitor nutrition and hydration status  - Monitor labs (i e  albumin)  - Assess for incontinence - Turn and reposition patient  - Assist with mobility/ambulation  - Relieve pressure over bony prominences  - Avoid friction and shearing  - Provide appropriate hygiene as needed including keeping skin clean and dry  - Evaluate need for skin moisturizer/barrier cream  - Collaborate with interdisciplinary team (i e  Nutrition, Rehabilitation, etc )   - Patient/family teaching  Outcome: Progressing  Goal: Incision(s), wounds(s) or drain site(s) healing without S/S of infection  Description: INTERVENTIONS  - Assess and document risk factors for skin impairment   - Assess and document dressing, incision, wound bed, drain sites and surrounding tissue  - Consider nutrition services referral as needed  - Oral mucous membranes remain intact  - Provide patient/ family education  Outcome: Progressing  Goal: Oral mucous membranes remain intact  Description: INTERVENTIONS  - Assess oral mucosa and hygiene practices  - Implement preventative oral hygiene regimen  - Implement oral medicated treatments as ordered  - Initiate Nutrition services referral as needed  Outcome: Progressing     Problem: HEMATOLOGIC - ADULT  Goal: Maintains hematologic stability  Description: INTERVENTIONS  - Assess for signs and symptoms of bleeding or hemorrhage  - Monitor labs  - Administer supportive blood products/factors as ordered and appropriate  Outcome: Progressing     Problem: MUSCULOSKELETAL - ADULT  Goal: Maintain or return mobility to safest level of function  Description: INTERVENTIONS:  - Assess patient's ability to carry out ADLs; assess patient's baseline for ADL function and identify physical deficits which impact ability to perform ADLs (bathing, care of mouth/teeth, toileting, grooming, dressing, etc )  - Assess/evaluate cause of self-care deficits   - Assess range of motion  - Assess patient's mobility  - Assess patient's need for assistive devices and provide as appropriate  - Encourage maximum independence but intervene and supervise when necessary  - Involve family in performance of ADLs  - Assess for home care needs following discharge   - Consider OT consult to assist with ADL evaluation and planning for discharge  - Provide patient education as appropriate  Outcome: Progressing  Goal: Maintain proper alignment of affected body part  Description: INTERVENTIONS:  - Support, maintain and protect limb and body alignment  - Provide patient/ family with appropriate education  Outcome: Progressing     Problem: Nutrition/Hydration-ADULT  Goal: Nutrient/Hydration intake appropriate for improving, restoring or maintaining nutritional needs  Description: Monitor and assess patient's nutrition/hydration status for malnutrition  Collaborate with interdisciplinary team and initiate plan and interventions as ordered  Monitor patient's weight and dietary intake as ordered or per policy  Utilize nutrition screening tool and intervene as necessary  Determine patient's food preferences and provide high-protein, high-caloric foods as appropriate       INTERVENTIONS:  - Monitor oral intake, urinary output, labs, and treatment plans  - Assess nutrition and hydration status and recommend course of action  - Evaluate amount of meals eaten  - Assist patient with eating if necessary   - Allow adequate time for meals  - Recommend/ encourage appropriate diets, oral nutritional supplements, and vitamin/mineral supplements  - Order, calculate, and assess calorie counts as needed  - Recommend, monitor, and adjust tube feedings and TPN/PPN based on assessed needs  - Assess need for intravenous fluids  - Provide specific nutrition/hydration education as appropriate  - Include patient/family/caregiver in decisions related to nutrition  Outcome: Progressing

## 2021-01-07 NOTE — ASSESSMENT & PLAN NOTE
Lab Results   Component Value Date    EGFR 67 01/07/2021    EGFR 69 01/06/2021    EGFR 50 01/05/2021    CREATININE 1 18 01/07/2021    CREATININE 1 15 01/06/2021    CREATININE 1 51 (H) 01/05/2021       At baseline

## 2021-01-07 NOTE — ASSESSMENT & PLAN NOTE
Diagnosed with COVID on 12/31  Presented hypoxic requiring 2 L nasal cannula  · Currently receiving intravenous steroids, day 3/10  · Currently receiving intravenous remdesivir day 3/5  · Continue treated for mild COVID pathway-continue zinc, vitamin-C  Will hold the vitamin-D given patient has hypercalcemia from sarcoidosis  · Titrate down oxygen as needed  · D-dimer-11 9-> 0 35 (N)  No suspicion for PE  Continue Lovenox     · Procalcitonin elevated- started on ceftriaxone and doxycycline day 1  · Off isolation on 1/11 Consent: The patient's consent was obtained including but not limited to risks of crusting, scabbing, blistering, scarring, darker or lighter pigmentary change, recurrence, incomplete removal and infection. Detail Level: Zone Render Post-Care Instructions In Note?: no Post-Care Instructions: I reviewed with the patient in detail post-care instructions. Patient is to wear sunprotection, and avoid picking at any of the treated lesions. Pt may apply Vaseline to crusted or scabbing areas. Duration Of Freeze Thaw-Cycle (Seconds): 0 Medical Necessity Clause: This procedure was medically necessary because the lesions that were treated were: Medical Necessity Information: It is in your best interest to select a reason for this procedure from the list below. All of these items fulfill various CMS LCD requirements except the new and changing color options. Post-Care Instructions: I reviewed with the patient in detail post-care instructions. Pt may apply Vaseline to crusted or scabbing areas.

## 2021-01-07 NOTE — ASSESSMENT & PLAN NOTE
Lab Results   Component Value Date    HGBA1C 9 0 02/22/2020       Recent Labs     01/06/21  1112 01/06/21  1622 01/06/21  1950 01/07/21  0531   POCGLU 239* 162* 139 188*       Blood Sugar Average: Last 72 hrs:  (P) 552 [id number]   Patient has insulin pump  Hold off on insulin pump  Blood sugars are controlled     Lantus 10 units am  Humalog 10 units TID with meals

## 2021-01-07 NOTE — PROGRESS NOTES
Progress Note - Wendy Saravia 1960, 62 y o  male MRN: [id number]    Unit/Bed#: 7T [facility] 716-01 Encounter: [id number]    Primary Care Provider: Laura Allen DO   Date and time admitted to hospital: 1/5/2021 10:01 AM        * Pneumonia due to COVID-19 virus  Assessment & Plan  Diagnosed with COVID on 12/31  Presented hypoxic requiring 2 L nasal cannula  · Currently receiving intravenous steroids, day 3/10  · Currently receiving intravenous remdesivir day 3/5  · Continue treated for mild COVID pathway-continue zinc, vitamin-C  Will hold the vitamin-D given patient has hypercalcemia from sarcoidosis  · Titrate down oxygen as needed  · D-dimer-11 9-> 0 35 (N)  No suspicion for PE  Continue Lovenox  · Procalcitonin elevated- started on ceftriaxone and doxycycline day 1  · Off isolation on 1/11    Acute respiratory failure with hypoxia [facility])  Assessment & Plan  Requiring 2L NC  Currently no on any home O2  Titrate down as able  Sepsis [facility])  Assessment & Plan  Resolved  No growth from blood cultures x2 for date  Continue treatment for COVID pneumonia    CKD (chronic kidney disease) stage 3, GFR 30-59 ml/min [facility])  Assessment & Plan  Lab Results   Component Value Date    EGFR 67 01/07/2021    EGFR 69 01/06/2021    EGFR 50 01/05/2021    CREATININE 1 18 01/07/2021    CREATININE 1 15 01/06/2021    CREATININE 1 51 (H) 01/05/2021       At baseline    Type 2 diabetes mellitus with microalbuminuria [facility])  Assessment & Plan  Lab Results   Component Value Date    HGBA1C 9 0 02/22/2020       Recent Labs     01/06/21  1112 01/06/21  1622 01/06/21  1950 01/07/21  0531   POCGLU 239* 162* 139 188*       Blood Sugar Average: Last 72 hrs:  (P) 247 [id number]   Patient has insulin pump  Hold off on insulin pump  Blood sugars are controlled  Lantus 10 units am  Humalog 10 units TID with meals     Sarcoidosis  Assessment & Plan  Patient takes prednisone for sarcoidosis has been stable   Avoid vitamin d due to hypercalcemia      VTE Pharmacologic Prophylaxis:   Pharmacologic: Enoxaparin (Lovenox)  Mechanical VTE Prophylaxis in Place: Yes    Patient Centered Rounds: I have performed bedside rounds with nursing staff today  Discussions with Specialists or Other Care Team Provider: DIAZ    Education and Discussions with Family / Patient: Patient     Time Spent for Care: 20 minutes  More than 50% of total time spent on counseling and coordination of care as described above  Current Length of Stay: 2 day(s)    Current Patient Status: Inpatient   Certification Statement: The patient will continue to require additional inpatient hospital stay due to continue covid tx, and use of oxygen    Discharge Plan: continue oxygen therapy and titrate down as able  Will need to be off oxygen for next 24 hours    Code Status: Level 1 - Full Code      Subjective:   Patient seen and examined  Still sob and cough    Objective:     Vitals:   Temp (24hrs), Av 6 °F (36 4 °C), Min:96 9 °F (36 1 °C), Max:98 3 °F (36 8 °C)    Temp:  [96 9 °F (36 1 °C)-98 3 °F (36 8 °C)] 96 9 °F (36 1 °C)  HR:  [55-70] 55  Resp:  [20] 20  BP: (108-121)/(69-79) 118/69  SpO2:  [91 %-97 %] 93 %  Body mass index is 23 44 kg/m²  Input and Output Summary (last 24 hours): Intake/Output Summary (Last 24 hours) at 2021 1017  Last data filed at 2021 0732  Gross per 24 hour   Intake 240 ml   Output 775 ml   Net -535 ml       Physical Exam:     Physical Exam  Constitutional:       General: He is not in acute distress  Appearance: Normal appearance  HENT:      Head: Normocephalic and atraumatic  Mouth/Throat:      Mouth: Mucous membranes are moist       Pharynx: Oropharynx is clear  Eyes:      General:         Right eye: No discharge  Left eye: No discharge  Cardiovascular:      Rate and Rhythm: Normal rate and regular rhythm  Pulses: Normal pulses  Heart sounds: No murmur     Pulmonary:      Effort: Pulmonary effort is normal  No respiratory distress  Breath sounds: Rhonchi present  No wheezing  Abdominal:      General: There is no distension  Musculoskeletal:      Right lower leg: No edema  Left lower leg: No edema  Skin:     General: Skin is warm and dry  Neurological:      General: No focal deficit present  Mental Status: He is alert and oriented to person, place, and time  Mental status is at baseline  Psychiatric:         Mood and Affect: Mood normal        Additional Data:     Labs:    Results from last 7 days   Lab Units 01/07/21  0546   WBC Thousand/uL 7 00   HEMOGLOBIN g/dL 15 4   HEMATOCRIT % 46 4   PLATELETS Thousands/uL 235   NEUTROS PCT % 84*   LYMPHS PCT % 10*   MONOS PCT % 6   EOS PCT % 0     Results from last 7 days   Lab Units 01/07/21  0546 01/06/21  0628   SODIUM mmol/L 137 136*   POTASSIUM mmol/L 4 4 4 4   CHLORIDE mmol/L 104 103   CO2 mmol/L 25 26   BUN mg/dL 35* 31*   CREATININE mg/dL 1 18 1 15   ANION GAP mmol/L 8 7   CALCIUM mg/dL 8 9 8 5   ALBUMIN g/dL  --  3 1   TOTAL BILIRUBIN mg/dL  --  0 60   ALK PHOS U/L  --  78   ALT U/L  --  23   AST U/L  --  28   GLUCOSE RANDOM mg/dL 179* 143*         Results from last 7 days   Lab Units 01/07/21  0531 01/06/21  1950 01/06/21  1622 01/06/21  1112 01/06/21  0605 01/05/21  2034 01/05/21  1558 01/05/21  1152   POC GLUCOSE mg/dl 188* 139 162* 239* 149* 155* 153* 151*         Results from last 7 days   Lab Units 01/06/21  0628 01/03/21  0929   LACTIC ACID mmol/L  --  1 5   PROCALCITONIN ng/ml 0 55* 0 09           * I Have Reviewed All Lab Data Listed Above  * Additional Pertinent Lab Tests Reviewed: All Labs Within Last 24 Hours Reviewed    Imaging:    Imaging Reports Reviewed Today Include: none   Imaging Personally Reviewed by Myself Includes: none    Recent Cultures (last 7 days):     Results from last 7 days   Lab Units 01/03/21  0930 01/03/21  0918   BLOOD CULTURE  No Growth at 72 hrs  No Growth at 72 hrs         Last 24 Hours Medication List:   Current Facility-Administered Medications   Medication Dose Route Frequency Provider Last Rate    ascorbic acid  1,000 mg Oral Q12H [facility] Lyndon Mata MD      atorvastatin  40 mg Oral Daily With Alfreda Galvan MD      cefTRIAXone  1,000 mg Intravenous Q24H Lyndon Mata MD      cholecalciferol  2,000 Units Oral Daily Lyndon Mata MD      dexamethasone  6 mg Intravenous Q24H Lyndon Mata MD      doxycycline hyclate  100 mg Oral Q12H Lyndon Mata MD      enoxaparin  30 mg Subcutaneous Q12H [facility] Lyndon Mata MD      insulin glargine  10 Units Subcutaneous QAM Lyndon Mata MD      insulin lispro  1-6 Units Subcutaneous TID [facility] Lyndon Mata MD      insulin lispro  1-6 Units Subcutaneous HS Lyndon Mata MD      insulin lispro  10 Units Subcutaneous TID With Meals Lyndon Mata MD      zinc sulfate  220 mg Oral Daily Lyndon Mata MD      Followed by   Eufemia Rey ON 1/12/2021] multivitamin-minerals  1 tablet Oral Daily Lyndon Mata MD      remdesivir  100 mg Intravenous Q24H Lyndon Mata  mg (01/07/21 3328)        Today, Patient Was Seen By: Lyndon Mata MD    ** Please Note: Dictation voice to text software may have been used in the creation of this document   **

## 2021-01-08 PROBLEM — A41.9 SEPSIS (HCC): Status: RESOLVED | Noted: 2021-01-05 | Resolved: 2021-01-08

## 2021-01-08 LAB
ANION GAP SERPL CALCULATED.3IONS-SCNC: 7 MMOL/L (ref 5–14)
BACTERIA BLD CULT: NORMAL
BACTERIA BLD CULT: NORMAL
BASOPHILS # BLD AUTO: 0 THOUSANDS/ΜL (ref 0–0.1)
BASOPHILS NFR BLD AUTO: 0 % (ref 0–1)
BUN SERPL-MCNC: 35 MG/DL (ref 5–25)
CALCIUM SERPL-MCNC: 8.6 MG/DL (ref 8.4–10.2)
CHLORIDE SERPL-SCNC: 102 MMOL/L (ref 97–108)
CO2 SERPL-SCNC: 29 MMOL/L (ref 22–30)
CREAT SERPL-MCNC: 1.16 MG/DL (ref 0.7–1.5)
D DIMER PPP FEU-MCNC: 0.37 UG/ML FEU
EOSINOPHIL # BLD AUTO: 0 THOUSAND/ΜL (ref 0–0.4)
EOSINOPHIL NFR BLD AUTO: 0 % (ref 0–6)
ERYTHROCYTE [DISTWIDTH] IN BLOOD BY AUTOMATED COUNT: 13.2 %
GFR SERPL CREATININE-BSD FRML MDRD: 69 ML/MIN/1.73SQ M
GLUCOSE SERPL-MCNC: 130 MG/DL (ref 65–140)
GLUCOSE SERPL-MCNC: 195 MG/DL (ref 65–140)
GLUCOSE SERPL-MCNC: 205 MG/DL (ref 70–99)
GLUCOSE SERPL-MCNC: 238 MG/DL (ref 65–140)
GLUCOSE SERPL-MCNC: 239 MG/DL (ref 65–140)
HCT VFR BLD AUTO: 44.2 % (ref 41–53)
HGB BLD-MCNC: 14.6 G/DL (ref 13.5–17.5)
LYMPHOCYTES # BLD AUTO: 0.5 THOUSANDS/ΜL (ref 0.5–4)
LYMPHOCYTES NFR BLD AUTO: 10 % (ref 25–45)
MCH RBC QN AUTO: 30.4 PG (ref 26–34)
MCHC RBC AUTO-ENTMCNC: 33.1 G/DL (ref 31–36)
MCV RBC AUTO: 92 FL (ref 80–100)
MONOCYTES # BLD AUTO: 0.4 THOUSAND/ΜL (ref 0.2–0.9)
MONOCYTES NFR BLD AUTO: 7 % (ref 1–10)
NEUTROPHILS # BLD AUTO: 4.5 THOUSANDS/ΜL (ref 1.8–7.8)
NEUTS SEG NFR BLD AUTO: 83 % (ref 45–65)
PLATELET # BLD AUTO: 245 THOUSANDS/UL (ref 150–450)
PMV BLD AUTO: 8 FL (ref 8.9–12.7)
POTASSIUM SERPL-SCNC: 4.5 MMOL/L (ref 3.6–5)
PROCALCITONIN SERPL-MCNC: 0.1 NG/ML
RBC # BLD AUTO: 4.81 MILLION/UL (ref 4.5–5.9)
SODIUM SERPL-SCNC: 138 MMOL/L (ref 137–147)
WBC # BLD AUTO: 5.5 THOUSAND/UL (ref 4.5–11)

## 2021-01-08 PROCEDURE — 84145 PROCALCITONIN (PCT): CPT | Performed by: INTERNAL MEDICINE

## 2021-01-08 PROCEDURE — 82948 REAGENT STRIP/BLOOD GLUCOSE: CPT

## 2021-01-08 PROCEDURE — 99232 SBSQ HOSP IP/OBS MODERATE 35: CPT | Performed by: INTERNAL MEDICINE

## 2021-01-08 PROCEDURE — 85379 FIBRIN DEGRADATION QUANT: CPT | Performed by: INTERNAL MEDICINE

## 2021-01-08 PROCEDURE — 85025 COMPLETE CBC W/AUTO DIFF WBC: CPT | Performed by: INTERNAL MEDICINE

## 2021-01-08 PROCEDURE — 97163 PT EVAL HIGH COMPLEX 45 MIN: CPT

## 2021-01-08 PROCEDURE — 80048 BASIC METABOLIC PNL TOTAL CA: CPT | Performed by: INTERNAL MEDICINE

## 2021-01-08 RX ADMIN — INSULIN LISPRO 1 UNITS: 100 INJECTION, SOLUTION INTRAVENOUS; SUBCUTANEOUS at 08:44

## 2021-01-08 RX ADMIN — INSULIN GLARGINE 10 UNITS: 100 INJECTION, SOLUTION SUBCUTANEOUS at 08:18

## 2021-01-08 RX ADMIN — ATORVASTATIN CALCIUM 40 MG: 40 TABLET, FILM COATED ORAL at 16:03

## 2021-01-08 RX ADMIN — OXYCODONE HYDROCHLORIDE AND ACETAMINOPHEN 1000 MG: 500 TABLET ORAL at 21:29

## 2021-01-08 RX ADMIN — INSULIN LISPRO 3 UNITS: 100 INJECTION, SOLUTION INTRAVENOUS; SUBCUTANEOUS at 21:29

## 2021-01-08 RX ADMIN — ENOXAPARIN SODIUM 30 MG: 100 INJECTION SUBCUTANEOUS at 21:29

## 2021-01-08 RX ADMIN — INSULIN LISPRO 10 UNITS: 100 INJECTION, SOLUTION INTRAVENOUS; SUBCUTANEOUS at 16:29

## 2021-01-08 RX ADMIN — Medication 2000 UNITS: at 08:18

## 2021-01-08 RX ADMIN — CEFTRIAXONE 1000 MG: 1 INJECTION, SOLUTION INTRAVENOUS at 09:06

## 2021-01-08 RX ADMIN — INSULIN LISPRO 3 UNITS: 100 INJECTION, SOLUTION INTRAVENOUS; SUBCUTANEOUS at 12:04

## 2021-01-08 RX ADMIN — ZINC SULFATE 220 MG (50 MG) CAPSULE 220 MG: CAPSULE at 08:18

## 2021-01-08 RX ADMIN — DEXAMETHASONE SODIUM PHOSPHATE 6 MG: 10 INJECTION, SOLUTION INTRAMUSCULAR; INTRAVENOUS at 11:34

## 2021-01-08 RX ADMIN — ENOXAPARIN SODIUM 30 MG: 100 INJECTION SUBCUTANEOUS at 08:17

## 2021-01-08 RX ADMIN — INSULIN LISPRO 10 UNITS: 100 INJECTION, SOLUTION INTRAVENOUS; SUBCUTANEOUS at 12:04

## 2021-01-08 RX ADMIN — OXYCODONE HYDROCHLORIDE AND ACETAMINOPHEN 1000 MG: 500 TABLET ORAL at 08:18

## 2021-01-08 RX ADMIN — DOXYCYCLINE 100 MG: 100 CAPSULE ORAL at 21:29

## 2021-01-08 RX ADMIN — DOXYCYCLINE 100 MG: 100 CAPSULE ORAL at 08:32

## 2021-01-08 RX ADMIN — REMDESIVIR 100 MG: 100 INJECTION, POWDER, LYOPHILIZED, FOR SOLUTION INTRAVENOUS at 02:30

## 2021-01-08 RX ADMIN — INSULIN LISPRO 10 UNITS: 100 INJECTION, SOLUTION INTRAVENOUS; SUBCUTANEOUS at 08:44

## 2021-01-08 NOTE — PLAN OF CARE
Problem: PHYSICAL THERAPY ADULT  Goal: Performs mobility at highest level of function for planned discharge setting  See evaluation for individualized goals  Description: Treatment/Interventions: Functional transfer training, LE strengthening/ROM, Elevations, Therapeutic exercise, Endurance training, Patient/family training, Equipment eval/education, Bed mobility, Gait training, Spoke to nursing, Spoke to case management, OT          See flowsheet documentation for full assessment, interventions and recommendations  Note: Prognosis: Good  Problem List: Decreased strength, Decreased endurance, Impaired balance, Decreased mobility, Decreased range of motion, Pain     Barriers to Discharge: Inaccessible home environment, Decreased caregiver support     PT Discharge Recommendation: Home with skilled therapy(Outpatient therapy)          See flowsheet documentation for full assessment

## 2021-01-08 NOTE — PROGRESS NOTES
Progress Note - Lauren Goff 1960, 62 y o  male MRN: [id number]    Unit/Bed#: 7[facility] 716-01 Encounter: [id number]    Primary Care Provider: Cindi Noland DO   Date and time admitted to hospital: 1/5/2021 10:01 AM        * Pneumonia due to COVID-19 virus  Assessment & Plan  Diagnosed with COVID on 12/31  Presented hypoxic requiring 2 L nasal cannula  Now on room air saturating 93%  · Currently receiving intravenous steroids, day 4/10  · Currently receiving intravenous remdesivir day 4/5  · Continue treated for mild COVID pathway-continue zinc, vitamin-C  Will hold the vitamin-D given patient has hypercalcemia from sarcoidosis  · Titrate down oxygen as needed  · D-dimer-11 9-> 0 35 (N)  No suspicion for PE  Continue Lovenox  · Procalcitonin elevated and down trending after starting antibiotic- continue ceftriaxone and doxycycline day 2  · Off isolation on 1/11    Acute respiratory failure with hypoxia [facility])  Assessment & Plan  Initially requiring 2 L nasal cannula, now saturating 93% on room air  Improving  Currently not on any home O2  Titrate down as able  CKD (chronic kidney disease) stage 3, GFR 30-59 ml/min [facility])  Assessment & Plan  Lab Results   Component Value Date    EGFR 69 01/08/2021    EGFR 67 01/07/2021    EGFR 69 01/06/2021    CREATININE 1 16 01/08/2021    CREATININE 1 18 01/07/2021    CREATININE 1 15 01/06/2021       Creatinine At baseline    Type 2 diabetes mellitus with microalbuminuria [facility])  Assessment & Plan  Lab Results   Component Value Date    HGBA1C 9 0 02/22/2020       Recent Labs     01/07/21  1124 01/07/21  1530 01/07/21  2034 01/08/21  0538   POCGLU 268* 123 284* 195*       Blood Sugar Average: Last 72 hrs:  (P) 186 [id number]   Patient has insulin pump  Hold off on insulin pump  Blood sugars are controlled  Lantus 10 units am  Humalog 10 units TID with meals     Sarcoidosis  Assessment & Plan  Patient takes prednisone for sarcoidosis has been stable  Avoid vitamin d due to hypercalcemia    Sepsis (HCC)-resolved as of 2021  Assessment & Plan  Resolved  No growth from blood cultures x2 for date  Continue treatment for COVID pneumonia      VTE Pharmacologic Prophylaxis:   Pharmacologic: Enoxaparin (Lovenox)  Mechanical VTE Prophylaxis in Place: Yes    Patient Centered Rounds: I have performed bedside rounds with nursing staff today  Discussions with Specialists or Other Care Team Provider:  Case Management, PT    Education and Discussions with Family / Patient:  Patient    Time Spent for Care: 20 minutes  More than 50% of total time spent on counseling and coordination of care as described above  Current Length of Stay: 3 day(s)    Current Patient Status: Inpatient   Certification Statement: The patient will continue to require additional inpatient hospital stay due to Monitor off oxygen for the next 24 hours  Potentially DC the next 24-48 hours  Discharge Plan:  Currently oxygen as of this morning however will need to be approximately 24 hours off oxygen to be discharged  Potentially within next 24-48 hours  Continue COVID treatment for now    Code Status: Level 1 - Full Code      Subjective:   Patient seen and examined  Patient currently states he feels better  He still has cough and poor appetite  No diarrhea reported  Objective:     Vitals:   Temp (24hrs), Av 4 °F (36 3 °C), Min:96 8 °F (36 °C), Max:98 5 °F (36 9 °C)    Temp:  [96 8 °F (36 °C)-98 5 °F (36 9 °C)] 96 8 °F (36 °C)  HR:  [58-65] 58  Resp:  [20] 20  BP: (114-133)/(59-82) 133/82  SpO2:  [93 %-99 %] 99 %  Body mass index is 23 44 kg/m²  Input and Output Summary (last 24 hours): Intake/Output Summary (Last 24 hours) at 2021 1022  Last data filed at 2021 2336  Gross per 24 hour   Intake 600 ml   Output 475 ml   Net 125 ml       Physical Exam:     Physical Exam  Constitutional:       General: He is not in acute distress  Appearance: Normal appearance   He is not ill-appearing  HENT:      Head: Normocephalic and atraumatic  Eyes:      General:         Right eye: No discharge  Left eye: No discharge  Cardiovascular:      Rate and Rhythm: Normal rate and regular rhythm  Pulses: Normal pulses  Heart sounds: No murmur  Pulmonary:      Effort: Pulmonary effort is normal  No respiratory distress  Breath sounds: Rhonchi present  No wheezing  Musculoskeletal:      Right lower leg: No edema  Left lower leg: No edema  Skin:     General: Skin is warm and dry  Neurological:      General: No focal deficit present  Mental Status: He is alert and oriented to person, place, and time  Mental status is at baseline  Psychiatric:         Mood and Affect: Mood normal          Additional Data:     Labs:    Results from last 7 days   Lab Units 01/08/21  0524   WBC Thousand/uL 5 50   HEMOGLOBIN g/dL 14 6   HEMATOCRIT % 44 2   PLATELETS Thousands/uL 245   NEUTROS PCT % 83*   LYMPHS PCT % 10*   MONOS PCT % 7   EOS PCT % 0     Results from last 7 days   Lab Units 01/08/21  0524  01/06/21  0628   SODIUM mmol/L 138   < > 136*   POTASSIUM mmol/L 4 5   < > 4 4   CHLORIDE mmol/L 102   < > 103   CO2 mmol/L 29   < > 26   BUN mg/dL 35*   < > 31*   CREATININE mg/dL 1 16   < > 1 15   ANION GAP mmol/L 7   < > 7   CALCIUM mg/dL 8 6   < > 8 5   ALBUMIN g/dL  --   --  3 1   TOTAL BILIRUBIN mg/dL  --   --  0 60   ALK PHOS U/L  --   --  78   ALT U/L  --   --  23   AST U/L  --   --  28   GLUCOSE RANDOM mg/dL 205*   < > 143*    < > = values in this interval not displayed           Results from last 7 days   Lab Units 01/08/21  0538 01/07/21 2034 01/07/21  1530 01/07/21  1124 01/07/21  0531 01/06/21  1950 01/06/21  1622 01/06/21  1112 01/06/21  0605 01/05/21  2034 01/05/21  1558 01/05/21  1152   POC GLUCOSE mg/dl 195* 284* 123 268* 188* 139 162* 239* 149* 155* 153* 151*         Results from last 7 days   Lab Units 01/07/21  0546 01/06/21  4383 01/03/21  0255 LACTIC ACID mmol/L  --   --  1 5   PROCALCITONIN ng/ml 0 28* 0 55* 0 09           * I Have Reviewed All Lab Data Listed Above  * Additional Pertinent Lab Tests Reviewed: All Labs Within Last 24 Hours Reviewed    Imaging:    Imaging Reports Reviewed Today Include: none    Recent Cultures (last 7 days):     Results from last 7 days   Lab Units 01/03/21  0930 01/03/21  0918   BLOOD CULTURE  No Growth After 4 Days  No Growth After 4 Days  Last 24 Hours Medication List:   Current Facility-Administered Medications   Medication Dose Route Frequency Provider Last Rate    ascorbic acid  1,000 mg Oral Q12H [address] Tatyana Mathews MD      atorvastatin  40 mg Oral Daily With Taurus Jj MD      cefTRIAXone  1,000 mg Intravenous Q24H Tatyana Mathews MD 1,000 mg (01/08/21 0906)    cholecalciferol  2,000 Units Oral Daily Tatyana Mathews MD      dexamethasone  6 mg Intravenous Q24H Tatyana Mathews MD      doxycycline hyclate  100 mg Oral Q12H Tatyana Mathews MD      enoxaparin  30 mg Subcutaneous Q12H [address] Tatyana Mathews MD      insulin glargine  10 Units Subcutaneous QAM Tatyana Mathews MD      insulin lispro  1-6 Units Subcutaneous TID [facility] Tatyana Mathews MD      insulin lispro  1-6 Units Subcutaneous HS Tatyana Mathews MD      insulin lispro  10 Units Subcutaneous TID With Meals Tatyana Mathews MD      zinc sulfate  220 mg Oral Daily Tatyana Mathews MD      Followed by   Campos Manning ON 1/12/2021] multivitamin-minerals  1 tablet Oral Daily Tatyana Mathews MD      remdesivir  100 mg Intravenous Q24H Tatyana Mathews  mg (01/07/21 0248)        Today, Patient Was Seen By: Tatyana Mathews MD    ** Please Note: Dictation voice to text software may have been used in the creation of this document   **

## 2021-01-08 NOTE — PLAN OF CARE
Problem: Potential for Falls  Goal: Patient will remain free of falls  Description: INTERVENTIONS:  - Assess patient frequently for physical needs  -  Identify cognitive and physical deficits and behaviors that affect risk of falls  -  [place] fall precautions as indicated by assessment   - Educate patient/family on patient safety including physical limitations  - Instruct patient to call for assistance with activity based on assessment  - Modify environment to reduce risk of injury  - Consider OT/PT consult to assist with strengthening/mobility  Outcome: Progressing     Problem: NEUROSENSORY - ADULT  Goal: Achieves stable or improved neurological status  Description: INTERVENTIONS  - Monitor and report changes in neurological status  - Monitor vital signs such as temperature, blood pressure, glucose, and any other labs ordered   - Initiate measures to prevent increased intracranial pressure  - Monitor for seizure activity and implement precautions if appropriate      Outcome: Progressing  Goal: Remains free of injury related to seizures activity  Description: INTERVENTIONS  - Maintain airway, patient safety  and administer oxygen as ordered  - Monitor patient for seizure activity, document and report duration and description of seizure to physician/advanced practitioner  - If seizure occurs,  ensure patient safety during seizure  - Reorient patient post seizure  - Seizure pads on all 4 side rails  - Instruct patient/family to notify RN of any seizure activity including if an aura is experienced  - Instruct patient/family to call for assistance with activity based on nursing assessment  - Administer anti-seizure medications if ordered    Outcome: Progressing  Goal: Achieves maximal functionality and self care  Description: INTERVENTIONS  - Monitor swallowing and airway patency with patient fatigue and changes in neurological status  - Encourage and assist patient to increase activity and self care     - Encourage visually impaired, hearing impaired and aphasic patients to use assistive/communication devices  Outcome: Progressing     Problem: CARDIOVASCULAR - ADULT  Goal: Maintains optimal cardiac output and hemodynamic stability  Description: INTERVENTIONS:  - Monitor I/O, vital signs and rhythm  - Monitor for S/S and trends of decreased cardiac output  - Administer and titrate ordered vasoactive medications to optimize hemodynamic stability  - Assess quality of pulses, skin color and temperature  - Assess for signs of decreased coronary artery perfusion  - Instruct patient to report change in severity of symptoms  Outcome: Progressing  Goal: Absence of cardiac dysrhythmias or at baseline rhythm  Description: INTERVENTIONS:  - Continuous cardiac monitoring, vital signs, obtain 12 lead EKG if ordered  - Administer antiarrhythmic and heart rate control medications as ordered  - Monitor electrolytes and administer replacement therapy as ordered  Outcome: Progressing     Problem: RESPIRATORY - ADULT  Goal: Achieves optimal ventilation and oxygenation  Description: INTERVENTIONS:  - Assess for changes in respiratory status  - Assess for changes in mentation and behavior  - Position to facilitate oxygenation and minimize respiratory effort  - Oxygen administered by appropriate delivery if ordered  - Initiate smoking cessation education as indicated  - Encourage broncho-pulmonary hygiene including cough, deep breathe, Incentive Spirometry  - Assess the need for suctioning and aspirate as needed  - Assess and instruct to report SOB or any respiratory difficulty  - Respiratory Therapy support as indicated  Outcome: Progressing     Problem: GASTROINTESTINAL - ADULT  Goal: Minimal or absence of nausea and/or vomiting  Description: INTERVENTIONS:  - Administer IV fluids if ordered to ensure adequate hydration  - Maintain NPO status until nausea and vomiting are resolved  - Nasogastric tube if ordered  - Administer ordered antiemetic medications as needed  - Provide nonpharmacologic comfort measures as appropriate  - Advance diet as tolerated, if ordered  - Consider nutrition services referral to assist patient with adequate nutrition and appropriate food choices  Outcome: Progressing  Goal: Maintains or returns to baseline bowel function  Description: INTERVENTIONS:  - Assess bowel function  - Encourage oral fluids to ensure adequate hydration  - Administer IV fluids if ordered to ensure adequate hydration  - Administer ordered medications as needed  - Encourage mobilization and activity  - Consider nutritional services referral to assist patient with adequate nutrition and appropriate food choices  Outcome: Progressing  Goal: Maintains adequate nutritional intake  Description: INTERVENTIONS:  - Monitor percentage of each meal consumed  - Identify factors contributing to decreased intake, treat as appropriate  - Assist with meals as needed  - Monitor I&O, weight, and lab values if indicated  - Obtain nutrition services referral as needed  Outcome: Progressing  Goal: Establish and maintain optimal ostomy function  Description: INTERVENTIONS:  - Assess bowel function  - Encourage oral fluids to ensure adequate hydration  - Administer IV fluids if ordered to ensure adequate hydration   - Administer ordered medications as needed  - Encourage mobilization and activity  - Nutrition services referral to assist patient with appropriate food choices  - Assess stoma site  - Consider wound care consult   Outcome: Progressing     Problem: GENITOURINARY - ADULT  Goal: Maintains or returns to baseline urinary function  Description: INTERVENTIONS:  - Assess urinary function  - Encourage oral fluids to ensure adequate hydration if ordered  - Administer IV fluids as ordered to ensure adequate hydration  - Administer ordered medications as needed  - Offer frequent toileting  - Follow urinary retention protocol if ordered  Outcome: Progressing  Goal: Absence of urinary retention  Description: INTERVENTIONS:  - Assess patients ability to void and empty bladder  - Monitor I/O  - Bladder scan as needed  - Discuss with physician/AP medications to alleviate retention as needed  - Discuss catheterization for long term situations as appropriate  Outcome: Progressing  Goal: Urinary catheter remains patent  Description: INTERVENTIONS:  - Assess patency of urinary catheter  - If patient has a chronic solorzano, consider changing catheter if non-functioning  - Follow guidelines for intermittent irrigation of non-functioning urinary catheter  Outcome: Progressing     Problem: METABOLIC, FLUID AND ELECTROLYTES - ADULT  Goal: Electrolytes maintained within normal limits  Description: INTERVENTIONS:  - Monitor labs and assess patient for signs and symptoms of electrolyte imbalances  - Administer electrolyte replacement as ordered  - Monitor response to electrolyte replacements, including repeat lab results as appropriate  - Instruct patient on fluid and nutrition as appropriate  Outcome: Progressing  Goal: Fluid balance maintained  Description: INTERVENTIONS:  - Monitor labs   - Monitor I/O and WT  - Instruct patient on fluid and nutrition as appropriate  - Assess for signs & symptoms of volume excess or deficit  Outcome: Progressing  Goal: Glucose maintained within target range  Description: INTERVENTIONS:  - Monitor Blood Glucose as ordered  - Assess for signs and symptoms of hyperglycemia and hypoglycemia  - Administer ordered medications to maintain glucose within target range  - Assess nutritional intake and initiate nutrition service referral as needed  Outcome: Progressing     Problem: SKIN/TISSUE INTEGRITY - ADULT  Goal: Skin integrity remains intact  Description: INTERVENTIONS  - Identify patients at risk for skin breakdown  - Assess and monitor skin integrity  - Assess and monitor nutrition and hydration status  - Monitor labs (i e  albumin)  - Assess for incontinence - Turn and reposition patient  - Assist with mobility/ambulation  - Relieve pressure over bony prominences  - Avoid friction and shearing  - Provide appropriate hygiene as needed including keeping skin clean and dry  - Evaluate need for skin moisturizer/barrier cream  - Collaborate with interdisciplinary team (i e  Nutrition, Rehabilitation, etc )   - Patient/family teaching  Outcome: Progressing  Goal: Incision(s), wounds(s) or drain site(s) healing without S/S of infection  Description: INTERVENTIONS  - Assess and document risk factors for skin impairment   - Assess and document dressing, incision, wound bed, drain sites and surrounding tissue  - Consider nutrition services referral as needed  - Oral mucous membranes remain intact  - Provide patient/ family education  Outcome: Progressing  Goal: Oral mucous membranes remain intact  Description: INTERVENTIONS  - Assess oral mucosa and hygiene practices  - Implement preventative oral hygiene regimen  - Implement oral medicated treatments as ordered  - Initiate Nutrition services referral as needed  Outcome: Progressing     Problem: HEMATOLOGIC - ADULT  Goal: Maintains hematologic stability  Description: INTERVENTIONS  - Assess for signs and symptoms of bleeding or hemorrhage  - Monitor labs  - Administer supportive blood products/factors as ordered and appropriate  Outcome: Progressing     Problem: MUSCULOSKELETAL - ADULT  Goal: Maintain or return mobility to safest level of function  Description: INTERVENTIONS:  - Assess patient's ability to carry out ADLs; assess patient's baseline for ADL function and identify physical deficits which impact ability to perform ADLs (bathing, care of mouth/teeth, toileting, grooming, dressing, etc )  - Assess/evaluate cause of self-care deficits   - Assess range of motion  - Assess patient's mobility  - Assess patient's need for assistive devices and provide as appropriate  - Encourage maximum independence but intervene and supervise when necessary  - Involve family in performance of ADLs  - Assess for home care needs following discharge   - Consider OT consult to assist with ADL evaluation and planning for discharge  - Provide patient education as appropriate  Outcome: Progressing  Goal: Maintain proper alignment of affected body part  Description: INTERVENTIONS:  - Support, maintain and protect limb and body alignment  - Provide patient/ family with appropriate education  Outcome: Progressing     Problem: Nutrition/Hydration-ADULT  Goal: Nutrient/Hydration intake appropriate for improving, restoring or maintaining nutritional needs  Description: Monitor and assess patient's nutrition/hydration status for malnutrition  Collaborate with interdisciplinary team and initiate plan and interventions as ordered  Monitor patient's weight and dietary intake as ordered or per policy  Utilize nutrition screening tool and intervene as necessary  Determine patient's food preferences and provide high-protein, high-caloric foods as appropriate       INTERVENTIONS:  - Monitor oral intake, urinary output, labs, and treatment plans  - Assess nutrition and hydration status and recommend course of action  - Evaluate amount of meals eaten  - Assist patient with eating if necessary   - Allow adequate time for meals  - Recommend/ encourage appropriate diets, oral nutritional supplements, and vitamin/mineral supplements  - Order, calculate, and assess calorie counts as needed  - Recommend, monitor, and adjust tube feedings and TPN/PPN based on assessed needs  - Assess need for intravenous fluids  - Provide specific nutrition/hydration education as appropriate  - Include patient/family/caregiver in decisions related to nutrition  Outcome: Progressing

## 2021-01-08 NOTE — PHYSICAL THERAPY NOTE
Physical Therapy Evaluation    Patient's Name: Ajit Brice    Admitting Diagnosis  Pneumonia due to COVID-19 virus [U07 1, J12 82]    Problem List  Patient Active Problem List   Diagnosis    MVP (mitral valve prolapse)    Sarcoidosis    Type 2 diabetes mellitus with microalbuminuria ([facility] [address] )    Abnormal chest CT    CKD (chronic kidney disease) stage 3, GFR 30-59 ml/min (HCC)    Hypercalcemia    Need for influenza vaccination    Abnormal positron emission tomography (PET) scan    Paresthesias    Nephrolithiasis    Edema of both legs    Cough    Pneumonia due to COVID-19 virus    Acute respiratory failure with hypoxia ([facility] [address] )       Past Medical History  Past Medical History:   Diagnosis Date    Diabetes mellitus ([facility] [address] )     Hyperlipidemia     Sarcoidosis        Past Surgical History  Past Surgical History:   Procedure Laterality Date    ABDOMINAL SURGERY  1962    COLONOSCOPY  2017    CYSTOSCOPY W/ URETERAL STENT PLACEMENT Left     KNEE SURGERY Right 1997    SD BIOPSY/EXCISION, LYMPH NODE(S) Right 11/2/2017    Procedure: INGUINAL LYMPH NODE BIOPSY;  Surgeon: Whitney Main MD;  [facility];  Service: General    SHOULDER SURGERY  2005       Recent Imaging  No orders to display       Recent Vital Signs  Vitals:    01/07/21 2336 01/08/21 0108 01/08/21 0655 01/08/21 0700   BP: 115/59   133/82   BP Location: Left arm   Left arm   Pulse: 65   58   Resp: 20   20   Temp: 98 5 °F (36 9 °C)   (!) 96 8 °F (36 °C)   TempSrc: Temporal   Temporal   SpO2: 97% 97% 99% 99%   Weight:       Height:            01/08/21 1140   PT Last Visit   PT Visit Date 01/08/21   Note Type   Note type Evaluation   Pain Assessment   Pain Assessment Tool 0-10   Pain Score 3   Pain Location/Orientation Location: Back   Pain Onset/Description Onset: Ongoing   Hospital Pain Intervention(s) Repositioned; Ambulation/increased activity   Home Living   Type of [address] Two level;Performs ADLs on one level;Stairs to enter with rails;Bed/bath upstairs  (6 GISSEL)   Prior Function   Level of [place] Independent with ADLs and functional mobility   Lives With Alone   ADL Assistance Independent   IADLs Independent   Falls in the last 6 months 0   Vocational Full time employment   Comments no help at home; full time work involving travel 5 days per week   Restrictions/Precautions   Weight Bearing Precautions Per Order No   Other Precautions Contact/isolation; Airborne/isolation;O2;Pain;Multiple lines   General   Family/Caregiver Present No   Cognition   Overall Cognitive Status WFL   Arousal/Participation Alert   Orientation Level Oriented X4   Memory Within functional limits   Following Commands Follows all commands and directions without difficulty   RLE Assessment   RLE Assessment WFL  (LBP with hip flexion)   LLE Assessment   LLE Assessment WFL  (LBP with hip flexion L>R)   Coordination   Movements are Fluid and Coordinated 1   Sensation WFL   Light Touch   RLE Light Touch Grossly intact   LLE Light Touch Grossly intact   Bed Mobility   Supine to Sit 6  Modified independent   Additional items HOB elevated; Bedrails; Increased time required   Additional Comments SOB noted after transfer; pt able to recover with seated rest 2min   Transfers   Sit to Stand 5  Supervision   Additional items Assist x 1; Armrests; Increased time required;Verbal cues   Stand to Sit 5  Supervision   Additional items Armrests; Increased time required;Verbal cues   Ambulation/Elevation   Gait pattern Excessively slow; Short stride; Foward flexed;Decreased foot clearance   Gait Assistance 5  Supervision   Additional items Verbal cues   Assistive Device None   Distance 5ft   Balance   Static Sitting Good   Dynamic Sitting Fair +   Static Standing Fair +   Dynamic Standing Fair   Ambulatory Fair   Endurance Deficit   Endurance Deficit Yes   Endurance Deficit Description SOB; increased O2 need from baseline   Activity Tolerance   Activity Tolerance Patient limited by fatigue   Medical Staff Made Aware spoke to [facility], MD   Nurse Made Aware spoke to RN   Assessment   Prognosis Good   Problem List Decreased strength;Decreased endurance; Impaired balance;Decreased mobility; Decreased range of motion;Pain   Barriers to Discharge Inaccessible home environment;Decreased caregiver support   Goals   Patient Goals to reduce O2 need and return home ind   STG Expiration Date 01/18/21   Short Term Goal #1 Pt will complete bed mobility ind, transfers ind, ambulation ind and FF stairs ind to return home  Plan   Treatment/Interventions Functional transfer training;LE strengthening/ROM; Elevations; Therapeutic exercise; Endurance training;Patient/family training;Equipment eval/education; Bed mobility;Gait training;Spoke to nursing;Spoke to case management;OT   PT Frequency   (3-5x/wk)   Recommendation   PT Discharge Recommendation Home with skilled therapy  (Outpatient therapy)   AM-PAC Basic Mobility Inpatient   Turning in Bed Without Bedrails 4   Lying on Back to Sitting on Edge of Flat Bed 4   Moving Bed to Chair 3   Standing Up From Chair 3   Walk in Room 3   Climb 3-5 Stairs 3   Basic Mobility Inpatient Raw Score 20   Basic Mobility Standardized Score 43 99       ASSESSMENT                                                                                                                     Lester Pham is a 62 y o  male admitted to [facility] on 1/5/2021 for Pneumonia due to COVID-19 virus  Pt  has a past medical history of Diabetes mellitus ([address] ), Hyperlipidemia, and Sarcoidosis    PT was consulted and pt was seen on 1/8/2021 for mobility assessment and d/c planning  Pt presents supine in bed alert and agreeable to therapy  He is reporting minor pain in the lower back at this time which he does have chronically due to Hx of compression Fx  He would benefit from OP f/u with ortho for assessment of same as he reports recent increase in pain after shoveling snow    He is demonstrating functional strength with some minor weakness due to fatigue  He has no pain in the LEs and no sensation deficits  He does become SOB with minimal activity on 1L NC is able to maintain saturation above 90% when resting desats to 88% with activity on 1L  Pt is currently functioning at a modified ind level for bed mobility, supervision assistance x1 level for transfers, supervision assistance x1 level for ambulation with no assistive device  Pt will benefit from continued skilled IP PT to address the above mentioned impairments  in order to maximize recovery and increase functional independence when completing mobility and ADLs  Currently PT recommendations for DME include none  At this time PT recommendations for d/c are home with skilled therapy        Rhiannon Mackey PT, DPT

## 2021-01-08 NOTE — ASSESSMENT & PLAN NOTE
Initially requiring 2 L nasal cannula, now saturating 93% on room air  Improving  Currently not on any home O2  Titrate down as able

## 2021-01-08 NOTE — UTILIZATION REVIEW
Continued Stay Review    Date: 1-8-21                         Current Patient Class: inpatient  Current Level of Care: med surg    HPI:60 y o  male initially admitted on  1-5-21 for covid 19 pneumonia     Assessment/Plan:     Wean oxygen from 2 L to 1 L nc  Physical therapy evaluation completed with recommendation for outpatient therapy  Continue iv ceftriaxone, iv dexamethasone, iv remdesivir, doxycycline  Continue to trend inflammatory markers        Pertinent Labs/Diagnostic Results:       Results from last 7 days   Lab Units 01/08/21  0524 01/07/21  0546 01/05/21 0213 01/03/21  0929   WBC Thousand/uL 5 50 7 00 5 56 3 72*   HEMOGLOBIN g/dL 14 6 15 4 15 9 15 4   HEMATOCRIT % 44 2 46 4 48 1 47 0   PLATELETS Thousands/uL 245 235 183 159   NEUTROS ABS Thousands/µL 4 50 5 90 4 70 2 78         Results from last 7 days   Lab Units 01/08/21  0524 01/07/21  0546 01/06/21  0628 01/05/21  0213 01/03/21  0929   SODIUM mmol/L 138 137 136* 135* 134*   POTASSIUM mmol/L 4 5 4 4 4 4 3 9 3 6   CHLORIDE mmol/L 102 104 103 99* 98*   CO2 mmol/L 29 25 26 26 28   ANION GAP mmol/L 7 8 7 10 8   BUN mg/dL 35* 35* 31* 23 20   CREATININE mg/dL 1 16 1 18 1 15 1 51* 1 55*   EGFR ml/min/1 73sq m 69 67 69 50 48   CALCIUM mg/dL 8 6 8 9 8 5 8 9 8 7   MAGNESIUM mg/dL  --   --   --  2 1  --    PHOSPHORUS mg/dL  --   --   --  2 0*  --      Results from last 7 days   Lab Units 01/06/21  0628 01/05/21  0213 01/03/21  0929   AST U/L 28 25 28   ALT U/L 23 25 27   ALK PHOS U/L 78 104 95   TOTAL PROTEIN g/dL 5 9 7 3 7 1   ALBUMIN g/dL 3 1 3 3* 3 2*   TOTAL BILIRUBIN mg/dL 0 60 0 63 0 82     Results from last 7 days   Lab Units 01/08/21  1149 01/08/21  0538 01/07/21  2034 01/07/21  1530 01/07/21  1124 01/07/21  0531 01/06/21  1950 01/06/21  1622 01/06/21  1112 01/06/21  0605 01/05/21  2034 01/05/21  1558   POC GLUCOSE mg/dl 239* 195* 284* 123 268* 188* 139 162* 239* 149* 155* 153*     Results from last 7 days   Lab Units 01/08/21  0524 01/07/21  0546 01/06/21  5406 01/05/21  0213 01/03/21  0929   GLUCOSE RANDOM mg/dL 205* 179* 143* 197* 212*       Results from last 7 days   Lab Units 01/05/21  0213 01/03/21  0929   CK TOTAL U/L 62 50     Results from last 7 days   Lab Units 01/05/21  0213 01/03/21  0929   TROPONIN I ng/mL <0 02 <0 02     Results from last 7 days   Lab Units 01/08/21  0524 01/06/21  0628 01/05/21  0213 01/03/21  0930   D-DIMER QUANTITATIVE ug/ml FEU 0 37 0 35 11 82* 0 56*             Results from last 7 days   Lab Units 01/07/21  0546 01/06/21  0628 01/03/21  0929   PROCALCITONIN ng/ml 0 28* 0 55* 0 09     Results from last 7 days   Lab Units 01/03/21  0929   LACTIC ACID mmol/L 1 5       Results from last 7 days   Lab Units 01/03/21  0929   NT-PRO BNP pg/mL 51     Results from last 7 days   Lab Units 01/05/21  0213 01/03/21  0929   FERRITIN ng/mL 440* 305     Results from last 7 days   Lab Units 01/03/21  0929   HEP B S AG  Non-reactive   HEP C AB  Non-reactive   HEP B C IGM  Non-reactive   HEP B C TOTAL AB  Non-reactive         Results from last 7 days   Lab Units 01/05/21  0213 01/03/21  0929   CRP mg/L 58 4* 23 6*       Results from last 7 days   Lab Units 01/03/21  0930 01/03/21  0918   BLOOD CULTURE  No Growth After 5 Days  No Growth After 5 Days         Vital Signs:     Date/Time  Temp  Pulse  Resp  BP  SpO2  Calculated FIO2 (%) - Nasal Cannula  Nasal Cannula O2 Flow Rate (L/min)  O2 Device  Patient Position - Orthostatic VS   01/08/21 1400  --  --  --  --  97 %  --  --   Nasal cannula  --   Nasal Cannula O2 Flow Rate (L/min): 1 L NC at 01/08/21 1400   01/08/21 1300  --  --  --  --  95 %  --  --   Nasal cannula  --   Nasal Cannula O2 Flow Rate (L/min): 1 L NC at 01/08/21 1300   01/08/21 1200  --  --  --  --  95 %  --  --   Nasal cannula  --   Nasal Cannula O2 Flow Rate (L/min): 1 L NC at 01/08/21 1200   01/08/21 1100  --  --  --  --  98 %  --  --   Nasal cannula  --   Nasal Cannula O2 Flow Rate (L/min): 2 L NC at 01/08/21 1100 01/08/21 1000  --  --  --  --  99 %  --  --   Nasal cannula  --   Nasal Cannula O2 Flow Rate (L/min): 2 L NC at 01/08/21 1000   01/08/21 0900  --  --  --  --  99 %  --  --   Nasal cannula  --   Nasal Cannula O2 Flow Rate (L/min): 2 L NC at 01/08/21 0900   01/08/21 0700  96 8 °F (36 °C)  Abnormal   58  20  133/82  99 %  28  2 L/min  Nasal cannula  Lying   01/08/21 0655  --  --  --  --  99 %  28  2 L/min  Nasal cannula  --   01/08/21 0108  --  --  --  --  97 %  28  2 L/min  Nasal cannula           Medications:   Scheduled Medications:  ascorbic acid, 1,000 mg, Oral, Q12H CORAL  atorvastatin, 40 mg, Oral, Daily With Dinner  cefTRIAXone, 1,000 mg, Intravenous, Q24H  cholecalciferol, 2,000 Units, Oral, Daily  dexamethasone, 6 mg, Intravenous, Q24H  doxycycline hyclate, 100 mg, Oral, Q12H  enoxaparin, 30 mg, Subcutaneous, Q12H CORAL  insulin glargine, 10 Units, Subcutaneous, QAM  insulin lispro, 1-6 Units, Subcutaneous, TID AC  insulin lispro, 1-6 Units, Subcutaneous, HS  insulin lispro, 10 Units, Subcutaneous, TID With Meals  zinc sulfate, 220 mg, Oral, Daily    Followed by  Lisa Lynch ON 1/12/2021] multivitamin-minerals, 1 tablet, Oral, Daily  remdesivir, 100 mg, Intravenous, Q24H      Continuous IV Infusions:     PRN Meds:       Discharge Plan: to be determined     Network Utilization Review Department  ATTENTION: Please call with any questions or concerns to [phone] and carefully listen to the prompts so that you are directed to the right person  All voicemails are confidential   Jerilyn Paez all requests for admission clinical reviews, approved or denied determinations and any other requests to dedicated fax number below belonging to the campus where the patient is receiving treatment   List of dedicated fax numbers for the Facilities:  [address] DENIALS (Administrative/Medical Necessity) [phone]   [address] (Maternity/NICU/Pediatrics) [phone]   [address] [facility] [address] [facility]  [phone]   [address] [address] [place] [address] Josue Kolb [facility] [phone]   [place] [address] [facility] [phone]

## 2021-01-08 NOTE — PLAN OF CARE
Problem: Potential for Falls  Goal: Patient will remain free of falls  Description: INTERVENTIONS:  - Assess patient frequently for physical needs  -  Identify cognitive and physical deficits and behaviors that affect risk of falls    -  [place] fall precautions as indicated by assessment   - Educate patient/family on patient safety including physical limitations  - Instruct patient to call for assistance with activity based on assessment  - Modify environment to reduce risk of injury  - Consider OT/PT consult to assist with strengthening/mobility  Outcome: Progressing

## 2021-01-08 NOTE — ASSESSMENT & PLAN NOTE
Lab Results   Component Value Date    HGBA1C 9 0 02/22/2020       Recent Labs     01/07/21  1124 01/07/21  1530 01/07/21 2034 01/08/21  0538   POCGLU 268* 123 284* 195*       Blood Sugar Average: Last 72 hrs:  (P) 634 [id number]   Patient has insulin pump  Hold off on insulin pump  Blood sugars are controlled     Lantus 10 units am  Humalog 10 units TID with meals

## 2021-01-08 NOTE — ASSESSMENT & PLAN NOTE
Lab Results   Component Value Date    EGFR 69 01/08/2021    EGFR 67 01/07/2021    EGFR 69 01/06/2021    CREATININE 1 16 01/08/2021    CREATININE 1 18 01/07/2021    CREATININE 1 15 01/06/2021       Creatinine At baseline

## 2021-01-08 NOTE — ASSESSMENT & PLAN NOTE
Diagnosed with COVID on 12/31  Presented hypoxic requiring 2 L nasal cannula  Now on room air saturating 93%  · Currently receiving intravenous steroids, day 4/10  · Currently receiving intravenous remdesivir day 4/5  · Continue treated for mild COVID pathway-continue zinc, vitamin-C  Will hold the vitamin-D given patient has hypercalcemia from sarcoidosis  · Titrate down oxygen as needed  · D-dimer-11 9-> 0 35 (N)  No suspicion for PE  Continue Lovenox     · Procalcitonin elevated and down trending after starting antibiotic- continue ceftriaxone and doxycycline day 2  · Off isolation on 1/11

## 2021-01-09 LAB
ALBUMIN SERPL BCP-MCNC: 3 G/DL (ref 3–5.2)
ALP SERPL-CCNC: 90 U/L (ref 43–122)
ALT SERPL W P-5'-P-CCNC: 24 U/L (ref 9–52)
ANION GAP SERPL CALCULATED.3IONS-SCNC: 8 MMOL/L (ref 5–14)
AST SERPL W P-5'-P-CCNC: 23 U/L (ref 17–59)
BASOPHILS # BLD AUTO: 0 THOUSANDS/ΜL (ref 0–0.1)
BASOPHILS NFR BLD AUTO: 0 % (ref 0–1)
BILIRUB SERPL-MCNC: 0.6 MG/DL
BUN SERPL-MCNC: 35 MG/DL (ref 5–25)
CALCIUM ALBUM COR SERPL-MCNC: 9.6 MG/DL (ref 8.3–10.1)
CALCIUM SERPL-MCNC: 8.8 MG/DL (ref 8.4–10.2)
CHLORIDE SERPL-SCNC: 105 MMOL/L (ref 97–108)
CO2 SERPL-SCNC: 25 MMOL/L (ref 22–30)
CREAT SERPL-MCNC: 1.09 MG/DL (ref 0.7–1.5)
EOSINOPHIL # BLD AUTO: 0 THOUSAND/ΜL (ref 0–0.4)
EOSINOPHIL NFR BLD AUTO: 0 % (ref 0–6)
ERYTHROCYTE [DISTWIDTH] IN BLOOD BY AUTOMATED COUNT: 13.5 %
GFR SERPL CREATININE-BSD FRML MDRD: 74 ML/MIN/1.73SQ M
GLUCOSE SERPL-MCNC: 157 MG/DL (ref 65–140)
GLUCOSE SERPL-MCNC: 170 MG/DL (ref 65–140)
GLUCOSE SERPL-MCNC: 177 MG/DL (ref 70–99)
GLUCOSE SERPL-MCNC: 179 MG/DL (ref 65–140)
GLUCOSE SERPL-MCNC: 316 MG/DL (ref 65–140)
HCT VFR BLD AUTO: 43 % (ref 41–53)
HGB BLD-MCNC: 14.3 G/DL (ref 13.5–17.5)
LYMPHOCYTES # BLD AUTO: 0.5 THOUSANDS/ΜL (ref 0.5–4)
LYMPHOCYTES NFR BLD AUTO: 8 % (ref 25–45)
MCH RBC QN AUTO: 30.6 PG (ref 26–34)
MCHC RBC AUTO-ENTMCNC: 33.3 G/DL (ref 31–36)
MCV RBC AUTO: 92 FL (ref 80–100)
MONOCYTES # BLD AUTO: 0.5 THOUSAND/ΜL (ref 0.2–0.9)
MONOCYTES NFR BLD AUTO: 9 % (ref 1–10)
NEUTROPHILS # BLD AUTO: 5.3 THOUSANDS/ΜL (ref 1.8–7.8)
NEUTS SEG NFR BLD AUTO: 83 % (ref 45–65)
PLATELET # BLD AUTO: 250 THOUSANDS/UL (ref 150–450)
PMV BLD AUTO: 8 FL (ref 8.9–12.7)
POTASSIUM SERPL-SCNC: 4.4 MMOL/L (ref 3.6–5)
PROCALCITONIN SERPL-MCNC: <0.05 NG/ML
PROT SERPL-MCNC: 5.7 G/DL (ref 5.9–8.4)
RBC # BLD AUTO: 4.67 MILLION/UL (ref 4.5–5.9)
SODIUM SERPL-SCNC: 138 MMOL/L (ref 137–147)
WBC # BLD AUTO: 6.4 THOUSAND/UL (ref 4.5–11)

## 2021-01-09 PROCEDURE — 85025 COMPLETE CBC W/AUTO DIFF WBC: CPT | Performed by: INTERNAL MEDICINE

## 2021-01-09 PROCEDURE — 82948 REAGENT STRIP/BLOOD GLUCOSE: CPT

## 2021-01-09 PROCEDURE — 99232 SBSQ HOSP IP/OBS MODERATE 35: CPT | Performed by: INTERNAL MEDICINE

## 2021-01-09 PROCEDURE — 80053 COMPREHEN METABOLIC PANEL: CPT | Performed by: INTERNAL MEDICINE

## 2021-01-09 PROCEDURE — 84145 PROCALCITONIN (PCT): CPT | Performed by: INTERNAL MEDICINE

## 2021-01-09 RX ADMIN — ENOXAPARIN SODIUM 30 MG: 100 INJECTION SUBCUTANEOUS at 21:30

## 2021-01-09 RX ADMIN — INSULIN LISPRO 1 UNITS: 100 INJECTION, SOLUTION INTRAVENOUS; SUBCUTANEOUS at 17:19

## 2021-01-09 RX ADMIN — INSULIN GLARGINE 10 UNITS: 100 INJECTION, SOLUTION SUBCUTANEOUS at 08:13

## 2021-01-09 RX ADMIN — ENOXAPARIN SODIUM 30 MG: 100 INJECTION SUBCUTANEOUS at 08:13

## 2021-01-09 RX ADMIN — DOXYCYCLINE 100 MG: 100 CAPSULE ORAL at 09:25

## 2021-01-09 RX ADMIN — OXYCODONE HYDROCHLORIDE AND ACETAMINOPHEN 1000 MG: 500 TABLET ORAL at 08:14

## 2021-01-09 RX ADMIN — INSULIN LISPRO 10 UNITS: 100 INJECTION, SOLUTION INTRAVENOUS; SUBCUTANEOUS at 17:19

## 2021-01-09 RX ADMIN — INSULIN LISPRO 10 UNITS: 100 INJECTION, SOLUTION INTRAVENOUS; SUBCUTANEOUS at 08:20

## 2021-01-09 RX ADMIN — DOXYCYCLINE 100 MG: 100 CAPSULE ORAL at 21:29

## 2021-01-09 RX ADMIN — INSULIN LISPRO 1 UNITS: 100 INJECTION, SOLUTION INTRAVENOUS; SUBCUTANEOUS at 21:37

## 2021-01-09 RX ADMIN — ZINC SULFATE 220 MG (50 MG) CAPSULE 220 MG: CAPSULE at 08:14

## 2021-01-09 RX ADMIN — OXYCODONE HYDROCHLORIDE AND ACETAMINOPHEN 1000 MG: 500 TABLET ORAL at 21:29

## 2021-01-09 RX ADMIN — REMDESIVIR 100 MG: 100 INJECTION, POWDER, LYOPHILIZED, FOR SOLUTION INTRAVENOUS at 03:19

## 2021-01-09 RX ADMIN — ATORVASTATIN CALCIUM 40 MG: 40 TABLET, FILM COATED ORAL at 15:38

## 2021-01-09 RX ADMIN — DEXAMETHASONE SODIUM PHOSPHATE 6 MG: 10 INJECTION, SOLUTION INTRAMUSCULAR; INTRAVENOUS at 11:28

## 2021-01-09 RX ADMIN — INSULIN LISPRO 10 UNITS: 100 INJECTION, SOLUTION INTRAVENOUS; SUBCUTANEOUS at 11:29

## 2021-01-09 RX ADMIN — CEFTRIAXONE 1000 MG: 1 INJECTION, SOLUTION INTRAVENOUS at 09:38

## 2021-01-09 RX ADMIN — Medication 2000 UNITS: at 08:14

## 2021-01-09 RX ADMIN — INSULIN LISPRO 5 UNITS: 100 INJECTION, SOLUTION INTRAVENOUS; SUBCUTANEOUS at 11:29

## 2021-01-09 RX ADMIN — INSULIN LISPRO 1 UNITS: 100 INJECTION, SOLUTION INTRAVENOUS; SUBCUTANEOUS at 08:19

## 2021-01-09 NOTE — PLAN OF CARE
Problem: Potential for Falls  Goal: Patient will remain free of falls  Description: INTERVENTIONS:  - Assess patient frequently for physical needs  -  Identify cognitive and physical deficits and behaviors that affect risk of falls  -  [place] fall precautions as indicated by assessment   - Educate patient/family on patient safety including physical limitations  - Instruct patient to call for assistance with activity based on assessment  - Modify environment to reduce risk of injury  - Consider OT/PT consult to assist with strengthening/mobility  Outcome: Progressing     Problem: NEUROSENSORY - ADULT  Goal: Achieves stable or improved neurological status  Description: INTERVENTIONS  - Monitor and report changes in neurological status  - Monitor vital signs such as temperature, blood pressure, glucose, and any other labs ordered   - Initiate measures to prevent increased intracranial pressure  - Monitor for seizure activity and implement precautions if appropriate      Outcome: Progressing  Goal: Remains free of injury related to seizures activity  Description: INTERVENTIONS  - Maintain airway, patient safety  and administer oxygen as ordered  - Monitor patient for seizure activity, document and report duration and description of seizure to physician/advanced practitioner  - If seizure occurs,  ensure patient safety during seizure  - Reorient patient post seizure  - Seizure pads on all 4 side rails  - Instruct patient/family to notify RN of any seizure activity including if an aura is experienced  - Instruct patient/family to call for assistance with activity based on nursing assessment  - Administer anti-seizure medications if ordered    Outcome: Progressing  Goal: Achieves maximal functionality and self care  Description: INTERVENTIONS  - Monitor swallowing and airway patency with patient fatigue and changes in neurological status  - Encourage and assist patient to increase activity and self care     - Encourage visually impaired, hearing impaired and aphasic patients to use assistive/communication devices  Outcome: Progressing     Problem: CARDIOVASCULAR - ADULT  Goal: Maintains optimal cardiac output and hemodynamic stability  Description: INTERVENTIONS:  - Monitor I/O, vital signs and rhythm  - Monitor for S/S and trends of decreased cardiac output  - Administer and titrate ordered vasoactive medications to optimize hemodynamic stability  - Assess quality of pulses, skin color and temperature  - Assess for signs of decreased coronary artery perfusion  - Instruct patient to report change in severity of symptoms  Outcome: Progressing  Goal: Absence of cardiac dysrhythmias or at baseline rhythm  Description: INTERVENTIONS:  - Continuous cardiac monitoring, vital signs, obtain 12 lead EKG if ordered  - Administer antiarrhythmic and heart rate control medications as ordered  - Monitor electrolytes and administer replacement therapy as ordered  Outcome: Progressing     Problem: RESPIRATORY - ADULT  Goal: Achieves optimal ventilation and oxygenation  Description: INTERVENTIONS:  - Assess for changes in respiratory status  - Assess for changes in mentation and behavior  - Position to facilitate oxygenation and minimize respiratory effort  - Oxygen administered by appropriate delivery if ordered  - Initiate smoking cessation education as indicated  - Encourage broncho-pulmonary hygiene including cough, deep breathe, Incentive Spirometry  - Assess the need for suctioning and aspirate as needed  - Assess and instruct to report SOB or any respiratory difficulty  - Respiratory Therapy support as indicated  Outcome: Progressing     Problem: GASTROINTESTINAL - ADULT  Goal: Minimal or absence of nausea and/or vomiting  Description: INTERVENTIONS:  - Administer IV fluids if ordered to ensure adequate hydration  - Maintain NPO status until nausea and vomiting are resolved  - Nasogastric tube if ordered  - Administer ordered antiemetic medications as needed  - Provide nonpharmacologic comfort measures as appropriate  - Advance diet as tolerated, if ordered  - Consider nutrition services referral to assist patient with adequate nutrition and appropriate food choices  Outcome: Progressing  Goal: Maintains or returns to baseline bowel function  Description: INTERVENTIONS:  - Assess bowel function  - Encourage oral fluids to ensure adequate hydration  - Administer IV fluids if ordered to ensure adequate hydration  - Administer ordered medications as needed  - Encourage mobilization and activity  - Consider nutritional services referral to assist patient with adequate nutrition and appropriate food choices  Outcome: Progressing  Goal: Maintains adequate nutritional intake  Description: INTERVENTIONS:  - Monitor percentage of each meal consumed  - Identify factors contributing to decreased intake, treat as appropriate  - Assist with meals as needed  - Monitor I&O, weight, and lab values if indicated  - Obtain nutrition services referral as needed  Outcome: Progressing  Goal: Establish and maintain optimal ostomy function  Description: INTERVENTIONS:  - Assess bowel function  - Encourage oral fluids to ensure adequate hydration  - Administer IV fluids if ordered to ensure adequate hydration   - Administer ordered medications as needed  - Encourage mobilization and activity  - Nutrition services referral to assist patient with appropriate food choices  - Assess stoma site  - Consider wound care consult   Outcome: Progressing     Problem: GENITOURINARY - ADULT  Goal: Maintains or returns to baseline urinary function  Description: INTERVENTIONS:  - Assess urinary function  - Encourage oral fluids to ensure adequate hydration if ordered  - Administer IV fluids as ordered to ensure adequate hydration  - Administer ordered medications as needed  - Offer frequent toileting  - Follow urinary retention protocol if ordered  Outcome: Progressing  Goal: Absence of urinary retention  Description: INTERVENTIONS:  - Assess patients ability to void and empty bladder  - Monitor I/O  - Bladder scan as needed  - Discuss with physician/AP medications to alleviate retention as needed  - Discuss catheterization for long term situations as appropriate  Outcome: Progressing  Goal: Urinary catheter remains patent  Description: INTERVENTIONS:  - Assess patency of urinary catheter  - If patient has a chronic solorzano, consider changing catheter if non-functioning  - Follow guidelines for intermittent irrigation of non-functioning urinary catheter  Outcome: Progressing     Problem: METABOLIC, FLUID AND ELECTROLYTES - ADULT  Goal: Electrolytes maintained within normal limits  Description: INTERVENTIONS:  - Monitor labs and assess patient for signs and symptoms of electrolyte imbalances  - Administer electrolyte replacement as ordered  - Monitor response to electrolyte replacements, including repeat lab results as appropriate  - Instruct patient on fluid and nutrition as appropriate  Outcome: Progressing  Goal: Fluid balance maintained  Description: INTERVENTIONS:  - Monitor labs   - Monitor I/O and WT  - Instruct patient on fluid and nutrition as appropriate  - Assess for signs & symptoms of volume excess or deficit  Outcome: Progressing  Goal: Glucose maintained within target range  Description: INTERVENTIONS:  - Monitor Blood Glucose as ordered  - Assess for signs and symptoms of hyperglycemia and hypoglycemia  - Administer ordered medications to maintain glucose within target range  - Assess nutritional intake and initiate nutrition service referral as needed  Outcome: Progressing     Problem: SKIN/TISSUE INTEGRITY - ADULT  Goal: Skin integrity remains intact  Description: INTERVENTIONS  - Identify patients at risk for skin breakdown  - Assess and monitor skin integrity  - Assess and monitor nutrition and hydration status  - Monitor labs (i e  albumin)  - Assess for incontinence - Turn and reposition patient  - Assist with mobility/ambulation  - Relieve pressure over bony prominences  - Avoid friction and shearing  - Provide appropriate hygiene as needed including keeping skin clean and dry  - Evaluate need for skin moisturizer/barrier cream  - Collaborate with interdisciplinary team (i e  Nutrition, Rehabilitation, etc )   - Patient/family teaching  Outcome: Progressing  Goal: Incision(s), wounds(s) or drain site(s) healing without S/S of infection  Description: INTERVENTIONS  - Assess and document risk factors for skin impairment   - Assess and document dressing, incision, wound bed, drain sites and surrounding tissue  - Consider nutrition services referral as needed  - Oral mucous membranes remain intact  - Provide patient/ family education  Outcome: Progressing  Goal: Oral mucous membranes remain intact  Description: INTERVENTIONS  - Assess oral mucosa and hygiene practices  - Implement preventative oral hygiene regimen  - Implement oral medicated treatments as ordered  - Initiate Nutrition services referral as needed  Outcome: Progressing     Problem: HEMATOLOGIC - ADULT  Goal: Maintains hematologic stability  Description: INTERVENTIONS  - Assess for signs and symptoms of bleeding or hemorrhage  - Monitor labs  - Administer supportive blood products/factors as ordered and appropriate  Outcome: Progressing     Problem: MUSCULOSKELETAL - ADULT  Goal: Maintain or return mobility to safest level of function  Description: INTERVENTIONS:  - Assess patient's ability to carry out ADLs; assess patient's baseline for ADL function and identify physical deficits which impact ability to perform ADLs (bathing, care of mouth/teeth, toileting, grooming, dressing, etc )  - Assess/evaluate cause of self-care deficits   - Assess range of motion  - Assess patient's mobility  - Assess patient's need for assistive devices and provide as appropriate  - Encourage maximum independence but intervene and supervise when necessary  - Involve family in performance of ADLs  - Assess for home care needs following discharge   - Consider OT consult to assist with ADL evaluation and planning for discharge  - Provide patient education as appropriate  Outcome: Progressing  Goal: Maintain proper alignment of affected body part  Description: INTERVENTIONS:  - Support, maintain and protect limb and body alignment  - Provide patient/ family with appropriate education  Outcome: Progressing     Problem: Nutrition/Hydration-ADULT  Goal: Nutrient/Hydration intake appropriate for improving, restoring or maintaining nutritional needs  Description: Monitor and assess patient's nutrition/hydration status for malnutrition  Collaborate with interdisciplinary team and initiate plan and interventions as ordered  Monitor patient's weight and dietary intake as ordered or per policy  Utilize nutrition screening tool and intervene as necessary  Determine patient's food preferences and provide high-protein, high-caloric foods as appropriate       INTERVENTIONS:  - Monitor oral intake, urinary output, labs, and treatment plans  - Assess nutrition and hydration status and recommend course of action  - Evaluate amount of meals eaten  - Assist patient with eating if necessary   - Allow adequate time for meals  - Recommend/ encourage appropriate diets, oral nutritional supplements, and vitamin/mineral supplements  - Order, calculate, and assess calorie counts as needed  - Recommend, monitor, and adjust tube feedings and TPN/PPN based on assessed needs  - Assess need for intravenous fluids  - Provide specific nutrition/hydration education as appropriate  - Include patient/family/caregiver in decisions related to nutrition  Outcome: Progressing

## 2021-01-09 NOTE — ASSESSMENT & PLAN NOTE
Diagnosed with COVID on 12/31  Presented hypoxic requiring 2 L nasal cannula  Now on room air saturating 96%, yesterday desaturated down to 80% on ambulation  · Currently receiving intravenous steroids, day 5/10  · Currently receiving intravenous remdesivir day 5/5  · Continue treated for mild COVID pathway-continue zinc, vitamin-C  Will hold the vitamin-D given patient has hypercalcemia from sarcoidosis  · Titrate down oxygen as needed  · D-dimer-11 9-> 0 35 (N)  No suspicion for PE  Continue Lovenox     · Procalcitonin elevated and down trending after starting antibiotic- continue ceftriaxone and doxycycline day 3  · Off isolation on 1/11

## 2021-01-09 NOTE — ASSESSMENT & PLAN NOTE
Lab Results   Component Value Date    EGFR 74 01/09/2021    EGFR 69 01/08/2021    EGFR 67 01/07/2021    CREATININE 1 09 01/09/2021    CREATININE 1 16 01/08/2021    CREATININE 1 18 01/07/2021       Creatinine At baseline

## 2021-01-09 NOTE — PLAN OF CARE
Problem: Potential for Falls  Goal: Patient will remain free of falls  Description: INTERVENTIONS:  - Assess patient frequently for physical needs  -  Identify cognitive and physical deficits and behaviors that affect risk of falls    -  [place] fall precautions as indicated by assessment   - Educate patient/family on patient safety including physical limitations  - Instruct patient to call for assistance with activity based on assessment  - Modify environment to reduce risk of injury  - Consider OT/PT consult to assist with strengthening/mobility  Outcome: Progressing

## 2021-01-09 NOTE — ASSESSMENT & PLAN NOTE
Lab Results   Component Value Date    HGBA1C 9 0 02/22/2020       Recent Labs     01/08/21  1149 01/08/21  1546 01/08/21 1956 01/09/21  0553   POCGLU 239* 130 238* 179*       Blood Sugar Average: Last 72 hrs:  (P) 534 [id number]   Patient has insulin pump  Hold off on insulin pump  Blood sugars are controlled     Lantus 10 units am  Humalog 10 units TID with meals

## 2021-01-09 NOTE — PROGRESS NOTES
Progress Note - Lester Pham 1960, 62 y o  male MRN: [id number]    Unit/Bed#: 7T [facility] 716-01 Encounter: [id number]    Primary Care Provider: Jose Sarkar DO   Date and time admitted to hospital: 1/5/2021 10:01 AM        * Pneumonia due to COVID-19 virus  Assessment & Plan  Diagnosed with COVID on 12/31  Presented hypoxic requiring 2 L nasal cannula  Now on room air saturating 96%, yesterday desaturated down to 80% on ambulation  · Currently receiving intravenous steroids, day 5/10  · Currently receiving intravenous remdesivir day 5/5  · Continue treated for mild COVID pathway-continue zinc, vitamin-C  Will hold the vitamin-D given patient has hypercalcemia from sarcoidosis  · Titrate down oxygen as needed  · D-dimer-11 9-> 0 35 (N)  No suspicion for PE  Continue Lovenox  · Procalcitonin elevated and down trending after starting antibiotic- continue ceftriaxone and doxycycline day 2  · Off isolation on 1/11    Acute respiratory failure with hypoxia [facility])  Assessment & Plan  Initially requiring 2 L nasal cannula, now saturating 96% on room air  Improving  Currently not on any home O2  Titrate down as able  CKD (chronic kidney disease) stage 3, GFR 30-59 ml/min [facility])  Assessment & Plan  Lab Results   Component Value Date    EGFR 74 01/09/2021    EGFR 69 01/08/2021    EGFR 67 01/07/2021    CREATININE 1 09 01/09/2021    CREATININE 1 16 01/08/2021    CREATININE 1 18 01/07/2021       Creatinine At baseline    Type 2 diabetes mellitus with microalbuminuria [facility])  Assessment & Plan  Lab Results   Component Value Date    HGBA1C 9 0 02/22/2020       Recent Labs     01/08/21  1149 01/08/21  1546 01/08/21  1956 01/09/21  0553   POCGLU 239* 130 238* 179*       Blood Sugar Average: Last 72 hrs:  (P) 452 [id number]   Patient has insulin pump  Hold off on insulin pump  Blood sugars are controlled     Lantus 10 units am  Humalog 10 units TID with meals     Sarcoidosis  Assessment & Plan  Patient takes prednisone for sarcoidosis has been stable  Avoid vitamin d due to hypercalcemia      VTE Pharmacologic Prophylaxis:   Pharmacologic: Enoxaparin (Lovenox)  Mechanical VTE Prophylaxis in Place: Yes    Patient Centered Rounds: I have performed bedside rounds with nursing staff today  Discussions with Specialists or Other Care Team Provider: none     Education and Discussions with Family / Patient:  Patient    Time Spent for Care: 20 minutes  More than 50% of total time spent on counseling and coordination of care as described above  Current Length of Stay: 4 day(s)    Current Patient Status: Inpatient   Certification Statement: The patient will continue to require additional inpatient hospital stay due to Continue treatment for COVID pneumonia    Discharge Plan:  Patient needs to titrate down oxygen for the next 24 hours    Code Status: Level 1 - Full Code      Subjective:   Patient seen examined at bedside patient currently states he feels improved he still has cough and shortness of breath    Objective:     Vitals:   Temp (24hrs), Av 8 °F (36 °C), Min:96 4 °F (35 8 °C), Max:97 1 °F (36 2 °C)    Temp:  [96 4 °F (35 8 °C)-97 1 °F (36 2 °C)] 97 1 °F (36 2 °C)  HR:  [40-66] 56  Resp:  [15-20] 16  BP: (100-133)/(73-83) 133/83  SpO2:  [92 %-99 %] 96 %  Body mass index is 23 44 kg/m²  Input and Output Summary (last 24 hours): Intake/Output Summary (Last 24 hours) at 2021 0948  Last data filed at 2021 0501  Gross per 24 hour   Intake 650 ml   Output 1250 ml   Net -600 ml       Physical Exam:     Physical Exam  Constitutional:       General: He is not in acute distress  Appearance: Normal appearance  HENT:      Head: Normocephalic and atraumatic  Eyes:      General:         Right eye: No discharge  Left eye: No discharge  Cardiovascular:      Rate and Rhythm: Normal rate and regular rhythm  Pulses: Normal pulses  Heart sounds: No murmur     Pulmonary:      Effort: Pulmonary effort is normal  No respiratory distress  Breath sounds: Normal breath sounds  No wheezing  Comments: dimished b/l breath sounds  Musculoskeletal:      Right lower leg: No edema  Left lower leg: No edema  Skin:     General: Skin is warm and dry  Neurological:      General: No focal deficit present  Mental Status: He is alert and oriented to person, place, and time  Mental status is at baseline  Psychiatric:         Mood and Affect: Mood normal          Additional Data:     Labs:    Results from last 7 days   Lab Units 01/09/21  0615   WBC Thousand/uL 6 40   HEMOGLOBIN g/dL 14 3   HEMATOCRIT % 43 0   PLATELETS Thousands/uL 250   NEUTROS PCT % 83*   LYMPHS PCT % 8*   MONOS PCT % 9   EOS PCT % 0     Results from last 7 days   Lab Units 01/09/21  0615   SODIUM mmol/L 138   POTASSIUM mmol/L 4 4   CHLORIDE mmol/L 105   CO2 mmol/L 25   BUN mg/dL 35*   CREATININE mg/dL 1 09   ANION GAP mmol/L 8   CALCIUM mg/dL 8 8   ALBUMIN g/dL 3 0   TOTAL BILIRUBIN mg/dL 0 60   ALK PHOS U/L 90   ALT U/L 24   AST U/L 23   GLUCOSE RANDOM mg/dL 177*         Results from last 7 days   Lab Units 01/09/21  0553 01/08/21  1956 01/08/21  1546 01/08/21  1149 01/08/21  0538 01/07/21  2034 01/07/21  1530 01/07/21  1124 01/07/21  0531 01/06/21  1950 01/06/21  1622 01/06/21  1112   POC GLUCOSE mg/dl 179* 238* 130 239* 195* 284* 123 268* 188* 139 162* 239*         Results from last 7 days   Lab Units 01/08/21  0524 01/07/21  0546 01/06/21  0628 01/03/21  0929   LACTIC ACID mmol/L  --   --   --  1 5   PROCALCITONIN ng/ml 0 10 0 28* 0 55* 0 09           * I Have Reviewed All Lab Data Listed Above  * Additional Pertinent Lab Tests Reviewed: All Labs Within Last 24 Hours Reviewed    Imaging:    Imaging Reports Reviewed Today Include: none  Recent Cultures (last 7 days):     Results from last 7 days   Lab Units 01/03/21  0930 01/03/21  0918   BLOOD CULTURE  No Growth After 5 Days  No Growth After 5 Days         Last 24 Hours Medication List:   Current Facility-Administered Medications   Medication Dose Route Frequency Provider Last Rate    ascorbic acid  1,000 mg Oral Q12H [address] Laura Bender MD      atorvastatin  40 mg Oral Daily With Daniel Tillman MD      cefTRIAXone  1,000 mg Intravenous Q24H Laura Bender MD 1,000 mg (01/09/21 9078)    cholecalciferol  2,000 Units Oral Daily Laura Bender MD      dexamethasone  6 mg Intravenous Q24H Laura Bender MD      doxycycline hyclate  100 mg Oral Q12H Laura Bender MD      enoxaparin  30 mg Subcutaneous Q12H [address] Laura Bender MD      insulin glargine  10 Units Subcutaneous QAM Laura Bender MD      insulin lispro  1-6 Units Subcutaneous TID AC Laura Bender MD      insulin lispro  1-6 Units Subcutaneous HS MD [place] Bains insulin lispro  10 Units Subcutaneous TID With Meals Laura Bender MD      zinc sulfate  220 mg Oral Daily Laura Bender MD      Followed by   Kike Herman ON 1/12/2021] multivitamin-minerals  1 tablet Oral Daily Laura Bender MD          Today, Patient Was Seen By: Laura Bender MD    ** Please Note: Dictation voice to text software may have been used in the creation of this document   **

## 2021-01-09 NOTE — ASSESSMENT & PLAN NOTE
Initially requiring 2 L nasal cannula, now saturating 96% on room air  Improving  Currently not on any home O2  Titrate down as able

## 2021-01-10 LAB
GLUCOSE SERPL-MCNC: 130 MG/DL (ref 65–140)
GLUCOSE SERPL-MCNC: 210 MG/DL (ref 65–140)
GLUCOSE SERPL-MCNC: 249 MG/DL (ref 65–140)
GLUCOSE SERPL-MCNC: 252 MG/DL (ref 65–140)

## 2021-01-10 PROCEDURE — 99232 SBSQ HOSP IP/OBS MODERATE 35: CPT | Performed by: INTERNAL MEDICINE

## 2021-01-10 PROCEDURE — 82948 REAGENT STRIP/BLOOD GLUCOSE: CPT

## 2021-01-10 RX ADMIN — INSULIN GLARGINE 10 UNITS: 100 INJECTION, SOLUTION SUBCUTANEOUS at 08:06

## 2021-01-10 RX ADMIN — ATORVASTATIN CALCIUM 40 MG: 40 TABLET, FILM COATED ORAL at 15:53

## 2021-01-10 RX ADMIN — INSULIN LISPRO 10 UNITS: 100 INJECTION, SOLUTION INTRAVENOUS; SUBCUTANEOUS at 09:37

## 2021-01-10 RX ADMIN — INSULIN LISPRO 3 UNITS: 100 INJECTION, SOLUTION INTRAVENOUS; SUBCUTANEOUS at 11:19

## 2021-01-10 RX ADMIN — INSULIN LISPRO 2 UNITS: 100 INJECTION, SOLUTION INTRAVENOUS; SUBCUTANEOUS at 16:40

## 2021-01-10 RX ADMIN — INSULIN LISPRO 10 UNITS: 100 INJECTION, SOLUTION INTRAVENOUS; SUBCUTANEOUS at 16:40

## 2021-01-10 RX ADMIN — OXYCODONE HYDROCHLORIDE AND ACETAMINOPHEN 1000 MG: 500 TABLET ORAL at 22:22

## 2021-01-10 RX ADMIN — CEFTRIAXONE 1000 MG: 1 INJECTION, SOLUTION INTRAVENOUS at 09:18

## 2021-01-10 RX ADMIN — ENOXAPARIN SODIUM 30 MG: 100 INJECTION SUBCUTANEOUS at 22:22

## 2021-01-10 RX ADMIN — DOXYCYCLINE 100 MG: 100 CAPSULE ORAL at 22:22

## 2021-01-10 RX ADMIN — DEXAMETHASONE SODIUM PHOSPHATE 6 MG: 10 INJECTION, SOLUTION INTRAMUSCULAR; INTRAVENOUS at 11:16

## 2021-01-10 RX ADMIN — Medication 2000 UNITS: at 08:07

## 2021-01-10 RX ADMIN — OXYCODONE HYDROCHLORIDE AND ACETAMINOPHEN 1000 MG: 500 TABLET ORAL at 08:42

## 2021-01-10 RX ADMIN — ZINC SULFATE 220 MG (50 MG) CAPSULE 220 MG: CAPSULE at 08:07

## 2021-01-10 RX ADMIN — DOXYCYCLINE 100 MG: 100 CAPSULE ORAL at 08:42

## 2021-01-10 RX ADMIN — INSULIN LISPRO 3 UNITS: 100 INJECTION, SOLUTION INTRAVENOUS; SUBCUTANEOUS at 22:25

## 2021-01-10 RX ADMIN — ENOXAPARIN SODIUM 30 MG: 100 INJECTION SUBCUTANEOUS at 08:06

## 2021-01-10 RX ADMIN — INSULIN LISPRO 10 UNITS: 100 INJECTION, SOLUTION INTRAVENOUS; SUBCUTANEOUS at 11:19

## 2021-01-10 NOTE — ASSESSMENT & PLAN NOTE
Initially requiring 2 L nasal cannula, now saturating 96% on 1L NC  Intermittently using 1L  Currently not on any home O2  Titrate down as able

## 2021-01-10 NOTE — PLAN OF CARE
Problem: Potential for Falls  Goal: Patient will remain free of falls  Description: INTERVENTIONS:  - Assess patient frequently for physical needs  -  Identify cognitive and physical deficits and behaviors that affect risk of falls  -  [place] fall precautions as indicated by assessment   - Educate patient/family on patient safety including physical limitations  - Instruct patient to call for assistance with activity based on assessment  - Modify environment to reduce risk of injury  - Consider OT/PT consult to assist with strengthening/mobility  Outcome: Progressing     Problem: NEUROSENSORY - ADULT  Goal: Achieves maximal functionality and self care  Description: INTERVENTIONS  - Monitor swallowing and airway patency with patient fatigue and changes in neurological status  - Encourage and assist patient to increase activity and self care     - Encourage visually impaired, hearing impaired and aphasic patients to use assistive/communication devices  Outcome: Progressing     Problem: RESPIRATORY - ADULT  Goal: Achieves optimal ventilation and oxygenation  Description: INTERVENTIONS:  - Assess for changes in respiratory status  - Assess for changes in mentation and behavior  - Position to facilitate oxygenation and minimize respiratory effort  - Oxygen administered by appropriate delivery if ordered  - Initiate smoking cessation education as indicated  - Encourage broncho-pulmonary hygiene including cough, deep breathe, Incentive Spirometry  - Assess the need for suctioning and aspirate as needed  - Assess and instruct to report SOB or any respiratory difficulty  - Respiratory Therapy support as indicated  Outcome: Progressing     Problem: GASTROINTESTINAL - ADULT  Goal: Maintains adequate nutritional intake  Description: INTERVENTIONS:  - Monitor percentage of each meal consumed  - Identify factors contributing to decreased intake, treat as appropriate  - Assist with meals as needed  - Monitor I&O, weight, and lab values if indicated  - Obtain nutrition services referral as needed  Outcome: Progressing     Problem: GENITOURINARY - ADULT  Goal: Urinary catheter remains patent  Description: INTERVENTIONS:  - Assess patency of urinary catheter  - If patient has a chronic solorzano, consider changing catheter if non-functioning  - Follow guidelines for intermittent irrigation of non-functioning urinary catheter  Outcome: Progressing     Problem: METABOLIC, FLUID AND ELECTROLYTES - ADULT  Goal: Glucose maintained within target range  Description: INTERVENTIONS:  - Monitor Blood Glucose as ordered  - Assess for signs and symptoms of hyperglycemia and hypoglycemia  - Administer ordered medications to maintain glucose within target range  - Assess nutritional intake and initiate nutrition service referral as needed  Outcome: Progressing     Problem: SKIN/TISSUE INTEGRITY - ADULT  Goal: Incision(s), wounds(s) or drain site(s) healing without S/S of infection  Description: INTERVENTIONS  - Assess and document risk factors for skin impairment   - Assess and document dressing, incision, wound bed, drain sites and surrounding tissue  - Consider nutrition services referral as needed  - Oral mucous membranes remain intact  - Provide patient/ family education  Outcome: Progressing

## 2021-01-10 NOTE — PROGRESS NOTES
Progress Note - Renetta Boykin 1960, 62 y o  male MRN: [id number]    Unit/Bed#: 7T [facility] 716-01 Encounter: [id number]    Primary Care Provider: Callie Leonard DO   Date and time admitted to hospital: 1/5/2021 10:01 AM        * Pneumonia due to COVID-19 virus  Assessment & Plan  Diagnosed with COVID on 12/31  Presented hypoxic requiring 2 L nasal cannula  Now on1L saturating 96%, has been desat on ambulation  Will need respiratory for home O2 eval prior to dc  · Currently receiving intravenous steroids, day 6/10  · Completed intravenous remdesivir day 5/5  · Continue treated for mild COVID pathway-continue zinc, vitamin-C  Will hold the vitamin-D given patient has hypercalcemia from sarcoidosis  · Titrate down oxygen as needed  · D-dimer-11 9-> 0 35 (N)  No suspicion for PE  Continue Lovenox  · Procalcitonin elevated and down trending after starting antibiotic- continue ceftriaxone and doxycycline day 3  · Off isolation on 1/11    Acute respiratory failure with hypoxia (HCC)  Assessment & Plan  Initially requiring 2 L nasal cannula, now saturating 96% on 1L NC  Intermittently using 1L  Currently not on any home O2  Titrate down as able  CKD (chronic kidney disease) stage 3, GFR 30-59 ml/min [facility])  Assessment & Plan  Lab Results   Component Value Date    EGFR 74 01/09/2021    EGFR 69 01/08/2021    EGFR 67 01/07/2021    CREATININE 1 09 01/09/2021    CREATININE 1 16 01/08/2021    CREATININE 1 18 01/07/2021       Creatinine At baseline    Type 2 diabetes mellitus with microalbuminuria [facility])  Assessment & Plan  Lab Results   Component Value Date    HGBA1C 9 0 02/22/2020       Recent Labs     01/09/21  1056 01/09/21  1543 01/09/21  2004 01/10/21  0606   POCGLU 316* 157* 170* 130       Blood Sugar Average: Last 72 hrs:  (P) 396 [id number]   Patient has insulin pump  Hold off on insulin pump  Blood sugars are controlled     Lantus 10 units am  Humalog 10 units TID with meals Sarcoidosis  Assessment & Plan  Patient takes prednisone for sarcoidosis has been stable  Avoid vitamin d due to hypercalcemia      VTE Pharmacologic Prophylaxis:   Pharmacologic: Enoxaparin (Lovenox)  Mechanical VTE Prophylaxis in Place: Yes    Patient Centered Rounds: I have performed bedside rounds with nursing staff today  Discussions with Specialists or Other Care Team Provider: none    Education and Discussions with Family / Patient: patient     Time Spent for Care: 20 minutes  More than 50% of total time spent on counseling and coordination of care as described above  Current Length of Stay: 5 day(s)    Current Patient Status: Inpatient   Certification Statement: The patient will continue to require additional inpatient hospital stay due to receiving covid tx    Discharge Plan: still needs 1L can be dc within 24-48 hours when off O2 for 24 hours    Code Status: Level 1 - Full Code      Subjective:   Patient seen and examined  Currently feels about the same  Sob and cough,mildly improved  Objective:     Vitals:   Temp (24hrs), Av 1 °F (36 2 °C), Min:96 8 °F (36 °C), Max:97 4 °F (36 3 °C)    Temp:  [96 8 °F (36 °C)-97 4 °F (36 3 °C)] 97 1 °F (36 2 °C)  HR:  [46-75] 62  Resp:  [16-18] 16  BP: (109-114)/(67-74) 109/69  SpO2:  [92 %-96 %] 92 %  Body mass index is 23 44 kg/m²  Input and Output Summary (last 24 hours): Intake/Output Summary (Last 24 hours) at 1/10/2021 1012  Last data filed at 2021 1700  Gross per 24 hour   Intake 480 ml   Output 400 ml   Net 80 ml       Physical Exam:     Physical Exam  Constitutional:       General: He is not in acute distress  Appearance: Normal appearance  HENT:      Head: Normocephalic and atraumatic  Eyes:      General:         Right eye: No discharge  Left eye: No discharge  Cardiovascular:      Rate and Rhythm: Normal rate and regular rhythm  Pulses: Normal pulses  Heart sounds: No murmur     Pulmonary:      Effort: Pulmonary effort is normal  No respiratory distress  Breath sounds: Normal breath sounds  No wheezing  Musculoskeletal:      Right lower leg: No edema  Left lower leg: No edema  Skin:     General: Skin is warm and dry  Neurological:      General: No focal deficit present  Mental Status: He is alert and oriented to person, place, and time  Mental status is at baseline  Psychiatric:         Mood and Affect: Mood normal        Additional Data:     Labs:    Results from last 7 days   Lab Units 01/09/21  0615   WBC Thousand/uL 6 40   HEMOGLOBIN g/dL 14 3   HEMATOCRIT % 43 0   PLATELETS Thousands/uL 250   NEUTROS PCT % 83*   LYMPHS PCT % 8*   MONOS PCT % 9   EOS PCT % 0     Results from last 7 days   Lab Units 01/09/21  0615   SODIUM mmol/L 138   POTASSIUM mmol/L 4 4   CHLORIDE mmol/L 105   CO2 mmol/L 25   BUN mg/dL 35*   CREATININE mg/dL 1 09   ANION GAP mmol/L 8   CALCIUM mg/dL 8 8   ALBUMIN g/dL 3 0   TOTAL BILIRUBIN mg/dL 0 60   ALK PHOS U/L 90   ALT U/L 24   AST U/L 23   GLUCOSE RANDOM mg/dL 177*         Results from last 7 days   Lab Units 01/10/21  0606 01/09/21  2004 01/09/21  1543 01/09/21  1056 01/09/21  0553 01/08/21  1956 01/08/21  1546 01/08/21  1149 01/08/21  0538 01/07/21  2034 01/07/21  1530 01/07/21  1124   POC GLUCOSE mg/dl 130 170* 157* 316* 179* 238* 130 239* 195* 284* 123 268*         Results from last 7 days   Lab Units 01/09/21  0615 01/08/21  0524 01/07/21  0546 01/06/21  0628   PROCALCITONIN ng/ml <0 05 0 10 0 28* 0 55*           * I Have Reviewed All Lab Data Listed Above  * Additional Pertinent Lab Tests Reviewed:  All Labs Within Last 24 Hours Reviewed    Imaging:    Imaging Reports Reviewed Today Include: none    Recent Cultures (last 7 days):           Last 24 Hours Medication List:   Current Facility-Administered Medications   Medication Dose Route Frequency Provider Last Rate    ascorbic acid  1,000 mg Oral Q12H [facility] Antonia Catalan MD      atorvastatin  40 mg Oral Daily With Kasia Adame MD      cefTRIAXone  1,000 mg Intravenous Q24H Tiffany Mckeon MD 1,000 mg (01/10/21 4305)    cholecalciferol  2,000 Units Oral Daily Tiffany Mckeon MD      dexamethasone  6 mg Intravenous Q24H Tiffany Mckeon MD      doxycycline hyclate  100 mg Oral Q12H Tiffany Mckeon MD      enoxaparin  30 mg Subcutaneous Q12H [address] Tiffany Mckeon MD      insulin glargine  10 Units Subcutaneous QAM Tiffany Mckeon MD      insulin lispro  1-6 Units Subcutaneous TID [facility] Tiffany Mckeon MD      insulin lispro  1-6 Units Subcutaneous HS Tiffany Mckeon MD      insulin lispro  10 Units Subcutaneous TID With Meals Tiffany Mckeon MD      zinc sulfate  220 mg Oral Daily Tiffany Mckeon MD      Followed by   Susan Mckinley ON 1/12/2021] multivitamin-minerals  1 tablet Oral Daily Tiffany Mckeon MD          Today, Patient Was Seen By: Tiffany Mckeon MD    ** Please Note: Dictation voice to text software may have been used in the creation of this document   **

## 2021-01-10 NOTE — ASSESSMENT & PLAN NOTE
Lab Results   Component Value Date    HGBA1C 9 0 02/22/2020       Recent Labs     01/09/21  1056 01/09/21  1543 01/09/21  2004 01/10/21  0606   POCGLU 316* 157* 170* 130       Blood Sugar Average: Last 72 hrs:  (P) 62 [id number]   Patient has insulin pump  Hold off on insulin pump  Blood sugars are controlled     Lantus 10 units am  Humalog 10 units TID with meals

## 2021-01-10 NOTE — ASSESSMENT & PLAN NOTE
Diagnosed with COVID on 12/31  Presented hypoxic requiring 2 L nasal cannula  Now on1L saturating 96%, has been desat on ambulation  Will need respiratory for home O2 eval prior to dc  · Currently receiving intravenous steroids, day 6/10  · Completed intravenous remdesivir day 5/5  · Continue treated for mild COVID pathway-continue zinc, vitamin-C  Will hold the vitamin-D given patient has hypercalcemia from sarcoidosis  · Titrate down oxygen as needed  · D-dimer-11 9-> 0 35 (N)  No suspicion for PE  Continue Lovenox     · Procalcitonin elevated and down trending after starting antibiotic- continue ceftriaxone and doxycycline day 3  · Off isolation on 1/11

## 2021-01-10 NOTE — PLAN OF CARE
Problem: Potential for Falls  Goal: Patient will remain free of falls  Description: INTERVENTIONS:  - Assess patient frequently for physical needs  -  Identify cognitive and physical deficits and behaviors that affect risk of falls    -  [place] fall precautions as indicated by assessment   - Educate patient/family on patient safety including physical limitations  - Instruct patient to call for assistance with activity based on assessment  - Modify environment to reduce risk of injury  - Consider OT/PT consult to assist with strengthening/mobility  Outcome: Progressing

## 2021-01-11 LAB
ALBUMIN SERPL BCP-MCNC: 3.1 G/DL (ref 3–5.2)
ALP SERPL-CCNC: 102 U/L (ref 43–122)
ALT SERPL W P-5'-P-CCNC: 30 U/L (ref 9–52)
ANION GAP SERPL CALCULATED.3IONS-SCNC: 7 MMOL/L (ref 5–14)
AST SERPL W P-5'-P-CCNC: 28 U/L (ref 17–59)
BILIRUB SERPL-MCNC: 0.8 MG/DL
BUN SERPL-MCNC: 32 MG/DL (ref 5–25)
CALCIUM ALBUM COR SERPL-MCNC: 9.6 MG/DL (ref 8.3–10.1)
CALCIUM SERPL-MCNC: 8.9 MG/DL (ref 8.4–10.2)
CHLORIDE SERPL-SCNC: 104 MMOL/L (ref 97–108)
CO2 SERPL-SCNC: 24 MMOL/L (ref 22–30)
CREAT SERPL-MCNC: 0.94 MG/DL (ref 0.7–1.5)
EOSINOPHIL # BLD AUTO: 0.09 THOUSAND/UL (ref 0–0.4)
EOSINOPHIL NFR BLD MANUAL: 1 % (ref 0–6)
ERYTHROCYTE [DISTWIDTH] IN BLOOD BY AUTOMATED COUNT: 13.2 %
GFR SERPL CREATININE-BSD FRML MDRD: 88 ML/MIN/1.73SQ M
GLUCOSE SERPL-MCNC: 180 MG/DL (ref 65–140)
GLUCOSE SERPL-MCNC: 184 MG/DL (ref 70–99)
GLUCOSE SERPL-MCNC: 220 MG/DL (ref 65–140)
GLUCOSE SERPL-MCNC: 267 MG/DL (ref 65–140)
GLUCOSE SERPL-MCNC: 289 MG/DL (ref 65–140)
HCT VFR BLD AUTO: 45.1 % (ref 41–53)
HGB BLD-MCNC: 15 G/DL (ref 13.5–17.5)
LYMPHOCYTES # BLD AUTO: 0.85 THOUSAND/UL (ref 0.5–4)
LYMPHOCYTES # BLD AUTO: 10 % (ref 25–45)
MCH RBC QN AUTO: 30.4 PG (ref 26–34)
MCHC RBC AUTO-ENTMCNC: 33.1 G/DL (ref 31–36)
MCV RBC AUTO: 92 FL (ref 80–100)
MONOCYTES # BLD AUTO: 0.34 THOUSAND/UL (ref 0.2–0.9)
MONOCYTES NFR BLD AUTO: 4 % (ref 1–10)
NEUTS BAND NFR BLD MANUAL: 1 % (ref 0–8)
NEUTS SEG # BLD: 7.23 THOUSAND/UL (ref 1.8–7.8)
NEUTS SEG NFR BLD AUTO: 84 %
PLATELET # BLD AUTO: 332 THOUSANDS/UL (ref 150–450)
PLATELET BLD QL SMEAR: ADEQUATE
PMV BLD AUTO: 8 FL (ref 8.9–12.7)
POTASSIUM SERPL-SCNC: 4.2 MMOL/L (ref 3.6–5)
PROCALCITONIN SERPL-MCNC: <0.05 NG/ML
PROT SERPL-MCNC: 5.9 G/DL (ref 5.9–8.4)
RBC # BLD AUTO: 4.91 MILLION/UL (ref 4.5–5.9)
RBC MORPH BLD: NORMAL
SODIUM SERPL-SCNC: 135 MMOL/L (ref 137–147)
TOTAL CELLS COUNTED SPEC: 100
WBC # BLD AUTO: 8.5 THOUSAND/UL (ref 4.5–11)

## 2021-01-11 PROCEDURE — 84145 PROCALCITONIN (PCT): CPT | Performed by: INTERNAL MEDICINE

## 2021-01-11 PROCEDURE — 82948 REAGENT STRIP/BLOOD GLUCOSE: CPT

## 2021-01-11 PROCEDURE — 85027 COMPLETE CBC AUTOMATED: CPT | Performed by: INTERNAL MEDICINE

## 2021-01-11 PROCEDURE — 80053 COMPREHEN METABOLIC PANEL: CPT | Performed by: INTERNAL MEDICINE

## 2021-01-11 PROCEDURE — 99232 SBSQ HOSP IP/OBS MODERATE 35: CPT | Performed by: INTERNAL MEDICINE

## 2021-01-11 PROCEDURE — 85007 BL SMEAR W/DIFF WBC COUNT: CPT | Performed by: INTERNAL MEDICINE

## 2021-01-11 RX ORDER — DOXYCYCLINE HYCLATE 100 MG/1
100 CAPSULE ORAL ONCE
Status: COMPLETED | OUTPATIENT
Start: 2021-01-11 | End: 2021-01-11

## 2021-01-11 RX ADMIN — ENOXAPARIN SODIUM 30 MG: 100 INJECTION SUBCUTANEOUS at 20:39

## 2021-01-11 RX ADMIN — INSULIN LISPRO 1 UNITS: 100 INJECTION, SOLUTION INTRAVENOUS; SUBCUTANEOUS at 08:19

## 2021-01-11 RX ADMIN — INSULIN LISPRO 10 UNITS: 100 INJECTION, SOLUTION INTRAVENOUS; SUBCUTANEOUS at 16:09

## 2021-01-11 RX ADMIN — OXYCODONE HYDROCHLORIDE AND ACETAMINOPHEN 1000 MG: 500 TABLET ORAL at 08:15

## 2021-01-11 RX ADMIN — CEFTRIAXONE 1000 MG: 1 INJECTION, SOLUTION INTRAVENOUS at 09:26

## 2021-01-11 RX ADMIN — DOXYCYCLINE 100 MG: 100 CAPSULE ORAL at 20:39

## 2021-01-11 RX ADMIN — DEXAMETHASONE SODIUM PHOSPHATE 6 MG: 10 INJECTION, SOLUTION INTRAMUSCULAR; INTRAVENOUS at 11:49

## 2021-01-11 RX ADMIN — INSULIN LISPRO 4 UNITS: 100 INJECTION, SOLUTION INTRAVENOUS; SUBCUTANEOUS at 16:09

## 2021-01-11 RX ADMIN — INSULIN LISPRO 10 UNITS: 100 INJECTION, SOLUTION INTRAVENOUS; SUBCUTANEOUS at 11:51

## 2021-01-11 RX ADMIN — INSULIN LISPRO 2 UNITS: 100 INJECTION, SOLUTION INTRAVENOUS; SUBCUTANEOUS at 21:06

## 2021-01-11 RX ADMIN — ENOXAPARIN SODIUM 30 MG: 100 INJECTION SUBCUTANEOUS at 08:15

## 2021-01-11 RX ADMIN — INSULIN GLARGINE 10 UNITS: 100 INJECTION, SOLUTION SUBCUTANEOUS at 08:15

## 2021-01-11 RX ADMIN — INSULIN LISPRO 4 UNITS: 100 INJECTION, SOLUTION INTRAVENOUS; SUBCUTANEOUS at 11:51

## 2021-01-11 RX ADMIN — ZINC SULFATE 220 MG (50 MG) CAPSULE 220 MG: CAPSULE at 08:15

## 2021-01-11 RX ADMIN — OXYCODONE HYDROCHLORIDE AND ACETAMINOPHEN 1000 MG: 500 TABLET ORAL at 20:39

## 2021-01-11 RX ADMIN — ATORVASTATIN CALCIUM 40 MG: 40 TABLET, FILM COATED ORAL at 16:08

## 2021-01-11 RX ADMIN — DOXYCYCLINE 100 MG: 100 CAPSULE ORAL at 08:49

## 2021-01-11 RX ADMIN — Medication 2000 UNITS: at 08:15

## 2021-01-11 RX ADMIN — INSULIN LISPRO 10 UNITS: 100 INJECTION, SOLUTION INTRAVENOUS; SUBCUTANEOUS at 08:20

## 2021-01-11 NOTE — ASSESSMENT & PLAN NOTE
Diagnosed with COVID on 12/31  Presented hypoxic requiring 2 L nasal cannula  Now on RA  Will need respiratory for home O2 eval prior to dc  · Currently receiving intravenous steroids, day 7/10- patient was a prolonged course of steroid taper due to his history of sarcoidosis  · Completed intravenous remdesivir day 5/5  · Continue treated for mild COVID pathway-continue zinc, vitamin-C  Will hold the vitamin-D given patient has hypercalcemia from sarcoidosis  · Titrate down oxygen as needed  · D-dimer-11 9-> 0 35 (N)  No suspicion for PE  Continue Lovenox  · Procalcitonin elevated and down trending after starting antibiotic-  Discontinue ceftriaxone and doxycycline day #5, last dose of doxycycline tonight after procalcitonin has been negative x2  · Off isolation on 1/11  · Will need repeat Chest xray in several weeks for resolution of pna, pt follows up with Dr Sammie Fang from pulm as outpatient

## 2021-01-11 NOTE — ASSESSMENT & PLAN NOTE
Lab Results   Component Value Date    HGBA1C 9 0 02/22/2020       Recent Labs     01/10/21  1055 01/10/21  1635 01/10/21  2005 01/11/21  0650   POCGLU 252* 210* 249* 180*       Blood Sugar Average: Last 72 hrs:  (P) 762 [id number]   Patient has insulin pump  Hold off on insulin pump  Blood sugars intermittently elevated with meals  Continue Lantus 10 units am- fasting sugars are ok  Humalog 10 units TID with meals- can increase to 12 units TID     At DC can continue his insulin pump

## 2021-01-11 NOTE — ASSESSMENT & PLAN NOTE
Patient takes prednisone 10 mg for sarcoidosis has been stable     · Avoid vitamin d due to hypercalcemia  · Outpatient follow-up with pulmonology  · Patient will need a prolonged course of steroid taper in the outpatient setting at discharge

## 2021-01-11 NOTE — UTILIZATION REVIEW
Continued Stay Review    Date: 1/11/21                     Current Patient Class: IP Current Level of Care: [facility]     HPI:60 y o  male initially admitted on 1-5-21 to Inpatient with Pneumonia and Acute Respiratory Failure due to COVID-19  Completed Remdesivir 1/9  Assessment/Plan:  Pt  on RA w/ sats 93%   Currently receiving intravenous steroids, day 7/10  Had prolonged steroid taper due to his history of sarcoidosis  Last dose Rocephin and  doxycycline tonight after procalcitonin has been negative x2  Anticipate DC next 24 hrs if he remains stable off O2      Pertinent Labs/Diagnostic Results:     Results from last 7 days   Lab Units 01/11/21 0518 01/09/21  0615 01/08/21  0524 01/07/21  0546 01/05/21  0213   WBC Thousand/uL 8 50 6 40 5 50 7 00 5 56   HEMOGLOBIN g/dL 15 0 14 3 14 6 15 4 15 9   HEMATOCRIT % 45 1 43 0 44 2 46 4 48 1   PLATELETS Thousands/uL 332 250 245 235 183   NEUTROS ABS Thousands/µL  --  5 30 4 50 5 90 4 70   TOTAL NEUT ABS Thousand/uL 7 23  --   --   --   --    BANDS PCT % 1  --   --   --   --      Results from last 7 days   Lab Units 01/11/21 0518 01/09/21  0615 01/08/21  0524 01/07/21  0546 01/06/21  0628 01/05/21  0213   SODIUM mmol/L 135* 138 138 137 136* 135*   POTASSIUM mmol/L 4 2 4 4 4 5 4 4 4 4 3 9   CHLORIDE mmol/L 104 105 102 104 103 99*   CO2 mmol/L 24 25 29 25 26 26   ANION GAP mmol/L 7 8 7 8 7 10   BUN mg/dL 32* 35* 35* 35* 31* 23   CREATININE mg/dL 0 94 1 09 1 16 1 18 1 15 1 51*   EGFR ml/min/1 73sq m 88 74 69 67 69 50   CALCIUM mg/dL 8 9 8 8 8 6 8 9 8 5 8 9   MAGNESIUM mg/dL  --   --   --   --   --  2 1   PHOSPHORUS mg/dL  --   --   --   --   --  2 0*     Results from last 7 days   Lab Units 01/11/21 0518 01/09/21 0615 01/06/21  0628 01/05/21  0213   AST U/L 28 23 28 25   ALT U/L 30 24 23 25   ALK PHOS U/L 102 90 78 104   TOTAL PROTEIN g/dL 5 9 5 7* 5 9 7 3   ALBUMIN g/dL 3 1 3 0 3 1 3 3*   TOTAL BILIRUBIN mg/dL 0 80 0 60 0 60 0 63     Results from last 7 days   Lab Units 01/11/21  1113 01/11/21  0650 01/10/21  2005 01/10/21  1635 01/10/21  1055 01/10/21  0606 01/09/21 2004 01/09/21  1543 01/09/21  1056 01/09/21  0553 01/08/21  1956 01/08/21  1546   POC GLUCOSE mg/dl 289* 180* 249* 210* 252* 130 170* 157* 316* 179* 238* 130     Results from last 7 days   Lab Units 01/11/21  0518 01/09/21  0615 01/08/21  0524 01/07/21  0546 01/06/21  0628 01/05/21  0213   GLUCOSE RANDOM mg/dL 184* 177* 205* 179* 143* 197*     Results from last 7 days   Lab Units 01/05/21  0213   CK TOTAL U/L 62     Results from last 7 days   Lab Units 01/05/21  0213   TROPONIN I ng/mL <0 02     Results from last 7 days   Lab Units 01/08/21  0524 01/06/21  0628 01/05/21  0213   D-DIMER QUANTITATIVE ug/ml FEU 0 37 0 35 11 82*     Results from last 7 days   Lab Units 01/11/21  0518 01/09/21  0615 01/08/21  0524 01/07/21  0546 01/06/21  0628   PROCALCITONIN ng/ml <0 05 <0 05 0 10 0 28* 0 55*     Results from last 7 days   Lab Units 01/05/21  0213   FERRITIN ng/mL 440*     Results from last 7 days   Lab Units 01/05/21  0213   CRP mg/L 58 4*     Vital Signs:   01/11/21 1000  --  --  --  --  --  93 %  --  --  None (Room air) --   01/11/21 0900  --  --  --  --  --  93 %  --  --  None (Room air) --   01/11/21 0856  97 2 °F (36 2 °C)  53  18  145/81  --  93 %  24  1 L/min  None (Room air) Lying   01/11/21 0400  --  --  --  --  --  95 %  --  --  None (Room air) --   01/11/21 0200  --  --  --  --  --  94 %  --  --  None (Room air) --   01/11/21 0010  96 9 °F (36 1 °C)  64  18  116/60  83  95 %  --  --  None (Room air) Lying   01/10/21 1608  97 4 °F (36 3 °C)A  66  18  102/59  76  95 %  24  1 L/min  Nasal cannula Lying   01/10/21 1500  --  --  --  --  --  95 %  --  --   Nasal cannula --   Nasal Cannula O2 Flow Rate (L/min): 1 L NC at 01/10/21 1500   01/10/21 1200  --  --  --  --  --  94 %  --  --   Nasal cannula --   Nasal Cannula O2 Flow Rate (L/min): 1 L NC at 01/10/21 1200   01/10/21 1100  --  --  --  --  --  94 %  -- --   Nasal cannula --   Nasal Cannula O2 Flow Rate (L/min): 1 L NC at 01/10/21 1100   01/10/21 1000  --  --  --  --  --  94 %  --  --   Nasal cannula --   Nasal Cannula O2 Flow Rate (L/min): 1 L NC at 01/10/21 0900   01/10/21 0801  97 1 °F (36 2 °C)   62  16  109/69  84  92 %  24  1 L/min  Nasal cannula Lying     Medications:   Scheduled Medications:  ascorbic acid, 1,000 mg, Oral, Q12H CORAL  atorvastatin, 40 mg, Oral, Daily With Dinner  cefTRIAXone  1,000 mg 50 mL   Intravenous, Q24H  cholecalciferol, 2,000 Units, Oral, Daily  dexamethasone, 6 mg, Intravenous, Q24H  doxycycline hyclate, 100 mg, Oral, Once  enoxaparin, 30 mg, Subcutaneous, Q12H CORAL  insulin glargine, 10 Units, Subcutaneous, QAM  insulin lispro, 1-6 Units, Subcutaneous, TID AC  insulin lispro, 1-6 Units, Subcutaneous, HS  insulin lispro, 10 Units, Subcutaneous, TID With Meals  [START ON 1/12/2021] multivitamin-minerals, 1 tablet, Oral, Daily    Continuous IV Infusions:  PRN Meds:    Discharge Plan: [facility] next 24 hrs if he remains stable off O2  Network Utilization Review Department  ATTENTION: Please call with any questions or concerns to [phone] and carefully listen to the prompts so that you are directed to the right person  All voicemails are confidential   Garrett Guillermo all requests for admission clinical reviews, approved or denied determinations and any other requests to dedicated fax number below belonging to the campus where the patient is receiving treatment   List of dedicated fax numbers for the Facilities:  FACILITY NAME UR FAX NUMBER   ADMISSION DENIALS (Administrative/Medical Necessity) [phone]   [address] (Maternity/NICU/Pediatrics) [address] [facility],[address] Mary Franco 6244 (Ramon Ramachandran "Juana" 103) [address] [place] [address] [address]ue Kolb [address] [place] [address] 951 [phone]

## 2021-01-11 NOTE — ASSESSMENT & PLAN NOTE
Lab Results   Component Value Date    EGFR 88 01/11/2021    EGFR 74 01/09/2021    EGFR 69 01/08/2021    CREATININE 0 94 01/11/2021    CREATININE 1 09 01/09/2021    CREATININE 1 16 01/08/2021       Creatinine At baseline

## 2021-01-11 NOTE — PROGRESS NOTES
Progress Note - Rigoberto Lau 1960, 62 y o  male MRN: [id number]    Unit/Bed#: 7T [facility] 716-01 Encounter: [id number]    Primary Care Provider: Uriel Ojeda DO   Date and time admitted to hospital: 1/5/2021 10:01 AM        * Pneumonia due to COVID-19 virus  Assessment & Plan  Diagnosed with COVID on 12/31  Presented hypoxic requiring 2 L nasal cannula  Now on RA  Will need respiratory for home O2 eval prior to dc  · Currently receiving intravenous steroids, day 7/10- patient was a prolonged course of steroid taper due to his history of sarcoidosis  · Completed intravenous remdesivir day 5/5  · Continue treated for mild COVID pathway-continue zinc, vitamin-C  Will hold the vitamin-D given patient has hypercalcemia from sarcoidosis  · Titrate down oxygen as needed  · D-dimer-11 9-> 0 35 (N)  No suspicion for PE  Continue Lovenox  · Procalcitonin elevated and down trending after starting antibiotic-  Discontinue ceftriaxone and doxycycline day #5, last dose of doxycycline tonight after procalcitonin has been negative x2  · Off isolation on 1/11  · Will need repeat Chest xray in several weeks for resolution of pna, pt follows up with Dr Paresh Cruz from pul as outpatient  Acute respiratory failure with hypoxia (HCC)  Assessment & Plan  Currently not on any home O2  Initially requiring 2 L nasal cannula, now saturating well on room air  Likely secondary to COVID pneumonia seen on chest x-ray 1/5     If off oxygen for the next 24 hours can likely be discharged home tomorrow    CKD (chronic kidney disease) stage 3, GFR 30-59 ml/min [facility])  Assessment & Plan  Lab Results   Component Value Date    EGFR 88 01/11/2021    EGFR 74 01/09/2021    EGFR 69 01/08/2021    CREATININE 0 94 01/11/2021    CREATININE 1 09 01/09/2021    CREATININE 1 16 01/08/2021       Creatinine At baseline    Type 2 diabetes mellitus with microalbuminuria [facility])  Assessment & Plan  Lab Results   Component Value Date    HGBA1C 9 0 2020       Recent Labs     01/10/21  1055 01/10/21  1635 01/10/21  2005 01/11/21  0650   POCGLU 252* 210* 249* 180*       Blood Sugar Average: Last 72 hrs:  (P) 466 [id number]   Patient has insulin pump  Hold off on insulin pump  Blood sugars intermittently elevated with meals  Continue Lantus 10 units am- fasting sugars are ok  Humalog 10 units TID with meals- can increase to 12 units TID  At DC can continue his insulin pump    Sarcoidosis  Assessment & Plan  Patient takes prednisone 10 mg for sarcoidosis has been stable  · Avoid vitamin d due to hypercalcemia  · Outpatient follow-up with pulmonology  · Patient will need a prolonged course of steroid taper in the outpatient setting at discharge      VTE Pharmacologic Prophylaxis:   Pharmacologic: Enoxaparin (Lovenox)  Mechanical VTE Prophylaxis in Place: Yes    Patient Centered Rounds: I have performed bedside rounds with nursing staff today  Discussions with Specialists or Other Care Team Provider: PT, case management    Education and Discussions with Family / Patient: Patient     Time Spent for Care: 20 minutes  More than 50% of total time spent on counseling and coordination of care as described above  Current Length of Stay: 6 day(s)    Current Patient Status: Inpatient   Certification Statement: The patient will continue to require additional inpatient hospital stay due to continue covid tx     Discharge Plan:  Patient off oxygen likely be DC within the next 24 hours if remains off oxygen  Code Status: Level 1 - Full Code      Subjective:   Patient seen examined at bedside  Patient currently states that he feels well  Objective:     Vitals:   Temp (24hrs), Av 2 °F (36 2 °C), Min:96 9 °F (36 1 °C), Max:97 4 °F (36 3 °C)    Temp:  [96 9 °F (36 1 °C)-97 4 °F (36 3 °C)] 97 2 °F (36 2 °C)  HR:  [53-66] 53  Resp:  [18] 18  BP: (102-145)/(59-81) 145/81  SpO2:  [93 %-95 %] 93 %  Body mass index is 23 44 kg/m²       Input and Output Summary (last 24 hours): Intake/Output Summary (Last 24 hours) at 1/11/2021 1307  Last data filed at 1/10/2021 1608  Gross per 24 hour   Intake 180 ml   Output --   Net 180 ml       Physical Exam:     Physical Exam  Constitutional:       General: He is not in acute distress  Appearance: Normal appearance  HENT:      Head: Normocephalic and atraumatic  Eyes:      General:         Right eye: No discharge  Left eye: No discharge  Cardiovascular:      Rate and Rhythm: Normal rate and regular rhythm  Pulses: Normal pulses  Heart sounds: No murmur  Pulmonary:      Effort: Pulmonary effort is normal  No respiratory distress  Breath sounds: Normal breath sounds  No wheezing  Musculoskeletal:      Right lower leg: No edema  Left lower leg: No edema  Skin:     General: Skin is warm and dry  Neurological:      General: No focal deficit present  Mental Status: He is alert and oriented to person, place, and time  Mental status is at baseline     Psychiatric:         Mood and Affect: Mood normal            Additional Data:     Labs:    Results from last 7 days   Lab Units 01/11/21  0518 01/09/21  0615   WBC Thousand/uL 8 50 6 40   HEMOGLOBIN g/dL 15 0 14 3   HEMATOCRIT % 45 1 43 0   PLATELETS Thousands/uL 332 250   BANDS PCT % 1  --    NEUTROS PCT %  --  83*   LYMPHS PCT %  --  8*   LYMPHO PCT % 10*  --    MONOS PCT %  --  9   MONO PCT % 4  --    EOS PCT % 1 0     Results from last 7 days   Lab Units 01/11/21  0518   SODIUM mmol/L 135*   POTASSIUM mmol/L 4 2   CHLORIDE mmol/L 104   CO2 mmol/L 24   BUN mg/dL 32*   CREATININE mg/dL 0 94   ANION GAP mmol/L 7   CALCIUM mg/dL 8 9   ALBUMIN g/dL 3 1   TOTAL BILIRUBIN mg/dL 0 80   ALK PHOS U/L 102   ALT U/L 30   AST U/L 28   GLUCOSE RANDOM mg/dL 184*         Results from last 7 days   Lab Units 01/11/21  1113 01/11/21  0650 01/10/21  2005 01/10/21  1635 01/10/21  1055 01/10/21  0606 01/09/21 2004 01/09/21  1543 01/09/21  1056 01/09/21  0553 01/08/21  1956 01/08/21  1546   POC GLUCOSE mg/dl 289* 180* 249* 210* 252* 130 170* 157* 316* 179* 238* 130         Results from last 7 days   Lab Units 01/09/21  0615 01/08/21  0524 01/07/21  0546 01/06/21  0628   PROCALCITONIN ng/ml <0 05 0 10 0 28* 0 55*           * I Have Reviewed All Lab Data Listed Above  * Additional Pertinent Lab Tests Reviewed: All Labs Within Last 24 Hours Reviewed    Imaging:    Imaging Reports Reviewed Today Include: none    Recent Cultures (last 7 days):           Last 24 Hours Medication List:   Current Facility-Administered Medications   Medication Dose Route Frequency Provider Last Rate    ascorbic acid  1,000 mg Oral Q12H [address] Megan Douglas MD      atorvastatin  40 mg Oral Daily With You Putnam MD      cholecalciferol  2,000 Units Oral Daily Megan Douglas MD      dexamethasone  6 mg Intravenous Q24H Megan Douglas MD      doxycycline hyclate  100 mg Oral Once Megan Douglas MD      enoxaparin  30 mg Subcutaneous Q12H [address] Megan Douglas MD      insulin glargine  10 Units Subcutaneous QAM Megan Douglas MD      insulin lispro  1-6 Units Subcutaneous TID AC Megan Douglas MD      insulin lispro  1-6 Units Subcutaneous HS Megan Douglas MD      insulin lispro  10 Units Subcutaneous TID With Meals Megan Douglas MD     Ethelyn Lisa Munguia ON 1/12/2021] multivitamin-minerals  1 tablet Oral Daily Megan Douglas MD          Today, Patient Was Seen By: Megan Douglas MD    ** Please Note: Dictation voice to text software may have been used in the creation of this document   **

## 2021-01-11 NOTE — ASSESSMENT & PLAN NOTE
Currently not on any home O2  Initially requiring 2 L nasal cannula, now saturating well on room air  Likely secondary to COVID pneumonia seen on chest x-ray 1/5     If off oxygen for the next 24 hours can likely be discharged home tomorrow

## 2021-01-11 NOTE — PLAN OF CARE
Problem: Potential for Falls  Goal: Patient will remain free of falls  Description: INTERVENTIONS:  - Assess patient frequently for physical needs  -  Identify cognitive and physical deficits and behaviors that affect risk of falls    -  [place] fall precautions as indicated by assessment   - Educate patient/family on patient safety including physical limitations  - Instruct patient to call for assistance with activity based on assessment  - Modify environment to reduce risk of injury  - Consider OT/PT consult to assist with strengthening/mobility  Outcome: Progressing

## 2021-01-12 ENCOUNTER — TELEPHONE (OUTPATIENT)
Dept: FAMILY MEDICINE CLINIC | Facility: CLINIC | Age: 61
End: 2021-01-12

## 2021-01-12 VITALS
BODY MASS INDEX: 23.55 KG/M2 | TEMPERATURE: 96 F | DIASTOLIC BLOOD PRESSURE: 74 MMHG | WEIGHT: 177.69 LBS | HEIGHT: 73 IN | SYSTOLIC BLOOD PRESSURE: 124 MMHG | RESPIRATION RATE: 18 BRPM | HEART RATE: 52 BPM | OXYGEN SATURATION: 95 %

## 2021-01-12 DIAGNOSIS — J12.82 PNEUMONIA DUE TO COVID-19 VIRUS: ICD-10-CM

## 2021-01-12 DIAGNOSIS — U07.1 PNEUMONIA DUE TO COVID-19 VIRUS: ICD-10-CM

## 2021-01-12 DIAGNOSIS — D86.9 SARCOIDOSIS: ICD-10-CM

## 2021-01-12 LAB
GLUCOSE SERPL-MCNC: 168 MG/DL (ref 65–140)
GLUCOSE SERPL-MCNC: 238 MG/DL (ref 65–140)
PROCALCITONIN SERPL-MCNC: <0.05 NG/ML

## 2021-01-12 PROCEDURE — 99238 HOSP IP/OBS DSCHRG MGMT 30/<: CPT | Performed by: INTERNAL MEDICINE

## 2021-01-12 PROCEDURE — 82948 REAGENT STRIP/BLOOD GLUCOSE: CPT

## 2021-01-12 PROCEDURE — 97110 THERAPEUTIC EXERCISES: CPT

## 2021-01-12 PROCEDURE — 84145 PROCALCITONIN (PCT): CPT | Performed by: INTERNAL MEDICINE

## 2021-01-12 PROCEDURE — 97116 GAIT TRAINING THERAPY: CPT

## 2021-01-12 RX ORDER — PREDNISONE 20 MG/1
20 TABLET ORAL DAILY
Qty: 3 TABLET | Refills: 0 | Status: SHIPPED | OUTPATIENT
Start: 2021-01-20 | End: 2021-01-12

## 2021-01-12 RX ORDER — PREDNISONE 10 MG/1
30 TABLET ORAL DAILY
Qty: 9 TABLET | Refills: 0 | Status: SHIPPED | OUTPATIENT
Start: 2021-01-16 | End: 2021-01-12

## 2021-01-12 RX ORDER — PREDNISONE 10 MG/1
10 TABLET ORAL DAILY
Qty: 30 TABLET | Refills: 0 | Status: SHIPPED | OUTPATIENT
Start: 2021-01-24 | End: 2021-01-12

## 2021-01-12 RX ORDER — PREDNISONE 20 MG/1
TABLET ORAL
Qty: 90 TABLET | Refills: 0 | Status: SHIPPED | OUTPATIENT
Start: 2021-01-12 | End: 2021-03-16

## 2021-01-12 RX ORDER — PREDNISONE 20 MG/1
40 TABLET ORAL DAILY
Qty: 6 TABLET | Refills: 0 | Status: SHIPPED | OUTPATIENT
Start: 2021-01-12 | End: 2021-01-12

## 2021-01-12 RX ADMIN — Medication 1 TABLET: at 08:39

## 2021-01-12 RX ADMIN — INSULIN LISPRO 10 UNITS: 100 INJECTION, SOLUTION INTRAVENOUS; SUBCUTANEOUS at 11:35

## 2021-01-12 RX ADMIN — ENOXAPARIN SODIUM 30 MG: 100 INJECTION SUBCUTANEOUS at 08:39

## 2021-01-12 RX ADMIN — INSULIN GLARGINE 10 UNITS: 100 INJECTION, SOLUTION SUBCUTANEOUS at 08:39

## 2021-01-12 RX ADMIN — Medication 2000 UNITS: at 08:39

## 2021-01-12 RX ADMIN — DEXAMETHASONE SODIUM PHOSPHATE 6 MG: 10 INJECTION, SOLUTION INTRAMUSCULAR; INTRAVENOUS at 11:37

## 2021-01-12 RX ADMIN — INSULIN LISPRO 1 UNITS: 100 INJECTION, SOLUTION INTRAVENOUS; SUBCUTANEOUS at 06:00

## 2021-01-12 RX ADMIN — INSULIN LISPRO 10 UNITS: 100 INJECTION, SOLUTION INTRAVENOUS; SUBCUTANEOUS at 08:39

## 2021-01-12 RX ADMIN — INSULIN LISPRO 3 UNITS: 100 INJECTION, SOLUTION INTRAVENOUS; SUBCUTANEOUS at 11:35

## 2021-01-12 NOTE — ASSESSMENT & PLAN NOTE
Currently not on any home O2  Initially requiring 2 L nasal cannula, now saturating well on room air  Likely secondary to COVID pneumonia seen on chest x-ray 1/5  Can be discharged home today

## 2021-01-12 NOTE — TELEPHONE ENCOUNTER
We do not schedule new patient TCMs at this time due to appt availability and this pt has [employer] which we do not currently accept

## 2021-01-12 NOTE — ASSESSMENT & PLAN NOTE
Lab Results   Component Value Date    EGFR 88 01/11/2021    EGFR 74 01/09/2021    EGFR 69 01/08/2021    CREATININE 0 94 01/11/2021    CREATININE 1 09 01/09/2021    CREATININE 1 16 01/08/2021       Creatinine At baseline  Continue to monitor

## 2021-01-12 NOTE — DISCHARGE INSTRUCTIONS
Discharge Anticoagulation Plan:     While hospitalized, patient did not have confirmed or highly suspected VTE/PE, and D-dimer was < 2 5    Patient confirmed to have no other indication for therapeutic anticoagulation  Patient without confirmed VTE and low risk of developing VTE  Patient will need follow up with primary care provider (or specialist) within 72 hours of hospital discharge, and frequently thereafter to monitor for signs of worsening respiratory function, VTE and/or bleeding  [address]    Your healthcare provider and/or public health staff have evaluated you and have determined that you do not need to remain in the hospital at this time  At this time you can be isolated at home where you will be monitored by staff from your local or state health department  You should carefully follow the prevention and isolation steps below until a healthcare provider or local or state health department says that you can return to your normal activities  Stay home except to get medical care    People who are mildly ill with COVID-19 are able to isolate at home during their illness  You should restrict activities outside your home, except for getting medical care  Do not go to work, school, or public areas  Avoid using public transportation, ride-sharing, or taxis  Separate yourself from other people and animals in your home    People: As much as possible, you should stay in a specific room and away from other people in your home  Also, you should use a separate bathroom, if available  Animals: You should restrict contact with pets and other animals while you are sick with COVID-19, just like you would around other people  Although there have not been reports of pets or other animals becoming sick with COVID-19, it is still recommended that people sick with COVID-19 limit contact with animals until more information is known about the virus   When possible, have another member of your household care for your animals while you are sick  If you are sick with COVID-19, avoid contact with your pet, including petting, snuggling, being kissed or licked, and sharing food  If you must care for your pet or be around animals while you are sick, wash your hands before and after you interact with pets and wear a facemask  See COVID-19 and Animals for more information  Call ahead before visiting your doctor    If you have a medical appointment, call the healthcare provider and tell them that you have or may have COVID-19  This will help the healthcare providers office take steps to keep other people from getting infected or exposed  Wear a facemask    You should wear a facemask when you are around other people (e g , sharing a room or vehicle) or pets and before you enter a healthcare providers office  If you are not able to wear a facemask (for example, because it causes trouble breathing), then people who live with you should not stay in the same room with you, or they should wear a facemask if they enter your room  Cover your coughs and sneezes    Cover your mouth and nose with a tissue when you cough or sneeze  Throw used tissues in a lined trash can  Immediately wash your hands with soap and water for at least 20 seconds or, if soap and water are not available, clean your hands with an alcohol-based hand  that contains at least 60% alcohol  Clean your hands often    Wash your hands often with soap and water for at least 20 seconds, especially after blowing your nose, coughing, or sneezing; going to the bathroom; and before eating or preparing food  If soap and water are not readily available, use an alcohol-based hand  with at least 60% alcohol, covering all surfaces of your hands and rubbing them together until they feel dry  Soap and water are the best option if hands are visibly dirty  Avoid touching your eyes, nose, and mouth with unwashed hands      Avoid sharing personal household items    You should not share dishes, drinking glasses, cups, eating utensils, towels, or bedding with other people or pets in your home  After using these items, they should be washed thoroughly with soap and water  Clean all high-touch surfaces everyday    High touch surfaces include counters, tabletops, doorknobs, bathroom fixtures, toilets, phones, keyboards, tablets, and bedside tables  Also, clean any surfaces that may have blood, stool, or body fluids on them  Use a household cleaning spray or wipe, according to the label instructions  Labels contain instructions for safe and effective use of the cleaning product including precautions you should take when applying the product, such as wearing gloves and making sure you have good ventilation during use of the product  Monitor your symptoms    Seek prompt medical attention if your illness is worsening (e g , difficulty breathing)  Before seeking care, call your healthcare provider and tell them that you have, or are being evaluated for, COVID-19  Put on a facemask before you enter the facility  These steps will help the healthcare providers office to keep other people in the office or waiting room from getting infected or exposed  Ask your healthcare provider to call the local or [facility] health department  Persons who are placed under active monitoring or facilitated self-monitoring should follow instructions provided by their local health department or occupational health professionals, as appropriate  If you have a medical emergency and need to call 911, notify the dispatch personnel that you have, or are being evaluated for COVID-19  If possible, put on a facemask before emergency medical services arrive      Discontinuing home isolation    Patients with confirmed COVID-19 should remain under home isolation precautions until the following conditions are met:   - They have had no fever for at least 24 hours (that is one full day of no fever without the use medicine that reduces fevers)  AND  - other symptoms have improved (for example, when their cough or shortness of breath have improved)  AND  - If had mild or moderate illness, at least 10 days have passed since their symptoms first appeared or if severe illness (needed oxygen) or immunosuppressed, at least 20 days have passed since symptoms first appeared  Patients with confirmed COVID-19 should also notify close contacts (including their workplace) and ask that they self-quarantine  Currently, close contact is defined as being within 6 feet for 15 minutes or more from the period 24 hours starting 48 hours before symptom onset to the time at which the patient went into isolation  Close contacts of patients diagnosed with COVID-19 should be instructed by the patient to self-quarantine for 14 days from the last time of their last contact with the patient       Source: [employer]

## 2021-01-12 NOTE — DISCHARGE SUMMARY
Discharge- Elly Wilson 1960, 62 y o  male MRN: [id number]    Unit/Bed#: 7T [facility] 716-01 Encounter: [id number]    Primary Care Provider: Terence Zepeda DO   Date and time admitted to hospital: 1/5/2021 10:01 AM        * Pneumonia due to COVID-19 virus  Assessment & Plan  Diagnosed with COVID on 12/31  Presented hypoxic requiring 2 L nasal cannula  Now on RA  Will need respiratory for home O2 eval prior to dc  · Currently receiving intravenous steroids, day 7/10- patient was a prolonged course of steroid taper due to his history of sarcoidosis  · Completed intravenous remdesivir day 5/5  · Continue treated for mild COVID pathway-continue zinc, vitamin-C  Will hold the vitamin-D given patient has hypercalcemia from sarcoidosis  · Titrate down oxygen as needed  · D-dimer-11 9-> 0 35 (N)  No suspicion for PE  Continue Lovenox  · Procalcitonin elevated and down trending after starting antibiotic-  Discontinue ceftriaxone and doxycycline day #5, last dose of doxycycline tonight after procalcitonin has been negative x2  · Off isolation on 1/11  · Will need repeat Chest xray in several weeks for resolution of pna, pt follows up with Dr Tyson Govea from pul as outpatient  · Can be discharged home today  Acute respiratory failure with hypoxia (HCC)  Assessment & Plan  Currently not on any home O2  Initially requiring 2 L nasal cannula, now saturating well on room air  Likely secondary to COVID pneumonia seen on chest x-ray 1/5  Can be discharged home today  CKD (chronic kidney disease) stage 3, GFR 30-59 ml/min [facility])  Assessment & Plan  Lab Results   Component Value Date    EGFR 88 01/11/2021    EGFR 74 01/09/2021    EGFR 69 01/08/2021    CREATININE 0 94 01/11/2021    CREATININE 1 09 01/09/2021    CREATININE 1 16 01/08/2021       Creatinine At baseline  Continue to monitor      Type 2 diabetes mellitus with microalbuminuria [facility])  Assessment & Plan  Lab Results   Component Value Date    HGBA1C 9 0 02/22/2020       Recent Labs     01/11/21  1113 01/11/21  1549 01/11/21 2027 01/12/21  0530   POCGLU 289* 267* 220* 168*       Blood Sugar Average: Last 72 hrs:  (P) 449 [id number]   Patient has insulin pump  Hold off on insulin pump  Blood sugars intermittently elevated with meals  Continue Lantus 10 units am- fasting sugars are ok  Humalog 10 units TID with meals-   At DC can continue his insulin pump    Sarcoidosis  Assessment & Plan  Patient takes prednisone 10 mg for sarcoidosis has been stable  · Avoid vitamin d due to hypercalcemia  · Outpatient follow-up with pulmonology  · Patient will need a prolonged course of steroid taper in the outpatient setting at discharge          Discharging Physician / Practitioner: Victorino Sequeira MD  PCP: Sherryll Essex, DO  Admission Date:   Admission Orders (From admission, onward)     Ordered        01/05/21 1131  Inpatient Admission  Once                   Discharge Date: 01/12/21    Resolved Problems  Date Reviewed: 1/12/2021          Resolved    Sepsis ([facility] [address] ) 1/8/2021     Resolved by  Chaitanya Juárez MD          Consultations During Hospital Stay:  · None    Procedures Performed:   · Chest x-ray    Significant Findings / Test Results:      · Slight worsening of bilateral ground glass opacity due to Covid 19 pneumonia  Incidental Findings:   · None     Test Results Pending at Discharge (will require follow up): · None     Outpatient Tests Requested:  · None    Complications:  None    Reason for Admission:  Shortness of breath    Hospital Course:     Gonzalez Juarez is a 62 y o  male patient who originally presented to the hospital on 1/5/2021 due to shortness of breath and cough found to have COVID pneumonia  He was treated under mild pathway of COVID-19 as he required oxygen supplementation briefly  Completed remdesivir and 5 days antibiotic  He also received IV dexamethasone 6 mg daily for 7 days    He will be discharged on prednisone taper starting from 40 mg to 10 mg daily his home regimen for sarcoidosis  He was instructed to follow-up with his pulmonologist and primary care doctor  He was given information to establish care with family doctor  He was given return to work letter as requested  On the day of discharge he was hemodynamically stable and all questions answered was in agreement with discharge plan  Please see above list of diagnoses and related plan for additional information  Condition at Discharge: good     Discharge Day Visit / Exam:     Subjective:  Patient seen examined at bedside  He feels fine  Vitals: Blood Pressure: 124/74 (01/12/21 0818)  Pulse: (!) 52 (01/12/21 0818)  Temperature: (!) 96 °F (35 6 °C) (01/12/21 0818)  Temp Source: Temporal (01/12/21 0818)  Respirations: 18 (01/12/21 0818)  Height: 6' 1" (185 4 cm) (01/07/21 1038)  Weight - Scale: 80 6 kg (177 lb 11 1 oz) (01/07/21 1038)  SpO2: 95 % (01/12/21 0818)  Exam:   Physical Exam  Vitals signs reviewed  Constitutional:       General: He is not in acute distress  Appearance: He is not ill-appearing  Eyes:      General:         Right eye: No discharge  Left eye: No discharge  Cardiovascular:      Rate and Rhythm: Normal rate and regular rhythm  Heart sounds: Normal heart sounds  No murmur  Pulmonary:      Effort: Pulmonary effort is normal  No respiratory distress  Breath sounds: Normal breath sounds  No wheezing  Neurological:      General: No focal deficit present  Mental Status: He is alert and oriented to person, place, and time  Psychiatric:         Mood and Affect: Mood normal          Behavior: Behavior normal          Discussion with Family:  Discussed with patient  Discharge instructions/Information to patient and family:   See after visit summary for information provided to patient and family        Provisions for Follow-Up Care:  See after visit summary for information related to follow-up care and any pertinent home health orders  Disposition:     Home    For Discharges to [facility]:   · Not Applicable to this Patient - Not Applicable to this Patient    Planned Readmission:  None     Discharge Statement:  I spent 20 minutes discharging the patient  This time was spent on the day of discharge  I had direct contact with the patient on the day of discharge  Greater than 50% of the total time was spent examining patient, answering all patient questions, arranging and discussing plan of care with patient as well as directly providing post-discharge instructions  Additional time then spent on discharge activities  Discharge Medications:  See after visit summary for reconciled discharge medications provided to patient and family        ** Please Note: This note has been constructed using a voice recognition system **

## 2021-01-12 NOTE — CASE MANAGEMENT
COVID-19 Positive  CM called pt via room phone  Pt alert and oriented  Pt reported that he lives at home alone  He said that he is ADL independent and ambulates without an assistive device  He said that he is employed  He said that he uses [facility] for pharmacy  He said that he does not need VNA  He said that he drives  He said that he believes that he has a friend or coworker that will transport him home at discharge

## 2021-01-12 NOTE — PLAN OF CARE
Problem: Potential for Falls  Goal: Patient will remain free of falls  Description: INTERVENTIONS:  - Assess patient frequently for physical needs  -  Identify cognitive and physical deficits and behaviors that affect risk of falls  -  [place] fall precautions as indicated by assessment   - Educate patient/family on patient safety including physical limitations  - Instruct patient to call for assistance with activity based on assessment  - Modify environment to reduce risk of injury  - Consider OT/PT consult to assist with strengthening/mobility  Outcome: Progressing     Problem: NEUROSENSORY - ADULT  Goal: Achieves stable or improved neurological status  Description: INTERVENTIONS  - Monitor and report changes in neurological status  - Monitor vital signs such as temperature, blood pressure, glucose, and any other labs ordered   - Initiate measures to prevent increased intracranial pressure  - Monitor for seizure activity and implement precautions if appropriate      Outcome: Progressing  Goal: Remains free of injury related to seizures activity  Description: INTERVENTIONS  - Maintain airway, patient safety  and administer oxygen as ordered  - Monitor patient for seizure activity, document and report duration and description of seizure to physician/advanced practitioner  - If seizure occurs,  ensure patient safety during seizure  - Reorient patient post seizure  - Seizure pads on all 4 side rails  - Instruct patient/family to notify RN of any seizure activity including if an aura is experienced  - Instruct patient/family to call for assistance with activity based on nursing assessment  - Administer anti-seizure medications if ordered    Outcome: Progressing  Goal: Achieves maximal functionality and self care  Description: INTERVENTIONS  - Monitor swallowing and airway patency with patient fatigue and changes in neurological status  - Encourage and assist patient to increase activity and self care     - Encourage visually impaired, hearing impaired and aphasic patients to use assistive/communication devices  Outcome: Progressing     Problem: CARDIOVASCULAR - ADULT  Goal: Maintains optimal cardiac output and hemodynamic stability  Description: INTERVENTIONS:  - Monitor I/O, vital signs and rhythm  - Monitor for S/S and trends of decreased cardiac output  - Administer and titrate ordered vasoactive medications to optimize hemodynamic stability  - Assess quality of pulses, skin color and temperature  - Assess for signs of decreased coronary artery perfusion  - Instruct patient to report change in severity of symptoms  Outcome: Progressing  Goal: Absence of cardiac dysrhythmias or at baseline rhythm  Description: INTERVENTIONS:  - Continuous cardiac monitoring, vital signs, obtain 12 lead EKG if ordered  - Administer antiarrhythmic and heart rate control medications as ordered  - Monitor electrolytes and administer replacement therapy as ordered  Outcome: Progressing     Problem: RESPIRATORY - ADULT  Goal: Achieves optimal ventilation and oxygenation  Description: INTERVENTIONS:  - Assess for changes in respiratory status  - Assess for changes in mentation and behavior  - Position to facilitate oxygenation and minimize respiratory effort  - Oxygen administered by appropriate delivery if ordered  - Initiate smoking cessation education as indicated  - Encourage broncho-pulmonary hygiene including cough, deep breathe, Incentive Spirometry  - Assess the need for suctioning and aspirate as needed  - Assess and instruct to report SOB or any respiratory difficulty  - Respiratory Therapy support as indicated  Outcome: Progressing     Problem: GASTROINTESTINAL - ADULT  Goal: Minimal or absence of nausea and/or vomiting  Description: INTERVENTIONS:  - Administer IV fluids if ordered to ensure adequate hydration  - Maintain NPO status until nausea and vomiting are resolved  - Nasogastric tube if ordered  - Administer ordered antiemetic medications as needed  - Provide nonpharmacologic comfort measures as appropriate  - Advance diet as tolerated, if ordered  - Consider nutrition services referral to assist patient with adequate nutrition and appropriate food choices  Outcome: Progressing  Goal: Maintains or returns to baseline bowel function  Description: INTERVENTIONS:  - Assess bowel function  - Encourage oral fluids to ensure adequate hydration  - Administer IV fluids if ordered to ensure adequate hydration  - Administer ordered medications as needed  - Encourage mobilization and activity  - Consider nutritional services referral to assist patient with adequate nutrition and appropriate food choices  Outcome: Progressing  Goal: Maintains adequate nutritional intake  Description: INTERVENTIONS:  - Monitor percentage of each meal consumed  - Identify factors contributing to decreased intake, treat as appropriate  - Assist with meals as needed  - Monitor I&O, weight, and lab values if indicated  - Obtain nutrition services referral as needed  Outcome: Progressing  Goal: Establish and maintain optimal ostomy function  Description: INTERVENTIONS:  - Assess bowel function  - Encourage oral fluids to ensure adequate hydration  - Administer IV fluids if ordered to ensure adequate hydration   - Administer ordered medications as needed  - Encourage mobilization and activity  - Nutrition services referral to assist patient with appropriate food choices  - Assess stoma site  - Consider wound care consult   Outcome: Progressing     Problem: GENITOURINARY - ADULT  Goal: Maintains or returns to baseline urinary function  Description: INTERVENTIONS:  - Assess urinary function  - Encourage oral fluids to ensure adequate hydration if ordered  - Administer IV fluids as ordered to ensure adequate hydration  - Administer ordered medications as needed  - Offer frequent toileting  - Follow urinary retention protocol if ordered  Outcome: Progressing  Goal: Absence of urinary retention  Description: INTERVENTIONS:  - Assess patients ability to void and empty bladder  - Monitor I/O  - Bladder scan as needed  - Discuss with physician/AP medications to alleviate retention as needed  - Discuss catheterization for long term situations as appropriate  Outcome: Progressing  Goal: Urinary catheter remains patent  Description: INTERVENTIONS:  - Assess patency of urinary catheter  - If patient has a chronic solorzano, consider changing catheter if non-functioning  - Follow guidelines for intermittent irrigation of non-functioning urinary catheter  Outcome: Progressing     Problem: METABOLIC, FLUID AND ELECTROLYTES - ADULT  Goal: Electrolytes maintained within normal limits  Description: INTERVENTIONS:  - Monitor labs and assess patient for signs and symptoms of electrolyte imbalances  - Administer electrolyte replacement as ordered  - Monitor response to electrolyte replacements, including repeat lab results as appropriate  - Instruct patient on fluid and nutrition as appropriate  Outcome: Progressing  Goal: Fluid balance maintained  Description: INTERVENTIONS:  - Monitor labs   - Monitor I/O and WT  - Instruct patient on fluid and nutrition as appropriate  - Assess for signs & symptoms of volume excess or deficit  Outcome: Progressing  Goal: Glucose maintained within target range  Description: INTERVENTIONS:  - Monitor Blood Glucose as ordered  - Assess for signs and symptoms of hyperglycemia and hypoglycemia  - Administer ordered medications to maintain glucose within target range  - Assess nutritional intake and initiate nutrition service referral as needed  Outcome: Progressing     Problem: SKIN/TISSUE INTEGRITY - ADULT  Goal: Skin integrity remains intact  Description: INTERVENTIONS  - Identify patients at risk for skin breakdown  - Assess and monitor skin integrity  - Assess and monitor nutrition and hydration status  - Monitor labs (i e  albumin)  - Assess for incontinence - Turn and reposition patient  - Assist with mobility/ambulation  - Relieve pressure over bony prominences  - Avoid friction and shearing  - Provide appropriate hygiene as needed including keeping skin clean and dry  - Evaluate need for skin moisturizer/barrier cream  - Collaborate with interdisciplinary team (i e  Nutrition, Rehabilitation, etc )   - Patient/family teaching  Outcome: Progressing  Goal: Incision(s), wounds(s) or drain site(s) healing without S/S of infection  Description: INTERVENTIONS  - Assess and document risk factors for skin impairment   - Assess and document dressing, incision, wound bed, drain sites and surrounding tissue  - Consider nutrition services referral as needed  - Oral mucous membranes remain intact  - Provide patient/ family education  Outcome: Progressing  Goal: Oral mucous membranes remain intact  Description: INTERVENTIONS  - Assess oral mucosa and hygiene practices  - Implement preventative oral hygiene regimen  - Implement oral medicated treatments as ordered  - Initiate Nutrition services referral as needed  Outcome: Progressing     Problem: HEMATOLOGIC - ADULT  Goal: Maintains hematologic stability  Description: INTERVENTIONS  - Assess for signs and symptoms of bleeding or hemorrhage  - Monitor labs  - Administer supportive blood products/factors as ordered and appropriate  Outcome: Progressing     Problem: MUSCULOSKELETAL - ADULT  Goal: Maintain or return mobility to safest level of function  Description: INTERVENTIONS:  - Assess patient's ability to carry out ADLs; assess patient's baseline for ADL function and identify physical deficits which impact ability to perform ADLs (bathing, care of mouth/teeth, toileting, grooming, dressing, etc )  - Assess/evaluate cause of self-care deficits   - Assess range of motion  - Assess patient's mobility  - Assess patient's need for assistive devices and provide as appropriate  - Encourage maximum independence but intervene and supervise when necessary  - Involve family in performance of ADLs  - Assess for home care needs following discharge   - Consider OT consult to assist with ADL evaluation and planning for discharge  - Provide patient education as appropriate  Outcome: Progressing  Goal: Maintain proper alignment of affected body part  Description: INTERVENTIONS:  - Support, maintain and protect limb and body alignment  - Provide patient/ family with appropriate education  Outcome: Progressing     Problem: Nutrition/Hydration-ADULT  Goal: Nutrient/Hydration intake appropriate for improving, restoring or maintaining nutritional needs  Description: Monitor and assess patient's nutrition/hydration status for malnutrition  Collaborate with interdisciplinary team and initiate plan and interventions as ordered  Monitor patient's weight and dietary intake as ordered or per policy  Utilize nutrition screening tool and intervene as necessary  Determine patient's food preferences and provide high-protein, high-caloric foods as appropriate       INTERVENTIONS:  - Monitor oral intake, urinary output, labs, and treatment plans  - Assess nutrition and hydration status and recommend course of action  - Evaluate amount of meals eaten  - Assist patient with eating if necessary   - Allow adequate time for meals  - Recommend/ encourage appropriate diets, oral nutritional supplements, and vitamin/mineral supplements  - Order, calculate, and assess calorie counts as needed  - Recommend, monitor, and adjust tube feedings and TPN/PPN based on assessed needs  - Assess need for intravenous fluids  - Provide specific nutrition/hydration education as appropriate  - Include patient/family/caregiver in decisions related to nutrition  Outcome: Progressing

## 2021-01-12 NOTE — ASSESSMENT & PLAN NOTE
Lab Results   Component Value Date    HGBA1C 9 0 02/22/2020       Recent Labs     01/11/21  1113 01/11/21  1549 01/11/21 2027 01/12/21  0530   POCGLU 289* 267* 220* 168*       Blood Sugar Average: Last 72 hrs:  (P) 552 [id number]   Patient has insulin pump  Hold off on insulin pump  Blood sugars intermittently elevated with meals     Continue Lantus 10 units am- fasting sugars are ok  Humalog 10 units TID with meals-   At DC can continue his insulin pump

## 2021-01-12 NOTE — ASSESSMENT & PLAN NOTE
Diagnosed with COVID on 12/31  Presented hypoxic requiring 2 L nasal cannula  Now on RA  Will need respiratory for home O2 eval prior to dc  · Currently receiving intravenous steroids, day 7/10- patient was a prolonged course of steroid taper due to his history of sarcoidosis  · Completed intravenous remdesivir day 5/5  · Continue treated for mild COVID pathway-continue zinc, vitamin-C  Will hold the vitamin-D given patient has hypercalcemia from sarcoidosis  · Titrate down oxygen as needed  · D-dimer-11 9-> 0 35 (N)  No suspicion for PE  Continue Lovenox  · Procalcitonin elevated and down trending after starting antibiotic-  Discontinue ceftriaxone and doxycycline day #5, last dose of doxycycline tonight after procalcitonin has been negative x2  · Off isolation on 1/11  · Will need repeat Chest xray in several weeks for resolution of pna, pt follows up with Dr Jenn Weir from pul as outpatient  · Can be discharged home today

## 2021-01-13 NOTE — UTILIZATION REVIEW
Continued Stay Review    Date: 1/11/21                     Current Patient Class: IP Current Level of Care: [facility]     HPI:60 y o  male initially admitted on 1-5-21 to Inpatient with Pneumonia and Acute Respiratory Failure due to COVID-19  Completed Remdesivir 1/9  Assessment/Plan:  Pt  on RA w/ sats 93%   Currently receiving intravenous steroids, day 7/10  Had prolonged steroid taper due to his history of sarcoidosis  Last dose Rocephin and  doxycycline tonight after procalcitonin has been negative x2  Anticipate DC next 24 hrs if he remains stable off O2      Pertinent Labs/Diagnostic Results:     Results from last 7 days   Lab Units 01/11/21  0518 01/09/21  0615 01/08/21  0524 01/07/21  0546   WBC Thousand/uL 8 50 6 40 5 50 7 00   HEMOGLOBIN g/dL 15 0 14 3 14 6 15 4   HEMATOCRIT % 45 1 43 0 44 2 46 4   PLATELETS Thousands/uL 332 250 245 235   NEUTROS ABS Thousands/µL  --  5 30 4 50 5 90   TOTAL NEUT ABS Thousand/uL 7 23  --   --   --    BANDS PCT % 1  --   --   --      Results from last 7 days   Lab Units 01/11/21  0518 01/09/21  0615 01/08/21  0524 01/07/21  0546   SODIUM mmol/L 135* 138 138 137   POTASSIUM mmol/L 4 2 4 4 4 5 4 4   CHLORIDE mmol/L 104 105 102 104   CO2 mmol/L 24 25 29 25   ANION GAP mmol/L 7 8 7 8   BUN mg/dL 32* 35* 35* 35*   CREATININE mg/dL 0 94 1 09 1 16 1 18   EGFR ml/min/1 73sq m 88 74 69 67   CALCIUM mg/dL 8 9 8 8 8 6 8 9     Results from last 7 days   Lab Units 01/11/21  0518 01/09/21  0615   AST U/L 28 23   ALT U/L 30 24   ALK PHOS U/L 102 90   TOTAL PROTEIN g/dL 5 9 5 7*   ALBUMIN g/dL 3 1 3 0   TOTAL BILIRUBIN mg/dL 0 80 0 60     Results from last 7 days   Lab Units 01/12/21  1107 01/12/21  0530 01/11/21  2027 01/11/21  1549 01/11/21  1113 01/11/21  0650 01/10/21  2005 01/10/21  1635 01/10/21  1055 01/10/21  0606 01/09/21  2004 01/09/21  1543   POC GLUCOSE mg/dl 238* 168* 220* 267* 289* 180* 249* 210* 252* 130 170* 157*     Results from last 7 days   Lab Units 01/11/21  0518 01/09/21  0615 01/08/21  0524 01/07/21  0546   GLUCOSE RANDOM mg/dL 184* 177* 205* 179*             Results from last 7 days   Lab Units 01/08/21  0524   D-DIMER QUANTITATIVE ug/ml FEU 0 37     Results from last 7 days   Lab Units 01/12/21  0524 01/11/21  0518 01/09/21  0615 01/08/21  0524 01/07/21  0546   PROCALCITONIN ng/ml <0 05 <0 05 <0 05 0 10 0 28*             Vital Signs:   01/11/21 1000  --  --  --  --  --  93 %  --  --  None (Room air) --   01/11/21 0900  --  --  --  --  --  93 %  --  --  None (Room air) --   01/11/21 0856  97 2 °F (36 2 °C)  53  18  145/81  --  93 %  24  1 L/min  None (Room air) Lying   01/11/21 0400  --  --  --  --  --  95 %  --  --  None (Room air) --   01/11/21 0200  --  --  --  --  --  94 %  --  --  None (Room air) --   01/11/21 0010  96 9 °F (36 1 °C)  64  18  116/60  83  95 %  --  --  None (Room air) Lying   01/10/21 1608  97 4 °F (36 3 °C)A  66  18  102/59  76  95 %  24  1 L/min  Nasal cannula Lying   01/10/21 1500  --  --  --  --  --  95 %  --  --   Nasal cannula --   Nasal Cannula O2 Flow Rate (L/min): 1 L NC at 01/10/21 1500   01/10/21 1200  --  --  --  --  --  94 %  --  --   Nasal cannula --   Nasal Cannula O2 Flow Rate (L/min): 1 L NC at 01/10/21 1200   01/10/21 1100  --  --  --  --  --  94 %  --  --   Nasal cannula --   Nasal Cannula O2 Flow Rate (L/min): 1 L NC at 01/10/21 1100   01/10/21 1000  --  --  --  --  --  94 %  --  --   Nasal cannula --   Nasal Cannula O2 Flow Rate (L/min): 1 L NC at 01/10/21 0900   01/10/21 0801  97 1 °F (36 2 °C)   62  16  109/69  84  92 %  24  1 L/min  Nasal cannula Lying     Medications:   Scheduled Medications:  ascorbic acid, 1,000 mg, Oral, Q12H CORAL  atorvastatin, 40 mg, Oral, Daily With Dinner  cefTRIAXone  1,000 mg 50 mL   Intravenous, Q24H  cholecalciferol, 2,000 Units, Oral, Daily  dexamethasone, 6 mg, Intravenous, Q24H  doxycycline hyclate, 100 mg, Oral, Once  enoxaparin, 30 mg, Subcutaneous, Q12H CROAL  insulin glargine, 10 Units, Subcutaneous, QAM  insulin lispro, 1-6 Units, Subcutaneous, TID AC  insulin lispro, 1-6 Units, Subcutaneous, HS  insulin lispro, 10 Units, Subcutaneous, TID With Meals  [START ON 1/12/2021] multivitamin-minerals, 1 tablet, Oral, Daily    Continuous IV Infusions:  PRN Meds:    Discharge Plan: Mitzi Franks next 24 hrs if he remains stable off O2  Network Utilization Review Department  ATTENTION: Please call with any questions or concerns to [phone] and carefully listen to the prompts so that you are directed to the right person  All voicemails are confidential   Chantell Aldrich all requests for admission clinical reviews, approved or denied determinations and any other requests to dedicated fax number below belonging to the campus where the patient is receiving treatment   List of dedicated fax numbers for the Facilities:  [address] DENIALS (Administrative/Medical Necessity) [phone]   [address] (Maternity/NICU/Pediatrics) [phone] [address] Mary Franco 3327 (  [address] "[address]) [id number] [place] [address] Josue Kolb [address] [place] [address] [phone]

## 2021-03-16 DIAGNOSIS — D86.9 SARCOIDOSIS: Primary | ICD-10-CM

## 2021-03-16 RX ORDER — PREDNISONE 10 MG/1
10 TABLET ORAL DAILY
Qty: 90 TABLET | Refills: 0 | Status: SHIPPED | OUTPATIENT
Start: 2021-03-16 | End: 2021-04-06

## 2021-04-06 ENCOUNTER — OFFICE VISIT (OUTPATIENT)
Dept: PULMONOLOGY | Facility: CLINIC | Age: 61
End: 2021-04-06
Payer: COMMERCIAL

## 2021-04-06 VITALS
HEIGHT: 73 IN | TEMPERATURE: 98 F | HEART RATE: 78 BPM | OXYGEN SATURATION: 99 % | WEIGHT: 182 LBS | SYSTOLIC BLOOD PRESSURE: 120 MMHG | DIASTOLIC BLOOD PRESSURE: 80 MMHG | RESPIRATION RATE: 18 BRPM | BODY MASS INDEX: 24.12 KG/M2

## 2021-04-06 DIAGNOSIS — E11.29 TYPE 2 DIABETES MELLITUS WITH MICROALBUMINURIA, UNSPECIFIED WHETHER LONG TERM INSULIN USE (HCC): ICD-10-CM

## 2021-04-06 DIAGNOSIS — N18.30 STAGE 3 CHRONIC KIDNEY DISEASE, UNSPECIFIED WHETHER STAGE 3A OR 3B CKD (HCC): ICD-10-CM

## 2021-04-06 DIAGNOSIS — R23.3 PETECHIAL ERUPTION: ICD-10-CM

## 2021-04-06 DIAGNOSIS — R05.9 COUGH: ICD-10-CM

## 2021-04-06 DIAGNOSIS — J12.82 PNEUMONIA DUE TO COVID-19 VIRUS: ICD-10-CM

## 2021-04-06 DIAGNOSIS — E83.52 HYPERCALCEMIA: ICD-10-CM

## 2021-04-06 DIAGNOSIS — N20.0 NEPHROLITHIASIS: ICD-10-CM

## 2021-04-06 DIAGNOSIS — R80.9 TYPE 2 DIABETES MELLITUS WITH MICROALBUMINURIA, UNSPECIFIED WHETHER LONG TERM INSULIN USE (HCC): ICD-10-CM

## 2021-04-06 DIAGNOSIS — F19.20 CORTICOSTEROID DEPENDENCE (HCC): ICD-10-CM

## 2021-04-06 DIAGNOSIS — D86.9 SARCOIDOSIS: Primary | ICD-10-CM

## 2021-04-06 DIAGNOSIS — U07.1 PNEUMONIA DUE TO COVID-19 VIRUS: ICD-10-CM

## 2021-04-06 PROCEDURE — 99215 OFFICE O/P EST HI 40 MIN: CPT | Performed by: INTERNAL MEDICINE

## 2021-04-06 RX ORDER — PREDNISONE 1 MG/1
5 TABLET ORAL DAILY
Qty: 60 TABLET | Refills: 0 | Status: SHIPPED | OUTPATIENT
Start: 2021-04-06 | End: 2021-12-13

## 2021-04-06 RX ORDER — FLUTICASONE FUROATE AND VILANTEROL 100; 25 UG/1; UG/1
1 POWDER RESPIRATORY (INHALATION) DAILY
Qty: 2 EACH | Refills: 0 | Status: SHIPPED | OUTPATIENT
Start: 2021-04-06 | End: 2021-10-04

## 2021-04-06 NOTE — ASSESSMENT & PLAN NOTE
Lab Results   Component Value Date    HGBA1C 9 0 02/22/2020    remains hyperglycemic, follow with endocrinology and continue current management with insulin  For this reason I will start weaning his corticosteroids completely hopefully that will help with his hyperglycemia  Management

## 2021-04-06 NOTE — PROGRESS NOTES
Progress note - Pulmonary Medicine   Castillo Recinos 62 y o  male MRN: [id number]       Impression & Plan:     Sarcoidosis    Will start weaning steroids very slowly as below  Will repeat BMP in 2 months   will consider other treatment in the future such as methotrexate if needed but probably his sarcoid is currently in remission completely  Pneumonia due to COVID-19 virus    Resolved completely, will repeat chest x-ray to confirm  Resolution of infiltrates  CKD (chronic kidney disease) stage 3, GFR 30-59 ml/min ([facility])  Lab Results   Component Value Date    EGFR 88 01/11/2021    EGFR 74 01/09/2021    EGFR 69 01/08/2021    CREATININE 0 94 01/11/2021    CREATININE 1 09 01/09/2021    CREATININE 1 16 01/08/2021       Stable kidney function, continue to follow with Nephrology    Hypercalcemia   Improved with treatment of sarcoidosis, will monitor after we stop steroids  Cough   Resolved completely while on steroids  I will wean steroids off and I told patient if he has cough to start using inhalers and I gave him 2 samples of Breo 100/25  Corticosteroid dependence ([facility])    Will taper steroids very slowly, 7 5 mg daily for 2 weeks then 5 mg daily for 2 weeks then 2 5 mg daily for 2 weeks then he will stop  Hopefully that will help with his diabetes and also the petechiae  Nephrolithiasis    Related to hypercalcemia in the past   Continue to follow with Nephrology and Urology    Type 2 diabetes mellitus with microalbuminuria [facility])    Lab Results   Component Value Date    HGBA1C 9 0 02/22/2020    remains hyperglycemic, follow with endocrinology and continue current management with insulin  For this reason I will start weaning his corticosteroids completely hopefully that will help with his hyperglycemia  Management  Petechial eruption   Secondary to some mild skin irritation/ trauma specially on his  Left shin   I believe this is secondary to some vascular fragility from corticosteroids and hyperglycemia  Will wean steroids  Will continue to monitor  No evidence of coagulopathy  Diagnoses and all orders for this visit:    Sarcoidosis  -     predniSONE 5 mg tablet; Take 1 tablet (5 mg total) by mouth daily Take 7 5 mg daily for 2 weeks then 5 mg daily for 2 weeks then 2 5 mg daily for 2 weeks then stop  -     Basic metabolic panel; Future  -     fluticasone-vilanterol (BREO ELLIPTA) 100-25 mcg/inh inhaler; Inhale 1 puff daily Rinse mouth after use  -     XR chest pa & lateral; Future    Pneumonia due to COVID-19 virus  -     XR chest pa & lateral; Future    Type 2 diabetes mellitus with microalbuminuria, unspecified whether long term insulin use (HCC)    Stage 3 chronic kidney disease, unspecified whether stage 3a or 3b CKD  -     Basic metabolic panel; Future    Nephrolithiasis    Cough  -     fluticasone-vilanterol (BREO ELLIPTA) 100-25 mcg/inh inhaler; Inhale 1 puff daily Rinse mouth after use  Corticosteroid dependence (HCC)  -     predniSONE 5 mg tablet; Take 1 tablet (5 mg total) by mouth daily Take 7 5 mg daily for 2 weeks then 5 mg daily for 2 weeks then 2 5 mg daily for 2 weeks then stop    Hypercalcemia  -     Basic metabolic panel; Future    Petechial eruption      ______________________________________________________________________    HPI:    Isa Preciado presents today for follow-up of   Sarcoidosis  In summary patient has history of sarcoidosis diagnosed with biopsy of a lymph node, he had multiple lesions specially lymph nodes and bone lesions on PET scan that initially raise concern of malignancy  He was treated with corticosteroids and his main symptoms at that time were cough and dyspnea, he was on inhaled steroids for possible asthma at some point but all symptoms were attributed to sarcoidosis and improved  After we weaned corticosteroids he had hyperglycemia that caused him to have renal injury which improved and also kidney stones    Patient remained after that on corticosteroids, we tried to introduce Plaquenil but his cough  Relapse after few months from weaning the steroids again  Patient remained on steroids for the past few years on and off, and currently on 10 mg daily and his symptoms has been well controlled with no shortness of breath or wheezing or chest pain,  And no cough  Patient follows with Nephrology and Urology  Also he has diabetes with hyperglycemia and currently follows with Endocrinology  His blood sugar has not been well controlled probably from corticosteroids  Patient had COVID-19 infection she couple of months ago and was admitted to the hospital for few days requiring few L of oxygen and was discharged without oxygen and he recovered completely  Currently he has only very mild dyspnea on exertion but denies any other complaints  Except having some small erythematous rashes on his leg and arms          Current Medications:    Current Outpatient Medications:     ACCU-CHEK SATYA PLUS test strip, TEST TID, Disp: , Rfl: 4    Continuous Blood Gluc  (FREESTYLE RILEY READER) RADHA, Use as directed, Disp: , Rfl: 0    Continuous Blood Gluc Sensor ([address] [place] [address]) [facility], Use as directed, Disp: , Rfl: 0    insulin aspart (NovoLOG) 100 units/mL injection, Inject under the skin 3 (three) times a day before meals, Disp: , Rfl:     Insulin Disposable Pump (V-Go 20) KIT, Use, Disp: , Rfl:     LANTUS SOLOSTAR injection pen 100 units/mL, Inject 1 pen into the skin daily, Disp: , Rfl: 1    metFORMIN (GLUCOPHAGE) 500 mg tablet, Take 500 mg by mouth 2 (two) times a day with meals, Disp: , Rfl:     simvastatin (ZOCOR) 10 mg tablet, Take 10 mg by mouth daily at bedtime, Disp: , Rfl:     ascorbic acid (VITAMIN C) 1000 MG tablet, Take 1 tablet (1,000 mg total) by mouth every 12 (twelve) hours for 1 dose, Disp: 1 tablet, Rfl: 0    fluticasone-vilanterol (BREO ELLIPTA) 100-25 mcg/inh inhaler, Inhale 1 puff daily Rinse mouth after use , Disp: 2 each, Rfl: 0    predniSONE 5 mg tablet, Take 1 tablet (5 mg total) by mouth daily Take 7 5 mg daily for 2 weeks then 5 mg daily for 2 weeks then 2 5 mg daily for 2 weeks then stop, Disp: 60 tablet, Rfl: 0    Review of Systems:  Review of Systems   Constitutional: Negative  HENT: Negative  Eyes: Negative  Respiratory: Positive for shortness of breath  Cardiovascular: Negative  Gastrointestinal: Negative  Endocrine: Negative  Genitourinary: Negative  Musculoskeletal: Negative  Skin: Negative  Allergic/Immunologic: Negative  Neurological: Negative  Hematological: Negative  Psychiatric/Behavioral: Negative  Aside from what is mentioned in the HPI, the review of systems is otherwise negative    Past medical history, surgical history, and family history were reviewed and updated as appropriate    Social history updates:  Social History     Tobacco Use   Smoking Status Never Smoker   Smokeless Tobacco Never Used       PhysicalExamination:  Vitals:   /80   Pulse 78   Temp 98 °F (36 7 °C)   Resp 18   Ht 6' 1" (1 854 m)   Wt 82 6 kg (182 lb)   SpO2 99%   BMI 24 01 kg/m²       General: alert, not in acute distress  HEENT: PERRL, no icteric sclera or cyanosis, no thrush  Neck:  Supple, no lymphadenopathy or thyromegaly, no JVD  Lungs:  Equal breath sounds and clear auscultations bilaterally, no wheezing or crackles  Heart: S1 S2 regular, no murmurs or gallops  Abdomen: soft, nontender, bowel sounds  present  Extremities: No edema, no clubbing or cyanosis  Neuro: Alert and oriented x 3, no focal neuro deficits   Skin: intact,   Few petechial rashes specially on left shin and arms    Diagnostic Data:  Labs:   I personally reviewed the most recent laboratory data pertinent to today's visit    Lab Results   Component Value Date    WBC 8 50 01/11/2021    HGB 15 0 01/11/2021    HCT 45 1 01/11/2021    MCV 92 01/11/2021     01/11/2021     Lab Results Component Value Date    SODIUM 135 (L) 01/11/2021    K 4 2 01/11/2021    CO2 24 01/11/2021     01/11/2021    BUN 32 (H) 01/11/2021    CREATININE 0 94 01/11/2021    CALCIUM 8 9 01/11/2021       PFT results: The most recent pulmonary function tests were reviewed  · Normal lung volumes  · Normal airflow on vital capacity  · Normal diffusion capacity  · Normal airway resistance as indicated by the specific airway conductance  · No bronchial hyperresponsiveness on the methacholine challenge test    Imaging:  I personally reviewed the images on the [facility] system pertinent to today's visit    Chest x-ray reviewed on PACs: Mild bilateral ground-glass changes  Other studies:    Pathology:  Final Diagnosis   A  Right inguinal lymph node:     - Noncaseasting granulomatous inflammation consistent with sarcoidosis in the appropriate clinical setting        - Special stains for acid fast bacilli, fungal organisms and spirochetes are negative  - No malignancy identified         - Flow cytometric analysis ([facility] report [id number]; interpreted by KATHY Delacruz MD):       -- Interpretation:  There is no phenotypic evidence of non-Hodgkin lymphoma  -- Immunophenotypic analysis:          - Viability 7AAD: 85%          - The B-cells (31% of total) are polytypic  The T-cells (55% of total) show no pan T-cell antigen deletion  The            CD4: CD8 ratio is within normal limits            - The following antigens were evaluated by flow cytometry and found to be expressed by the             appropriate cells:  CD19, CD20, CD10, CD11c, CD23, FMC-7, kappa, lambda, CD11b, CD13,             CD14, CD16, CD33, CD64, CD2, CD3, CD4, CD5, CD7, CD8, CD56, CD57, CD38, CD34, ,            HLA-DR, and CD45             Bart Santos MD

## 2021-04-06 NOTE — ASSESSMENT & PLAN NOTE
Lab Results   Component Value Date    EGFR 88 01/11/2021    EGFR 74 01/09/2021    EGFR 69 01/08/2021    CREATININE 0 94 01/11/2021    CREATININE 1 09 01/09/2021    CREATININE 1 16 01/08/2021       Stable kidney function, continue to follow with Nephrology

## 2021-04-06 NOTE — ASSESSMENT & PLAN NOTE
Secondary to some mild skin irritation/ trauma specially on his  Left shin  I believe this is secondary to some vascular fragility from corticosteroids and hyperglycemia  Will wean steroids  Will continue to monitor  No evidence of coagulopathy

## 2021-04-06 NOTE — ASSESSMENT & PLAN NOTE
Will start weaning steroids very slowly as below  Will repeat BMP in 2 months   will consider other treatment in the future such as methotrexate if needed but probably his sarcoid is currently in remission completely

## 2021-04-06 NOTE — ASSESSMENT & PLAN NOTE
Will taper steroids very slowly, 7 5 mg daily for 2 weeks then 5 mg daily for 2 weeks then 2 5 mg daily for 2 weeks then he will stop  Hopefully that will help with his diabetes and also the petechiae

## 2021-04-06 NOTE — ASSESSMENT & PLAN NOTE
Resolved completely while on steroids  I will wean steroids off and I told patient if he has cough to start using inhalers and I gave him 2 samples of Breo 100/25

## 2021-06-04 ENCOUNTER — TELEPHONE (OUTPATIENT)
Dept: PULMONOLOGY | Facility: CLINIC | Age: 61
End: 2021-06-04

## 2021-06-04 NOTE — TELEPHONE ENCOUNTER
Spoke to pt to remind him of his appt he said he could not make it that because of an arrangement he had made and that he would call us to reschedule

## 2021-08-16 ENCOUNTER — TELEPHONE (OUTPATIENT)
Dept: PULMONOLOGY | Facility: CLINIC | Age: 61
End: 2021-08-16

## 2021-08-16 NOTE — TELEPHONE ENCOUNTER
Patient called and wanted to inform you that his cough has returned and is now having to start taking the Anahy Edmond that you gave him prior from last visit in April  I can call him back and schedule a follow up appointment as well   Please advise

## 2021-09-07 NOTE — TELEPHONE ENCOUNTER
Pt LM that he doesn't think the Breo inhaler that Dr Kyra Arriaga prescribed is working  He has an appt on 10/4  Wanted to see if Dr Kyra Arriaga could prescribe a new inhaler until his appt?  Please advise [phone]

## 2021-10-04 ENCOUNTER — OFFICE VISIT (OUTPATIENT)
Dept: PULMONOLOGY | Facility: CLINIC | Age: 61
End: 2021-10-04
Payer: COMMERCIAL

## 2021-10-04 ENCOUNTER — APPOINTMENT (OUTPATIENT)
Dept: LAB | Facility: HOSPITAL | Age: 61
End: 2021-10-04
Attending: INTERNAL MEDICINE
Payer: COMMERCIAL

## 2021-10-04 VITALS
BODY MASS INDEX: 24.65 KG/M2 | OXYGEN SATURATION: 98 % | RESPIRATION RATE: 18 BRPM | HEART RATE: 76 BPM | DIASTOLIC BLOOD PRESSURE: 78 MMHG | SYSTOLIC BLOOD PRESSURE: 138 MMHG | HEIGHT: 73 IN | WEIGHT: 186 LBS

## 2021-10-04 DIAGNOSIS — I34.1 MVP (MITRAL VALVE PROLAPSE): ICD-10-CM

## 2021-10-04 DIAGNOSIS — J12.82 PNEUMONIA DUE TO COVID-19 VIRUS: ICD-10-CM

## 2021-10-04 DIAGNOSIS — N18.30 STAGE 3 CHRONIC KIDNEY DISEASE, UNSPECIFIED WHETHER STAGE 3A OR 3B CKD (HCC): ICD-10-CM

## 2021-10-04 DIAGNOSIS — E83.52 HYPERCALCEMIA: ICD-10-CM

## 2021-10-04 DIAGNOSIS — D86.9 SARCOIDOSIS: ICD-10-CM

## 2021-10-04 DIAGNOSIS — N20.0 NEPHROLITHIASIS: ICD-10-CM

## 2021-10-04 DIAGNOSIS — U07.1 PNEUMONIA DUE TO COVID-19 VIRUS: ICD-10-CM

## 2021-10-04 DIAGNOSIS — E11.29 TYPE 2 DIABETES MELLITUS WITH MICROALBUMINURIA, UNSPECIFIED WHETHER LONG TERM INSULIN USE (HCC): ICD-10-CM

## 2021-10-04 DIAGNOSIS — D86.9 SARCOIDOSIS: Primary | ICD-10-CM

## 2021-10-04 DIAGNOSIS — R93.89 ABNORMAL CHEST CT: ICD-10-CM

## 2021-10-04 DIAGNOSIS — R80.9 TYPE 2 DIABETES MELLITUS WITH MICROALBUMINURIA, UNSPECIFIED WHETHER LONG TERM INSULIN USE (HCC): ICD-10-CM

## 2021-10-04 DIAGNOSIS — R05.9 COUGH: ICD-10-CM

## 2021-10-04 DIAGNOSIS — Z23 NEEDS FLU SHOT: ICD-10-CM

## 2021-10-04 PROBLEM — F19.20 CORTICOSTEROID DEPENDENCE (HCC): Status: RESOLVED | Noted: 2021-04-06 | Resolved: 2021-10-04

## 2021-10-04 PROBLEM — J96.01 ACUTE RESPIRATORY FAILURE WITH HYPOXIA (HCC): Status: RESOLVED | Noted: 2021-01-05 | Resolved: 2021-10-04

## 2021-10-04 LAB
ANION GAP SERPL CALCULATED.3IONS-SCNC: 7 MMOL/L (ref 4–13)
BASOPHILS # BLD AUTO: 0.04 THOUSANDS/ΜL (ref 0–0.1)
BASOPHILS NFR BLD AUTO: 1 % (ref 0–1)
BUN SERPL-MCNC: 19 MG/DL (ref 5–25)
CALCIUM SERPL-MCNC: 10 MG/DL (ref 8.3–10.1)
CHLORIDE SERPL-SCNC: 105 MMOL/L (ref 100–108)
CO2 SERPL-SCNC: 30 MMOL/L (ref 21–32)
CREAT SERPL-MCNC: 1.45 MG/DL (ref 0.6–1.3)
EOSINOPHIL # BLD AUTO: 0.34 THOUSAND/ΜL (ref 0–0.61)
EOSINOPHIL NFR BLD AUTO: 7 % (ref 0–6)
ERYTHROCYTE [DISTWIDTH] IN BLOOD BY AUTOMATED COUNT: 13.2 % (ref 11.6–15.1)
GFR SERPL CREATININE-BSD FRML MDRD: 52 ML/MIN/1.73SQ M
GLUCOSE SERPL-MCNC: 125 MG/DL (ref 65–140)
HCT VFR BLD AUTO: 44.7 % (ref 36.5–49.3)
HGB BLD-MCNC: 14.5 G/DL (ref 12–17)
IMM GRANULOCYTES # BLD AUTO: 0.01 THOUSAND/UL (ref 0–0.2)
IMM GRANULOCYTES NFR BLD AUTO: 0 % (ref 0–2)
LYMPHOCYTES # BLD AUTO: 1.3 THOUSANDS/ΜL (ref 0.6–4.47)
LYMPHOCYTES NFR BLD AUTO: 27 % (ref 14–44)
MCH RBC QN AUTO: 29.7 PG (ref 26.8–34.3)
MCHC RBC AUTO-ENTMCNC: 32.4 G/DL (ref 31.4–37.4)
MCV RBC AUTO: 92 FL (ref 82–98)
MONOCYTES # BLD AUTO: 0.68 THOUSAND/ΜL (ref 0.17–1.22)
MONOCYTES NFR BLD AUTO: 14 % (ref 4–12)
NEUTROPHILS # BLD AUTO: 2.39 THOUSANDS/ΜL (ref 1.85–7.62)
NEUTS SEG NFR BLD AUTO: 51 % (ref 43–75)
NRBC BLD AUTO-RTO: 0 /100 WBCS
PLATELET # BLD AUTO: 244 THOUSANDS/UL (ref 149–390)
PMV BLD AUTO: 10.2 FL (ref 8.9–12.7)
POTASSIUM SERPL-SCNC: 3.9 MMOL/L (ref 3.5–5.3)
RBC # BLD AUTO: 4.88 MILLION/UL (ref 3.88–5.62)
SODIUM SERPL-SCNC: 142 MMOL/L (ref 136–145)
WBC # BLD AUTO: 4.76 THOUSAND/UL (ref 4.31–10.16)

## 2021-10-04 PROCEDURE — 85025 COMPLETE CBC W/AUTO DIFF WBC: CPT

## 2021-10-04 PROCEDURE — 80048 BASIC METABOLIC PNL TOTAL CA: CPT

## 2021-10-04 PROCEDURE — 90471 IMMUNIZATION ADMIN: CPT

## 2021-10-04 PROCEDURE — 90682 RIV4 VACC RECOMBINANT DNA IM: CPT

## 2021-10-04 PROCEDURE — 36415 COLL VENOUS BLD VENIPUNCTURE: CPT

## 2021-10-04 PROCEDURE — 99215 OFFICE O/P EST HI 40 MIN: CPT | Performed by: INTERNAL MEDICINE

## 2021-10-04 RX ORDER — FLUTICASONE FUROATE, UMECLIDINIUM BROMIDE AND VILANTEROL TRIFENATATE 100; 62.5; 25 UG/1; UG/1; UG/1
1 POWDER RESPIRATORY (INHALATION) DAILY
Qty: 60 BLISTER | Refills: 3 | Status: SHIPPED | OUTPATIENT
Start: 2021-10-04 | End: 2022-03-29

## 2021-10-05 ENCOUNTER — NURSE TRIAGE (OUTPATIENT)
Dept: OTHER | Facility: OTHER | Age: 61
End: 2021-10-05

## 2021-10-05 DIAGNOSIS — Z20.822 EXPOSURE TO COVID-19 VIRUS: Primary | ICD-10-CM

## 2021-10-05 PROCEDURE — U0005 INFEC AGEN DETEC AMPLI PROBE: HCPCS | Performed by: FAMILY MEDICINE

## 2021-10-05 PROCEDURE — U0003 INFECTIOUS AGENT DETECTION BY NUCLEIC ACID (DNA OR RNA); SEVERE ACUTE RESPIRATORY SYNDROME CORONAVIRUS 2 (SARS-COV-2) (CORONAVIRUS DISEASE [COVID-19]), AMPLIFIED PROBE TECHNIQUE, MAKING USE OF HIGH THROUGHPUT TECHNOLOGIES AS DESCRIBED BY CMS-2020-01-R: HCPCS | Performed by: FAMILY MEDICINE

## 2021-10-08 ENCOUNTER — TELEPHONE (OUTPATIENT)
Dept: PULMONOLOGY | Facility: CLINIC | Age: 61
End: 2021-10-08

## 2021-10-08 DIAGNOSIS — D86.9 SARCOIDOSIS: Primary | ICD-10-CM

## 2021-10-08 DIAGNOSIS — E83.52 HYPERCALCEMIA: ICD-10-CM

## 2021-10-08 DIAGNOSIS — N18.30 STAGE 3 CHRONIC KIDNEY DISEASE, UNSPECIFIED WHETHER STAGE 3A OR 3B CKD (HCC): ICD-10-CM

## 2021-10-08 RX ORDER — FOLIC ACID 1 MG/1
1 TABLET ORAL DAILY
Qty: 90 TABLET | Refills: 3 | Status: SHIPPED | OUTPATIENT
Start: 2021-10-08

## 2021-10-08 RX ORDER — PREDNISONE 10 MG/1
20 TABLET ORAL DAILY
Qty: 60 TABLET | Refills: 2 | Status: SHIPPED | OUTPATIENT
Start: 2021-10-08 | End: 2021-12-13

## 2021-10-11 ENCOUNTER — TELEPHONE (OUTPATIENT)
Dept: PULMONOLOGY | Facility: CLINIC | Age: 61
End: 2021-10-11

## 2021-10-12 ENCOUNTER — TELEPHONE (OUTPATIENT)
Dept: PULMONOLOGY | Facility: CLINIC | Age: 61
End: 2021-10-12

## 2021-10-13 DIAGNOSIS — D86.9 SARCOIDOSIS: Primary | ICD-10-CM

## 2021-10-18 ENCOUNTER — HOSPITAL ENCOUNTER (OUTPATIENT)
Dept: CT IMAGING | Facility: HOSPITAL | Age: 61
Discharge: HOME | End: 2021-10-18
Attending: INTERNAL MEDICINE
Payer: COMMERCIAL

## 2021-10-18 DIAGNOSIS — D86.9 SARCOIDOSIS: ICD-10-CM

## 2021-10-18 DIAGNOSIS — R05.9 COUGH: ICD-10-CM

## 2021-10-18 PROCEDURE — 71250 CT THORAX DX C-: CPT

## 2021-10-18 PROCEDURE — G1004 CDSM NDSC: HCPCS

## 2021-10-25 ENCOUNTER — TELEPHONE (OUTPATIENT)
Dept: PULMONOLOGY | Facility: CLINIC | Age: 61
End: 2021-10-25

## 2021-12-06 ENCOUNTER — HOSPITAL ENCOUNTER (OUTPATIENT)
Dept: NON INVASIVE DIAGNOSTICS | Facility: HOSPITAL | Age: 61
Discharge: HOME | End: 2021-12-06
Attending: INTERNAL MEDICINE
Payer: COMMERCIAL

## 2021-12-06 VITALS
SYSTOLIC BLOOD PRESSURE: 112 MMHG | BODY MASS INDEX: 24.65 KG/M2 | HEIGHT: 73 IN | HEART RATE: 68 BPM | WEIGHT: 186 LBS | DIASTOLIC BLOOD PRESSURE: 68 MMHG

## 2021-12-06 DIAGNOSIS — D86.9 SARCOIDOSIS: ICD-10-CM

## 2021-12-06 DIAGNOSIS — I34.1 MVP (MITRAL VALVE PROLAPSE): ICD-10-CM

## 2021-12-06 LAB
AORTIC ROOT: 3.8 CM
APICAL FOUR CHAMBER EJECTION FRACTION: 50 %
ASCENDING AORTA: 2.8 CM
E WAVE DECELERATION TIME: 163 MS
FRACTIONAL SHORTENING: 29 % (ref 28–44)
INTERVENTRICULAR SEPTUM IN DIASTOLE (PARASTERNAL SHORT AXIS VIEW): 0.5 CM
LAAS-AP2: 20.5 CM2
LAAS-AP4: 18.1 CM2
LEFT INTERNAL DIMENSION IN SYSTOLE: 3.7 CM (ref 2.1–4)
LEFT VENTRICULAR INTERNAL DIMENSION IN DIASTOLE: 5.2 CM (ref 5.28–7.87)
LEFT VENTRICULAR POSTERIOR WALL IN END DIASTOLE: 0.5 CM
LEFT VENTRICULAR STROKE VOLUME: 72 ML
MV E'TISSUE VEL-LAT: 11 CM/S
MV E'TISSUE VEL-SEP: 8 CM/S
MV PEAK A VEL: 0.68 M/S
MV PEAK E VEL: 63 CM/S
MV STENOSIS PRESSURE HALF TIME: 0 MS
PISA MRMAX VEL: 0.39 M/S
PISA RADIUS: 0.4 CM
RIGHT ATRIAL 2D VOLUME: 26 ML
RIGHT ATRIUM AREA SYSTOLE A4C: 12.4 CM2
RIGHT VENTRICLE ID DIMENSION: 3.9 CM
SL CV LV EF: 55
SL CV PED ECHO LEFT VENTRICLE DIASTOLIC VOLUME (MOD BIPLANE) 2D: 130 ML
SL CV PED ECHO LEFT VENTRICLE SYSTOLIC VOLUME (MOD BIPLANE) 2D: 57 ML
TRICUSPID VALVE PEAK REGURGITATION VELOCITY: 1.83 M/S
TRICUSPID VALVE S': 54 CM/S
TV PEAK GRADIENT: 13 MMHG
Z-SCORE OF LEFT VENTRICULAR DIMENSION IN END SYSTOLE: -2.15

## 2021-12-06 PROCEDURE — 93306 TTE W/DOPPLER COMPLETE: CPT | Performed by: INTERNAL MEDICINE

## 2021-12-06 PROCEDURE — 93306 TTE W/DOPPLER COMPLETE: CPT

## 2021-12-13 ENCOUNTER — OFFICE VISIT (OUTPATIENT)
Dept: PULMONOLOGY | Facility: CLINIC | Age: 61
End: 2021-12-13
Payer: COMMERCIAL

## 2021-12-13 VITALS
HEIGHT: 73 IN | WEIGHT: 182 LBS | HEART RATE: 78 BPM | SYSTOLIC BLOOD PRESSURE: 122 MMHG | OXYGEN SATURATION: 99 % | BODY MASS INDEX: 24.12 KG/M2 | RESPIRATION RATE: 18 BRPM | DIASTOLIC BLOOD PRESSURE: 68 MMHG

## 2021-12-13 DIAGNOSIS — U07.1 PNEUMONIA DUE TO COVID-19 VIRUS: ICD-10-CM

## 2021-12-13 DIAGNOSIS — R93.89 ABNORMAL CHEST CT: ICD-10-CM

## 2021-12-13 DIAGNOSIS — D86.9 SARCOIDOSIS: Primary | ICD-10-CM

## 2021-12-13 DIAGNOSIS — J12.82 PNEUMONIA DUE TO COVID-19 VIRUS: ICD-10-CM

## 2021-12-13 DIAGNOSIS — N18.31 STAGE 3A CHRONIC KIDNEY DISEASE (HCC): ICD-10-CM

## 2021-12-13 DIAGNOSIS — I34.1 MVP (MITRAL VALVE PROLAPSE): ICD-10-CM

## 2021-12-13 DIAGNOSIS — E83.52 HYPERCALCEMIA: ICD-10-CM

## 2021-12-13 DIAGNOSIS — M43.9 COMPRESSION DEFORMITY OF VERTEBRA: ICD-10-CM

## 2021-12-13 DIAGNOSIS — R05.9 COUGH: ICD-10-CM

## 2021-12-13 PROCEDURE — 99215 OFFICE O/P EST HI 40 MIN: CPT | Performed by: INTERNAL MEDICINE

## 2021-12-13 PROCEDURE — 93000 ELECTROCARDIOGRAM COMPLETE: CPT | Performed by: INTERNAL MEDICINE

## 2021-12-13 RX ORDER — PREDNISONE 1 MG/1
TABLET ORAL
Qty: 70 TABLET | Refills: 0 | Status: SHIPPED | OUTPATIENT
Start: 2021-12-13 | End: 2022-01-10

## 2022-03-22 LAB
25(OH)D3 SERPL-MCNC: 38 NG/ML (ref 30–100)
ALBUMIN SERPL-MCNC: 4.2 G/DL (ref 3.6–5.1)
ALBUMIN/GLOB SERPL: 1.9 (CALC) (ref 1–2.5)
ALP SERPL-CCNC: 78 U/L (ref 35–144)
ALT SERPL-CCNC: 33 U/L (ref 9–46)
AST SERPL-CCNC: 13 U/L (ref 10–35)
BASOPHILS # BLD AUTO: 39 CELLS/UL (ref 0–200)
BASOPHILS NFR BLD AUTO: 1 %
BILIRUB SERPL-MCNC: 0.9 MG/DL (ref 0.2–1.2)
BUN SERPL-MCNC: 21 MG/DL (ref 7–25)
BUN/CREAT SERPL: 14 (CALC) (ref 6–22)
CALCIUM SERPL-MCNC: 9.4 MG/DL (ref 8.6–10.3)
CHLORIDE SERPL-SCNC: 101 MMOL/L (ref 98–110)
CO2 SERPL-SCNC: 26 MMOL/L (ref 20–32)
CREAT SERPL-MCNC: 1.5 MG/DL (ref 0.7–1.25)
EOSINOPHIL # BLD AUTO: 218 CELLS/UL (ref 15–500)
EOSINOPHIL NFR BLD AUTO: 5.6 %
ERYTHROCYTE [DISTWIDTH] IN BLOOD BY AUTOMATED COUNT: 12.2 % (ref 11–15)
GLOBULIN SER CALC-MCNC: 2.2 G/DL (CALC) (ref 1.9–3.7)
GLUCOSE SERPL-MCNC: 261 MG/DL (ref 65–99)
HCT VFR BLD AUTO: 42.1 % (ref 38.5–50)
HGB BLD-MCNC: 13.8 G/DL (ref 13.2–17.1)
LYMPHOCYTES # BLD AUTO: 928 CELLS/UL (ref 850–3900)
LYMPHOCYTES NFR BLD AUTO: 23.8 %
MCH RBC QN AUTO: 31.4 PG (ref 27–33)
MCHC RBC AUTO-ENTMCNC: 32.8 G/DL (ref 32–36)
MCV RBC AUTO: 95.7 FL (ref 80–100)
MONOCYTES # BLD AUTO: 410 CELLS/UL (ref 200–950)
MONOCYTES NFR BLD AUTO: 10.5 %
NEUTROPHILS # BLD AUTO: 2305 CELLS/UL (ref 1500–7800)
NEUTROPHILS NFR BLD AUTO: 59.1 %
PLATELET # BLD AUTO: 214 THOUSAND/UL (ref 140–400)
PMV BLD REES-ECKER: 10.8 FL (ref 7.5–12.5)
POTASSIUM SERPL-SCNC: 4.3 MMOL/L (ref 3.5–5.3)
PROT SERPL-MCNC: 6.4 G/DL (ref 6.1–8.1)
RBC # BLD AUTO: 4.4 MILLION/UL (ref 4.2–5.8)
SL AMB EGFR AFRICAN AMERICAN: 58 ML/MIN/1.73M2
SL AMB EGFR NON AFRICAN AMERICAN: 50 ML/MIN/1.73M2
SODIUM SERPL-SCNC: 135 MMOL/L (ref 135–146)
WBC # BLD AUTO: 3.9 THOUSAND/UL (ref 3.8–10.8)

## 2022-03-29 ENCOUNTER — OFFICE VISIT (OUTPATIENT)
Dept: PULMONOLOGY | Facility: CLINIC | Age: 62
End: 2022-03-29
Payer: COMMERCIAL

## 2022-03-29 VITALS
WEIGHT: 188 LBS | OXYGEN SATURATION: 99 % | RESPIRATION RATE: 18 BRPM | SYSTOLIC BLOOD PRESSURE: 112 MMHG | HEART RATE: 70 BPM | BODY MASS INDEX: 24.92 KG/M2 | DIASTOLIC BLOOD PRESSURE: 70 MMHG | HEIGHT: 73 IN

## 2022-03-29 DIAGNOSIS — R80.9 TYPE 2 DIABETES MELLITUS WITH MICROALBUMINURIA, UNSPECIFIED WHETHER LONG TERM INSULIN USE (HCC): ICD-10-CM

## 2022-03-29 DIAGNOSIS — D86.9 SARCOIDOSIS: Primary | ICD-10-CM

## 2022-03-29 DIAGNOSIS — R60.0 EDEMA OF BOTH LEGS: ICD-10-CM

## 2022-03-29 DIAGNOSIS — Z79.899 METHOTREXATE, LONG TERM, CURRENT USE: ICD-10-CM

## 2022-03-29 DIAGNOSIS — E11.29 TYPE 2 DIABETES MELLITUS WITH MICROALBUMINURIA, UNSPECIFIED WHETHER LONG TERM INSULIN USE (HCC): ICD-10-CM

## 2022-03-29 DIAGNOSIS — N18.31 STAGE 3A CHRONIC KIDNEY DISEASE (HCC): ICD-10-CM

## 2022-03-29 DIAGNOSIS — R05.9 COUGH: ICD-10-CM

## 2022-03-29 DIAGNOSIS — N20.0 NEPHROLITHIASIS: ICD-10-CM

## 2022-03-29 PROBLEM — Z79.631 METHOTREXATE, LONG TERM, CURRENT USE: Status: ACTIVE | Noted: 2022-03-29

## 2022-03-29 PROCEDURE — 99214 OFFICE O/P EST MOD 30 MIN: CPT | Performed by: INTERNAL MEDICINE

## 2022-03-29 NOTE — ASSESSMENT & PLAN NOTE
Lab Results   Component Value Date    EGFR 52 10/04/2021    EGFR 88 01/11/2021    EGFR 74 01/09/2021    CREATININE 1 50 (H) 03/19/2022    CREATININE 1 45 (H) 10/04/2021    CREATININE 0 94 01/11/2021   Check BMP in few months, continue to follow with Nephrology    Currently stable creatinine but slightly increased from earlier last year

## 2022-03-29 NOTE — PROGRESS NOTES
Progress note - Pulmonary Medicine   Zhen Crouch 62 y o  male MRN: [id number]       Impression & Plan:     Sarcoidosis  Including Pulmonary, lymph nodes and bone with hyperglycemia  Currently in remission on low-dose methotrexate  Continue methotrexate 7 5 and folic acid  Chest CT scan shows stability of nodules with no new lesions  Will consider PFTs in the future  We can consider inhaled corticosteroids in the future if needed    Nephrolithiasis  Continue to follow with Urology, continue low calcium intake  Will check BMP in the future    CKD (chronic kidney disease) stage 3, GFR 30-59 ml/min ([facility])  Lab Results   Component Value Date    EGFR 52 10/04/2021    EGFR 88 01/11/2021    EGFR 74 01/09/2021    CREATININE 1 50 (H) 03/19/2022    CREATININE 1 45 (H) 10/04/2021    CREATININE 0 94 01/11/2021   Check BMP in few months, continue to follow with Nephrology  Currently stable creatinine but slightly increased from earlier last year    Methotrexate, long term, current use  Will check CBC and BMP in 3 months  Continue folic acid  Type 2 diabetes mellitus with microalbuminuria ([facility])    Lab Results   Component Value Date    HGBA1C 9 0 02/22/2020   Patient will continue to follow with Endocrinology, his A1cs improving  Currently off steroids  Edema of both legs  No evidence of cardiomyopathy on echocardiogram   I believe this is 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 and also eating salty snacks  I counseled him about that and he will try to change his diet  Diagnoses and all orders for this visit:    Sarcoidosis  -     CBC and differential; Future  -     Basic metabolic panel; Future    Cough    Nephrolithiasis    Stage 3a chronic kidney disease (HCC)  -     Basic metabolic panel;  Future    Type 2 diabetes mellitus with microalbuminuria, unspecified whether long term insulin use ([facility])    Methotrexate, long term, current use  -     CBC and differential; Future  - Basic metabolic panel; Future    Edema of both legs      ______________________________________________________________________    HPI:    Teja Wilkins presents today for follow-up of sarcoidosis involving bone and lymph nodes and lungs the associated with hypercalcemia and nephrolithiasis  Patient was treated on and off with long courses of corticosteroids but was weaned off few months ago and started on methotrexate 7 5 mg with folic acid and has been tolerating well  He presents today feeling much better, denies significant shortness of breath, denies chest pain or wheezing, he still has very minimal cough  Denies fever or chills or night sweats  He gained some weight but he attributes to his lifestyle, 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, and also to insulin  Last time he passed a kidney stone was in October  He has diabetes and currently on insulin  Partially his blood sugar was uncontrolled due to steroids but currently weaned off        Current Medications:    Current Outpatient Medications:     Continuous Blood Gluc  (FREESTYLE RILEY READER) RADHA, Use as directed, Disp: , Rfl: 0    Continuous Blood Gluc Sensor ([address] [place] [address]) [facility], Use as directed, Disp: , Rfl: 0    folic acid (KP Folic Acid) 1 mg tablet, Take 1 tablet (1 mg total) by mouth daily, Disp: 90 tablet, Rfl: 3    insulin aspart (NovoLOG) 100 units/mL injection, Inject under the skin 3 (three) times a day before meals, Disp: , Rfl:     Insulin Disposable Pump (V-Go 20) KIT, Use, Disp: , Rfl:     LANTUS SOLOSTAR injection pen 100 units/mL, Inject 1 pen into the skin daily, Disp: , Rfl: 1    metFORMIN (GLUCOPHAGE) 500 mg tablet, Take 500 mg by mouth 2 (two) times a day with meals, Disp: , Rfl:     methotrexate 2 5 mg tablet, Take 3 tablets (7 5 mg total) by mouth once a week, Disp: 12 tablet, Rfl: 6    simvastatin (ZOCOR) 10 mg tablet, Take 10 mg by mouth daily at bedtime, Disp: , Rfl: Review of Systems:  Review of Systems   Constitutional: Negative  HENT: Negative  Eyes: Negative  Respiratory: Positive for cough  Cardiovascular: Positive for leg swelling  Gastrointestinal: Negative  Endocrine: Negative  Genitourinary: Negative  Musculoskeletal: Negative  Skin: Negative  Allergic/Immunologic: Negative  Neurological: Negative  Hematological: Negative  Psychiatric/Behavioral: Negative  Aside from what is mentioned in the HPI, the review of systems is otherwise negative    Past medical history, surgical history, and family history were reviewed and updated as appropriate    Social history updates:  Social History     Tobacco Use   Smoking Status Never Smoker   Smokeless Tobacco Never Used       PhysicalExamination:  Vitals:   /70   Pulse 70   Resp 18   Ht 6' 1" (1 854 m)   Wt 85 3 kg (188 lb)   SpO2 99%   BMI 24 80 kg/m²     General: alert, not in acute distress  HEENT: PERRL, no icteric sclera or cyanosis, no thrush  Neck:  Supple, no lymphadenopathy or thyromegaly, no JVD  Lungs:  Equal breath sounds and clear auscultations bilaterally, no wheezing or crackles  Heart: S1S2 regular, no murmures or gallops  Abdomen: soft, non-tender, bowel sounds  present  Extrimities: + mild pitting edema, no clubbing or cyanosis  Neuro: Alert and oriented x 3, no focal neurodeficits   Skin: intact, no rashes    Diagnostic Data:  Labs: I personally reviewed the most recent laboratory data pertinent to today's visit    Lab Results   Component Value Date    WBC 3 9 03/19/2022    HGB 13 8 03/19/2022    HCT 42 1 03/19/2022    MCV 95 7 03/19/2022     03/19/2022     Lab Results   Component Value Date    SODIUM 135 03/19/2022    K 4 3 03/19/2022    CO2 26 03/19/2022     03/19/2022    BUN 21 03/19/2022    CREATININE 1 50 (H) 03/19/2022    CALCIUM 9 4 03/19/2022       PFT results: The most recent pulmonary function tests were reviewed    · Normal lung volumes  · Normal airflow on vital capacity  · Normal diffusion capacity  · Normal airway resistance as indicated by the specific airway conductance  · No bronchial hyperresponsiveness on the methacholine challenge test     Imaging:  I personally reviewed the images on the [facility] system pertinent to today's visit    Chest CT scan reviewed on PACs:  Grossly clear and stable lung parenchyma, stable scattered nodularity with some calcifications secondary to sarcoidosis measuring 2-4 mm  Mediastinal lymphadenopathy stable with some calcifications    Chest x-ray reviewed on PACs: Mild bilateral ground-glass changes      Other studies:      Echocardiogram:  LVEF 55%, normal RV      Pathology:  Final Diagnosis   A   Right inguinal lymph node:     - Noncaseasting granulomatous inflammation consistent with sarcoidosis in the appropriate clinical setting  Josh Jones- Special stains for acid fast bacilli, fungal organisms and spirochetes are negative      - No malignancy identified         - Flow cytometric analysis ([facility] report [id number]; interpreted by KATHY Sanders MD):       -- Interpretation:  There is no phenotypic evidence of non-Hodgkin lymphoma        -- Immunophenotypic analysis:          - Viability 7AAD: 85%          - The B-cells (31% of total) are polytypic  The T-cells (55% of total) show no pan T-cell antigen deletion  The            CD4:CD8 ratio is within normal limits            - The following antigens were evaluated by flow cytometry and found to be expressed by the             appropriate cells:  CD19, CD20, CD10, CD11c, CD23, FMC-7, kappa, lambda, CD11b, CD13,             CD14, CD16, CD33, CD64, CD2, CD3, CD4, CD5, CD7, CD8, CD56, CD57, CD38, CD34, ,            HLA-DR, and CD45                   Karen Lazaro MD

## 2022-03-29 NOTE — ASSESSMENT & PLAN NOTE
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

## 2022-03-29 NOTE — ASSESSMENT & PLAN NOTE
Lab Results   Component Value Date    HGBA1C 9 0 02/22/2020   Patient will continue to follow with Endocrinology, his A1cs improving  Currently off steroids

## 2022-03-29 NOTE — ASSESSMENT & PLAN NOTE
Including Pulmonary, lymph nodes and bone with hyperglycemia  Currently in remission on low-dose methotrexate  Continue methotrexate 7 5 and folic acid  Chest CT scan shows stability of nodules with no new lesions  Will consider PFTs in the future    We can consider inhaled corticosteroids in the future if needed

## 2022-04-20 DIAGNOSIS — D86.9 SARCOIDOSIS: ICD-10-CM

## 2022-05-13 ENCOUNTER — OCCMED (OUTPATIENT)
Dept: URGENT CARE | Facility: MEDICAL CENTER | Age: 62
End: 2022-05-13
Payer: OTHER MISCELLANEOUS

## 2022-05-13 DIAGNOSIS — M54.12 CERVICAL RADICULOPATHY: Primary | ICD-10-CM

## 2022-05-13 DIAGNOSIS — M54.2 CERVICALGIA: ICD-10-CM

## 2022-05-13 DIAGNOSIS — S20.211A RIB CONTUSION, RIGHT, INITIAL ENCOUNTER: ICD-10-CM

## 2022-05-13 PROCEDURE — 99283 EMERGENCY DEPT VISIT LOW MDM: CPT

## 2022-05-13 PROCEDURE — G0382 LEV 3 HOSP TYPE B ED VISIT: HCPCS

## 2022-05-23 ENCOUNTER — APPOINTMENT (OUTPATIENT)
Dept: URGENT CARE | Facility: MEDICAL CENTER | Age: 62
End: 2022-05-23
Payer: OTHER MISCELLANEOUS

## 2022-05-23 PROCEDURE — 99213 OFFICE O/P EST LOW 20 MIN: CPT | Performed by: PHYSICIAN ASSISTANT

## 2022-07-10 LAB
BASOPHILS # BLD AUTO: 39 CELLS/UL (ref 0–200)
BASOPHILS NFR BLD AUTO: 0.9 %
BUN SERPL-MCNC: 19 MG/DL (ref 7–25)
BUN/CREAT SERPL: 14 (CALC) (ref 6–22)
CALCIUM SERPL-MCNC: 10.4 MG/DL (ref 8.6–10.3)
CHLORIDE SERPL-SCNC: 106 MMOL/L (ref 98–110)
CO2 SERPL-SCNC: 29 MMOL/L (ref 20–32)
CREAT SERPL-MCNC: 1.4 MG/DL (ref 0.7–1.25)
EOSINOPHIL # BLD AUTO: 280 CELLS/UL (ref 15–500)
EOSINOPHIL NFR BLD AUTO: 6.5 %
ERYTHROCYTE [DISTWIDTH] IN BLOOD BY AUTOMATED COUNT: 12.9 % (ref 11–15)
GLUCOSE SERPL-MCNC: 123 MG/DL (ref 65–99)
HCT VFR BLD AUTO: 41.2 % (ref 38.5–50)
HGB BLD-MCNC: 13.8 G/DL (ref 13.2–17.1)
LYMPHOCYTES # BLD AUTO: 1213 CELLS/UL (ref 850–3900)
LYMPHOCYTES NFR BLD AUTO: 28.2 %
MCH RBC QN AUTO: 31.4 PG (ref 27–33)
MCHC RBC AUTO-ENTMCNC: 33.5 G/DL (ref 32–36)
MCV RBC AUTO: 93.8 FL (ref 80–100)
MONOCYTES # BLD AUTO: 516 CELLS/UL (ref 200–950)
MONOCYTES NFR BLD AUTO: 12 %
NEUTROPHILS # BLD AUTO: 2253 CELLS/UL (ref 1500–7800)
NEUTROPHILS NFR BLD AUTO: 52.4 %
PLATELET # BLD AUTO: 238 THOUSAND/UL (ref 140–400)
PMV BLD REES-ECKER: 10.2 FL (ref 7.5–12.5)
POTASSIUM SERPL-SCNC: 4.1 MMOL/L (ref 3.5–5.3)
RBC # BLD AUTO: 4.39 MILLION/UL (ref 4.2–5.8)
SL AMB EGFR AFRICAN AMERICAN: 63 ML/MIN/1.73M2
SL AMB EGFR NON AFRICAN AMERICAN: 54 ML/MIN/1.73M2
SODIUM SERPL-SCNC: 141 MMOL/L (ref 135–146)
WBC # BLD AUTO: 4.3 THOUSAND/UL (ref 3.8–10.8)

## 2022-08-23 ENCOUNTER — TELEPHONE (OUTPATIENT)
Dept: PULMONOLOGY | Facility: CLINIC | Age: 62
End: 2022-08-23

## 2022-08-23 DIAGNOSIS — Z79.631 METHOTREXATE, LONG TERM, CURRENT USE: Primary | ICD-10-CM

## 2022-08-23 NOTE — TELEPHONE ENCOUNTER
Patient Martín Burch is requesting a refill for     Medication(s)  name Metformin tablets    Dose 500mg     Last office Visit 3/29/22    [facility] [place] 90d    PT LM RE: HIS ENDOCRINOLOGIST CLOSED HIS OFFICE AND HE NEEDS JUST THIS ONE REFILL UNTIL HE FINDS A NEW ONE   NOT SURE IF WE CAN DO THIS OR NOT

## 2022-08-29 ENCOUNTER — OFFICE VISIT (OUTPATIENT)
Dept: FAMILY MEDICINE CLINIC | Facility: CLINIC | Age: 62
End: 2022-08-29
Payer: COMMERCIAL

## 2022-08-29 VITALS
TEMPERATURE: 98.2 F | SYSTOLIC BLOOD PRESSURE: 102 MMHG | BODY MASS INDEX: 24.94 KG/M2 | WEIGHT: 188.2 LBS | HEIGHT: 73 IN | DIASTOLIC BLOOD PRESSURE: 62 MMHG

## 2022-08-29 DIAGNOSIS — E11.29 TYPE 2 DIABETES MELLITUS WITH MICROALBUMINURIA, UNSPECIFIED WHETHER LONG TERM INSULIN USE (HCC): Primary | ICD-10-CM

## 2022-08-29 DIAGNOSIS — M77.11 RIGHT LATERAL EPICONDYLITIS: ICD-10-CM

## 2022-08-29 DIAGNOSIS — Z12.5 SCREENING FOR PROSTATE CANCER: ICD-10-CM

## 2022-08-29 DIAGNOSIS — E83.52 HYPERCALCEMIA: ICD-10-CM

## 2022-08-29 DIAGNOSIS — N40.1 BENIGN PROSTATIC HYPERPLASIA WITH NOCTURIA: ICD-10-CM

## 2022-08-29 DIAGNOSIS — R35.1 BENIGN PROSTATIC HYPERPLASIA WITH NOCTURIA: ICD-10-CM

## 2022-08-29 DIAGNOSIS — E55.9 VITAMIN D DEFICIENCY: ICD-10-CM

## 2022-08-29 DIAGNOSIS — Z79.631 METHOTREXATE, LONG TERM, CURRENT USE: ICD-10-CM

## 2022-08-29 DIAGNOSIS — I34.1 MVP (MITRAL VALVE PROLAPSE): ICD-10-CM

## 2022-08-29 DIAGNOSIS — D86.9 SARCOIDOSIS: ICD-10-CM

## 2022-08-29 DIAGNOSIS — Z23 ENCOUNTER FOR IMMUNIZATION: ICD-10-CM

## 2022-08-29 DIAGNOSIS — N18.31 STAGE 3A CHRONIC KIDNEY DISEASE (HCC): ICD-10-CM

## 2022-08-29 DIAGNOSIS — R80.9 TYPE 2 DIABETES MELLITUS WITH MICROALBUMINURIA, UNSPECIFIED WHETHER LONG TERM INSULIN USE (HCC): Primary | ICD-10-CM

## 2022-08-29 DIAGNOSIS — Z13.6 SCREENING FOR CARDIOVASCULAR CONDITION: ICD-10-CM

## 2022-08-29 DIAGNOSIS — I87.2 VENOUS STASIS DERMATITIS OF BOTH LOWER EXTREMITIES: ICD-10-CM

## 2022-08-29 PROBLEM — J12.82 PNEUMONIA DUE TO COVID-19 VIRUS: Status: RESOLVED | Noted: 2021-01-05 | Resolved: 2022-08-29

## 2022-08-29 PROBLEM — R05.9 COUGH: Status: RESOLVED | Noted: 2020-03-16 | Resolved: 2022-08-29

## 2022-08-29 PROBLEM — N40.0 BPH (BENIGN PROSTATIC HYPERPLASIA): Status: ACTIVE | Noted: 2017-09-07

## 2022-08-29 PROBLEM — R93.89 ABNORMAL CHEST CT: Status: RESOLVED | Noted: 2017-09-07 | Resolved: 2022-08-29

## 2022-08-29 PROBLEM — E78.2 MIXED HYPERLIPIDEMIA: Status: ACTIVE | Noted: 2022-08-29

## 2022-08-29 PROBLEM — U07.1 PNEUMONIA DUE TO COVID-19 VIRUS: Status: RESOLVED | Noted: 2021-01-05 | Resolved: 2022-08-29

## 2022-08-29 PROBLEM — R94.8 ABNORMAL POSITRON EMISSION TOMOGRAPHY (PET) SCAN: Status: RESOLVED | Noted: 2018-12-12 | Resolved: 2022-08-29

## 2022-08-29 PROBLEM — R23.3 PETECHIAL ERUPTION: Status: RESOLVED | Noted: 2021-04-06 | Resolved: 2022-08-29

## 2022-08-29 PROCEDURE — 90471 IMMUNIZATION ADMIN: CPT | Performed by: FAMILY MEDICINE

## 2022-08-29 PROCEDURE — 3066F NEPHROPATHY DOC TX: CPT | Performed by: FAMILY MEDICINE

## 2022-08-29 PROCEDURE — 99203 OFFICE O/P NEW LOW 30 MIN: CPT | Performed by: FAMILY MEDICINE

## 2022-08-29 PROCEDURE — 3725F SCREEN DEPRESSION PERFORMED: CPT | Performed by: FAMILY MEDICINE

## 2022-08-29 PROCEDURE — 83036 HEMOGLOBIN GLYCOSYLATED A1C: CPT | Performed by: FAMILY MEDICINE

## 2022-08-29 PROCEDURE — 90677 PCV20 VACCINE IM: CPT | Performed by: FAMILY MEDICINE

## 2022-08-29 RX ORDER — PEN NEEDLE, DIABETIC 31 GX5/16"
NEEDLE, DISPOSABLE MISCELLANEOUS 4 TIMES DAILY
COMMUNITY
Start: 2022-07-24 | End: 2022-10-17 | Stop reason: SDUPTHER

## 2022-08-29 RX ORDER — INSULIN GLARGINE 100 [IU]/ML
INJECTION, SOLUTION SUBCUTANEOUS
Qty: 30 ML | Refills: 1
Start: 2022-08-29 | End: 2022-10-03

## 2022-08-29 RX ORDER — TRIAMCINOLONE ACETONIDE 1 MG/G
CREAM TOPICAL 2 TIMES DAILY
Qty: 30 G | Refills: 3 | Status: SHIPPED | OUTPATIENT
Start: 2022-08-29

## 2022-08-29 RX ORDER — INSULIN LISPRO 100 [IU]/ML
INJECTION, SOLUTION INTRAVENOUS; SUBCUTANEOUS
COMMUNITY
Start: 2022-08-14 | End: 2022-09-21 | Stop reason: SDUPTHER

## 2022-08-29 RX ORDER — PREDNISONE 10 MG/1
TABLET ORAL
Qty: 30 TABLET | Refills: 0 | Status: SHIPPED | OUTPATIENT
Start: 2022-08-29

## 2022-08-29 NOTE — ASSESSMENT & PLAN NOTE
Lab Results   Component Value Date    HGBA1C 9 0 02/22/2020   HbA1c in the office today is 7 1  Continue Lantus 18 units at bedtime, metformin 500 mg twice a day and the insulin pump with NovoLog with boluses 3 times daily  Follow-up with new endocrinologist in October

## 2022-08-29 NOTE — PROGRESS NOTES
Assessment/Plan:    Type 2 diabetes mellitus with microalbuminuria (HCC)    Lab Results   Component Value Date    HGBA1C 9 0 02/22/2020   HbA1c in the office today is 7 1  Continue Lantus 18 units at bedtime, metformin 500 mg twice a day and the insulin pump with NovoLog with boluses 3 times daily  Follow-up with new endocrinologist in October  Sarcoidosis  Continue methotrexate follow-up with Pulmonary  Mixed hyperlipidemia  Continue simvastatin 10 mg daily  Recheck in 6 months    MVP (mitral valve prolapse)  Asymptomatic  Venous insufficiency  Elevate legs as much as possible  Venous stasis dermatitis of both lower extremities  Keep skin moist and soft  Triamcinolone as needed  Diagnoses and all orders for this visit:    Type 2 diabetes mellitus with microalbuminuria, unspecified whether long term insulin use (HCC)  -     Microalbumin / creatinine urine ratio ([facility]); Future  -     Lantus SoloStar 100 units/mL SOPN; 18 units at hs  -     CBC and differential; Future  -     Comprehensive metabolic panel; Future  -     TSH, 3rd generation; Future  -     POCT hemoglobin A1c    Sarcoidosis    Stage 3a chronic kidney disease (HCC)    Right lateral epicondylitis  -     predniSONE 10 mg tablet; 5 x 2 days, 4 x 2 days, 3 x 2 days,2 x 2 days, 1 x 2 days  Vitamin D deficiency  -     Vitamin D 25 hydroxy; Future    Encounter for immunization  -     Pneumococcal Conjugate Vaccine 20-valent (PCV20)    Screening for prostate cancer  -     PSA, Total Screen; Future    Hypercalcemia  -     TSH, 3rd generation; Future    Methotrexate, long term, current use    MVP (mitral valve prolapse)    Screening for cardiovascular condition  -     Lipid panel;  Future    Venous stasis dermatitis of both lower extremities  -     triamcinolone (KENALOG) 0 1 % cream; Apply topically 2 (two) times a day    Benign prostatic hyperplasia with nocturia          Subjective:   Chief Complaint   Patient presents with  Diabetes     No refills needed           Patient ID: John Meng is a 62 y o  male  He presents to the office today to establish as a new patient  He is an insulin-dependent diabetic, has a history of sarcoidosis, hyperlipidemia, MVP, BPH chronic kidney disease  Last colonoscopy was 2 years ago, he sees the eye doctor on a regular basis and is scheduled in a couple of months to see them again  He follows with Pulmonary for his sarcoid, sees endocrinology for his diabetes  The following portions of the patient's history were reviewed and updated as appropriate: allergies, current medications, past family history, past medical history, past social history, past surgical history and problem list   Depression Screening and Follow-up Plan: Patient was screened for depression during today's encounter  They screened negative with a PHQ-2 score of 0       Review of Systems   Constitutional: Negative for appetite change, chills, diaphoresis, fatigue, fever and unexpected weight change  HENT: Negative for congestion, ear pain, hearing loss, nosebleeds, postnasal drip, rhinorrhea, sinus pressure, sore throat, tinnitus and trouble swallowing  Eyes: Negative for photophobia, pain, discharge, redness, itching and visual disturbance  Respiratory: Negative for cough, chest tightness, shortness of breath and wheezing  Cardiovascular: Positive for leg swelling  Negative for chest pain and palpitations  Denies orthopnea , dyspnea on exertion   Gastrointestinal: Negative for abdominal distention, abdominal pain, blood in stool, constipation, diarrhea, nausea and vomiting  Endocrine: Negative  Genitourinary: Negative for difficulty urinating, dysuria, flank pain, frequency, hematuria and urgency  Nocturia x1 per night, able to go back to sleep     Musculoskeletal: Positive for arthralgias (Right elbow, 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  )  Negative for back pain, gait problem, joint swelling and myalgias  Skin: Positive for rash (On his lower extremities)  Negative for pallor and wound  Denies skin lesions   Allergic/Immunologic: Negative for environmental allergies, food allergies and immunocompromised state  Neurological: Negative for dizziness, tremors, seizures, syncope, speech difficulty, weakness, numbness and headaches  Hematological: Negative for adenopathy  Does not bruise/bleed easily  Psychiatric/Behavioral: Negative for behavioral problems, confusion, sleep disturbance and suicidal ideas  The patient is not nervous/anxious  Objective:      /62   Temp 98 2 °F (36 8 °C)   Ht 6' 1" (1 854 m)   Wt 85 4 kg (188 lb 3 2 oz)   BMI 24 83 kg/m²          Physical Exam  Vitals and nursing note reviewed  Constitutional:       Appearance: He is well-developed  HENT:      Head: Normocephalic and atraumatic  Right Ear: Tympanic membrane and ear canal normal       Left Ear: Tympanic membrane and ear canal normal       Nose: Nose normal    Eyes:      General: Scleral icterus present  Extraocular Movements: Extraocular movements intact  Conjunctiva/sclera: Conjunctivae normal       Pupils: Pupils are equal, round, and reactive to light  Neck:      Thyroid: No thyromegaly  Vascular: No JVD  Trachea: No tracheal deviation  Cardiovascular:      Rate and Rhythm: Normal rate and regular rhythm  Heart sounds: No murmur heard  Comments: Multiple varicosities overly lower extremities +2 nonpitting edema  Pulmonary:      Effort: Pulmonary effort is normal       Breath sounds: Normal breath sounds  No wheezing or rales  Abdominal:      General: Bowel sounds are normal       Palpations: Abdomen is soft  There is no mass  Tenderness: There is no abdominal tenderness  There is no guarding or rebound  Musculoskeletal:         General: No tenderness or deformity  Cervical back: Normal range of motion and neck supple  Lymphadenopathy:      Cervical: No cervical adenopathy  Skin:     General: Skin is warm and dry  Capillary Refill: Capillary refill takes less than 2 seconds  Findings: Rash (Stasis changes of the lower extremities  Brawny edema) present  No lesion  Nails: There is no clubbing  Neurological:      Mental Status: He is alert and oriented to person, place, and time  Cranial Nerves: No cranial nerve deficit  Sensory: No sensory deficit  Motor: No weakness or abnormal muscle tone  Coordination: Coordination normal       Deep Tendon Reflexes: Reflexes are normal and symmetric  Reflexes normal    Psychiatric:         Mood and Affect: Mood normal          Behavior: Behavior normal          Thought Content:  Thought content normal          Judgment: Judgment normal

## 2022-08-30 LAB — SL AMB POCT HEMOGLOBIN AIC: 7.1 (ref ?–6.5)

## 2022-08-30 PROCEDURE — 3051F HG A1C>EQUAL 7.0%<8.0%: CPT | Performed by: FAMILY MEDICINE

## 2022-09-03 ENCOUNTER — APPOINTMENT (OUTPATIENT)
Dept: LAB | Facility: MEDICAL CENTER | Age: 62
End: 2022-09-03
Payer: COMMERCIAL

## 2022-09-03 DIAGNOSIS — E83.52 HYPERCALCEMIA: ICD-10-CM

## 2022-09-03 DIAGNOSIS — E11.29 TYPE 2 DIABETES MELLITUS WITH MICROALBUMINURIA, UNSPECIFIED WHETHER LONG TERM INSULIN USE (HCC): ICD-10-CM

## 2022-09-03 DIAGNOSIS — R80.9 TYPE 2 DIABETES MELLITUS WITH MICROALBUMINURIA, UNSPECIFIED WHETHER LONG TERM INSULIN USE (HCC): ICD-10-CM

## 2022-09-03 DIAGNOSIS — Z13.6 SCREENING FOR CARDIOVASCULAR CONDITION: ICD-10-CM

## 2022-09-03 DIAGNOSIS — Z12.5 SCREENING FOR PROSTATE CANCER: ICD-10-CM

## 2022-09-03 DIAGNOSIS — E55.9 VITAMIN D DEFICIENCY: ICD-10-CM

## 2022-09-03 LAB
25(OH)D3 SERPL-MCNC: 43.8 NG/ML (ref 30–100)
ALBUMIN SERPL BCP-MCNC: 3.8 G/DL (ref 3.5–5)
ALP SERPL-CCNC: 91 U/L (ref 46–116)
ALT SERPL W P-5'-P-CCNC: 29 U/L (ref 12–78)
ANION GAP SERPL CALCULATED.3IONS-SCNC: 6 MMOL/L (ref 4–13)
AST SERPL W P-5'-P-CCNC: 21 U/L (ref 5–45)
BASOPHILS # BLD AUTO: 0.03 THOUSANDS/ΜL (ref 0–0.1)
BASOPHILS NFR BLD AUTO: 1 % (ref 0–1)
BILIRUB SERPL-MCNC: 0.77 MG/DL (ref 0.2–1)
BUN SERPL-MCNC: 21 MG/DL (ref 5–25)
CALCIUM SERPL-MCNC: 9.6 MG/DL (ref 8.3–10.1)
CHLORIDE SERPL-SCNC: 109 MMOL/L (ref 96–108)
CHOLEST SERPL-MCNC: 118 MG/DL
CO2 SERPL-SCNC: 27 MMOL/L (ref 21–32)
CREAT SERPL-MCNC: 1.46 MG/DL (ref 0.6–1.3)
CREAT UR-MCNC: 127 MG/DL
EOSINOPHIL # BLD AUTO: 0.15 THOUSAND/ΜL (ref 0–0.61)
EOSINOPHIL NFR BLD AUTO: 4 % (ref 0–6)
ERYTHROCYTE [DISTWIDTH] IN BLOOD BY AUTOMATED COUNT: 13.6 % (ref 11.6–15.1)
GFR SERPL CREATININE-BSD FRML MDRD: 51 ML/MIN/1.73SQ M
GLUCOSE P FAST SERPL-MCNC: 126 MG/DL (ref 65–99)
HCT VFR BLD AUTO: 42.9 % (ref 36.5–49.3)
HDLC SERPL-MCNC: 53 MG/DL
HGB BLD-MCNC: 14.2 G/DL (ref 12–17)
IMM GRANULOCYTES # BLD AUTO: 0.01 THOUSAND/UL (ref 0–0.2)
IMM GRANULOCYTES NFR BLD AUTO: 0 % (ref 0–2)
LDLC SERPL CALC-MCNC: 56 MG/DL (ref 0–100)
LYMPHOCYTES # BLD AUTO: 1.26 THOUSANDS/ΜL (ref 0.6–4.47)
LYMPHOCYTES NFR BLD AUTO: 29 % (ref 14–44)
MCH RBC QN AUTO: 31.8 PG (ref 26.8–34.3)
MCHC RBC AUTO-ENTMCNC: 33.1 G/DL (ref 31.4–37.4)
MCV RBC AUTO: 96 FL (ref 82–98)
MICROALBUMIN UR-MCNC: 9.1 MG/L (ref 0–20)
MICROALBUMIN/CREAT 24H UR: 7 MG/G CREATININE (ref 0–30)
MONOCYTES # BLD AUTO: 0.44 THOUSAND/ΜL (ref 0.17–1.22)
MONOCYTES NFR BLD AUTO: 10 % (ref 4–12)
NEUTROPHILS # BLD AUTO: 2.42 THOUSANDS/ΜL (ref 1.85–7.62)
NEUTS SEG NFR BLD AUTO: 56 % (ref 43–75)
NONHDLC SERPL-MCNC: 65 MG/DL
NRBC BLD AUTO-RTO: 0 /100 WBCS
PLATELET # BLD AUTO: 234 THOUSANDS/UL (ref 149–390)
PMV BLD AUTO: 10.8 FL (ref 8.9–12.7)
POTASSIUM SERPL-SCNC: 4.2 MMOL/L (ref 3.5–5.3)
PROT SERPL-MCNC: 6.9 G/DL (ref 6.4–8.4)
PSA SERPL-MCNC: 0.6 NG/ML (ref 0–4)
RBC # BLD AUTO: 4.47 MILLION/UL (ref 3.88–5.62)
SODIUM SERPL-SCNC: 142 MMOL/L (ref 135–147)
TRIGL SERPL-MCNC: 46 MG/DL
TSH SERPL DL<=0.05 MIU/L-ACNC: 1.33 UIU/ML (ref 0.45–4.5)
WBC # BLD AUTO: 4.31 THOUSAND/UL (ref 4.31–10.16)

## 2022-09-03 PROCEDURE — 82306 VITAMIN D 25 HYDROXY: CPT

## 2022-09-03 PROCEDURE — 36415 COLL VENOUS BLD VENIPUNCTURE: CPT

## 2022-09-03 PROCEDURE — 3066F NEPHROPATHY DOC TX: CPT | Performed by: INTERNAL MEDICINE

## 2022-09-03 PROCEDURE — G0103 PSA SCREENING: HCPCS

## 2022-09-03 PROCEDURE — 85025 COMPLETE CBC W/AUTO DIFF WBC: CPT

## 2022-09-03 PROCEDURE — 80061 LIPID PANEL: CPT

## 2022-09-03 PROCEDURE — 82570 ASSAY OF URINE CREATININE: CPT

## 2022-09-03 PROCEDURE — 84443 ASSAY THYROID STIM HORMONE: CPT

## 2022-09-03 PROCEDURE — 3061F NEG MICROALBUMINURIA REV: CPT | Performed by: INTERNAL MEDICINE

## 2022-09-03 PROCEDURE — 80053 COMPREHEN METABOLIC PANEL: CPT

## 2022-09-03 PROCEDURE — 82043 UR ALBUMIN QUANTITATIVE: CPT

## 2022-09-07 ENCOUNTER — TELEPHONE (OUTPATIENT)
Dept: FAMILY MEDICINE CLINIC | Facility: CLINIC | Age: 62
End: 2022-09-07

## 2022-09-07 ENCOUNTER — OFFICE VISIT (OUTPATIENT)
Dept: PULMONOLOGY | Facility: CLINIC | Age: 62
End: 2022-09-07
Payer: COMMERCIAL

## 2022-09-07 VITALS
OXYGEN SATURATION: 99 % | RESPIRATION RATE: 18 BRPM | DIASTOLIC BLOOD PRESSURE: 70 MMHG | HEART RATE: 70 BPM | HEIGHT: 73 IN | BODY MASS INDEX: 24.52 KG/M2 | WEIGHT: 185 LBS | SYSTOLIC BLOOD PRESSURE: 128 MMHG

## 2022-09-07 DIAGNOSIS — Z79.631 METHOTREXATE, LONG TERM, CURRENT USE: ICD-10-CM

## 2022-09-07 DIAGNOSIS — D86.9 SARCOIDOSIS: Primary | ICD-10-CM

## 2022-09-07 DIAGNOSIS — N18.31 STAGE 3A CHRONIC KIDNEY DISEASE (HCC): ICD-10-CM

## 2022-09-07 DIAGNOSIS — N20.0 NEPHROLITHIASIS: ICD-10-CM

## 2022-09-07 DIAGNOSIS — E83.52 HYPERCALCEMIA: ICD-10-CM

## 2022-09-07 PROCEDURE — 99214 OFFICE O/P EST MOD 30 MIN: CPT | Performed by: INTERNAL MEDICINE

## 2022-09-07 NOTE — PROGRESS NOTES
Progress note - Pulmonary Medicine   Hira Loredo 62 y o  male MRN: [id number]       Impression & Plan:     Sarcoidosis  Appears to be stable and controlled currently with methotrexate  Currently no respiratory symptoms so I would not repeat any imaging study or PFTs and no need for any inhalers    Methotrexate, long term, current use  Will need monitoring of CBC from time to time, I will follow on any labs ordered by his PCP and if not ordered by next visit I will check labs    Nephrolithiasis  Continue low calcium diet and follow with urology and nephrology as needed    CKD (chronic kidney disease) stage 3, GFR 30-59 ml/min ([facility])  Lab Results   Component Value Date    EGFR 51 09/03/2022    EGFR 52 10/04/2021    EGFR 88 01/11/2021    CREATININE 1 46 (H) 09/03/2022    CREATININE 1 40 (H) 07/09/2022    CREATININE 1 50 (H) 03/19/2022   Secondary to hypercalcemia and sarcoidosis, currently stable and calcium level improved with methotrexate therapy    Hypercalcemia  Improved, continue methotrexate and encouraged hydration      Diagnoses and all orders for this visit:    Sarcoidosis    Methotrexate, long term, current use    Nephrolithiasis    Stage 3a chronic kidney disease ([facility] [address] )    Hypercalcemia      ______________________________________________________________________    HPI:    Hira Loredo presents today for follow-up of sarcoidosis involving bone and lymph nodes and lungs the associated with hypercalcemia and nephrolithiasis  Patient presents today feeling fine, denies any shortness of breath or cough, he denies any chest pain or fever or chills or night sweats, denies weight loss, denies legs edema  He had some rashes on his leg that is improving with Kenalog ointment  Otherwise he has been tolerating the methotrexate 7 5 mg weekly with folic acid without any complaints  He denies dizziness or lightheadedness    His diabetes under control but his blood sugar increased slightly with a low-dose prednisone that he has been on starting with 5 mg daily and tapering by 1 mg every few days due to elbow tendinitis      Current Medications:    Current Outpatient Medications:     B-D ULTRAFINE III SHORT PEN 31G X 8 MM MISC, 4 (four) times a day Use to test, Disp: , Rfl:     Continuous Blood Gluc  (FREESTYLE RILEY READER) RADHA, Use as directed, Disp: , Rfl: 0    Continuous Blood Gluc Sensor (FREESTYLE RILEY SENSOR SYSTEM) [facility], Use as directed, Disp: , Rfl: 0    folic acid (KP Folic Acid) 1 mg tablet, Take 1 tablet (1 mg total) by mouth daily, Disp: 90 tablet, Rfl: 3    HumaLOG 100 UNIT/ML injection, USE UP TO 60 UNITS DAILY PER INSTRUCTIONS, Disp: , Rfl:     metFORMIN (GLUCOPHAGE) 500 mg tablet, Take 1 tablet (500 mg total) by mouth 2 (two) times a day with meals, Disp: 60 tablet, Rfl: 0    methotrexate 2 5 mg tablet, TAKE 3 TABLETS(7 5 MG) BY MOUTH 1 TIME A WEEK, Disp: 12 tablet, Rfl: 6    predniSONE 10 mg tablet, 5 x 2 days, 4 x 2 days, 3 x 2 days,2 x 2 days, 1 x 2 days  , Disp: 30 tablet, Rfl: 0    simvastatin (ZOCOR) 10 mg tablet, Take 10 mg by mouth daily at bedtime, Disp: , Rfl:     triamcinolone (KENALOG) 0 1 % cream, Apply topically 2 (two) times a day, Disp: 30 g, Rfl: 3    insulin aspart (NovoLOG) 100 units/mL injection, Inject under the skin 3 (three) times a day before meals (Patient not taking: No sig reported), Disp: , Rfl:     Insulin Disposable Pump (V-Go 20) KIT, Use (Patient not taking: No sig reported), Disp: , Rfl:     Lantus SoloStar 100 units/mL SOPN, 18 units at hs (Patient not taking: Reported on 9/7/2022), Disp: 30 mL, Rfl: 1    Review of Systems:  Review of Systems   Constitutional: Negative  HENT: Negative  Eyes: Negative  Respiratory: Negative  Cardiovascular: Negative  Gastrointestinal: Negative  Endocrine: Negative  Genitourinary: Negative  Musculoskeletal: Negative  Skin: Negative  Allergic/Immunologic: Negative      Neurological: Negative  Hematological: Negative  Psychiatric/Behavioral: Negative  Aside from what is mentioned in the HPI, the review of systems is otherwise negative    Past medical history, surgical history, and family history were reviewed and updated as appropriate    Social history updates:  Social History     Tobacco Use   Smoking Status Never Smoker   Smokeless Tobacco Never Used       PhysicalExamination:  Vitals:   /70   Pulse 70   Resp 18   Ht 6' 1" (1 854 m)   Wt 83 9 kg (185 lb)   SpO2 99%   BMI 24 41 kg/m²     General: alert, not in acute distress  HEENT: PERRL, no icteric sclera or cyanosis, no thrush  Neck:  Supple, no lymphadenopathy or thyromegaly, no JVD  Lungs:  Equal breath sounds and clear auscultations bilaterally, no wheezing or crackles  Heart: S1S2 regular, no murmurs or gallops  Abdomen: soft, nontender, bowel sounds  present  Extremities: no edema, no clubbing or cyanosis  Neuro: Alert and oriented x 3, no focal neurodeficits   Skin: intact, no rashes    Diagnostic Data:  Labs: I personally reviewed the most recent laboratory data pertinent to today's visit    Lab Results   Component Value Date    WBC 4 31 09/03/2022    HGB 14 2 09/03/2022    HCT 42 9 09/03/2022    MCV 96 09/03/2022     09/03/2022     Lab Results   Component Value Date    SODIUM 142 09/03/2022    K 4 2 09/03/2022    CO2 27 09/03/2022     (H) 09/03/2022    BUN 21 09/03/2022    CREATININE 1 46 (H) 09/03/2022    CALCIUM 9 6 09/03/2022         PFT results: The most recent pulmonary function tests were reviewed  · Normal lung volumes  · Normal airflow on vital capacity  · Normal diffusion capacity  · Normal airway resistance as indicated by the specific airway conductance    · No bronchial hyperresponsiveness on the methacholine challenge test     Imaging:  I personally reviewed the images on the [facility] system pertinent to today's visit    Chest CT scan reviewed on PACs:  Grossly clear and stable lung parenchyma, stable scattered nodularity with some calcifications secondary to sarcoidosis measuring 2-4 mm  Mediastinal lymphadenopathy stable with some calcifications     Chest x-ray reviewed on PACs: Mild bilateral ground-glass changes      Other studies:      Echocardiogram:  LVEF 55%, normal RV        Pathology:  Final Diagnosis   A   Right inguinal lymph node:     - Noncaseasting granulomatous inflammation consistent with sarcoidosis in the appropriate clinical setting  Inga العلي- Special stains for acid fast bacilli, fungal organisms and spirochetes are negative      - No malignancy identified         - Flow cytometric analysis ([facility] report [id number]; interpreted by KATHY Angeles MD):       -- Interpretation:  There is no phenotypic evidence of non-Hodgkin lymphoma        -- Immunophenotypic analysis:          - Viability 7AAD: 85%          - The B-cells (31% of total) are polytypic  The T-cells (55% of total) show no pan T-cell antigen deletion  The            CD4:CD8 ratio is within normal limits            - The following antigens were evaluated by flow cytometry and found to be expressed by the             appropriate cells:  CD19, CD20, CD10, CD11c, CD23, FMC-7, kappa, lambda, CD11b, CD13,             CD14, CD16, CD33, CD64, CD2, CD3, CD4, CD5, CD7, CD8, CD56, CD57, CD38, CD34, ,            HLA-DR, and CD45              Zheng Garcia MD

## 2022-09-07 NOTE — ASSESSMENT & PLAN NOTE
Lab Results   Component Value Date    EGFR 51 09/03/2022    EGFR 52 10/04/2021    EGFR 88 01/11/2021    CREATININE 1 46 (H) 09/03/2022    CREATININE 1 40 (H) 07/09/2022    CREATININE 1 50 (H) 03/19/2022   Secondary to hypercalcemia and sarcoidosis, currently stable and calcium level improved with methotrexate therapy

## 2022-09-07 NOTE — TELEPHONE ENCOUNTER
----- Message from Renée Raymundo DO sent at 9/5/2022  9:40 PM EDT -----    Lab work was stable  Chronic kidney disease has not changed since his last lab  Vitamin-D is normal, PSA shows it is unlikely he has prostate cancer  Urine does not show any protein

## 2022-09-07 NOTE — ASSESSMENT & PLAN NOTE
Appears to be stable and controlled currently with methotrexate  Currently no respiratory symptoms so I would not repeat any imaging study or PFTs and no need for any inhalers

## 2022-09-07 NOTE — ASSESSMENT & PLAN NOTE
Will need monitoring of CBC from time to time, I will follow on any labs ordered by his PCP and if not ordered by next visit I will check labs

## 2022-09-21 ENCOUNTER — TELEPHONE (OUTPATIENT)
Dept: FAMILY MEDICINE CLINIC | Facility: CLINIC | Age: 62
End: 2022-09-21

## 2022-09-21 DIAGNOSIS — Z79.4 TYPE 2 DIABETES MELLITUS WITH MICROALBUMINURIA, WITH LONG-TERM CURRENT USE OF INSULIN (HCC): Primary | ICD-10-CM

## 2022-09-21 DIAGNOSIS — N18.30 STAGE 3 CHRONIC KIDNEY DISEASE, UNSPECIFIED WHETHER STAGE 3A OR 3B CKD (HCC): ICD-10-CM

## 2022-09-21 DIAGNOSIS — E11.29 TYPE 2 DIABETES MELLITUS WITH MICROALBUMINURIA, WITH LONG-TERM CURRENT USE OF INSULIN (HCC): Primary | ICD-10-CM

## 2022-09-21 DIAGNOSIS — Z79.631 METHOTREXATE, LONG TERM, CURRENT USE: ICD-10-CM

## 2022-09-21 DIAGNOSIS — R80.9 TYPE 2 DIABETES MELLITUS WITH MICROALBUMINURIA, WITH LONG-TERM CURRENT USE OF INSULIN (HCC): Primary | ICD-10-CM

## 2022-09-21 DIAGNOSIS — E83.52 HYPERCALCEMIA: ICD-10-CM

## 2022-09-21 DIAGNOSIS — D86.9 SARCOIDOSIS: ICD-10-CM

## 2022-09-21 RX ORDER — INSULIN LISPRO 100 [IU]/ML
INJECTION, SOLUTION INTRAVENOUS; SUBCUTANEOUS
Qty: 10 ML | Refills: 3 | Status: SHIPPED | OUTPATIENT
Start: 2022-09-21 | End: 2022-10-03

## 2022-09-21 RX ORDER — FOLIC ACID 1 MG/1
TABLET ORAL
Qty: 90 TABLET | Refills: 3 | Status: SHIPPED | OUTPATIENT
Start: 2022-09-21

## 2022-10-03 ENCOUNTER — TELEPHONE (OUTPATIENT)
Dept: ENDOCRINOLOGY | Facility: CLINIC | Age: 62
End: 2022-10-03

## 2022-10-03 ENCOUNTER — OFFICE VISIT (OUTPATIENT)
Dept: ENDOCRINOLOGY | Facility: CLINIC | Age: 62
End: 2022-10-03
Payer: COMMERCIAL

## 2022-10-03 VITALS
WEIGHT: 187 LBS | DIASTOLIC BLOOD PRESSURE: 80 MMHG | BODY MASS INDEX: 24.67 KG/M2 | SYSTOLIC BLOOD PRESSURE: 142 MMHG | HEART RATE: 76 BPM

## 2022-10-03 DIAGNOSIS — E78.2 MIXED HYPERLIPIDEMIA: ICD-10-CM

## 2022-10-03 DIAGNOSIS — Z79.4 TYPE 2 DIABETES MELLITUS WITH HYPERGLYCEMIA, WITH LONG-TERM CURRENT USE OF INSULIN (HCC): Primary | ICD-10-CM

## 2022-10-03 DIAGNOSIS — E11.65 TYPE 2 DIABETES MELLITUS WITH HYPERGLYCEMIA, WITH LONG-TERM CURRENT USE OF INSULIN (HCC): Primary | ICD-10-CM

## 2022-10-03 DIAGNOSIS — E11.22 CKD STAGE 3 DUE TO TYPE 2 DIABETES MELLITUS (HCC): ICD-10-CM

## 2022-10-03 DIAGNOSIS — N18.30 CKD STAGE 3 DUE TO TYPE 2 DIABETES MELLITUS (HCC): ICD-10-CM

## 2022-10-03 PROCEDURE — 99204 OFFICE O/P NEW MOD 45 MIN: CPT | Performed by: INTERNAL MEDICINE

## 2022-10-03 RX ORDER — INSULIN DEGLUDEC INJECTION 100 U/ML
20 INJECTION, SOLUTION SUBCUTANEOUS
Qty: 18 ML | Refills: 2 | Status: SHIPPED | OUTPATIENT
Start: 2022-10-03 | End: 2022-10-10

## 2022-10-03 NOTE — PATIENT INSTRUCTIONS
Start farxiga 10mg daily  Stay hydrated while you take this medication  Increase your long acting insulin to 20 units  (We have sent Nori Catalan to the pharmacy )  Continue metformin  Once you have your medications: Stop Vgo after dinner  Take your normal basaglar dose  The next day take the higher dose 20 units basaglar or tresiba  Come in 2 weeks to download your freestyle Moz, alternatively you can send in a log of your blood sugars

## 2022-10-03 NOTE — PROGRESS NOTES
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, lymphnodes, and kidneys  He is following with Dr James Dong of pulmonology  Current regimen: basaglar 12 units at bedtime, Vgo 20 daily with 6-10 units with main meals depending on what he is eating  Depending on quantity of carbs  For breakfast will do 2-4 units  He reports being on the vgo 30 and blood sugars would be low but he was also taking lantus at that time  Taking metformin 500mg BID  He was on glipizide 15 years ago  He denies pancreatitis, UTIs, yeast infections  No family hx of thyroid cancer  Hypoglycemic episodes: frequent, 0-2x per day, at least every other day  Hypoglycemia symptoms: "fuzzy in head", dizziness, lower extremity and upper extremity tingling, sometimes headache  Treats low blood sugars with M&Ms or glucose tablets  Home blood glucose monitoring: freestyle amadeo, unable to generate report today, however numbers were reviewed on phone  71 high, 22 in target 3 low  Before breakfast:   Before lunch:   Before dinner:   Bedtime:   Breakfast - 6 am breakfast   No lunch  Snack at 9:30-10 am  He does report handful of M&Ms daily to avoid low blood sugar  Daily  Dinner at [employer] at night when blood sugars are less than 150  Reports he would have a low blood sugar if he did not snack at night if blood sugars were less than 150  Diet: avoids peanuts cashews, almonds tomatoes because of kidney disease or spinach  Avoids grapefruit juice  Rarely drinks juice  Does drink soda 2-3 times per week, mostly only when treating low  Tries to eat carbs in moderation, tries to have no more than 2 servings of carbs with each meal     Activity: does not exercise, reports very busy for work  Diabetes education: has been to DM education a long time ago, would be interested in a refresher for group class      Healthcare maintenance:  Ophthamology: last saw June 2022, denies retinopathy  Podiatry: does not follow with    Patient Active Problem List   Diagnosis    MVP (mitral valve prolapse)    Sarcoidosis    Type [facility] diabetes mellitus with [facility]albuminuria [facility] )    CKD (chronic kidney disease) stage 3, GFR 30-59 ml/min ([facility])    Hypercalcemia    Paresthesias    Nephrolithiasis    Venous insufficiency    Methotrexate, long term, current use    BPH (benign prostatic hyperplasia)    Venous stasis dermatitis of both lower extremities    Screening for cardiovascular condition    Mixed hyperlipidemia      Past Medical History:   Diagnosis Date    Diabetes mellitus ([facility] )     Hyperlipidemia     Sarcoidosis       Past Surgical History:   Procedure Laterality Date    ABDOMINAL SURGERY  1962    APPENDECTOMY  1962    COLON SURGERY  1962    COLONOSCOPY  2017    CYSTOSCOPY W/ URETERAL STENT PLACEMENT Left     INTESTINAL MALROTATION REPAIR      KNEE SURGERY Right 1997    GA BIOPSY/EXCISION, LYMPH NODE(S) Right 11/02/2017    Procedure: INGUINAL LYMPH NODE BIOPSY;  Surgeon: Ford Najera MD;  [facility];  Service: General    SHOULDER SURGERY  2005      Family History   Problem Relation Age of Onset    Heart disease Father     Diabetes Father     Hyperlipidemia Father     Heart failure Father         Congestive    Cancer Mother         Breast Cancer    Arthritis Mother     Diabetes Sister     Cancer Sister     Hyperlipidemia Brother      Social History     Tobacco Use    Smoking status: Never Smoker    Smokeless tobacco: Never Used   Substance Use Topics  Alcohol use: Never     Allergies   Allergen Reactions    Acetaminophen Swelling     tylenol    Dust Mite Extract     Molds & Smuts     Other     Percocet [Oxycodone-Acetaminophen]      Angioedema on finger tips           Current Outpatient Medications:     B-D ULTRAFINE III SHORT PEN 31G X 8 MM MISC, 4 (four) times a day Use to test, Disp: , Rfl:     Continuous Blood Gluc  (FREESTYLE RILEY READER) RADHA, Use as directed, Disp: , Rfl: 0    Continuous Blood Gluc Sensor ([address] [place] [address]) [facility], Use as directed, Disp: , Rfl: 0    dapagliflozin (Farxiga) 10 MG tablet, Take 1 tablet (10 mg total) by mouth daily, Disp: 30 tablet, Rfl: 3    folic acid (FOLVITE) 1 mg tablet, TAKE 1 TABLET(1 MG) BY MOUTH DAILY, Disp: 90 tablet, Rfl: 3    Insulin Degludec (Tresiba) 100 UNIT/ML SOLN, Inject 20 Units under the skin daily at bedtime, Disp: 18 mL, Rfl: 2    Insulin Disposable Pump (V-Go 20) KIT, Use, Disp: , Rfl:     metFORMIN (GLUCOPHAGE) 500 mg tablet, TAKE 1 TABLET(500 MG) BY MOUTH TWICE DAILY WITH MEALS, Disp: 60 tablet, Rfl: 0    methotrexate 2 5 mg tablet, TAKE 3 TABLETS(7 5 MG) BY MOUTH 1 TIME A WEEK, Disp: 12 tablet, Rfl: 6    simvastatin (ZOCOR) 10 mg tablet, Take 10 mg by mouth daily at bedtime, Disp: , Rfl:     triamcinolone (KENALOG) 0 1 % cream, Apply topically 2 (two) times a day, Disp: 30 g, Rfl: 3    predniSONE 10 mg tablet, 5 x 2 days, 4 x 2 days, 3 x 2 days,2 x 2 days, 1 x 2 days  , Disp: 30 tablet, Rfl: 0  Review of Systems   Constitutional: Negative for chills, fatigue and fever  HENT: Negative for rhinorrhea and sore throat  Respiratory: Negative for choking, chest tightness, shortness of breath, wheezing and stridor  Cardiovascular: Negative for chest pain, palpitations and leg swelling  Gastrointestinal: Negative for abdominal pain, blood in stool, constipation, diarrhea, nausea and vomiting  Genitourinary: Negative for hematuria     Neurological: Negative for dizziness and light-headedness  All other systems reviewed and are negative  Physical Exam:  Body mass index is 24 67 kg/m²  /80   Pulse 76   Wt 84 8 kg (187 lb)   BMI 24 67 kg/m²    Wt Readings from Last 3 Encounters:   10/03/22 84 8 kg (187 lb)   09/07/22 83 9 kg (185 lb)   08/29/22 85 4 kg (188 lb 3 2 oz)       Physical Exam  Constitutional:       Appearance: He is well-developed  HENT:      Head: Normocephalic and atraumatic  Eyes:      General:         Right eye: No discharge  Left eye: No discharge  Cardiovascular:      Rate and Rhythm: Normal rate and regular rhythm  Pulses: no weak pulses          Posterior tibial pulses are 2+ on the right side and 2+ on the left side  Heart sounds: Normal heart sounds  No murmur heard  No friction rub  No gallop  Pulmonary:      Effort: Pulmonary effort is normal  No respiratory distress  Breath sounds: Normal breath sounds  No wheezing or rales  Chest:      Chest wall: No tenderness  Abdominal:      General: Bowel sounds are normal  There is no distension  Palpations: Abdomen is soft  There is no mass  Tenderness: There is no abdominal tenderness  Musculoskeletal:         General: Normal range of motion  Cervical back: Normal range of motion and neck supple  Feet:      Right foot:      Skin integrity: No ulcer, skin breakdown, erythema, warmth, callus or dry skin  Left foot:      Skin integrity: No ulcer, skin breakdown, erythema, warmth, callus or dry skin  Skin:     General: Skin is warm and dry  Neurological:      Mental Status: He is alert and oriented to person, place, and time  Psychiatric:         Behavior: Behavior normal        Patient's shoes and socks removed  Right Foot/Ankle   Right Foot Inspection  Skin Exam: skin normal and skin intact  No dry skin, no warmth, no callus, no erythema, no maceration, no abnormal color, no pre-ulcer, no ulcer and no callus       Sensory Monofilament testing: intact    Vascular  The right PT pulse is 2+  Left Foot/Ankle  Left Foot Inspection  Skin Exam: skin normal and skin intact  No dry skin, no warmth, no erythema, no maceration, normal color, no pre-ulcer, no ulcer and no callus  Sensory   Monofilament testing: intact    Vascular  The left PT pulse is 2+  Assign Risk Category  No deformity present  No loss of protective sensation  No weak pulses  Risk: 0      Labs:   Lab Results   Component Value Date    HGBA1C 7 1 (A) 08/30/2022       Lab Results   Component Value Date    KXP5ONYHEFTN 1 330 09/03/2022       Lab Results   Component Value Date    CREATININE 1 46 (H) 09/03/2022    CREATININE 1 40 (H) 07/09/2022    CREATININE 1 50 (H) 03/19/2022    BUN 21 09/03/2022     12/09/2017    K 4 2 09/03/2022     (H) 09/03/2022    CO2 27 09/03/2022     eGFR   Date Value Ref Range Status   09/03/2022 51 ml/min/1 73sq m Final     No components found for: [facility]    Lab Results   Component Value Date    HDL 53 09/03/2022    TRIG 46 09/03/2022       Lab Results   Component Value Date    ALT 29 09/03/2022    AST 21 09/03/2022    ALKPHOS 91 09/03/2022    BILITOT 0 5 12/09/2017       Not on file     Impression:  1  Type 2 diabetes mellitus with hyperglycemia, with long-term current use of insulin ([facility] )    2  CKD stage 3 due to type 2 diabetes mellitus ([facility] )    3  Mixed hyperlipidemia           Plan:  Diagnoses and all orders for this visit:    Type 2 diabetes mellitus with hyperglycemia, with long-term current use of insulin ([facility] )  -     Ambulatory Referral to Diabetic Education; Future  -     dapagliflozin (Farxiga) 10 MG tablet; Take 1 tablet (10 mg total) by mouth daily  -     Insulin Degludec Girtha Camel) 100 UNIT/ML SOLN; Inject 20 Units under the skin daily at bedtime  -     Basic metabolic panel Lab Collect; Future  -     HEMOGLOBIN A1C W/ EAG ESTIMATION Lab Collect;  Future  Referral placed for DM education  [place] may benefit with hx CKD3  Discontinue vgo, start farxiga 10mg, pt reports basaglar is not covered, willswitch to tresiba and increase to 20units  With these changes, will try to decrease or stop postprandial hypoglycemia  There is a trend of blood sugars decreasing overall overnight, this may be due to too much basal insulin and so discontinuing vgo and increasing injected basal insulin may resolve this for total daily basal dose of 20units (vs 32 units)  Advised pt to stay hydrated while on farxiga  Continue metformin 500mg BID  Advised to come in two weeks to download freestyle amadeo or send in blood sugar log  CKD stage 3 due to type 2 diabetes mellitus ([facility] )  -     Basic metabolic panel Lab Collect; Future  -     HEMOGLOBIN A1C W/ EAG ESTIMATION Lab Collect; Future  Recheck prior to next visit  Mixed hyperlipidemia  On simvastatin 10mg  Lipid panel reviewed, continue simvastatin 10mg  Discussed with the patient and all questioned fully answered  He will call me if any problems arise

## 2022-10-04 ENCOUNTER — TELEPHONE (OUTPATIENT)
Dept: DIABETES SERVICES | Facility: CLINIC | Age: 62
End: 2022-10-04

## 2022-10-04 DIAGNOSIS — E11.69 TYPE 2 DIABETES MELLITUS WITH OTHER SPECIFIED COMPLICATION, WITH LONG-TERM CURRENT USE OF INSULIN (HCC): Primary | ICD-10-CM

## 2022-10-04 DIAGNOSIS — Z79.4 TYPE 2 DIABETES MELLITUS WITH OTHER SPECIFIED COMPLICATION, WITH LONG-TERM CURRENT USE OF INSULIN (HCC): Primary | ICD-10-CM

## 2022-10-04 NOTE — TELEPHONE ENCOUNTER
Can you put in a new education referral for patient? The current reason on the referral isn't a choice    Thank you

## 2022-10-10 DIAGNOSIS — Z79.4 TYPE 2 DIABETES MELLITUS WITH HYPERGLYCEMIA, WITH LONG-TERM CURRENT USE OF INSULIN (HCC): Primary | ICD-10-CM

## 2022-10-10 DIAGNOSIS — E11.65 TYPE 2 DIABETES MELLITUS WITH HYPERGLYCEMIA, WITH LONG-TERM CURRENT USE OF INSULIN (HCC): Primary | ICD-10-CM

## 2022-10-10 RX ORDER — INSULIN DEGLUDEC INJECTION 100 U/ML
20 INJECTION, SOLUTION SUBCUTANEOUS
Qty: 18 ML | Refills: 1 | Status: SHIPPED | OUTPATIENT
Start: 2022-10-10

## 2022-10-17 ENCOUNTER — TELEPHONE (OUTPATIENT)
Dept: ENDOCRINOLOGY | Facility: CLINIC | Age: 62
End: 2022-10-17

## 2022-10-17 DIAGNOSIS — Z79.4 TYPE 2 DIABETES MELLITUS WITH HYPERGLYCEMIA, WITH LONG-TERM CURRENT USE OF INSULIN (HCC): Primary | ICD-10-CM

## 2022-10-17 DIAGNOSIS — Z79.631 METHOTREXATE, LONG TERM, CURRENT USE: ICD-10-CM

## 2022-10-17 DIAGNOSIS — E11.65 TYPE 2 DIABETES MELLITUS WITH HYPERGLYCEMIA, WITH LONG-TERM CURRENT USE OF INSULIN (HCC): Primary | ICD-10-CM

## 2022-10-17 RX ORDER — FLASH GLUCOSE SENSOR
1 KIT MISCELLANEOUS
Qty: 6 EACH | Refills: 1 | Status: SHIPPED | OUTPATIENT
Start: 2022-10-17

## 2022-10-17 RX ORDER — PEN NEEDLE, DIABETIC 31 GX5/16"
NEEDLE, DISPOSABLE MISCELLANEOUS 4 TIMES DAILY
Qty: 400 EACH | Refills: 1 | Status: SHIPPED | OUTPATIENT
Start: 2022-10-17

## 2022-10-17 NOTE — TELEPHONE ENCOUNTER
Pt called taking farxiga and doesn't thinks it working he is struggling to keep bs under 200, increased urination Q2H, lost 10lbs, nausea and fatigue

## 2022-10-17 NOTE — TELEPHONE ENCOUNTER
Pt was supposed to send the log, so we can adjust the dose of medications  tresiba was increased to 20 units last visit, can he send the log ?     Eda Navarro

## 2022-10-20 ENCOUNTER — TELEPHONE (OUTPATIENT)
Dept: ENDOCRINOLOGY | Facility: CLINIC | Age: 62
End: 2022-10-20

## 2022-10-20 NOTE — TELEPHONE ENCOUNTER
JACOB- Pt called states his bs numbers are back to normal his body was probably adjusting from not having vgo   Advised pt  would still like to see bs numbers  He will email numbers

## 2022-10-28 PROBLEM — Z13.6 SCREENING FOR CARDIOVASCULAR CONDITION: Status: RESOLVED | Noted: 2022-08-29 | Resolved: 2022-10-28

## 2022-12-03 DIAGNOSIS — D86.9 SARCOIDOSIS: ICD-10-CM

## 2022-12-19 ENCOUNTER — OFFICE VISIT (OUTPATIENT)
Dept: FAMILY MEDICINE CLINIC | Facility: CLINIC | Age: 62
End: 2022-12-19

## 2022-12-19 VITALS
HEIGHT: 73 IN | DIASTOLIC BLOOD PRESSURE: 60 MMHG | BODY MASS INDEX: 22.95 KG/M2 | SYSTOLIC BLOOD PRESSURE: 98 MMHG | TEMPERATURE: 97.3 F | WEIGHT: 173.13 LBS

## 2022-12-19 DIAGNOSIS — M77.11 RIGHT LATERAL EPICONDYLITIS: Primary | ICD-10-CM

## 2022-12-19 DIAGNOSIS — Z79.631 METHOTREXATE, LONG TERM, CURRENT USE: ICD-10-CM

## 2022-12-19 RX ORDER — BETAMETHASONE SODIUM PHOSPHATE AND BETAMETHASONE ACETATE 3; 3 MG/ML; MG/ML
6 INJECTION, SUSPENSION INTRA-ARTICULAR; INTRALESIONAL; INTRAMUSCULAR; SOFT TISSUE
Status: COMPLETED | OUTPATIENT
Start: 2022-12-19 | End: 2022-12-19

## 2022-12-19 RX ADMIN — BETAMETHASONE SODIUM PHOSPHATE AND BETAMETHASONE ACETATE 6 MG: 3; 3 INJECTION, SUSPENSION INTRA-ARTICULAR; INTRALESIONAL; INTRAMUSCULAR; SOFT TISSUE at 14:50

## 2022-12-19 NOTE — PROGRESS NOTES
Name: Jazzmine Gregory      : 1960      MRN: [id number]  Encounter Provider: Bienvenido Gregg DO  Encounter Date: 2022   Encounter department: [address] [place] [address] PRIMARY CARE    Assessment & Plan     Chief Complaint   Patient presents with   • Elbow Pain     Right elbow-"worse currently"       1  Right lateral epicondylitis  -     Medium joint arthrocentesis: R elbow           Subjective      Complaining of right lateral elbow pain  Had an episode over the summer where he took some steroids, it improved while he was taking the medication, but after the medication finished his symptoms returned shortly after that  He does do a lot of repetitive motion with his upper extremities at work  He is left-hand dominant however  Review of Systems   Constitutional: Negative for chills and fever  HENT: Negative for ear pain and sore throat  Eyes: Negative for pain and visual disturbance  Respiratory: Negative for cough and shortness of breath  Cardiovascular: Negative for chest pain and palpitations  Gastrointestinal: Negative for abdominal pain and vomiting  Genitourinary: Negative for dysuria and hematuria  Musculoskeletal: Positive for arthralgias  Negative for back pain and joint swelling  Skin: Negative for color change and rash  Neurological: Negative for seizures and syncope  All other systems reviewed and are negative        Current Outpatient Medications on File Prior to Visit   Medication Sig   • B-D ULTRAFINE III SHORT PEN 31G X 8 MM MISC Use 4 (four) times a day Use to test   • Continuous Blood Gluc  (FREESTYLE DIMITRIS READER) RADHA Use as directed   • Continuous Blood Gluc Sensor (FreeStyle Dimitris Sensor System) MISC Use 1 Device every 14 (fourteen) days   • dapagliflozin (Farxiga) 10 MG tablet Take 1 tablet (10 mg total) by mouth daily   • folic acid (FOLVITE) 1 mg tablet TAKE 1 TABLET(1 MG) BY MOUTH DAILY   • insulin degludec Dock Maureen FlexTouch) 100 units/mL injection pen Inject 20 Units under the skin daily at bedtime   • methotrexate 2 5 mg tablet TAKE 3 TABLETS(7 5 MG) BY MOUTH 1 TIME A WEEK   • simvastatin (ZOCOR) 10 mg tablet Take 10 mg by mouth daily at bedtime   • triamcinolone (KENALOG) 0 1 % cream Apply topically 2 (two) times a day   • Insulin Disposable Pump (V-Go 20) KIT Use   • predniSONE 10 mg tablet 5 x 2 days, 4 x 2 days, 3 x 2 days,2 x 2 days, 1 x 2 days  • [DISCONTINUED] metFORMIN (GLUCOPHAGE) 500 mg tablet Take 1 tablet (500 mg total) by mouth 2 (two) times a day with meals       Objective     BP 98/60 (BP Location: Left arm, Patient Position: Sitting, Cuff Size: Large)   Temp (!) 97 3 °F (36 3 °C)   Ht 6' 1" (1 854 m)   Wt 78 5 kg (173 lb 2 oz)   BMI 22 84 kg/m²     Physical Exam  Constitutional:       General: He is not in acute distress  Appearance: He is well-developed  HENT:      Head: Normocephalic and atraumatic  Eyes:      Conjunctiva/sclera: Conjunctivae normal    Cardiovascular:      Rate and Rhythm: Normal rate and regular rhythm  Pulses: Normal pulses  Heart sounds: Normal heart sounds  Pulmonary:      Effort: Pulmonary effort is normal       Breath sounds: Normal breath sounds  Abdominal:      General: Abdomen is flat  Musculoskeletal:      Right elbow: Decreased range of motion  Tenderness present in lateral epicondyle  Skin:     Coloration: Skin is not jaundiced or pale  Findings: No rash  Neurological:      Mental Status: He is alert and oriented to person, place, and time  Psychiatric:         Judgment: Judgment normal        Eva Nj DO Medium joint arthrocentesis: R elbow  Universal Protocol:  Consent: Verbal consent obtained    Consent given by: patient  Patient understanding: patient states understanding of the procedure being performed  Patient identity confirmed: verbally with patient    Supporting Documentation  Indications: pain   Procedure Details  Location: elbow - R elbow  Preparation: Right lateral elbow was prepped with ethyl alcohol    Approach: anterolateral  Medications administered: 6 mg betamethasone acetate-betamethasone sodium phosphate 6 (3-3) mg/mL (A wheal was raised with 1% lidocaine without epinephrine, and then the betamethasone was injected )    Patient tolerance: patient tolerated the procedure well with no immediate complications  Dressing:  Sterile dressing applied

## 2022-12-22 DIAGNOSIS — Z79.631 METHOTREXATE, LONG TERM, CURRENT USE: ICD-10-CM

## 2023-02-06 ENCOUNTER — TELEPHONE (OUTPATIENT)
Dept: PULMONOLOGY | Facility: CLINIC | Age: 63
End: 2023-02-06

## 2023-02-06 DIAGNOSIS — E11.65 TYPE 2 DIABETES MELLITUS WITH HYPERGLYCEMIA, WITH LONG-TERM CURRENT USE OF INSULIN (HCC): ICD-10-CM

## 2023-02-06 DIAGNOSIS — Z79.4 TYPE 2 DIABETES MELLITUS WITH HYPERGLYCEMIA, WITH LONG-TERM CURRENT USE OF INSULIN (HCC): ICD-10-CM

## 2023-02-06 NOTE — TELEPHONE ENCOUNTER
Informed patient he is due for a 6 month follow up at our [facility] office with Dr Morgan Paula or ALESSANDRA in April  Patient stated he will call back to schedule appointment

## 2023-02-13 ENCOUNTER — APPOINTMENT (OUTPATIENT)
Dept: LAB | Facility: MEDICAL CENTER | Age: 63
End: 2023-02-13

## 2023-02-13 DIAGNOSIS — Z79.4 TYPE 2 DIABETES MELLITUS WITH HYPERGLYCEMIA, WITH LONG-TERM CURRENT USE OF INSULIN (HCC): ICD-10-CM

## 2023-02-13 DIAGNOSIS — E11.65 TYPE 2 DIABETES MELLITUS WITH HYPERGLYCEMIA, WITH LONG-TERM CURRENT USE OF INSULIN (HCC): ICD-10-CM

## 2023-02-13 DIAGNOSIS — N18.30 CKD STAGE 3 DUE TO TYPE 2 DIABETES MELLITUS (HCC): ICD-10-CM

## 2023-02-13 DIAGNOSIS — E11.22 CKD STAGE 3 DUE TO TYPE 2 DIABETES MELLITUS (HCC): ICD-10-CM

## 2023-02-13 LAB
ANION GAP SERPL CALCULATED.3IONS-SCNC: 5 MMOL/L (ref 4–13)
BUN SERPL-MCNC: 22 MG/DL (ref 5–25)
CALCIUM SERPL-MCNC: 9.5 MG/DL (ref 8.3–10.1)
CHLORIDE SERPL-SCNC: 108 MMOL/L (ref 96–108)
CO2 SERPL-SCNC: 28 MMOL/L (ref 21–32)
CREAT SERPL-MCNC: 1.19 MG/DL (ref 0.6–1.3)
GFR SERPL CREATININE-BSD FRML MDRD: 65 ML/MIN/1.73SQ M
GLUCOSE P FAST SERPL-MCNC: 87 MG/DL (ref 65–99)
POTASSIUM SERPL-SCNC: 4.1 MMOL/L (ref 3.5–5.3)
SODIUM SERPL-SCNC: 141 MMOL/L (ref 135–147)

## 2023-02-14 LAB
EST. AVERAGE GLUCOSE BLD GHB EST-MCNC: 192 MG/DL
HBA1C MFR BLD: 8.3 %

## 2023-02-18 DIAGNOSIS — R80.9 TYPE 2 DIABETES MELLITUS WITH MICROALBUMINURIA, UNSPECIFIED WHETHER LONG TERM INSULIN USE (HCC): Primary | ICD-10-CM

## 2023-02-18 DIAGNOSIS — E11.29 TYPE 2 DIABETES MELLITUS WITH MICROALBUMINURIA, UNSPECIFIED WHETHER LONG TERM INSULIN USE (HCC): Primary | ICD-10-CM

## 2023-02-20 RX ORDER — SIMVASTATIN 10 MG
TABLET ORAL
Qty: 90 TABLET | Refills: 1 | Status: SHIPPED | OUTPATIENT
Start: 2023-02-20

## 2023-02-27 ENCOUNTER — OFFICE VISIT (OUTPATIENT)
Dept: ENDOCRINOLOGY | Facility: CLINIC | Age: 63
End: 2023-02-27

## 2023-02-27 VITALS
SYSTOLIC BLOOD PRESSURE: 120 MMHG | TEMPERATURE: 98 F | DIASTOLIC BLOOD PRESSURE: 84 MMHG | BODY MASS INDEX: 22.13 KG/M2 | HEIGHT: 73 IN | HEART RATE: 72 BPM | WEIGHT: 167 LBS

## 2023-02-27 DIAGNOSIS — E78.2 MIXED HYPERLIPIDEMIA: ICD-10-CM

## 2023-02-27 DIAGNOSIS — R80.9 TYPE 2 DIABETES MELLITUS WITH MICROALBUMINURIA, WITH LONG-TERM CURRENT USE OF INSULIN (HCC): Primary | ICD-10-CM

## 2023-02-27 DIAGNOSIS — Z79.4 TYPE 2 DIABETES MELLITUS WITH MICROALBUMINURIA, WITH LONG-TERM CURRENT USE OF INSULIN (HCC): Primary | ICD-10-CM

## 2023-02-27 DIAGNOSIS — E11.29 TYPE 2 DIABETES MELLITUS WITH MICROALBUMINURIA, WITH LONG-TERM CURRENT USE OF INSULIN (HCC): Primary | ICD-10-CM

## 2023-02-27 NOTE — PATIENT INSTRUCTIONS
Hypoglycemia instructions   Linh Gore  2/27/2023  [id number]    Low Blood Sugar    Steps to treat low blood sugar  1  Test blood sugar if you have symptoms of low blood sugar:   Low Blood Sugar Symptoms:  o Sweaty  o Dizzy  o Rapid heartbeat  o Shaky  o Bad mood  o Hungry      2  Treat blood sugar less than 70 with 15 grams of fast-acting carbohydrate:   Examples of 15 grams Fast-Acting Carbohydrate:  o 4 oz juice  o 4 oz regular soda  o 3-4 glucose tablets (chew)  o 3-4 hard candies (chew)          3  Wait 15 minutes and test your blood sugar again     4   If blood sugar is less than 100, repeat steps 2-3     5  When your blood sugar is 100 or more, eat a snack if it will be longer than one hour until your next meal  The snack should be 15 grams of carbohydrate and a protein:   Examples of snacks:  o ½ sandwich  o 6 crackers with cheese  o Piece of fruit with cheese or peanut butter  o 6 crackers with peanut butter

## 2023-02-27 NOTE — PROGRESS NOTES
Patient Progress Note      CC: DM      Referring Provider  No referring provider defined for this encounter  History of Present Illness:   Ashley Cee is a 65 y o  male with a history of type 2 diabetes with long term use of insulin  Diabetes course has been stable  Complications of DM: CKD  Denies recent illness or hospitalizations  Denies recent severe hypoglycemic or severe hyperglycemic episodes  Denies any issues with his current regimen  Home glucose monitoring: are performed regularly, using Dimitris  Reviewed it on patients phone  Average glucose 187 mg/dl   In target range 45%, above range 54%, below range 1%      Current regimen: Farxiga 10 mg daily, metformin 500 mg twice a day, Tresiba 20 units at bedtime  compliant most of the time, denies any side effects from medications  Injects in: abdomen  Rotates sites: Yes  Hypoglycemic episodes: Yes, rare  H/o of hypoglycemia causing hospitalization or Intervention such as glucagon injection  or ambulance call :  No  Hypoglycemia symptoms: jittery, sweating  Treatment of hypoglycemia: candy    Medic alert tag: recommended: Yes    Diabetes education: Not recently  Diet: 2 meals per day, 2 snacks per day  Timing of meals is predictable  Diabetic diet compliance:  noncompliant some of the time  Activity: Daily activity is predictable: Yes  No routine exercise   Ophthamology: Dec 2022  Podiatry: Oct 2022    Not on ACE inhibitor/ARB  Has hyperlipidemia: on statin - tolerating well, no myalgias  compliant most of the time, denies any side effects from medications    Thyroid disorders: No  History of pancreatitis: No    Patient Active Problem List   Diagnosis   • MVP (mitral valve prolapse)   • Sarcoidosis   • Type 2 diabetes mellitus with microalbuminuria (HCC)   • CKD (chronic kidney disease) stage 3, GFR 30-59 ml/min ([facility])   • Hypercalcemia   • Paresthesias   • Nephrolithiasis   • Venous insufficiency   • Methotrexate, long term, current use   • BPH (benign prostatic hyperplasia)   • Venous stasis dermatitis of both lower extremities   • Mixed hyperlipidemia      Past Medical History:   Diagnosis Date   • Diabetes mellitus ([address] )    • Hyperlipidemia    • Sarcoidosis       Past Surgical History:   Procedure Laterality Date   • ABDOMINAL SURGERY  1962   • APPENDECTOMY  1962   • COLON SURGERY  1962   • COLONOSCOPY  2017   • CYSTOSCOPY W/ URETERAL STENT PLACEMENT Left    • INTESTINAL MALROTATION REPAIR     • KNEE SURGERY Right 1997   • [place] BX/EXC LYMPH NODE OPEN SUPERFICIAL Right 11/02/2017    Procedure: INGUINAL LYMPH NODE BIOPSY;  Surgeon: Wilmar Goodwin MD;  Location: [facility];  Service: General   • SHOULDER SURGERY  2005      Family History   Problem Relation Age of Onset   • Heart disease Father    • Diabetes Father    • Hyperlipidemia Father    • Heart failure Father         Congestive   • Cancer Mother         Breast Cancer   • Arthritis Mother    • Diabetes Sister    • Cancer Sister    • Hyperlipidemia Brother      Social History     Tobacco Use   • Smoking status: Never   • Smokeless tobacco: Never   Substance Use Topics   • Alcohol use: Never     Allergies   Allergen Reactions   • Acetaminophen Swelling     tylenol   • Dust Mite Extract    • Molds & Smuts    • Other    • Percocet [Oxycodone-Acetaminophen]      Angioedema on finger tips           Current Outpatient Medications:   •  B-D ULTRAFINE III SHORT PEN 31G X 8 MM MISC, Use 4 (four) times a day Use to test, Disp: 400 each, Rfl: 1  •  Continuous Blood Gluc  (FREESTYLE DIMITRIS READER) RADHA, Use as directed, Disp: , Rfl: 0  •  Continuous Blood Gluc Sensor (FreeStyle Dimitris Sensor System) MISC, Use 1 Device every 14 (fourteen) days, Disp: 6 each, Rfl: 1  •  dapagliflozin (Farxiga) 10 MG tablet, Take 1 tablet (10 mg total) by mouth daily, Disp: 30 tablet, Rfl: 3  •  folic acid (FOLVITE) 1 mg tablet, TAKE 1 TABLET(1 MG) BY MOUTH DAILY, Disp: 90 tablet, Rfl: 3  •  insulin degludec Adolfo Kale FlexTouch) 100 units/mL injection pen, Inject 20 Units under the skin daily at bedtime, Disp: 18 mL, Rfl: 1  •  metFORMIN (GLUCOPHAGE) 500 mg tablet, TAKE 1 TABLET(500 MG) BY MOUTH TWICE DAILY WITH MEALS, Disp: 180 tablet, Rfl: 1  •  methotrexate 2 5 mg tablet, TAKE 3 TABLETS(7 5 MG) BY MOUTH 1 TIME A WEEK, Disp: 12 tablet, Rfl: 6  •  simvastatin (ZOCOR) 10 mg tablet, TAKE 1 TABLET BY MOUTH DAILY, Disp: 90 tablet, Rfl: 1  •  sitaGLIPtin (JANUVIA) 100 mg tablet, Take 1 tablet (100 mg total) by mouth daily, Disp: 90 tablet, Rfl: 1  •  predniSONE 10 mg tablet, 5 x 2 days, 4 x 2 days, 3 x 2 days,2 x 2 days, 1 x 2 days  , Disp: 30 tablet, Rfl: 0  •  triamcinolone (KENALOG) 0 1 % cream, Apply topically 2 (two) times a day, Disp: 30 g, Rfl: 3  Review of Systems   Constitutional: Negative for activity change, appetite change, fatigue and unexpected weight change  HENT: Negative for trouble swallowing  Eyes: Negative for visual disturbance  Respiratory: Negative for shortness of breath  Cardiovascular: Negative for chest pain and palpitations  Gastrointestinal: Negative for constipation and diarrhea  Endocrine: Negative for polydipsia and polyuria  Musculoskeletal: Positive for arthralgias and back pain (chronic)  Skin: Negative for wound  Neurological: Negative for numbness  Psychiatric/Behavioral: Negative  Physical Exam:  Body mass index is 22 03 kg/m²  /84   Pulse 72   Temp 98 °F (36 7 °C) (Skin)   Ht 6' 1" (1 854 m)   Wt 75 8 kg (167 lb)   BMI 22 03 kg/m²    Wt Readings from Last 3 Encounters:   02/27/23 75 8 kg (167 lb)   12/19/22 78 5 kg (173 lb 2 oz)   10/03/22 84 8 kg (187 lb)       Physical Exam  Vitals and nursing note reviewed  Constitutional:       Appearance: He is well-developed  HENT:      Head: Normocephalic  Eyes:      General: No scleral icterus  Pupils: Pupils are equal, round, and reactive to light  Neck:      Thyroid: No thyromegaly     Cardiovascular: Rate and Rhythm: Normal rate and regular rhythm  Pulses:           Radial pulses are 2+ on the right side and 2+ on the left side  Heart sounds: No murmur heard  Pulmonary:      Effort: Pulmonary effort is normal  No respiratory distress  Breath sounds: Normal breath sounds  No wheezing  Musculoskeletal:      Cervical back: Neck supple  Skin:     General: Skin is warm and dry  Neurological:      Mental Status: He is alert  Patient's shoes and socks were not removed  Labs:   Component      Latest Ref Rng & Units 8/30/2022 9/3/2022 2/13/2023   Sodium      135 - 147 mmol/L  142 141   Potassium      3 5 - 5 3 mmol/L  4 2 4 1   Chloride      96 - 108 mmol/L  109 (H) 108   CO2      21 - 32 mmol/L  27 28   Anion Gap      4 - 13 mmol/L  6 5   BUN      5 - 25 mg/dL  21 22   Creatinine      0 60 - 1 30 mg/dL  1 46 (H) 1 19   GLUCOSE FASTING      65 - 99 mg/dL  126 (H) 87   Calcium      8 3 - 10 1 mg/dL  9 6 9 5   AST      5 - 45 U/L  21    ALT      12 - 78 U/L  29    Alkaline Phosphatase      46 - 116 U/L  91    Total Protein      6 4 - 8 4 g/dL  6 9    Albumin      3 5 - 5 0 g/dL  3 8    TOTAL BILIRUBIN      0 20 - 1 00 mg/dL  0 77    eGFR      ml/min/1 73sq m  51 65   Cholesterol      See Comment mg/dL  118    Triglycerides      See Comment mg/dL  46    HDL      >=40 mg/dL  53    LDL Calculated      0 - 100 mg/dL  56    Non-HDL Cholesterol      mg/dl  65    EXT Creatinine Urine      mg/dL  127 0    MICROALBUM ,U,RANDOM      0 0 - 20 0 mg/L  9 1    MICROALBUMIN/CREATININE RATIO      0 - 30 mg/g creatinine  7    Hemoglobin A1C      Normal 3 8-5 6%; PreDiabetic 5 7-6 4%;  Diabetic >=6 5%; Glycemic control for adults with diabetes <7 0% % 7 1 (A)  8 3 (H)   eAG, EST AVG Glucose      mg/dl   192   TSH 3RD GENERATON      0 450 - 4 500 uIU/mL  1 330    Vit D, 25-Hydroxy      30 0 - 100 0 ng/mL  43 8          Plan:    Diagnoses and all orders for this visit:    Type 2 diabetes mellitus with microalbuminuria, with long-term current use of insulin (HCC)  HGA1C 8 3%  Worsened  Treatment regimen: start Januvia 100 mg daily  Discussed side effects, safety/efficacy  Continue other treatments  Send [employer] in 2 weeks  Discussed intensive insulin regimen does increase risk for hypoglycemia  Episodes of hypoglycemia can lead to permanent disability and death  Discussed risks/complications associated with uncontrolled diabetes  Advised to adhere to diabetic diet, and recommended staying active/exercising routinely as tolerated  Keep carbohydrates consistent to limit blood glucose fluctuations  Advised to call if blood sugars less than 70 mg/dl or over 300 mg/dl  Check blood glucose 3+ times a day  Discussed symptoms and treatment of hypoglycemia  Discussed use of CGM to collect additional blood glucose data to reveal trends and patterns that can be used to optimize treatment plan  Recommended routine follow-up with podiatry and ophthalmology  Send log in 2 weeks  Ordered blood work to complete prior to next visit  -     Hemoglobin A1C; Future  -     Basic metabolic panel; Future  -     Microalbumin / creatinine urine ratio; Future  -     sitaGLIPtin (JANUVIA) 100 mg tablet; Take 1 tablet (100 mg total) by mouth daily    Mixed hyperlipidemia  LDL previously 56  Continue statin therapy           Discussed with the patient diagnosis and treatment and all questions fully answered  He will call me if any problems arise  Counseled patient on diagnostic results, prognosis, risk and benefit of treatment options, instruction for management, importance of treatment compliance, risk factor reduction and impressions        Isabella Kent PA-C

## 2023-03-04 ENCOUNTER — PATIENT MESSAGE (OUTPATIENT)
Dept: ENDOCRINOLOGY | Facility: CLINIC | Age: 63
End: 2023-03-04

## 2023-03-31 DIAGNOSIS — Z79.4 TYPE 2 DIABETES MELLITUS WITH HYPERGLYCEMIA, WITH LONG-TERM CURRENT USE OF INSULIN (HCC): ICD-10-CM

## 2023-03-31 DIAGNOSIS — E11.65 TYPE 2 DIABETES MELLITUS WITH HYPERGLYCEMIA, WITH LONG-TERM CURRENT USE OF INSULIN (HCC): ICD-10-CM

## 2023-03-31 RX ORDER — INSULIN DEGLUDEC INJECTION 100 U/ML
INJECTION, SOLUTION SUBCUTANEOUS
Qty: 18 ML | Refills: 1 | Status: SHIPPED | OUTPATIENT
Start: 2023-03-31

## 2023-04-24 ENCOUNTER — OFFICE VISIT (OUTPATIENT)
Dept: PULMONOLOGY | Facility: CLINIC | Age: 63
End: 2023-04-24

## 2023-04-24 VITALS
TEMPERATURE: 97.3 F | HEIGHT: 73 IN | WEIGHT: 162 LBS | DIASTOLIC BLOOD PRESSURE: 62 MMHG | OXYGEN SATURATION: 98 % | HEART RATE: 69 BPM | BODY MASS INDEX: 21.47 KG/M2 | RESPIRATION RATE: 14 BRPM | SYSTOLIC BLOOD PRESSURE: 102 MMHG

## 2023-04-24 DIAGNOSIS — E11.29 TYPE 2 DIABETES MELLITUS WITH MICROALBUMINURIA, WITH LONG-TERM CURRENT USE OF INSULIN (HCC): ICD-10-CM

## 2023-04-24 DIAGNOSIS — Z79.4 TYPE 2 DIABETES MELLITUS WITH MICROALBUMINURIA, WITH LONG-TERM CURRENT USE OF INSULIN (HCC): ICD-10-CM

## 2023-04-24 DIAGNOSIS — R63.4 WEIGHT LOSS: ICD-10-CM

## 2023-04-24 DIAGNOSIS — Z79.631 METHOTREXATE, LONG TERM, CURRENT USE: ICD-10-CM

## 2023-04-24 DIAGNOSIS — N18.2 STAGE 2 CHRONIC KIDNEY DISEASE: ICD-10-CM

## 2023-04-24 DIAGNOSIS — D86.9 SARCOIDOSIS: Primary | ICD-10-CM

## 2023-04-24 DIAGNOSIS — R80.9 TYPE 2 DIABETES MELLITUS WITH MICROALBUMINURIA, WITH LONG-TERM CURRENT USE OF INSULIN (HCC): ICD-10-CM

## 2023-04-24 DIAGNOSIS — E83.52 HYPERCALCEMIA: ICD-10-CM

## 2023-04-24 PROBLEM — N18.9 CKD (CHRONIC KIDNEY DISEASE): Status: ACTIVE | Noted: 2018-05-12

## 2023-04-24 RX ORDER — MELATONIN
1000 DAILY
COMMUNITY

## 2023-04-24 NOTE — ASSESSMENT & PLAN NOTE
Controlled with methotrexate 7 5 mg weekly  No respiratory symptoms  We will continue for now and continue folic acid

## 2023-04-24 NOTE — ASSESSMENT & PLAN NOTE
Most likely secondary to diabetes that is poorly controlled and also the medications  I encourage patient to increase his calorie intake especially protein and fat  Also will check TSH and CMP  His last CBC did not show any anemia and he does not have any GI symptoms to indicate the need for colonoscopy earlier, we will follow on future labs

## 2023-04-24 NOTE — PROGRESS NOTES
Progress note - Pulmonary Medicine   Brayan Lopez 65 y o  male MRN: [id number]       Impression & Plan:     Sarcoidosis  Controlled with methotrexate 7 5 mg weekly  No respiratory symptoms  We will continue for now and continue folic acid  Methotrexate, long term, current use  We will check CBC with differential also CMP    Hypercalcemia  Resolved, continue methotrexate    CKD (chronic kidney disease)  Lab Results   Component Value Date    EGFR 65 02/13/2023    EGFR 51 09/03/2022    EGFR 52 10/04/2021    CREATININE 1 19 02/13/2023    CREATININE 1 46 (H) 09/03/2022    CREATININE 1 40 (H) 07/09/2022   His creatinine and GFR are better now and he is CKD stage II now    Weight loss  Most likely secondary to diabetes that is poorly controlled and also the medications  I encourage patient to increase his calorie intake especially protein and fat  Also will check TSH and CMP  His last CBC did not show any anemia and he does not have any GI symptoms to indicate the need for colonoscopy earlier, we will follow on future labs  Type 2 diabetes mellitus with microalbuminuria (HCC)    Lab Results   Component Value Date    HGBA1C 8 3 (H) 02/13/2023     Probably needs better control but at the same time to be off of 1 of these medications because of significant weight loss  Follow-up with endocrine  Diagnoses and all orders for this visit:    Sarcoidosis  -     CBC and differential; Future  -     Comprehensive metabolic panel; Future    Stage 2 chronic kidney disease    Type 2 diabetes mellitus with microalbuminuria, with long-term current use of insulin (HCC)    Methotrexate, long term, current use  -     CBC and differential; Future  -     Comprehensive metabolic panel; Future    Hypercalcemia    Weight loss  -     CBC and differential; Future  -     Comprehensive metabolic panel; Future  -     TSH, 3rd generation with Free T4 reflex;  Future    Other orders  -     cholecalciferol (VITAMIN D3) 1,000 units tablet; Take 1,000 Units by mouth daily      ______________________________________________________________________    HPI:    Jackie Estevez presents today for follow-up of sarcoidosis  Patient has sarcoidosis involving lungs, bone and lymph nodes associated with hypercalcemia and nephrolithiasis and renal failure in the past   This was diagnosed with lymph node biopsy  Over the past few years we have tried multiple therapies including steroids that gave good response but could not wean and then we tried Imuran and Plaquenil without success or with side effects so he ended up on methotrexate that controlled his symptoms and the disease very well  He is currently on 7 5 mg weekly with folic acid  He presents today feeling good with no complaints of shortness of breath or cough or any other respiratory problems  He denies chest pain, denies fever chills or night sweats, denies recent infections  He denies any recent nephrolithiasis colics or passing stones  His main complaint is weight loss, he lost significant weight over the past several months, about 20-25 pounds and he attributes that to his diabetes  He was on insulin pump but due to episodes of hypoglycemia he was switched to [place] but his blood sugars have been high  He denies neuropathy complaints currently  He denies any diarrhea or constipation  Denies abdominal pain or nausea or vomiting  He is up-to-date with colonoscopy 5-6 years ago that was normal   No GERD symptoms  He has intermittent mild postnasal drip sometimes        Current Medications:    Current Outpatient Medications:   •  B-D ULTRAFINE III SHORT PEN 31G X 8 MM MISC, Use 4 (four) times a day Use to test, Disp: 400 each, Rfl: 1  •  cholecalciferol (VITAMIN D3) 1,000 units tablet, Take 1,000 Units by mouth daily, Disp: , Rfl:   •  Continuous Blood Gluc  (FREESTYLE [place] READER) RADHA, Use as directed, Disp: , Rfl: 0  •  Continuous Blood Gluc Sensor (FreeStyle [facility], Use 1 Device every 14 (fourteen) days, Disp: 6 each, Rfl: 1  •  dapagliflozin (Farxiga) 10 MG tablet, Take 1 tablet (10 mg total) by mouth daily, Disp: 30 tablet, Rfl: 3  •  folic acid (FOLVITE) 1 mg tablet, TAKE 1 TABLET(1 MG) BY MOUTH DAILY, Disp: 90 tablet, Rfl: 3  •  metFORMIN (GLUCOPHAGE) 500 mg tablet, TAKE 1 TABLET(500 MG) BY MOUTH TWICE DAILY WITH MEALS, Disp: 180 tablet, Rfl: 1  •  methotrexate 2 5 mg tablet, TAKE 3 TABLETS(7 5 MG) BY MOUTH 1 TIME A WEEK, Disp: 12 tablet, Rfl: 6  •  simvastatin (ZOCOR) 10 mg tablet, TAKE 1 TABLET BY MOUTH DAILY, Disp: 90 tablet, Rfl: 1  •  sitaGLIPtin (JANUVIA) 100 mg tablet, Take 1 tablet (100 mg total) by mouth daily, Disp: 90 tablet, Rfl: 1  •  Tresiba FlexTouch 100 units/mL injection pen, ADMINISTER 20 UNITS UNDER THE SKIN DAILY AT BEDTIME, Disp: 18 mL, Rfl: 1    Review of Systems:  Review of Systems   Constitutional: Positive for unexpected weight change  Negative for appetite change and fever  HENT: Positive for postnasal drip  Negative for ear pain, rhinorrhea, sneezing, sore throat and trouble swallowing  Eyes: Negative  Cardiovascular: Negative  Negative for chest pain  Gastrointestinal: Negative  Endocrine: Negative  Genitourinary: Negative  Musculoskeletal: Negative  Negative for myalgias  Skin: Negative  Allergic/Immunologic: Negative  Neurological: Negative  Negative for headaches  Hematological: Negative  Psychiatric/Behavioral: Negative        Aside from what is mentioned in the HPI, the review of systems is otherwise negative    Past medical history, surgical history, and family history were reviewed and updated as appropriate    Social history updates:  Social History     Tobacco Use   Smoking Status Never   Smokeless Tobacco Never       PhysicalExamination:  Vitals:   /62 (BP Location: Left arm, Patient Position: Sitting, Cuff Size: Standard)   Pulse 69   Temp (!) 97 3 °F (36 3 °C) "(Tympanic)   Resp 14   Ht 6' 1\" (1 854 m)   Wt 73 5 kg (162 lb)   SpO2 98%   BMI 21 37 kg/m²     General: alert, not in acute distress  HEENT: PERRL, no icteric sclera or cyanosis, no thrush  Neck: Supple, no lymphadenopathy or thyromegaly, no JVD  Lungs: Equal breath sounds and clear auscultations bilaterally, no wheezing or crackles  Heart: S1S2 regular, no murmurs or gallops  Abdomen: soft, nontender, bowel sounds present  Extremities: no edema, no clubbing or cyanosis  Neuro: Alert and oriented x 3, no focal neurodeficits   Skin: intact, no rashes    Diagnostic Data:  Labs: I personally reviewed the most recent laboratory data pertinent to today's visit    Lab Results   Component Value Date    WBC 4 31 09/03/2022    HGB 14 2 09/03/2022    HCT 42 9 09/03/2022    MCV 96 09/03/2022     09/03/2022     Lab Results   Component Value Date    SODIUM 141 02/13/2023    K 4 1 02/13/2023    CO2 28 02/13/2023     02/13/2023    BUN 22 02/13/2023    CREATININE 1 19 02/13/2023    CALCIUM 9 5 02/13/2023       PFT results: The most recent pulmonary function tests were reviewed  • Normal lung volumes  • Normal airflow on vital capacity  • Normal diffusion capacity  • Normal airway resistance as indicated by the specific airway conductance    • No bronchial hyperresponsiveness on the methacholine challenge test     Imaging:  I personally reviewed the images on the [facility] system pertinent to today's visit    Chest CT scan reviewed on PACs:  Grossly clear and stable lung parenchyma, stable scattered nodularity with some calcifications secondary to sarcoidosis measuring 2-4 mm   Mediastinal lymphadenopathy stable with some calcifications     Chest x-ray reviewed on PACs: Mild bilateral ground-glass changes      Other studies:      Echocardiogram:  LVEF 55%, normal RV        Pathology:  Final Diagnosis   A   Right inguinal lymph node:     - Noncaseasting granulomatous inflammation consistent with sarcoidosis in the " appropriate clinical setting  Ilene Meo- Special stains for acid fast bacilli, fungal organisms and spirochetes are negative      - No malignancy identified         - Flow cytometric analysis ([facility] report [id number]; interpreted by KATHY Powell MD):       -- Interpretation:  There is no phenotypic evidence of non-Hodgkin lymphoma        -- Immunophenotypic analysis:          - Viability 7AAD: 85%          - The B-cells (31% of total) are polytypic  The T-cells (55% of total) show no pan T-cell antigen deletion  The            CD4:CD8 ratio is within normal limits            - The following antigens were evaluated by flow cytometry and found to be expressed by the             appropriate cells:  CD19, CD20, CD10, CD11c, CD23, FMC-7, kappa, lambda, CD11b, CD13,             CD14, CD16, CD33, CD64, CD2, CD3, CD4, CD5, CD7, CD8, CD56, CD57, CD38, CD34, ,            HLA-DR, and CD45              Raj Roman MD  Answers for HPI/ROS submitted by the patient on 4/22/2023  Chronicity: chronic  When did you first notice your symptoms?: more than 1 year ago  How often do your symptoms occur?: rarely  Since you first noticed this problem, how has it changed?: gradually improving  Do you have shortness of breath that occurs with effort or exertion?: Yes  Do you have heartburn?: No  Do you have fatigue?: Yes  Do you have nasal congestion?: No  Do you have shortness of breath when lying flat?: No  Do you have shortness of breath when you wake up?: No  Do you have sweats?: No  Have you experienced weight loss?: Yes  Which of the following makes your symptoms worse?: exposure to fumes, strenuous activity, URI  Risk factors for lung disease: travel

## 2023-04-24 NOTE — ASSESSMENT & PLAN NOTE
Lab Results   Component Value Date    HGBA1C 8 3 (H) 02/13/2023     Probably needs better control but at the same time to be off of 1 of these medications because of significant weight loss  Follow-up with endocrine

## 2023-04-24 NOTE — ASSESSMENT & PLAN NOTE
Lab Results   Component Value Date    EGFR 65 02/13/2023    EGFR 51 09/03/2022    EGFR 52 10/04/2021    CREATININE 1 19 02/13/2023    CREATININE 1 46 (H) 09/03/2022    CREATININE 1 40 (H) 07/09/2022   His creatinine and GFR are better now and he is CKD stage II now

## 2023-06-04 DIAGNOSIS — Z79.4 TYPE 2 DIABETES MELLITUS WITH HYPERGLYCEMIA, WITH LONG-TERM CURRENT USE OF INSULIN (HCC): ICD-10-CM

## 2023-06-04 DIAGNOSIS — E11.65 TYPE 2 DIABETES MELLITUS WITH HYPERGLYCEMIA, WITH LONG-TERM CURRENT USE OF INSULIN (HCC): ICD-10-CM

## 2023-06-05 RX ORDER — FLASH GLUCOSE SENSOR
KIT MISCELLANEOUS
Qty: 6 EACH | Refills: 1 | Status: SHIPPED | OUTPATIENT
Start: 2023-06-05

## 2023-06-06 DIAGNOSIS — D86.9 SARCOIDOSIS: ICD-10-CM

## 2023-06-07 NOTE — TELEPHONE ENCOUNTER
Can we please resend this as the methotrexate 2 5mg - take 3 tabs once weekly  He will pay less for the 2 5mg tabs than the 7 5mg tabs  Thanks!

## 2023-06-09 ENCOUNTER — APPOINTMENT (OUTPATIENT)
Dept: LAB | Facility: MEDICAL CENTER | Age: 63
End: 2023-06-09
Payer: COMMERCIAL

## 2023-06-09 DIAGNOSIS — Z79.4 TYPE 2 DIABETES MELLITUS WITH MICROALBUMINURIA, WITH LONG-TERM CURRENT USE OF INSULIN (HCC): ICD-10-CM

## 2023-06-09 DIAGNOSIS — D86.9 SARCOIDOSIS: ICD-10-CM

## 2023-06-09 DIAGNOSIS — Z79.631 METHOTREXATE, LONG TERM, CURRENT USE: ICD-10-CM

## 2023-06-09 DIAGNOSIS — R63.4 WEIGHT LOSS: ICD-10-CM

## 2023-06-09 DIAGNOSIS — E11.29 TYPE 2 DIABETES MELLITUS WITH MICROALBUMINURIA, WITH LONG-TERM CURRENT USE OF INSULIN (HCC): ICD-10-CM

## 2023-06-09 DIAGNOSIS — R80.9 TYPE 2 DIABETES MELLITUS WITH MICROALBUMINURIA, WITH LONG-TERM CURRENT USE OF INSULIN (HCC): ICD-10-CM

## 2023-06-09 LAB
ALBUMIN SERPL BCP-MCNC: 3.8 G/DL (ref 3.5–5)
ALP SERPL-CCNC: 124 U/L (ref 46–116)
ALT SERPL W P-5'-P-CCNC: 25 U/L (ref 12–78)
ANION GAP SERPL CALCULATED.3IONS-SCNC: 5 MMOL/L (ref 4–13)
AST SERPL W P-5'-P-CCNC: 23 U/L (ref 5–45)
BASOPHILS # BLD AUTO: 0.05 THOUSANDS/ÂΜL (ref 0–0.1)
BASOPHILS NFR BLD AUTO: 1 % (ref 0–1)
BILIRUB SERPL-MCNC: 0.5 MG/DL (ref 0.2–1)
BUN SERPL-MCNC: 21 MG/DL (ref 5–25)
CALCIUM SERPL-MCNC: 9.4 MG/DL (ref 8.3–10.1)
CHLORIDE SERPL-SCNC: 111 MMOL/L (ref 96–108)
CO2 SERPL-SCNC: 29 MMOL/L (ref 21–32)
CREAT SERPL-MCNC: 1.52 MG/DL (ref 0.6–1.3)
CREAT UR-MCNC: 102 MG/DL
EOSINOPHIL # BLD AUTO: 0.25 THOUSAND/ÂΜL (ref 0–0.61)
EOSINOPHIL NFR BLD AUTO: 4 % (ref 0–6)
ERYTHROCYTE [DISTWIDTH] IN BLOOD BY AUTOMATED COUNT: 13.5 % (ref 11.6–15.1)
EST. AVERAGE GLUCOSE BLD GHB EST-MCNC: 186 MG/DL
GFR SERPL CREATININE-BSD FRML MDRD: 48 ML/MIN/1.73SQ M
GLUCOSE P FAST SERPL-MCNC: 116 MG/DL (ref 65–99)
HBA1C MFR BLD: 8.1 %
HCT VFR BLD AUTO: 44.8 % (ref 36.5–49.3)
HGB BLD-MCNC: 14.8 G/DL (ref 12–17)
IMM GRANULOCYTES # BLD AUTO: 0.01 THOUSAND/UL (ref 0–0.2)
IMM GRANULOCYTES NFR BLD AUTO: 0 % (ref 0–2)
LYMPHOCYTES # BLD AUTO: 1.58 THOUSANDS/ÂΜL (ref 0.6–4.47)
LYMPHOCYTES NFR BLD AUTO: 27 % (ref 14–44)
MCH RBC QN AUTO: 32.7 PG (ref 26.8–34.3)
MCHC RBC AUTO-ENTMCNC: 33 G/DL (ref 31.4–37.4)
MCV RBC AUTO: 99 FL (ref 82–98)
MICROALBUMIN UR-MCNC: 16.7 MG/L (ref 0–20)
MICROALBUMIN/CREAT 24H UR: 16 MG/G CREATININE (ref 0–30)
MONOCYTES # BLD AUTO: 0.56 THOUSAND/ÂΜL (ref 0.17–1.22)
MONOCYTES NFR BLD AUTO: 10 % (ref 4–12)
NEUTROPHILS # BLD AUTO: 3.38 THOUSANDS/ÂΜL (ref 1.85–7.62)
NEUTS SEG NFR BLD AUTO: 58 % (ref 43–75)
NRBC BLD AUTO-RTO: 0 /100 WBCS
PLATELET # BLD AUTO: 238 THOUSANDS/UL (ref 149–390)
PMV BLD AUTO: 10.6 FL (ref 8.9–12.7)
POTASSIUM SERPL-SCNC: 4.2 MMOL/L (ref 3.5–5.3)
PROT SERPL-MCNC: 6.5 G/DL (ref 6.4–8.4)
RBC # BLD AUTO: 4.53 MILLION/UL (ref 3.88–5.62)
SODIUM SERPL-SCNC: 145 MMOL/L (ref 135–147)
TSH SERPL DL<=0.05 MIU/L-ACNC: 1.7 UIU/ML (ref 0.45–4.5)
WBC # BLD AUTO: 5.83 THOUSAND/UL (ref 4.31–10.16)

## 2023-06-09 PROCEDURE — 84443 ASSAY THYROID STIM HORMONE: CPT

## 2023-06-09 PROCEDURE — 82043 UR ALBUMIN QUANTITATIVE: CPT

## 2023-06-09 PROCEDURE — 36415 COLL VENOUS BLD VENIPUNCTURE: CPT

## 2023-06-09 PROCEDURE — 80053 COMPREHEN METABOLIC PANEL: CPT

## 2023-06-09 PROCEDURE — 85025 COMPLETE CBC W/AUTO DIFF WBC: CPT

## 2023-06-09 PROCEDURE — 83036 HEMOGLOBIN GLYCOSYLATED A1C: CPT

## 2023-06-09 PROCEDURE — 82570 ASSAY OF URINE CREATININE: CPT

## 2023-07-10 ENCOUNTER — OFFICE VISIT (OUTPATIENT)
Dept: ENDOCRINOLOGY | Facility: CLINIC | Age: 63
End: 2023-07-10
Payer: COMMERCIAL

## 2023-07-10 VITALS
SYSTOLIC BLOOD PRESSURE: 98 MMHG | HEIGHT: 73 IN | BODY MASS INDEX: 21.5 KG/M2 | DIASTOLIC BLOOD PRESSURE: 52 MMHG | WEIGHT: 162.2 LBS

## 2023-07-10 DIAGNOSIS — N18.31 STAGE 3A CHRONIC KIDNEY DISEASE (HCC): ICD-10-CM

## 2023-07-10 DIAGNOSIS — Z79.4 TYPE 2 DIABETES MELLITUS WITH MICROALBUMINURIA, WITH LONG-TERM CURRENT USE OF INSULIN (HCC): Primary | ICD-10-CM

## 2023-07-10 DIAGNOSIS — E78.2 MIXED HYPERLIPIDEMIA: ICD-10-CM

## 2023-07-10 DIAGNOSIS — E11.29 TYPE 2 DIABETES MELLITUS WITH MICROALBUMINURIA, WITH LONG-TERM CURRENT USE OF INSULIN (HCC): Primary | ICD-10-CM

## 2023-07-10 DIAGNOSIS — R80.9 TYPE 2 DIABETES MELLITUS WITH MICROALBUMINURIA, WITH LONG-TERM CURRENT USE OF INSULIN (HCC): Primary | ICD-10-CM

## 2023-07-10 PROCEDURE — 99214 OFFICE O/P EST MOD 30 MIN: CPT | Performed by: PHYSICIAN ASSISTANT

## 2023-07-10 PROCEDURE — 95251 CONT GLUC MNTR ANALYSIS I&R: CPT | Performed by: PHYSICIAN ASSISTANT

## 2023-07-10 RX ORDER — GLIPIZIDE 5 MG/1
5 TABLET, FILM COATED, EXTENDED RELEASE ORAL DAILY
Qty: 90 TABLET | Refills: 1 | Status: SHIPPED | OUTPATIENT
Start: 2023-07-10

## 2023-07-10 NOTE — PROGRESS NOTES
Patient Progress Note      Chief Complaint   Patient presents with   • Diabetes Mellitus   • Diabetes Type 2      Referring Provider  Monique Lee, [address] [facility],  [address]     History of Present Illness:   Misael Dean is a 65 y.o. male with a history of type 2 diabetes with long term use of insulin. Diabetes course has been stable. Complications of DM: CKD. Denies recent illness or hospitalizations. Denies recent severe hypoglycemic or severe hyperglycemic episodes. Denies any issues with his current regimen. Home glucose monitoring: are performed regularly, using Dimitris. Average glucose 194 mg/dl   In target range 43%, above range 57%, below range 0%        Current regimen: Farxiga 10 mg daily, metformin 500 mg twice a day, Tresiba 20 units at bedtime, Januvia 100 mg daily  He states he has not noticed any difference in BG levels while on Januvia. compliant most of the time, denies any side effects from medications. Injects in: abdomen. Rotates sites: Yes  Hypoglycemic episodes: No, rare  H/o of hypoglycemia causing hospitalization or Intervention such as glucagon injection or ambulance call : No  Hypoglycemia symptoms: jittery, sweating  Treatment of hypoglycemia: candy     Medic alert tag: recommended: Yes     Diabetes education: Not recently  Diet: 2 meals per day (breakfast and dinner), 2 snacks per day. Timing of meals is predictable. Diabetic diet compliance: noncompliant most of the time  Activity: Daily activity is predictable: Yes. No routine exercise. Ophthamology: Dec 2022  Podiatry: Oct 2022     Not on ACE inhibitor/ARB  Has hyperlipidemia: on statin - tolerating well, no myalgias. compliant most of the time, denies any side effects from medications.   Thyroid disorders: No  History of pancreatitis: No    Patient Active Problem List   Diagnosis   • MVP (mitral valve prolapse)   • Sarcoidosis   • Type 2 diabetes mellitus with microalbuminuria ([address]) • CKD (chronic kidney disease)   • Hypercalcemia   • Paresthesias   • Nephrolithiasis   • Venous insufficiency   • Methotrexate, long term, current use   • BPH (benign prostatic hyperplasia)   • Venous stasis dermatitis of both lower extremities   • Mixed hyperlipidemia   • Weight loss      Past Medical History:   Diagnosis Date   • Diabetes mellitus ([address])    • Hyperlipidemia    • Pneumonia 2022   • Sarcoidosis       Past Surgical History:   Procedure Laterality Date   • ABDOMINAL SURGERY  1962   • APPENDECTOMY  1962   • COLON SURGERY  1962   • COLONOSCOPY  2017   • CYSTOSCOPY W/ URETERAL STENT PLACEMENT Left    • INTESTINAL MALROTATION REPAIR     • KNEE SURGERY Right 1997   • LYMPH NODE BIOPSY  2018   • CA BX/EXC LYMPH NODE OPEN SUPERFICIAL Right 11/02/2017    Procedure: INGUINAL LYMPH NODE BIOPSY;  Surgeon: Merita Essex, MD;  [facility];  Service: General   • SHOULDER SURGERY  2005      Family History   Problem Relation Age of Onset   • Heart disease Father    • Diabetes Father    • Hyperlipidemia Father    • Heart failure Father         Congestive   • Cancer Mother         Breast Cancer   • Arthritis Mother    • Diabetes Sister    • Cancer Sister    • Hyperlipidemia Brother      Social History     Tobacco Use   • Smoking status: Never   • Smokeless tobacco: Never   Substance Use Topics   • Alcohol use: Never     Allergies   Allergen Reactions   • Acetaminophen Swelling     tylenol   • Dust Mite Extract    • Molds & Smuts    • Other    • Percocet [Oxycodone-Acetaminophen]      Angioedema on finger tips           Current Outpatient Medications:   •  B-D ULTRAFINE III SHORT PEN 31G X 8 MM MISC, Use 4 (four) times a day Use to test, Disp: 400 each, Rfl: 1  •  cholecalciferol (VITAMIN D3) 1,000 units tablet, Take 1,000 Units by mouth daily, Disp: , Rfl:   •  Continuous Blood Gluc  (FREESTYLE [place] READER) RADHA, Use as directed, Disp: , Rfl: 0  •  Continuous Blood Gluc Sensor (FreeStyle [place] 14 Day Sensor) MISC, Change sensor every 14 days, Disp: 6 each, Rfl: 1  •  dapagliflozin (Farxiga) 10 MG tablet, Take 1 tablet (10 mg total) by mouth daily, Disp: 30 tablet, Rfl: 1  •  folic acid (FOLVITE) 1 mg tablet, TAKE 1 TABLET(1 MG) BY MOUTH DAILY, Disp: 90 tablet, Rfl: 3  •  glipiZIDE (GLUCOTROL XL) 5 mg 24 hr tablet, Take 1 tablet (5 mg total) by mouth daily, Disp: 90 tablet, Rfl: 1  •  metFORMIN (GLUCOPHAGE) 500 mg tablet, Take 1 tablet (500 mg total) by mouth 2 (two) times a day with meals, Disp: 180 tablet, Rfl: 1  •  methotrexate 2.5 mg tablet, Take 3 tablets by mouth once a week, Disp: 36 tablet, Rfl: 0  •  simvastatin (ZOCOR) 10 mg tablet, TAKE 1 TABLET BY MOUTH DAILY, Disp: 90 tablet, Rfl: 1  •  Tresiba FlexTouch 100 units/mL injection pen, ADMINISTER 20 UNITS UNDER THE SKIN DAILY AT BEDTIME, Disp: 18 mL, Rfl: 1  Review of Systems   Constitutional: Negative for activity change, appetite change, fatigue and unexpected weight change. HENT: Negative for trouble swallowing. Eyes: Negative for visual disturbance. Respiratory: Negative for shortness of breath. Cardiovascular: Negative for chest pain and palpitations. Gastrointestinal: Negative for constipation and diarrhea. Endocrine: Negative for polydipsia and polyuria. Musculoskeletal: Positive for arthralgias (tendonitis). Skin: Negative for wound. Neurological: Negative for numbness. Psychiatric/Behavioral: Negative. Physical Exam:  Body mass index is 21.4 kg/m². BP 98/52 (BP Location: Left arm, Patient Position: Sitting, Cuff Size: Adult)   Ht 6' 1" (1.854 m)   Wt 73.6 kg (162 lb 3.2 oz)   BMI 21.40 kg/m²    Wt Readings from Last 3 Encounters:   07/10/23 73.6 kg (162 lb 3.2 oz)   04/24/23 73.5 kg (162 lb)   04/10/23 74.6 kg (164 lb 8 oz)       Physical Exam  Vitals and nursing note reviewed. Constitutional:       Appearance: He is well-developed. HENT:      Head: Normocephalic.    Eyes:      General: No scleral icterus. Pupils: Pupils are equal, round, and reactive to light. Neck:      Thyroid: No thyromegaly. Cardiovascular:      Rate and Rhythm: Normal rate and regular rhythm. Pulses:           Radial pulses are 2+ on the right side and 2+ on the left side. Heart sounds: No murmur heard. Pulmonary:      Effort: Pulmonary effort is normal. No respiratory distress. Breath sounds: Normal breath sounds. No wheezing. Musculoskeletal:      Cervical back: Neck supple. Skin:     General: Skin is warm and dry. Neurological:      Mental Status: He is alert. Patient's shoes and socks were not removed. Labs:   Component      Latest Ref Rng & Units 2/13/2023 6/9/2023   Sodium      135 - 147 mmol/L 141 145   Potassium      3.5 - 5.3 mmol/L 4.1 4.2   Chloride      96 - 108 mmol/L 108 111 (H)   CO2      21 - 32 mmol/L 28 29   Anion Gap      4 - 13 mmol/L 5 5   BUN      5 - 25 mg/dL 22 21   Creatinine      0.60 - 1.30 mg/dL 1.19 1.52 (H)   GLUCOSE FASTING      65 - 99 mg/dL 87 116 (H)   Calcium      8.3 - 10.1 mg/dL 9.5 9.4   AST      5 - 45 U/L  23   ALT      12 - 78 U/L  25   Alkaline Phosphatase      46 - 116 U/L  124 (H)   Total Protein      6.4 - 8.4 g/dL  6.5   Albumin      3.5 - 5.0 g/dL  3.8   TOTAL BILIRUBIN      0.20 - 1.00 mg/dL  0.50   eGFR      ml/min/1.73sq m 65 48   EXT Creatinine Urine      mg/dL  102.0   MICROALBUM.,U,RANDOM      0.0 - 20.0 mg/L  16.7   MICROALBUMIN/CREATININE RATIO      0 - 30 mg/g creatinine  16   Hemoglobin A1C      Normal 3.8-5.6%; PreDiabetic 5.7-6.4%; Diabetic >=6.5%; Glycemic control for adults with diabetes <7.0% % 8.3 (H) 8.1 (H)   eAG, EST AVG Glucose      mg/dl 192 186   TSH 3RD GENERATON      0.450 - 4.500 uIU/mL  1.701       Plan:    Miri Urban was seen today for diabetes mellitus and diabetes type 2. Diagnoses and all orders for this visit:    Type 2 diabetes mellitus with microalbuminuria, with long-term current use of insulin ([address])  HGA1C 8.1%. Improved. Treatment regimen: stop Januvia. Continue other treatments. Start glipizide 5 mg XL daily. Discussed intensive insulin regimen does increase risk for hypoglycemia. Episodes of hypoglycemia can lead to permanent disability and death. Discussed risks/complications associated with uncontrolled diabetes. Advised to adhere to diabetic diet, and recommended staying active/exercising routinely as tolerated. Keep carbohydrates consistent to limit blood glucose fluctuations. Advised to call if blood sugars less than 70 mg/dl or over 300 mg/dl. Check blood glucose 3+ times a day  Discussed symptoms and treatment of hypoglycemia. Discussed use of CGM to collect additional blood glucose data to reveal trends and patterns that can be used to optimize treatment plan. Recommended routine follow-up with podiatry and ophthalmology. Send log in 2 weeks. Ordered blood work to complete prior to next visit. -     Hemoglobin A1C; Future  -     Basic metabolic panel; Future  -     glipiZIDE (GLUCOTROL XL) 5 mg 24 hr tablet; Take 1 tablet (5 mg total) by mouth daily    Mixed hyperlipidemia  LDL 56  Continue statin therapy     Stage 3a chronic kidney disease (HCC)  GFR 48  Managed by nephrology   -     Basic metabolic panel; Future           Discussed with the patient diagnosis and treatment and all questions fully answered. He will call me if any problems arise. Counseled patient on diagnostic results, prognosis, risk and benefit of treatment options, instruction for management, importance of treatment compliance, risk factor reduction and impressions.       Isabella Kent PA-C

## 2023-07-10 NOTE — PATIENT INSTRUCTIONS
Hypoglycemia instructions   Virinca Cushing  7/10/2023  [id number]    Low Blood Sugar    Steps to treat low blood sugar. 1. Test blood sugar if you have symptoms of low blood sugar:   Low Blood Sugar Symptoms:  o Sweaty  o Dizzy  o Rapid heartbeat  o Shaky  o Bad mood  o Hungry      2. Treat blood sugar less than 70 with 15 grams of fast-acting carbohydrate:   Examples of 15 grams Fast-Acting Carbohydrate:  o 4 oz juice  o 4 oz regular soda  o 3-4 glucose tablets (chew)  o 3-4 hard candies (chew)          3. Wait 15 minutes and test your blood sugar again     4.  If blood sugar is less than 100, repeat steps 2-3.    5. When your blood sugar is 100 or more, eat a snack if it will be longer than one hour until your next meal. The snack should be 15 grams of carbohydrate and a protein:   Examples of snacks:  o ½ sandwich  o 6 crackers with cheese  o Piece of fruit with cheese or peanut butter  o 6 crackers with peanut butter

## 2023-07-16 DIAGNOSIS — E11.29 TYPE 2 DIABETES MELLITUS WITH MICROALBUMINURIA, UNSPECIFIED WHETHER LONG TERM INSULIN USE: ICD-10-CM

## 2023-07-16 DIAGNOSIS — R80.9 TYPE 2 DIABETES MELLITUS WITH MICROALBUMINURIA, UNSPECIFIED WHETHER LONG TERM INSULIN USE: ICD-10-CM

## 2023-07-17 RX ORDER — SIMVASTATIN 10 MG
10 TABLET ORAL DAILY
Qty: 90 TABLET | Refills: 3 | Status: SHIPPED | OUTPATIENT
Start: 2023-07-17 | End: 2024-07-11

## 2023-07-25 ENCOUNTER — TELEPHONE (OUTPATIENT)
Dept: ENDOCRINOLOGY | Facility: CLINIC | Age: 63
End: 2023-07-25

## 2023-07-25 DIAGNOSIS — E11.65 TYPE 2 DIABETES MELLITUS WITH HYPERGLYCEMIA, WITH LONG-TERM CURRENT USE OF INSULIN (HCC): ICD-10-CM

## 2023-07-25 DIAGNOSIS — Z79.4 TYPE 2 DIABETES MELLITUS WITH HYPERGLYCEMIA, WITH LONG-TERM CURRENT USE OF INSULIN (HCC): ICD-10-CM

## 2023-07-25 NOTE — TELEPHONE ENCOUNTER
Average glucose is 179 mg/dL which is improved compared to the prior CGM download. There are some spikes in glucose throughout the day which are likely due to variation in carbohydrate intake. Advised patient to continue current treatment and follow a diabetic diet. Limit carbs with meals and keep carb intake consistent to limit fluctuations in blood glucose.

## 2023-07-26 ENCOUNTER — DOCUMENTATION (OUTPATIENT)
Dept: ENDOCRINOLOGY | Facility: CLINIC | Age: 63
End: 2023-07-26

## 2023-07-26 RX ORDER — DAPAGLIFLOZIN 10 MG/1
10 TABLET, FILM COATED ORAL DAILY
Qty: 30 TABLET | Refills: 4 | Status: SHIPPED | OUTPATIENT
Start: 2023-07-26

## 2023-07-26 NOTE — TELEPHONE ENCOUNTER
Patient aware. He said he eats more carbs at night so he doesn't go low over night. He wants to lower his Eluterio Less so he doesn't have to eat carbs before bed.

## 2023-07-26 NOTE — TELEPHONE ENCOUNTER
Requested medication(s) are due for refill today: Yes  Patient has already received a courtesy refill: No  Other reason request has been forwarded to provider: Guidelines failed.

## 2023-09-01 DIAGNOSIS — D86.9 SARCOIDOSIS: ICD-10-CM

## 2023-09-28 DIAGNOSIS — N18.30 STAGE 3 CHRONIC KIDNEY DISEASE, UNSPECIFIED WHETHER STAGE 3A OR 3B CKD (HCC): ICD-10-CM

## 2023-09-28 DIAGNOSIS — D86.9 SARCOIDOSIS: ICD-10-CM

## 2023-09-28 DIAGNOSIS — E83.52 HYPERCALCEMIA: ICD-10-CM

## 2023-09-28 RX ORDER — FOLIC ACID 1 MG/1
TABLET ORAL
Qty: 90 TABLET | Refills: 3 | Status: SHIPPED | OUTPATIENT
Start: 2023-09-28

## 2023-10-09 ENCOUNTER — OFFICE VISIT (OUTPATIENT)
Dept: FAMILY MEDICINE CLINIC | Facility: CLINIC | Age: 63
End: 2023-10-09
Payer: COMMERCIAL

## 2023-10-09 VITALS
BODY MASS INDEX: 21.49 KG/M2 | SYSTOLIC BLOOD PRESSURE: 100 MMHG | WEIGHT: 162.13 LBS | HEIGHT: 73 IN | DIASTOLIC BLOOD PRESSURE: 60 MMHG

## 2023-10-09 DIAGNOSIS — Z23 NEED FOR VACCINATION: ICD-10-CM

## 2023-10-09 DIAGNOSIS — M77.11 RIGHT LATERAL EPICONDYLITIS: Primary | ICD-10-CM

## 2023-10-09 PROCEDURE — 99213 OFFICE O/P EST LOW 20 MIN: CPT | Performed by: FAMILY MEDICINE

## 2023-10-09 PROCEDURE — 90686 IIV4 VACC NO PRSV 0.5 ML IM: CPT

## 2023-10-09 PROCEDURE — 90471 IMMUNIZATION ADMIN: CPT

## 2023-10-09 NOTE — PROGRESS NOTES
Name: Dominique Guzman      : 1960      MRN: [id number]  Encounter Provider: Tom Birch DO  Encounter Date: 10/9/2023   Encounter department: [address] [place] [address] PRIMARY CARE    Assessment & Plan     Chief Complaint   Patient presents with   • Pain     Right elbow-"repetitive motion at work,long-term"       1. Right lateral epicondylitis  Comments: With the chronic recurrence, will recommend he see a specialist.  Orders:  -     Ambulatory Referral to Hand Surgery; Future    2. Need for vaccination  -     influenza vaccine, quadrivalent, 0.5 mL, preservative-free, for adult and pediatric patients 6 mos+ (AFLURIA, FLUARIX, FLULAVAL, FLUZONE)           Subjective      Patient presents to the office complaining of recurrent right elbow pain. It has been injected twice by me with some improvement but recurrence comes shortly thereafter. He does a lot of repetitive motion at work, carries a heavy bag. Review of Systems   Constitutional: Negative for chills and fever. HENT: Negative for ear pain and sore throat. Eyes: Negative for pain and visual disturbance. Respiratory: Negative for cough and shortness of breath. Cardiovascular: Negative for chest pain and palpitations. Gastrointestinal: Negative for abdominal pain and vomiting. Genitourinary: Negative for dysuria and hematuria. Musculoskeletal: Positive for arthralgias. Negative for back pain. Skin: Negative for color change and rash. Neurological: Negative for seizures and syncope. All other systems reviewed and are negative.       Current Outpatient Medications on File Prior to Visit   Medication Sig   • B-D ULTRAFINE III SHORT PEN 31G X 8 MM MISC Use 4 (four) times a day Use to test   • cholecalciferol (VITAMIN D3) 1,000 units tablet Take 1,000 Units by mouth daily   • Continuous Blood Gluc  (FREESTYLE DIMITRIS READER) RADHA Use as directed   • Continuous Blood Gluc Sensor (FreeStyle [place] 14 Day Sensor) [facility] Change sensor every 14 days   • Farxiga 10 MG tablet TAKE 1 TABLET(10 MG) BY MOUTH DAILY   • folic acid (FOLVITE) 1 mg tablet TAKE 1 TABLET(1 MG) BY MOUTH DAILY   • glipiZIDE (GLUCOTROL XL) 5 mg 24 hr tablet Take 1 tablet (5 mg total) by mouth daily   • metFORMIN (GLUCOPHAGE) 500 mg tablet Take 1 tablet (500 mg total) by mouth 2 (two) times a day with meals   • methotrexate 2.5 mg tablet Take 3 tablets by mouth once a week   • simvastatin (ZOCOR) 10 mg tablet Take 1 tablet (10 mg total) by mouth daily   • Tresiba FlexTouch 100 units/mL injection pen ADMINISTER 20 UNITS UNDER THE SKIN DAILY AT BEDTIME (Patient taking differently: 16 units daily at HS)       Objective     /60 (BP Location: Left arm, Patient Position: Sitting, Cuff Size: Standard)   Ht 6' 1" (1.854 m)   Wt 73.5 kg (162 lb 2 oz)   BMI 21.39 kg/m²     Physical Exam  Vitals and nursing note reviewed. Constitutional:       General: He is not in acute distress. Appearance: Normal appearance. He is well-developed. HENT:      Head: Normocephalic and atraumatic. Eyes:      Conjunctiva/sclera: Conjunctivae normal.   Cardiovascular:      Rate and Rhythm: Normal rate and regular rhythm. Pulses: Normal pulses. Heart sounds: Normal heart sounds. Pulmonary:      Effort: Pulmonary effort is normal.      Breath sounds: Normal breath sounds. Abdominal:      General: Abdomen is flat. Musculoskeletal:      Right elbow: Decreased range of motion (Pain increases with phonation, and flexion. ). Tenderness present in lateral epicondyle. Neurological:      Mental Status: He is alert and oriented to person, place, and time.    Psychiatric:         Judgment: Judgment normal.       Marium Mckinney,

## 2023-10-14 DIAGNOSIS — E11.65 TYPE 2 DIABETES MELLITUS WITH HYPERGLYCEMIA, WITH LONG-TERM CURRENT USE OF INSULIN (HCC): ICD-10-CM

## 2023-10-14 DIAGNOSIS — Z79.4 TYPE 2 DIABETES MELLITUS WITH HYPERGLYCEMIA, WITH LONG-TERM CURRENT USE OF INSULIN (HCC): ICD-10-CM

## 2023-10-16 ENCOUNTER — APPOINTMENT (OUTPATIENT)
Dept: RADIOLOGY | Facility: MEDICAL CENTER | Age: 63
End: 2023-10-16
Payer: COMMERCIAL

## 2023-10-16 ENCOUNTER — OFFICE VISIT (OUTPATIENT)
Dept: OBGYN CLINIC | Facility: MEDICAL CENTER | Age: 63
End: 2023-10-16
Payer: COMMERCIAL

## 2023-10-16 VITALS
HEIGHT: 73 IN | SYSTOLIC BLOOD PRESSURE: 99 MMHG | DIASTOLIC BLOOD PRESSURE: 62 MMHG | HEART RATE: 66 BPM | WEIGHT: 159.4 LBS | BODY MASS INDEX: 21.12 KG/M2

## 2023-10-16 DIAGNOSIS — M25.521 PAIN IN RIGHT ELBOW: ICD-10-CM

## 2023-10-16 DIAGNOSIS — M77.11 RIGHT LATERAL EPICONDYLITIS: Primary | ICD-10-CM

## 2023-10-16 PROCEDURE — 73080 X-RAY EXAM OF ELBOW: CPT

## 2023-10-16 PROCEDURE — 99204 OFFICE O/P NEW MOD 45 MIN: CPT | Performed by: ORTHOPAEDIC SURGERY

## 2023-10-16 RX ORDER — INSULIN DEGLUDEC INJECTION 100 U/ML
INJECTION, SOLUTION SUBCUTANEOUS
Qty: 18 ML | Refills: 1 | Status: SHIPPED | OUTPATIENT
Start: 2023-10-16

## 2023-10-16 NOTE — PROGRESS NOTES
The HAND & UPPER EXTREMITY OFFICE VISIT   Referred By:  Manohar Hawley,   [address]  [address] [facility],  [address] [place] [address]    Chief Complaint:     Right lateral epicondylitis    History of Present Illness:   65 y.o., right hand dominant male presents for initial evaluation of right lateral epicondylitis at the request of Dr. Santiago Fernandez. His note was reviewed in detail today. Patient reports he has been having pain for at least the last year which is significantly worsened recently. Patient has had 2 steroid injections, the last was 4/10/2023 with excellent but short-term relief. He notes the repetitive motion contributed to the pain. He notes he has used a brace which was not beneficial. He has not had any PT. he is unable to take Tylenol due to an allergy and he is unable to take more than a very limited amount of NSAIDs due to his diabetes and sarcoidosis affecting his kidney function. Patient reports occasional numbness/tingling in fingers.     ADLs: [employer]  He notes he is an  at a [employer]      Past Medical History:  Past Medical History:   Diagnosis Date    Diabetes mellitus ([address])     Hyperlipidemia     Pneumonia 2022    Sarcoidosis      Past Surgical History:   Procedure Laterality Date    [address]    COLONOSCOPY  2017    CYSTOSCOPY W/ URETERAL STENT PLACEMENT Left     INTESTINAL MALROTATION REPAIR      KNEE SURGERY Right 1997    LYMPH NODE BIOPSY  2018    UT BX/EXC LYMPH NODE OPEN SUPERFICIAL Right 11/02/2017    Procedure: INGUINAL LYMPH NODE BIOPSY;  Surgeon: Patti Sy MD;  [facility];  Service: General    SHOULDER SURGERY  2005     Family History   Problem Relation Age of Onset    Heart disease Father     Diabetes Father     Hyperlipidemia Father     Heart failure Father         Congestive    Cancer Mother         Breast Cancer    Arthritis Mother     Diabetes Sister     Cancer Sister     Hyperlipidemia Brother      Social History     Socioeconomic History    Marital status: Single     Spouse name: Not on file    Number of children: Not on file    Years of education: Not on file    Highest education level: Not on file   Occupational History    Not on file   Tobacco Use    Smoking status: Never     Passive exposure: Never    Smokeless tobacco: Never   Vaping Use    Vaping Use: Never used   Substance and Sexual Activity    Alcohol use: Never    Drug use: No    Sexual activity: Never   Other Topics Concern    Not on file   Social History Narrative    Not on file     Social Determinants of Health     Financial Resource Strain: Not on file   Food Insecurity: Not on file   Transportation Needs: Not on file   Physical Activity: Not on file   Stress: Not on file   Social Connections: Not on file   Intimate Partner Violence: Not on file   Housing Stability: Not on file     Scheduled Meds:  Continuous Infusions:No current facility-administered medications for this visit. PRN Meds:.   Allergies   Allergen Reactions    Acetaminophen Swelling     tylenol    Dust Mite Extract     Molds & Smuts     Other     Percocet [Oxycodone-Acetaminophen]      Angioedema on finger tips       Physical Examination:    BP 99/62   Pulse 66   Ht 6' 1" (1.854 m)   Wt 72.3 kg (159 lb 6.4 oz)   BMI 21.03 kg/m²     Gen: A&Ox3, NAD  Cardiac: regular rate  Chest: non labored breathing  Abdomen: Non-distended    Right Upper Extremity:  Skin CDI  No obvious deformity of the shoulder, arm, elbow, forearm, wrist, hand  5/5 motor strength wrist extension, flexion, finger extension, flexion  2+RP  Tender over ECRL/ECRB origin just superior to the lateral epicondyle  Nontender over the posterior or medial elbow  Pain with resisted wrist extension  No pain with resisted finger extension  + tinel's elbow  Intact sensation ulnar/median nerve distribution  Paraesthesias in radial sensory nerve distribution  Ulnar nerve stable in epicondylar groove  Full ROM elbow    Left Upper Extremity:  Skin CDI  No obvious deformity of the shoulder, arm, elbow, forearm, wrist, hand  Sensation intact to light touch in the axillary median, ulnar, and radial nerve distributions  5/5 motor strength  2+RP    Studies:  Radiographs: I personally reviewed and independently interpreted the available radiographs. 10/16/23: Radiographs of the right elbow, multiple views, demonstrate no fractures or dislocations. There is very mild enthesophyte formation in the common extensor origin of the lateral condyle. No significant radiocapitellar arthritis. Radiocapitellar and ulnar trochlear joints and proximal radial ulnar joints are congruent. Assessment and Plan:  1. Right lateral epicondylitis  Ambulatory Referral to Hand Surgery    Ambulatory Referral to PT/OT Hand Therapy    With the chronic recurrence, will recommend he see a specialist.      2. Pain in right elbow  XR elbow 3+ vw right        65 y.o. male presents with signs and symptoms consistent with the above diagnosis. We discussed the natural history of this condition and its pathogenesis. We discussed operative and nonoperative treatment options. Imaging reviewed and physical exam was performed. Based on the findings, I recommend attending formal PT/OT and working on ROM, stretching, strengthening, as well as dry needling. We also discussed Voltaren gel up to 4 times per day as well as ice massage. He does not need to use a brace if he does not find it beneficial. Patient to return to office in 3 months or as needed based on his symptoms. he expressed understanding of the plan and agreed. We encouraged them to contact our office with any questions or concerns.      Rogelio Espitia MD  Hand and Upper Extremity Surgery

## 2023-11-17 DIAGNOSIS — E11.65 TYPE 2 DIABETES MELLITUS WITH HYPERGLYCEMIA, WITH LONG-TERM CURRENT USE OF INSULIN (HCC): ICD-10-CM

## 2023-11-17 DIAGNOSIS — Z79.4 TYPE 2 DIABETES MELLITUS WITH HYPERGLYCEMIA, WITH LONG-TERM CURRENT USE OF INSULIN (HCC): ICD-10-CM

## 2023-11-17 RX ORDER — FLASH GLUCOSE SENSOR
KIT MISCELLANEOUS
Qty: 6 EACH | Refills: 1 | Status: SHIPPED | OUTPATIENT
Start: 2023-11-17

## 2023-11-18 DIAGNOSIS — Z79.4 TYPE 2 DIABETES MELLITUS WITH HYPERGLYCEMIA, WITH LONG-TERM CURRENT USE OF INSULIN (HCC): ICD-10-CM

## 2023-11-18 DIAGNOSIS — E11.65 TYPE 2 DIABETES MELLITUS WITH HYPERGLYCEMIA, WITH LONG-TERM CURRENT USE OF INSULIN (HCC): ICD-10-CM

## 2023-11-20 RX ORDER — DAPAGLIFLOZIN 10 MG/1
10 TABLET, FILM COATED ORAL DAILY
Qty: 30 TABLET | Refills: 4 | Status: SHIPPED | OUTPATIENT
Start: 2023-11-20

## 2023-11-22 ENCOUNTER — EVALUATION (OUTPATIENT)
Dept: PHYSICAL THERAPY | Facility: MEDICAL CENTER | Age: 63
End: 2023-11-22
Payer: COMMERCIAL

## 2023-11-22 DIAGNOSIS — M77.11 RIGHT LATERAL EPICONDYLITIS: Primary | ICD-10-CM

## 2023-11-22 DIAGNOSIS — D86.9 SARCOIDOSIS: ICD-10-CM

## 2023-11-22 PROCEDURE — 97140 MANUAL THERAPY 1/> REGIONS: CPT | Performed by: PHYSICAL THERAPIST

## 2023-11-22 PROCEDURE — 97760 ORTHOTIC MGMT&TRAING 1ST ENC: CPT | Performed by: PHYSICAL THERAPIST

## 2023-11-22 PROCEDURE — 97161 PT EVAL LOW COMPLEX 20 MIN: CPT | Performed by: PHYSICAL THERAPIST

## 2023-11-22 RX ORDER — METHOTREXATE 2.5 MG/1
TABLET ORAL
Qty: 36 TABLET | Refills: 1 | Status: SHIPPED | OUTPATIENT
Start: 2023-11-22

## 2023-11-22 NOTE — PROGRESS NOTES
PT Evaluation     Today's date: 2023  Patient name: Everton Palmer  : 1960  MRN: [id number]  Referring provider: Master Maldonado  Dx:   Encounter Diagnosis     ICD-10-CM    1. Right lateral epicondylitis  M77.11 Ambulatory Referral to PT/OT Hand Therapy    With the chronic recurrence, will recommend he see a specialist.                     Assessment  Assessment details: Pt is a 59year old RHD male who presents to PT with chronic R elbow lateral epicondylosis. Pt reports he has had same symptoms for the past 10 months. Pt has localized tenderness to anterior lateral epicondyle and MT junction of common extensors. He has good strength in his wrist stabilizers, pain with resistance, none with stretch. He also moderate weakness in his rotator cuff musculature which could be a contributor to his symptoms. Due to his tight work schedule we fabricated custom volar wrist splint to allow his tissues to rest, performed EPAT treatment to his common extensors, demonstrated cross friction massage to insertion points, educated on proper ergonomics, and provided him with HEP to work on light loading with eccentrics and strengthening for his shoulder musculature. Planning on following up with him in the next month.  Thank you kindly for your referral.   Impairments: activity intolerance, impaired physical strength, lacks appropriate home exercise program and pain with function  Understanding of Dx/Px/POC: good   Prognosis: good    Goals  Goals:  1: Pt independent in HEP  2: reduce tenderness by 25%  3: reduce pain with resistance by 25%  4: improve sh strength 1/2 grade  5: reduce pain at worst 2 grades    Plan  Plan details: Initiate PT as per POC  Patient would benefit from: skilled physical therapy  Planned modality interventions: thermotherapy: hydrocollator packs  Other planned modality interventions: EPAT  Planned therapy interventions: IASTM, joint mobilization, kinesiology taping, manual therapy, massage, orthotic fitting/training, orthotic management and training, patient education, activity modification, strengthening, stretching, therapeutic activities, therapeutic exercise, flexibility, functional ROM exercises and home exercise program  Frequency: 1x month  Duration in visits: 3  Duration in weeks: 12  Plan of Care beginning date: 2023  Plan of Care expiration date: 2/15/2024  Treatment plan discussed with: patient      Subjective Evaluation    History of Present Illness  Mechanism of injury: About 10 months  About the same, not getting better  2 prior injections, temporary relief  No help wit counterforce brace  About 4 hours a day, scanning items in inventory          Recurrent probem    Quality of life: good    Patient Goals  Patient goals for therapy: increased strength, independence with ADLs/IADLs, decreased pain and increased motion    Pain  Current pain ratin  At best pain ratin  At worst pain ratin  Location: localzied to lateral elbow  Quality: tight  Aggravating factors: lifting (reaching, driving)  Progression: no change    Social Support    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[employer])  Hand dominance: ambidextrous (writes, eats with L, force more with R)  Life stress: shooting      Objective     Tenderness     Additional Tenderness Details  Mild to lateral epicondyle, moderate to MTJ    Active Range of Motion     Right Elbow   Normal active range of motion    Left Wrist   Normal active range of motion    Right Wrist   Normal active range of motion    Strength/Myotome Testing     Right Shoulder     Planes of Motion   Flexion: 4+   Extension: 4+   Abduction: 4   External rotation at 0°: 4   Internal rotation at 0°: 4+   Horizontal abduction: 4-     Isolated Muscles   Lower trapezius: 4-   Middle deltoid: 4     Left Wrist/Hand   Wrist extension: 5  Wrist flexion: 5  Radial deviation: 5  Ulnar deviation: 5    Right Wrist/Hand Wrist extension: 5  Wrist flexion: 5  Radial deviation: 5  Ulnar deviation: 5    Additional Strength Details  + pain with EDC resisted in each digit    Tests     Right Elbow   Positive Cozen's. Negative Mill's.             Precautions: onset 10 month  HEP: cross friction massage, volar wrist splint for night, extensor stretch, wrist ext eccentrics, TB row, ext, robberies      Manuals             MH             EPAT             IASTM/cross friction                          Neuro Re-Ed                          Supine serratus punches             SL ER             Prone ext             Prone abd                                       Ther Ex             Ball roll for wrist             Wrist eccentrics             Flexbar towel twist             Flexbar sup/pron                                                                 Ther Activity                                       Gait Training                                       Modalities

## 2023-12-03 DIAGNOSIS — Z79.4 TYPE 2 DIABETES MELLITUS WITH MICROALBUMINURIA, WITH LONG-TERM CURRENT USE OF INSULIN (HCC): ICD-10-CM

## 2023-12-03 DIAGNOSIS — R80.9 TYPE 2 DIABETES MELLITUS WITH MICROALBUMINURIA, WITH LONG-TERM CURRENT USE OF INSULIN (HCC): ICD-10-CM

## 2023-12-03 DIAGNOSIS — E11.29 TYPE 2 DIABETES MELLITUS WITH MICROALBUMINURIA, WITH LONG-TERM CURRENT USE OF INSULIN (HCC): ICD-10-CM

## 2023-12-04 RX ORDER — GLIPIZIDE 5 MG/1
5 TABLET, FILM COATED, EXTENDED RELEASE ORAL DAILY
Qty: 90 TABLET | Refills: 1 | Status: SHIPPED | OUTPATIENT
Start: 2023-12-04

## 2023-12-17 DIAGNOSIS — E11.65 TYPE 2 DIABETES MELLITUS WITH HYPERGLYCEMIA, WITH LONG-TERM CURRENT USE OF INSULIN (HCC): ICD-10-CM

## 2023-12-17 DIAGNOSIS — Z79.631 METHOTREXATE, LONG TERM, CURRENT USE: ICD-10-CM

## 2023-12-17 DIAGNOSIS — Z79.4 TYPE 2 DIABETES MELLITUS WITH HYPERGLYCEMIA, WITH LONG-TERM CURRENT USE OF INSULIN (HCC): ICD-10-CM

## 2023-12-18 DIAGNOSIS — E11.65 TYPE 2 DIABETES MELLITUS WITH HYPERGLYCEMIA, WITH LONG-TERM CURRENT USE OF INSULIN (HCC): ICD-10-CM

## 2023-12-18 DIAGNOSIS — Z79.4 TYPE 2 DIABETES MELLITUS WITH HYPERGLYCEMIA, WITH LONG-TERM CURRENT USE OF INSULIN (HCC): ICD-10-CM

## 2023-12-18 RX ORDER — PEN NEEDLE, DIABETIC 31 GX5/16"
NEEDLE, DISPOSABLE MISCELLANEOUS
Qty: 400 EACH | Refills: 1 | Status: SHIPPED | OUTPATIENT
Start: 2023-12-18 | End: 2023-12-18 | Stop reason: SDUPTHER

## 2023-12-18 RX ORDER — PEN NEEDLE, DIABETIC 31 GX5/16"
NEEDLE, DISPOSABLE MISCELLANEOUS
Qty: 400 EACH | Refills: 1 | Status: SHIPPED | OUTPATIENT
Start: 2023-12-18

## 2023-12-27 ENCOUNTER — OFFICE VISIT (OUTPATIENT)
Dept: PHYSICAL THERAPY | Facility: MEDICAL CENTER | Age: 63
End: 2023-12-27
Payer: COMMERCIAL

## 2023-12-27 DIAGNOSIS — M77.11 RIGHT LATERAL EPICONDYLITIS: Primary | ICD-10-CM

## 2023-12-27 PROCEDURE — 97110 THERAPEUTIC EXERCISES: CPT | Performed by: PHYSICAL THERAPIST

## 2023-12-27 PROCEDURE — 97140 MANUAL THERAPY 1/> REGIONS: CPT | Performed by: PHYSICAL THERAPIST

## 2023-12-27 NOTE — PROGRESS NOTES
Daily Note     Today's date: 2023  Patient name: Juan Escobedo  : 1960  MRN: [id number]  Referring provider: Pedro Ortega,*  Dx:   Encounter Diagnosis     ICD-10-CM    1. Right lateral epicondylitis  M77.11                      Subjective: Pt reports he has been feeling better overall. Still some tension in his extensor muscle bellies. Noticed cross friction massage over epicondyle has been helping, less tender there.       Objective: See treatment diary below      Assessment: Tolerated treatment well. Patient exhibited good technique with therapeutic exercises and would benefit from continued PT  Performed EPAT today, pt tolerated well, 16 hz 3.5; Pt had mild tension to his digit extensors with STM/IASTM  He has significant improvement in his flexibility  Initiated program today, pt tolerated exercises well, no reported pain after  Noticed Pt had firm end feel at about 10 degrees from full extension- performed some H-U joint mobs , joshua to reach about 3 degrees from 0 after.   Pt's next day off will be in late January, will call us back to schedule next follow up.     Plan: Continue per plan of care.      Precautions: onset 10 month  HEP: cross friction massage, volar wrist splint for night, extensor stretch, wrist ext eccentrics, TB row, ext, robberies      Manuals             MH 10            EPAT 5            IASTM/cross friction/PROM 10             25            Neuro Re-Ed                          Supine serratus punches 2# 2x10            SL ER 2# 2x10            Prone ext 2# 2x10            Prone abd 2# 2x10                          10            Ther Ex             Ball roll for wrist             Wrist eccentrics 2# 2x10            Flexbar towel twist eccentrics Red 20x            Flexbar sup/pron Red 20x                                                    10            Ther Activity                                       Gait Training                                        Modalities

## 2024-01-12 ENCOUNTER — OFFICE VISIT (OUTPATIENT)
Dept: OBGYN CLINIC | Facility: CLINIC | Age: 64
End: 2024-01-12
Payer: COMMERCIAL

## 2024-01-12 VITALS
BODY MASS INDEX: 21.07 KG/M2 | SYSTOLIC BLOOD PRESSURE: 100 MMHG | HEIGHT: 73 IN | DIASTOLIC BLOOD PRESSURE: 60 MMHG | WEIGHT: 159 LBS

## 2024-01-12 DIAGNOSIS — M77.11 RIGHT LATERAL EPICONDYLITIS: Primary | ICD-10-CM

## 2024-01-12 PROCEDURE — 99212 OFFICE O/P EST SF 10 MIN: CPT | Performed by: ORTHOPAEDIC SURGERY

## 2024-01-12 NOTE — PROGRESS NOTES
HAND & UPPER EXTREMITY OFFICE VISIT   Referred By:  No referring provider defined for this encounter.      Chief Complaint:     Right lateral epicondylitis       Previous History:   Previously diagnosed with lateral condylitis referred to physical therapy and given a brace.    Interval History:  Brace is helping at night as well as PT, 1/10 pain noted.  He still has an occasional flareup of pain with certain activities but states overall it is much more tolerable and he is happy with his outcome thus far.  Denies numbness or tingling in the hand.    Past Medical History:  Past Medical History:   Diagnosis Date    Diabetes mellitus (HCC)     Hyperlipidemia     Pneumonia 2022    Sarcoidosis      Past Surgical History:   Procedure Laterality Date    ABDOMINAL SURGERY  1962    APPENDECTOMY  1962    COLON SURGERY  1962    COLONOSCOPY  2017    CYSTOSCOPY W/ URETERAL STENT PLACEMENT Left     INTESTINAL MALROTATION REPAIR      KNEE SURGERY Right 1997    LYMPH NODE BIOPSY  2018    [place] BX/EXC LYMPH NODE OPEN SUPERFICIAL Right 11/02/2017    Procedure: INGUINAL LYMPH NODE BIOPSY;  Surgeon: RIZWAN Mederos MD;  Location: [facility];  Service: General    SHOULDER SURGERY  2005     Family History   Problem Relation Age of Onset    Heart disease Father     Diabetes Father     Hyperlipidemia Father     Heart failure Father         Congestive    Cancer Mother         Breast Cancer    Arthritis Mother     Diabetes Sister     Cancer Sister     Hyperlipidemia Brother      Social History     Socioeconomic History    Marital status: Single     Spouse name: Not on file    Number of children: Not on file    Years of education: Not on file    Highest education level: Not on file   Occupational History    Not on file   Tobacco Use    Smoking status: Never     Passive exposure: Never    Smokeless tobacco: Never   Vaping Use    Vaping status: Never Used   Substance and Sexual Activity    Alcohol use: Never    Drug use: No    Sexual  "activity: Never   Other Topics Concern    Not on file   Social History Narrative    Not on file     Social Determinants of Health     Financial Resource Strain: Not on file   Food Insecurity: Not on file   Transportation Needs: Not on file   Physical Activity: Not on file   Stress: Not on file   Social Connections: Not on file   Intimate Partner Violence: Not on file   Housing Stability: Not on file     Scheduled Meds:  Continuous Infusions:No current facility-administered medications for this visit.    PRN Meds:.  Allergies   Allergen Reactions    Acetaminophen Swelling     tylenol    Dust Mite Extract     Molds & Smuts     Other     Percocet [Oxycodone-Acetaminophen]      Angioedema on finger tips         Physical Examination:    /60   Ht 6' 1\" (1.854 m)   Wt 72.1 kg (159 lb)   BMI 20.98 kg/m²     Gen: A&Ox3, NAD  Cardiac: regular rate  Chest: non labored breathing  Abdomen: Non-distended      Right Upper Extremity:  Skin CDI  No obvious deformity of the shoulder, arm, elbow, forearm, wrist, hand  Minimally tender over the lateral condyle at the extensor origin  Minimal pain with resisted wrist extension with elbow extended  Sensation intact to light touch in the axillary median, ulnar, and radial nerve distributions  Full elbow range of motion  2+RP        Studies:  No new studies reviewed      Assessment and Plan:  1. Right lateral epicondylitis            63 y.o. male presents in follow up for the above diagnosis.  His symptoms have been improving after starting physical therapy and doing home exercise program.  His symptoms are now amenable and much more manageable.  Recommend continue doing a home exercise program until he has complete resolution of his symptoms.  It also can be beneficial to continue to do stretching long-term to prevent recurrence.  Per his request he will follow-up as needed at this point.    he expressed understanding of the plan and agreed. We encouraged them to contact our " office with any questions or concerns.       Pedro Ortega MD  Hand and Upper Extremity Surgery      *This note was dictated using Dragon voice recognition software. Please excuse any word substitutions or errors.*

## 2024-01-29 ENCOUNTER — OFFICE VISIT (OUTPATIENT)
Dept: PHYSICAL THERAPY | Facility: MEDICAL CENTER | Age: 64
End: 2024-01-29
Payer: COMMERCIAL

## 2024-01-29 DIAGNOSIS — M77.11 RIGHT LATERAL EPICONDYLITIS: Primary | ICD-10-CM

## 2024-01-29 PROCEDURE — 97140 MANUAL THERAPY 1/> REGIONS: CPT | Performed by: PHYSICAL THERAPIST

## 2024-01-29 NOTE — PROGRESS NOTES
Daily Note/Discharge     Today's date: 2024  Patient name: Juan Escobedo  : 1960  MRN: [id number]  Referring provider: Pedro Ortega,*  Dx:   Encounter Diagnosis     ICD-10-CM    1. Right lateral epicondylitis  M77.11                      Subjective: Pt reports he rarely has pain, more with heavy lifting. Pain sometimes 1-2/10      Objective: See treatment diary below      Assessment: Tolerated treatment well.   Mild tenderness to superior lateral epicondyle   II R/L 100/95, no pain with    Very mild pain with Cozen's 5/5 strength  oals  Goals:  1: Pt independent in HEP- met  2: reduce tenderness by 25%- met  3: reduce pain with resistance by 25%- met  4: improve sh strength 1/2 grade- met  5: reduce pain at worst 2 grades- met  Pt has met his therapy goals, ready for D/C to HEP    Plan: D/C to HEP     Precautions: onset 10 month  HEP: cross friction massage, volar wrist splint for night, extensor stretch, wrist ext eccentrics, TB row, ext, robberies      Manuals            MH 10 10           EPAT 5 5           IASTM/cross friction/PROM 10 10 + reassessmetn            25 25           Neuro Re-Ed                          Supine serratus punches 2# 2x10            SL ER 2# 2x10            Prone ext 2# 2x10            Prone abd 2# 2x10                          10            Ther Ex             Ball roll for wrist             Wrist eccentrics 2# 2x10            Flexbar towel twist eccentrics Red 20x            Flexbar sup/pron Red 20x                                                    10            Ther Activity                                       Gait Training                                       Modalities

## 2024-02-26 ENCOUNTER — TELEPHONE (OUTPATIENT)
Dept: ENDOCRINOLOGY | Facility: CLINIC | Age: 64
End: 2024-02-26

## 2024-02-26 NOTE — TELEPHONE ENCOUNTER
Patient called asking if he needs blood work done before his next appt on 3/5/24, there is none in epic. Please advise

## 2024-02-27 DIAGNOSIS — E11.65 TYPE 2 DIABETES MELLITUS WITH HYPERGLYCEMIA, WITH LONG-TERM CURRENT USE OF INSULIN (HCC): Primary | ICD-10-CM

## 2024-02-27 DIAGNOSIS — Z79.4 TYPE 2 DIABETES MELLITUS WITH HYPERGLYCEMIA, WITH LONG-TERM CURRENT USE OF INSULIN (HCC): Primary | ICD-10-CM

## 2024-03-02 ENCOUNTER — APPOINTMENT (OUTPATIENT)
Dept: LAB | Facility: MEDICAL CENTER | Age: 64
End: 2024-03-02
Payer: COMMERCIAL

## 2024-03-02 LAB
ANION GAP SERPL CALCULATED.3IONS-SCNC: 7 MMOL/L
BUN SERPL-MCNC: 19 MG/DL (ref 5–25)
CALCIUM SERPL-MCNC: 9.6 MG/DL (ref 8.4–10.2)
CHLORIDE SERPL-SCNC: 107 MMOL/L (ref 96–108)
CHOLEST SERPL-MCNC: 132 MG/DL
CO2 SERPL-SCNC: 30 MMOL/L (ref 21–32)
CREAT SERPL-MCNC: 1.28 MG/DL (ref 0.6–1.3)
EST. AVERAGE GLUCOSE BLD GHB EST-MCNC: 203 MG/DL
GFR SERPL CREATININE-BSD FRML MDRD: 59 ML/MIN/1.73SQ M
GLUCOSE P FAST SERPL-MCNC: 89 MG/DL (ref 65–99)
HBA1C MFR BLD: 8.7 %
HDLC SERPL-MCNC: 52 MG/DL
LDLC SERPL CALC-MCNC: 71 MG/DL (ref 0–100)
NONHDLC SERPL-MCNC: 80 MG/DL
POTASSIUM SERPL-SCNC: 4.6 MMOL/L (ref 3.5–5.3)
SODIUM SERPL-SCNC: 144 MMOL/L (ref 135–147)
TRIGL SERPL-MCNC: 45 MG/DL

## 2024-03-02 PROCEDURE — 36415 COLL VENOUS BLD VENIPUNCTURE: CPT

## 2024-03-02 PROCEDURE — 83036 HEMOGLOBIN GLYCOSYLATED A1C: CPT

## 2024-03-02 PROCEDURE — 80061 LIPID PANEL: CPT

## 2024-03-02 PROCEDURE — 80048 BASIC METABOLIC PNL TOTAL CA: CPT

## 2024-03-05 ENCOUNTER — OFFICE VISIT (OUTPATIENT)
Dept: ENDOCRINOLOGY | Facility: CLINIC | Age: 64
End: 2024-03-05
Payer: COMMERCIAL

## 2024-03-05 VITALS
DIASTOLIC BLOOD PRESSURE: 76 MMHG | SYSTOLIC BLOOD PRESSURE: 102 MMHG | HEIGHT: 73 IN | WEIGHT: 163.6 LBS | BODY MASS INDEX: 21.68 KG/M2

## 2024-03-05 DIAGNOSIS — E78.2 MIXED HYPERLIPIDEMIA: ICD-10-CM

## 2024-03-05 DIAGNOSIS — E11.65 TYPE 2 DIABETES MELLITUS WITH HYPERGLYCEMIA, WITH LONG-TERM CURRENT USE OF INSULIN (HCC): Primary | ICD-10-CM

## 2024-03-05 DIAGNOSIS — Z79.4 TYPE 2 DIABETES MELLITUS WITH HYPERGLYCEMIA, WITH LONG-TERM CURRENT USE OF INSULIN (HCC): Primary | ICD-10-CM

## 2024-03-05 PROCEDURE — 95251 CONT GLUC MNTR ANALYSIS I&R: CPT

## 2024-03-05 PROCEDURE — 99214 OFFICE O/P EST MOD 30 MIN: CPT

## 2024-03-05 RX ORDER — INSULIN LISPRO 100 [IU]/ML
INJECTION, SOLUTION INTRAVENOUS; SUBCUTANEOUS
Qty: 9 ML | Refills: 1 | Status: SHIPPED | OUTPATIENT
Start: 2024-03-05

## 2024-03-05 RX ORDER — BLOOD-GLUCOSE SENSOR
EACH MISCELLANEOUS
Qty: 6 EACH | Refills: 1 | Status: SHIPPED | OUTPATIENT
Start: 2024-03-05

## 2024-03-05 RX ORDER — SUB-Q INSULIN DEVICE, 40 UNIT
EACH MISCELLANEOUS
Qty: 30 KIT | Refills: 2 | Status: SHIPPED | OUTPATIENT
Start: 2024-03-05

## 2024-03-05 RX ORDER — SUB-Q INSULIN DEVICE, 40 UNIT
EACH MISCELLANEOUS
Qty: 30 KIT | Refills: 2 | Status: SHIPPED | OUTPATIENT
Start: 2024-03-05 | End: 2024-03-05

## 2024-03-05 NOTE — ASSESSMENT & PLAN NOTE
HGA1C uncontrolled. BGs elevated after meals. No hypoglycemia.   Treatment regimen:   - With significant weight loss and symptoms of hyperglycemia I will r/o MIKE.  - We discussed treatment and he is agreeing to restart on V-go 20. Will start with 1 click with each meal.  - Stop Tresiba and glipizide once started on V-go. Continue with Metformin and Farxiga for now.  - Will upgrade to [employer] 3 to monitor BGs.   Discussed risks/complications associated with uncontrolled diabetes.    Advised to adhere to diabetic diet, and recommended staying active/exercising routinely.  Keep carbohydrates consistent to limit blood glucose fluctuations.  Advised to call if blood sugars less than 70 mg/dl or over 300 mg/dl.   Discussed symptoms and treatment of hypoglycemia.    Recommended routine follow-up with podiatry and ophthalmology.   Ordered blood work to complete prior to next visit.   Lab Results   Component Value Date    HGBA1C 8.7 (H) 03/02/2024

## 2024-03-05 NOTE — PROGRESS NOTES
Established Patient Progress Note      Chief Complaint   Patient presents with    Diabetes Type 2        Impression & Plan:    Problem List Items Addressed This Visit          Endocrine    Type 2 diabetes mellitus with hyperglycemia, with long-term current use of insulin ([facility]) - Primary     HGA1C uncontrolled. BGs elevated after meals. No hypoglycemia.   Treatment regimen:   - With significant weight loss and symptoms of hyperglycemia I will r/o MIKE.  - We discussed treatment and he is agreeing to restart on V-go 20. Will start with 1 click with each meal.  - Stop Tresiba and glipizide once started on V-go. Continue with Metformin and Farxiga for now.  - Will upgrade to Dimitris 3 to monitor BGs.   Discussed risks/complications associated with uncontrolled diabetes.    Advised to adhere to diabetic diet, and recommended staying active/exercising routinely.  Keep carbohydrates consistent to limit blood glucose fluctuations.  Advised to call if blood sugars less than 70 mg/dl or over 300 mg/dl.   Discussed symptoms and treatment of hypoglycemia.    Recommended routine follow-up with podiatry and ophthalmology.   Ordered blood work to complete prior to next visit.   Lab Results   Component Value Date    HGBA1C 8.7 (H) 03/02/2024            Relevant Medications    Continuous Blood Gluc Sensor (KSEStyle Dimitris 3 Sensor) MISC    Insulin Disposable Pump (V-Go 20) 20 UNIT/24HR KIT    insulin lispro (HumaLOG) 100 units/mL injection    Other Relevant Orders    GAD65, IA-2, and Insulin Autoantibody    Anti-islet cell antibody    Zinc Transporter 8 (Znt8) Antibody    C-peptide    Hemoglobin A1C    Comprehensive metabolic panel    Albumin / creatinine urine ratio       Other    Mixed hyperlipidemia     Lipid panel at goal. Continue statin.              History of Present Illness:   Juan MARMOLEJO Fanny is a 63 y.o. male with type 2 diabetes seen in follow up. Reports complications of CKD. Denies recent severe hypoglycemic or severe  hyperglycemic episodes. Denies any issues with his current regimen. Last A1C was 8.7. Home glucose monitoring: are performed regularly with CGM.     He was on V-go device in the past and diabetes was well controlled. Since coming off V-go diabetes control has worsened. He has had about 30 pound weight loss in past 6-9 months. He also admits to polyuria and polydipsia. He denies any nausea/vomiting or GI disturbance. He is UTD with colonoscopy.     Juan Escobedo   Device used: [employer] 14 day   Home use   Indication: Type 2 Diabetes  More than 72 hours of data was reviewed. Report to be scanned to chart.   Date Range: 2/21/24-3/5/24  Analysis of data:   Average Glucose: 199  Coefficient of Variation: 31.2%   Time in Target Range: 40%   Time Above Range: 37% high, 23% very high    Time Below Range: 0%   Interpretation of data:  Having significant highs after meals. No hypoglycemia noted.      Current regimen:   Farxiga 10 mg daily  Metformin 500 mg twice daily  Tresiba 16 units daily  Glipizide 5 mg XL daily     Last Eye Exam: needs to schedule   Last Foot Exam: done today     Has hyperlipidemia: Taking simvastatin       Patient Active Problem List   Diagnosis    MVP (mitral valve prolapse)    Sarcoidosis    Type 2 diabetes mellitus with hyperglycemia, with long-term current use of insulin (HCC)    CKD (chronic kidney disease)    Hypercalcemia    Paresthesias    Nephrolithiasis    Venous insufficiency    Methotrexate, long term, current use    BPH (benign prostatic hyperplasia)    Venous stasis dermatitis of both lower extremities    Mixed hyperlipidemia    Weight loss      Past Medical History:   Diagnosis Date    Diabetes mellitus (HCC)     Hyperlipidemia     Pneumonia 2022    Sarcoidosis       Past Surgical History:   Procedure Laterality Date    ABDOMINAL SURGERY  1962    APPENDECTOMY  1962    COLON SURGERY  1962    COLONOSCOPY  2017    CYSTOSCOPY W/ URETERAL STENT PLACEMENT Left     INTESTINAL MALROTATION REPAIR       KNEE SURGERY Right 1997    LYMPH NODE BIOPSY  2018    [place] BX/EXC LYMPH NODE OPEN SUPERFICIAL Right 11/02/2017    Procedure: INGUINAL LYMPH NODE BIOPSY;  Surgeon: RIZWAN Mederos MD;  Location[facility];  Service: General    SHOULDER SURGERY  2005      Social History     Tobacco Use    Smoking status: Never     Passive exposure: Never    Smokeless tobacco: Never   Substance Use Topics    Alcohol use: Never     Allergies   Allergen Reactions    Acetaminophen Swelling     tylenol    Dust Mite Extract     Molds & Smuts     Other     Percocet [Oxycodone-Acetaminophen]      Angioedema on finger tips           Current Outpatient Medications:     Continuous Blood Gluc Sensor (FreeStyle Dimitris 3 Sensor) MISC, Change once every 14 days, Disp: 6 each, Rfl: 1    Insulin Disposable Pump (V-Go 20) 20 UNIT/24HR KIT, Change once every 24 hours. 1 click with each meal., Disp: 30 kit, Rfl: 2    insulin lispro (HumaLOG) 100 units/mL injection, Use to fill V-go device. Max of 56 units daily., Disp: 9 mL, Rfl: 1    B-D ULTRAFINE III SHORT PEN 31G X 8 MM MISC, USE FOUR TIMES DAILY AS DIRECTED, Disp: 400 each, Rfl: 1    cholecalciferol (VITAMIN D3) 1,000 units tablet, Take 1,000 Units by mouth daily, Disp: , Rfl:     Continuous Blood Gluc  (FREESTYLE DIMITRIS READER) RADHA, Use as directed (Patient not taking: Reported on 10/16/2023), Disp: , Rfl: 0    Farxiga 10 MG tablet, TAKE 1 TABLET(10 MG) BY MOUTH DAILY, Disp: 30 tablet, Rfl: 4    folic acid (FOLVITE) 1 mg tablet, TAKE 1 TABLET(1 MG) BY MOUTH DAILY, Disp: 90 tablet, Rfl: 3    metFORMIN (GLUCOPHAGE) 500 mg tablet, TAKE 1 TABLET(500 MG) BY MOUTH TWICE DAILY WITH MEALS, Disp: 180 tablet, Rfl: 1    methotrexate 2.5 MG tablet, TAKE 3 TABLETS BY MOUTH 1 TIME A WEEK, Disp: 36 tablet, Rfl: 1    simvastatin (ZOCOR) 10 mg tablet, Take 1 tablet (10 mg total) by mouth daily, Disp: 90 tablet, Rfl: 3    Review of Systems   Constitutional:  Positive for unexpected weight change (30  "pound weight loss). Negative for appetite change, chills, diaphoresis, fatigue and fever.   Eyes:  Negative for visual disturbance.   Respiratory:  Negative for shortness of breath.    Cardiovascular:  Negative for chest pain and palpitations.   Gastrointestinal:  Negative for constipation, diarrhea, nausea and vomiting.   Endocrine: Positive for polydipsia and polyuria.   Skin:  Negative for wound.   Neurological:  Negative for dizziness and weakness.   All other systems reviewed and are negative.      Physical Exam:  Body mass index is 21.58 kg/m².  /76 (BP Location: Right arm, Patient Position: Sitting)   Ht 6' 1\" (1.854 m)   Wt 74.2 kg (163 lb 9.6 oz)   BMI 21.58 kg/m²    Wt Readings from Last 3 Encounters:   03/05/24 74.2 kg (163 lb 9.6 oz)   01/12/24 72.1 kg (159 lb)   10/16/23 72.3 kg (159 lb 6.4 oz)       Physical Exam  Vitals reviewed.   Constitutional:       Appearance: Normal appearance.   Cardiovascular:      Pulses: no weak pulses.           Dorsalis pedis pulses are 2+ on the right side and 2+ on the left side.   Feet:      Right foot:      Skin integrity: No ulcer, skin breakdown, erythema, warmth, callus or dry skin.      Left foot:      Skin integrity: No ulcer, skin breakdown, erythema, warmth, callus or dry skin.   Neurological:      Mental Status: He is oriented to person, place, and time.   Psychiatric:         Mood and Affect: Mood normal.         Thought Content: Thought content normal.       Patient's shoes and socks removed.    Right Foot/Ankle   Right Foot Inspection  Skin Exam: skin normal and skin intact. No dry skin, no warmth, no callus, no erythema, no maceration, no abnormal color, no pre-ulcer, no ulcer and no callus.     Toe Exam: ROM and strength within normal limits. No swelling, no tenderness, erythema and  no right toe deformity    Sensory   Vibration: intact  Monofilament testing: intact    Vascular  Capillary refills: < 3 seconds  The right DP pulse is 2+.     Left " Foot/Ankle  Left Foot Inspection  Skin Exam: skin normal and skin intact. No dry skin, no warmth, no erythema, no maceration, normal color, no pre-ulcer, no ulcer and no callus.     Toe Exam: ROM and strength within normal limits. No swelling, no tenderness, no erythema and no left toe deformity.     Sensory   Vibration: intact  Monofilament testing: intact    Vascular  Capillary refills: < 3 seconds  The left DP pulse is 2+.     Assign Risk Category  No deformity present  No loss of protective sensation  No weak pulses  Risk: 0      Labs:   Lab Results   Component Value Date    HGBA1C 8.7 (H) 03/02/2024    HGBA1C 8.1 (H) 06/09/2023    HGBA1C 8.3 (H) 02/13/2023     Lab Results   Component Value Date    CREATININE 1.28 03/02/2024    CREATININE 1.52 (H) 06/09/2023    CREATININE 1.19 02/13/2023    BUN 19 03/02/2024     12/09/2017    K 4.6 03/02/2024     03/02/2024    CO2 30 03/02/2024     GFR, Calculated   Date Value Ref Range Status   05/17/2018 35 (L) >60 mL/min/1.73m2 Final     Comment:     Please refer to initial GFR, CALC footnote     EGFR   Date Value Ref Range Status   07/19/2018 49.7 (L) >60 Final     Comment:     If patient is , multiply estimated GFR by 1.159.     eGFR   Date Value Ref Range Status   03/02/2024 59 ml/min/1.73sq m Final     Lab Results   Component Value Date    HDL 52 03/02/2024    TRIG 45 03/02/2024     Lab Results   Component Value Date    ALT 25 06/09/2023    AST 23 06/09/2023    ALKPHOS 124 (H) 06/09/2023    BILITOT 0.5 12/09/2017     Lab Results   Component Value Date    CAK2QZYTDSQT 1.701 06/09/2023    KXT1EODZQQEB 1.330 09/03/2022       Patient Instructions   Once you start on V-go stop Tresiba and Glipizide   Start on V-go 20  Continue with Metformin and Farxiga at this time.   Change to Diimtris 3       Discussed with the patient and all questioned fully answered. He will call me if any problems arise.    GUSTAVO Arnold

## 2024-03-05 NOTE — PATIENT INSTRUCTIONS
Once you start on V-go stop Tresiba and Glipizide   Start on V-go 20  Continue with Metformin and Farxiga at this time.   Change to Dimitris 3

## 2024-03-09 ENCOUNTER — APPOINTMENT (OUTPATIENT)
Dept: LAB | Facility: MEDICAL CENTER | Age: 64
End: 2024-03-09
Payer: COMMERCIAL

## 2024-03-09 DIAGNOSIS — E11.65 TYPE II DIABETES MELLITUS WITH HYPEROSMOLARITY, UNCONTROLLED (HCC): Primary | ICD-10-CM

## 2024-03-09 DIAGNOSIS — Z79.4 ENCOUNTER FOR LONG-TERM (CURRENT) USE OF INSULIN (HCC): ICD-10-CM

## 2024-03-09 DIAGNOSIS — E11.00 TYPE II DIABETES MELLITUS WITH HYPEROSMOLARITY, UNCONTROLLED (HCC): Primary | ICD-10-CM

## 2024-03-09 PROCEDURE — 86341 ISLET CELL ANTIBODY: CPT

## 2024-03-09 PROCEDURE — 83519 RIA NONANTIBODY: CPT

## 2024-03-09 PROCEDURE — 84681 ASSAY OF C-PEPTIDE: CPT

## 2024-03-09 PROCEDURE — 36415 COLL VENOUS BLD VENIPUNCTURE: CPT

## 2024-03-09 PROCEDURE — 86337 INSULIN ANTIBODIES: CPT

## 2024-03-10 LAB — C PEPTIDE SERPL-MCNC: 1.4 NG/ML (ref 1.1–4.4)

## 2024-03-11 LAB — PANC ISLET CELL AB TITR SER: NEGATIVE {TITER}

## 2024-03-12 LAB — GAD65 AB SER-ACNC: <5 U/ML (ref 0–5)

## 2024-03-15 ENCOUNTER — TELEPHONE (OUTPATIENT)
Dept: ENDOCRINOLOGY | Facility: CLINIC | Age: 64
End: 2024-03-15

## 2024-03-15 LAB
ISLET CELL512 AB SER-ACNC: <7.5 U/ML
ZNT8 AB SERPL IA-ACNC: <15 U/ML

## 2024-03-15 NOTE — TELEPHONE ENCOUNTER
Called patient regarding recent labs and to se how V-go device is going. He did not answer so voice message was left. I gave him call back number at his earliest convenience.

## 2024-03-18 LAB — INSULIN AB SER-ACNC: <5 UU/ML

## 2024-03-19 ENCOUNTER — TELEPHONE (OUTPATIENT)
Dept: ENDOCRINOLOGY | Facility: CLINIC | Age: 64
End: 2024-03-19

## 2024-03-20 ENCOUNTER — TELEPHONE (OUTPATIENT)
Dept: ENDOCRINOLOGY | Facility: CLINIC | Age: 64
End: 2024-03-20

## 2024-03-25 NOTE — TELEPHONE ENCOUNTER
Pt called back he said he definitely feels better.  Vgo is making him more aware of when he has lows.  He said its going ok.  He has gained 7-8 pounds which he is happy about.

## 2024-03-29 NOTE — TELEPHONE ENCOUNTER
Attempted to call back patient multiple times with no answer. If he has questions or concerns and would like me to contact him please have him call back or designate a good time to call him back.

## 2024-04-01 DIAGNOSIS — E11.65 TYPE 2 DIABETES MELLITUS WITH HYPERGLYCEMIA, WITH LONG-TERM CURRENT USE OF INSULIN (HCC): ICD-10-CM

## 2024-04-01 DIAGNOSIS — Z79.4 TYPE 2 DIABETES MELLITUS WITH HYPERGLYCEMIA, WITH LONG-TERM CURRENT USE OF INSULIN (HCC): ICD-10-CM

## 2024-04-01 RX ORDER — INSULIN LISPRO 100 [IU]/ML
56 INJECTION, SOLUTION INTRAVENOUS; SUBCUTANEOUS DAILY
Qty: 16.8 ML | Refills: 5 | Status: SHIPPED | OUTPATIENT
Start: 2024-04-01 | End: 2024-09-28

## 2024-05-04 DIAGNOSIS — Z79.4 TYPE 2 DIABETES MELLITUS WITH HYPERGLYCEMIA, WITH LONG-TERM CURRENT USE OF INSULIN (HCC): ICD-10-CM

## 2024-05-04 DIAGNOSIS — E11.65 TYPE 2 DIABETES MELLITUS WITH HYPERGLYCEMIA, WITH LONG-TERM CURRENT USE OF INSULIN (HCC): ICD-10-CM

## 2024-05-04 RX ORDER — INSULIN LISPRO 100 [IU]/ML
INJECTION, SOLUTION INTRAVENOUS; SUBCUTANEOUS
Qty: 50 ML | Refills: 1 | Status: SHIPPED | OUTPATIENT
Start: 2024-05-04

## 2024-05-04 RX ORDER — SUB-Q INSULIN DEVICE, 40 UNIT
EACH MISCELLANEOUS
Qty: 90 KIT | Refills: 1 | Status: SHIPPED | OUTPATIENT
Start: 2024-05-04

## 2024-05-05 DIAGNOSIS — D86.9 SARCOIDOSIS: ICD-10-CM

## 2024-05-06 DIAGNOSIS — E11.65 TYPE 2 DIABETES MELLITUS WITH HYPERGLYCEMIA, WITH LONG-TERM CURRENT USE OF INSULIN (HCC): ICD-10-CM

## 2024-05-06 DIAGNOSIS — Z79.4 TYPE 2 DIABETES MELLITUS WITH HYPERGLYCEMIA, WITH LONG-TERM CURRENT USE OF INSULIN (HCC): ICD-10-CM

## 2024-05-06 NOTE — TELEPHONE ENCOUNTER
Received notification for a refill for the patient's Farga to be sent to his local [facility].

## 2024-05-07 DIAGNOSIS — E83.52 HYPERCALCEMIA: ICD-10-CM

## 2024-05-07 DIAGNOSIS — D86.9 SARCOIDOSIS: Primary | ICD-10-CM

## 2024-05-07 DIAGNOSIS — N18.30 STAGE 3 CHRONIC KIDNEY DISEASE, UNSPECIFIED WHETHER STAGE 3A OR 3B CKD (HCC): ICD-10-CM

## 2024-05-07 DIAGNOSIS — Z79.631 METHOTREXATE, LONG TERM, CURRENT USE: ICD-10-CM

## 2024-05-07 RX ORDER — DAPAGLIFLOZIN 10 MG/1
10 TABLET, FILM COATED ORAL DAILY
Qty: 30 TABLET | Refills: 5 | Status: SHIPPED | OUTPATIENT
Start: 2024-05-07

## 2024-05-07 RX ORDER — METHOTREXATE 2.5 MG/1
TABLET ORAL
Qty: 36 TABLET | Refills: 1 | Status: SHIPPED | OUTPATIENT
Start: 2024-05-07 | End: 2024-05-13

## 2024-05-07 RX ORDER — FOLIC ACID 1 MG/1
1 TABLET ORAL DAILY
Qty: 90 TABLET | Refills: 3 | Status: SHIPPED | OUTPATIENT
Start: 2024-05-07

## 2024-05-10 DIAGNOSIS — D86.9 SARCOIDOSIS: ICD-10-CM

## 2024-05-13 RX ORDER — METHOTREXATE 2.5 MG/1
TABLET ORAL
Qty: 30 TABLET | Refills: 5 | Status: SHIPPED | OUTPATIENT
Start: 2024-05-13

## 2024-05-20 LAB
LEFT EYE DIABETIC RETINOPATHY: NORMAL
RIGHT EYE DIABETIC RETINOPATHY: NORMAL

## 2024-05-27 DIAGNOSIS — E11.65 TYPE 2 DIABETES MELLITUS WITH HYPERGLYCEMIA, WITH LONG-TERM CURRENT USE OF INSULIN (HCC): ICD-10-CM

## 2024-05-27 DIAGNOSIS — Z79.4 TYPE 2 DIABETES MELLITUS WITH HYPERGLYCEMIA, WITH LONG-TERM CURRENT USE OF INSULIN (HCC): ICD-10-CM

## 2024-05-29 RX ORDER — DAPAGLIFLOZIN 10 MG/1
10 TABLET, FILM COATED ORAL DAILY
Qty: 30 TABLET | Refills: 0 | OUTPATIENT
Start: 2024-05-29

## 2024-05-29 NOTE — TELEPHONE ENCOUNTER
Duplicate sent to [facility]#[id number] : Receipt confirmed by pharmacy (5/7/2024 11:46 AM EDT) 30 with 5 refills

## 2024-05-31 NOTE — TELEPHONE ENCOUNTER
Patient called requesting refill for Farxiga. Patient made aware medication was refilled on 05/07/2024 for 30 with 5 refills to [facility] pharmacy. Patient instructed to contact the pharmacy to obtain refills of medication. Patient verbalized understanding.

## 2024-07-04 DIAGNOSIS — Z79.4 TYPE 2 DIABETES MELLITUS WITH HYPERGLYCEMIA, WITH LONG-TERM CURRENT USE OF INSULIN (HCC): ICD-10-CM

## 2024-07-04 DIAGNOSIS — E11.65 TYPE 2 DIABETES MELLITUS WITH HYPERGLYCEMIA, WITH LONG-TERM CURRENT USE OF INSULIN (HCC): ICD-10-CM

## 2024-07-05 RX ORDER — INSULIN LISPRO 100 [IU]/ML
INJECTION, SOLUTION INTRAVENOUS; SUBCUTANEOUS
Qty: 10 ML | Refills: 1 | Status: SHIPPED | OUTPATIENT
Start: 2024-07-05

## 2024-07-13 DIAGNOSIS — E11.29 TYPE 2 DIABETES MELLITUS WITH MICROALBUMINURIA (HCC): ICD-10-CM

## 2024-07-13 DIAGNOSIS — R80.9 TYPE 2 DIABETES MELLITUS WITH MICROALBUMINURIA (HCC): ICD-10-CM

## 2024-07-13 RX ORDER — SIMVASTATIN 10 MG
10 TABLET ORAL DAILY
Qty: 100 TABLET | Refills: 1 | Status: SHIPPED | OUTPATIENT
Start: 2024-07-13

## 2024-07-18 DIAGNOSIS — Z79.4 TYPE 2 DIABETES MELLITUS WITH HYPERGLYCEMIA, WITH LONG-TERM CURRENT USE OF INSULIN (HCC): ICD-10-CM

## 2024-07-18 DIAGNOSIS — E11.65 TYPE 2 DIABETES MELLITUS WITH HYPERGLYCEMIA, WITH LONG-TERM CURRENT USE OF INSULIN (HCC): ICD-10-CM

## 2024-07-18 RX ORDER — BLOOD-GLUCOSE SENSOR
EACH MISCELLANEOUS
Qty: 6 EACH | Refills: 0 | Status: SHIPPED | OUTPATIENT
Start: 2024-07-18

## 2024-07-18 NOTE — TELEPHONE ENCOUNTER
Pt is traveling and runs out tomorrow of the sensors.    Reason for call:   [x] Refill   [] Prior Auth  [] Other:     Office:   [] PCP/Provider -   Endo Specialty/Provider -     Medication: Continuous Blood Gluc Sensor (FreeStyle Dimitris 3 Sensor) MISC Change once every 14 days       Pharmacy: [facility] #[id number] [place], [place] - [address]      Does the patient have enough for 3 days?   [] Yes   [x] No - Send as HP to POD

## 2024-08-03 DIAGNOSIS — E11.65 TYPE 2 DIABETES MELLITUS WITH HYPERGLYCEMIA, WITH LONG-TERM CURRENT USE OF INSULIN (HCC): ICD-10-CM

## 2024-08-03 DIAGNOSIS — Z79.631 METHOTREXATE, LONG TERM, CURRENT USE: ICD-10-CM

## 2024-08-03 DIAGNOSIS — Z79.4 TYPE 2 DIABETES MELLITUS WITH HYPERGLYCEMIA, WITH LONG-TERM CURRENT USE OF INSULIN (HCC): ICD-10-CM

## 2024-08-04 RX ORDER — INSULIN LISPRO 100 [IU]/ML
INJECTION, SOLUTION INTRAVENOUS; SUBCUTANEOUS
Qty: 10 ML | Refills: 5 | Status: SHIPPED | OUTPATIENT
Start: 2024-08-04

## 2024-08-11 DIAGNOSIS — E11.65 TYPE 2 DIABETES MELLITUS WITH HYPERGLYCEMIA, WITH LONG-TERM CURRENT USE OF INSULIN (HCC): ICD-10-CM

## 2024-08-11 DIAGNOSIS — Z79.4 TYPE 2 DIABETES MELLITUS WITH HYPERGLYCEMIA, WITH LONG-TERM CURRENT USE OF INSULIN (HCC): ICD-10-CM

## 2024-08-12 RX ORDER — SUB-Q INSULIN DEVICE, 40 UNIT
EACH MISCELLANEOUS
Qty: 1 KIT | Refills: 3 | Status: SHIPPED | OUTPATIENT
Start: 2024-08-12

## 2024-08-13 DIAGNOSIS — Z00.6 ENCOUNTER FOR EXAMINATION FOR NORMAL COMPARISON OR CONTROL IN CLINICAL RESEARCH PROGRAM: ICD-10-CM

## 2024-08-24 ENCOUNTER — APPOINTMENT (OUTPATIENT)
Dept: LAB | Facility: MEDICAL CENTER | Age: 64
End: 2024-08-24
Payer: COMMERCIAL

## 2024-08-24 DIAGNOSIS — Z00.6 ENCOUNTER FOR EXAMINATION FOR NORMAL COMPARISON OR CONTROL IN CLINICAL RESEARCH PROGRAM: ICD-10-CM

## 2024-08-24 DIAGNOSIS — D86.9 SARCOIDOSIS: ICD-10-CM

## 2024-08-24 DIAGNOSIS — Z79.4 TYPE 2 DIABETES MELLITUS WITH HYPERGLYCEMIA, WITH LONG-TERM CURRENT USE OF INSULIN (HCC): ICD-10-CM

## 2024-08-24 DIAGNOSIS — E11.65 TYPE 2 DIABETES MELLITUS WITH HYPERGLYCEMIA, WITH LONG-TERM CURRENT USE OF INSULIN (HCC): ICD-10-CM

## 2024-08-24 DIAGNOSIS — Z79.631 METHOTREXATE, LONG TERM, CURRENT USE: ICD-10-CM

## 2024-08-24 LAB
ALBUMIN SERPL BCG-MCNC: 4.1 G/DL (ref 3.5–5)
ALP SERPL-CCNC: 80 U/L (ref 34–104)
ALT SERPL W P-5'-P-CCNC: 15 U/L (ref 7–52)
ANION GAP SERPL CALCULATED.3IONS-SCNC: 7 MMOL/L (ref 4–13)
AST SERPL W P-5'-P-CCNC: 20 U/L (ref 13–39)
BASOPHILS # BLD AUTO: 0.05 THOUSANDS/ÂΜL (ref 0–0.1)
BASOPHILS NFR BLD AUTO: 1 % (ref 0–1)
BILIRUB SERPL-MCNC: 0.56 MG/DL (ref 0.2–1)
BUN SERPL-MCNC: 20 MG/DL (ref 5–25)
CALCIUM SERPL-MCNC: 9.6 MG/DL (ref 8.4–10.2)
CHLORIDE SERPL-SCNC: 103 MMOL/L (ref 96–108)
CO2 SERPL-SCNC: 28 MMOL/L (ref 21–32)
CREAT SERPL-MCNC: 1.44 MG/DL (ref 0.6–1.3)
CREAT UR-MCNC: 83.6 MG/DL
EOSINOPHIL # BLD AUTO: 0.18 THOUSAND/ÂΜL (ref 0–0.61)
EOSINOPHIL NFR BLD AUTO: 4 % (ref 0–6)
ERYTHROCYTE [DISTWIDTH] IN BLOOD BY AUTOMATED COUNT: 14.1 % (ref 11.6–15.1)
EST. AVERAGE GLUCOSE BLD GHB EST-MCNC: 177 MG/DL
GFR SERPL CREATININE-BSD FRML MDRD: 51 ML/MIN/1.73SQ M
GLUCOSE P FAST SERPL-MCNC: 168 MG/DL (ref 65–99)
HBA1C MFR BLD: 7.8 %
HCT VFR BLD AUTO: 45.3 % (ref 36.5–49.3)
HGB BLD-MCNC: 15 G/DL (ref 12–17)
IMM GRANULOCYTES # BLD AUTO: 0.02 THOUSAND/UL (ref 0–0.2)
IMM GRANULOCYTES NFR BLD AUTO: 0 % (ref 0–2)
LYMPHOCYTES # BLD AUTO: 1.4 THOUSANDS/ÂΜL (ref 0.6–4.47)
LYMPHOCYTES NFR BLD AUTO: 29 % (ref 14–44)
MCH RBC QN AUTO: 33 PG (ref 26.8–34.3)
MCHC RBC AUTO-ENTMCNC: 33.1 G/DL (ref 31.4–37.4)
MCV RBC AUTO: 100 FL (ref 82–98)
MICROALBUMIN UR-MCNC: <7 MG/L
MONOCYTES # BLD AUTO: 0.48 THOUSAND/ÂΜL (ref 0.17–1.22)
MONOCYTES NFR BLD AUTO: 10 % (ref 4–12)
NEUTROPHILS # BLD AUTO: 2.76 THOUSANDS/ÂΜL (ref 1.85–7.62)
NEUTS SEG NFR BLD AUTO: 56 % (ref 43–75)
NRBC BLD AUTO-RTO: 0 /100 WBCS
PLATELET # BLD AUTO: 220 THOUSANDS/UL (ref 149–390)
PMV BLD AUTO: 10.2 FL (ref 8.9–12.7)
POTASSIUM SERPL-SCNC: 4.4 MMOL/L (ref 3.5–5.3)
PROT SERPL-MCNC: 6.6 G/DL (ref 6.4–8.4)
RBC # BLD AUTO: 4.54 MILLION/UL (ref 3.88–5.62)
SODIUM SERPL-SCNC: 138 MMOL/L (ref 135–147)
WBC # BLD AUTO: 4.89 THOUSAND/UL (ref 4.31–10.16)

## 2024-08-24 PROCEDURE — 36415 COLL VENOUS BLD VENIPUNCTURE: CPT

## 2024-08-24 PROCEDURE — 80053 COMPREHEN METABOLIC PANEL: CPT

## 2024-08-24 PROCEDURE — 85025 COMPLETE CBC W/AUTO DIFF WBC: CPT

## 2024-08-24 PROCEDURE — 82570 ASSAY OF URINE CREATININE: CPT

## 2024-08-24 PROCEDURE — 83036 HEMOGLOBIN GLYCOSYLATED A1C: CPT

## 2024-08-24 PROCEDURE — 82043 UR ALBUMIN QUANTITATIVE: CPT

## 2024-08-27 ENCOUNTER — OFFICE VISIT (OUTPATIENT)
Dept: PULMONOLOGY | Facility: CLINIC | Age: 64
End: 2024-08-27
Payer: COMMERCIAL

## 2024-08-27 VITALS
DIASTOLIC BLOOD PRESSURE: 62 MMHG | BODY MASS INDEX: 22.4 KG/M2 | TEMPERATURE: 96.8 F | HEART RATE: 93 BPM | HEIGHT: 73 IN | WEIGHT: 169 LBS | OXYGEN SATURATION: 97 % | SYSTOLIC BLOOD PRESSURE: 114 MMHG

## 2024-08-27 DIAGNOSIS — Z79.4 TYPE 2 DIABETES MELLITUS WITH HYPERGLYCEMIA, WITH LONG-TERM CURRENT USE OF INSULIN (HCC): ICD-10-CM

## 2024-08-27 DIAGNOSIS — Z79.631 METHOTREXATE, LONG TERM, CURRENT USE: ICD-10-CM

## 2024-08-27 DIAGNOSIS — E83.52 HYPERCALCEMIA: ICD-10-CM

## 2024-08-27 DIAGNOSIS — E11.65 TYPE 2 DIABETES MELLITUS WITH HYPERGLYCEMIA, WITH LONG-TERM CURRENT USE OF INSULIN (HCC): ICD-10-CM

## 2024-08-27 DIAGNOSIS — D75.89 MACROCYTOSIS: ICD-10-CM

## 2024-08-27 DIAGNOSIS — D86.9 SARCOIDOSIS: Primary | ICD-10-CM

## 2024-08-27 DIAGNOSIS — N18.31 STAGE 3A CHRONIC KIDNEY DISEASE (HCC): ICD-10-CM

## 2024-08-27 PROCEDURE — 99214 OFFICE O/P EST MOD 30 MIN: CPT | Performed by: INTERNAL MEDICINE

## 2024-08-27 NOTE — ASSESSMENT & PLAN NOTE
Lab Results   Component Value Date    EGFR 51 08/24/2024    EGFR 59 03/02/2024    EGFR 48 06/09/2023    CREATININE 1.44 (H) 08/24/2024    CREATININE 1.28 03/02/2024    CREATININE 1.52 (H) 06/09/2023     Initially was triggered by hypercalcemia from sarcoidosis but improved and currently his calcium level is stable.  He is on methotrexate.  Used to follow with nephrology.  His creatinine and GFR in general have been stable over the past year or even more but fluctuating in value.  I believe there is possibility of diabetic nephropathy.  I will repeat labs in 1 month or so and if kidney function remains elevated I will send him to see his nephrologist again.  And always encourage the patient to keep himself hydrated.

## 2024-08-27 NOTE — ASSESSMENT & PLAN NOTE
Lab Results   Component Value Date    HGBA1C 7.8 (H) 08/24/2024   A1c is still elevated, continue insulin regimen for now and follow with endocrinology.  I encouraged him to cut down on his carbohydrate intake as well.

## 2024-08-27 NOTE — ASSESSMENT & PLAN NOTE
I noticed worsening macrocytosis, MCV now is 100, no anemia, not sure if this is related to methotrexate although he takes folic acid over there is hypothyroidism, I will check folate, B12 and TSH.

## 2024-08-27 NOTE — PROGRESS NOTES
Progress note - Pulmonary Medicine   Juan Escobedo 63 y.o. male MRN: [id number]       Impression & Plan:     Sarcoidosis  Currently stable and asymptomatic, continue methotrexate 7.5 mg weekly with folic acid    CKD (chronic kidney disease)  Lab Results   Component Value Date    EGFR 51 08/24/2024    EGFR 59 03/02/2024    EGFR 48 06/09/2023    CREATININE 1.44 (H) 08/24/2024    CREATININE 1.28 03/02/2024    CREATININE 1.52 (H) 06/09/2023     Initially was triggered by hypercalcemia from sarcoidosis but improved and currently his calcium level is stable.  He is on methotrexate.  Used to follow with nephrology.  His creatinine and GFR in general have been stable over the past year or even more but fluctuating in value.  I believe there is possibility of diabetic nephropathy.  I will repeat labs in 1 month or so and if kidney function remains elevated I will send him to see his nephrologist again.  And always encourage the patient to keep himself hydrated.    Hypercalcemia  Currently stable on methotrexate    Macrocytosis  I noticed worsening macrocytosis, MCV now is 100, no anemia, not sure if this is related to methotrexate although he takes folic acid over there is hypothyroidism, I will check folate, B12 and TSH.    Methotrexate, long term, current use  Continue for now and continue folic acid, if levels are normal not sure if he will benefit from leucovorin to be added given his increasing MCV    Type 2 diabetes mellitus with hyperglycemia, with long-term current use of insulin ([facility])    Lab Results   Component Value Date    HGBA1C 7.8 (H) 08/24/2024   A1c is still elevated, continue insulin regimen for now and follow with endocrinology.  I encouraged him to cut down on his carbohydrate intake as well.      Return in about 6 months (around 2/27/2025).    Diagnoses and all orders for this visit:    Sarcoidosis  -     Basic metabolic panel; Future    Hypercalcemia  -     Basic metabolic panel;  Future    Methotrexate, long term, current use  -     Folate; Future  -     Methylmalonic acid, serum; Future  -     Basic metabolic panel; Future    Macrocytosis  -     Folate; Future  -     Methylmalonic acid, serum; Future  -     TSH, 3rd generation with Free T4 reflex; Future    Stage 3a chronic kidney disease (HCC)    Type 2 diabetes mellitus with hyperglycemia, with long-term current use of insulin (HCC)      ______________________________________________________________________    HPI:    Juan MARMOLEJO Fanny presents today for follow-up of sarcoidosis    Patient has sarcoidosis involving lungs, bone and lymph nodes associated with hypercalcemia and nephrolithiasis and renal failure in the past. This was diagnosed with lymph node biopsy. Over the past few years we have tried multiple therapies including steroids that gave good response but could not wean and then we tried Imuran and Plaquenil without success or with side effects so he ended up on methotrexate that controlled his symptoms and the disease very well. He is currently on 7.5 mg weekly with folic acid.       Patient in general has been doing fine on the methotrexate and folic acid, he presents today denying any respiratory symptoms, over the past few weeks he had a cold and had mild cough that subsided completely.  Denies dyspnea exertion, denies fever chills or night sweats, + also complained of weight loss but he gained some weight and has been stable since then.  Otherwise denies other complaints.  His diabetes has not been well-controlled and he follows with endocrinology.  In the past had 2 syncopal episodes but no more events for the past few years.  Also no more events of kidney stones.    Current Medications:    Current Outpatient Medications:     cholecalciferol (VITAMIN D3) 1,000 units tablet, Take 1,000 Units by mouth daily, Disp: , Rfl:     Continuous Glucose Sensor (FreeStyle Dimitris 3 Sensor) MISC, Change once every 14 days, Disp: 6 each,  Rfl: 0    dapagliflozin (Farxiga) 10 MG tablet, Take 1 tablet (10 mg total) by mouth daily, Disp: 30 tablet, Rfl: 5    folic acid (FOLVITE) 1 mg tablet, Take 1 tablet (1 mg total) by mouth daily, Disp: 90 tablet, Rfl: 3    Insulin Disposable Pump (V-Go 20) 20 UNIT/24HR KIT, CHANGE ONCE EVERY 24 HOURS. 1 CLICK WITH EACH MEAL, Disp: 1 kit, Rfl: 3    insulin lispro (HumALOG/ADMELOG) 100 units/mL injection, ADMINISTER 56 UNITS UNDER THE SKIN DAILY, Disp: 10 mL, Rfl: 5    metFORMIN (GLUCOPHAGE) 500 mg tablet, Take 1 tablet (500 mg total) by mouth 2 (two) times a day with meals, Disp: 180 tablet, Rfl: 1    methotrexate 2.5 MG tablet, TAKE 3 TABLETS BY MOUTH 1 TIME A WEEK, Disp: 30 tablet, Rfl: 5    simvastatin (ZOCOR) 10 mg tablet, TAKE 1 TABLET(10 MG) BY MOUTH DAILY, Disp: 100 tablet, Rfl: 1    B-D ULTRAFINE III SHORT PEN 31G X 8 MM MISC, USE FOUR TIMES DAILY AS DIRECTED, Disp: 400 each, Rfl: 1    Continuous Blood Gluc  (FREESTYLE RILEY READER) RADHA, Use as directed (Patient not taking: Reported on 10/16/2023), Disp: , Rfl: 0    Review of Systems:  Review of Systems   Constitutional: Negative.  Negative for appetite change and fever.   HENT:  Positive for postnasal drip. Negative for ear pain, rhinorrhea, sneezing, sore throat and trouble swallowing.    Eyes: Negative.    Respiratory: Negative.     Cardiovascular: Negative.  Negative for chest pain.   Gastrointestinal: Negative.    Endocrine: Negative.    Genitourinary: Negative.    Musculoskeletal: Negative.  Negative for myalgias.   Skin: Negative.    Allergic/Immunologic: Negative.    Neurological: Negative.  Negative for headaches.   Hematological: Negative.    Psychiatric/Behavioral: Negative.       Aside from what is mentioned in the HPI, the review of systems is otherwise negative    Past medical history, surgical history, and family history were reviewed and updated as appropriate    Social history updates:  Social History     Tobacco Use   Smoking Status  "Never    Passive exposure: Never   Smokeless Tobacco Never       PhysicalExamination:  Vitals:   /62 (BP Location: Right arm, Patient Position: Sitting, Cuff Size: Standard)   Pulse 93   Temp (!) 96.8 °F (36 °C) (Tympanic)   Ht 6' 1\" (1.854 m)   Wt 76.7 kg (169 lb)   SpO2 97%   BMI 22.30 kg/m²     Gen:  Comfortable on room air.  No conversational dyspnea  HEENT:  Conjugate gaze.  sclerae anicteric.  Oropharynx moist  Neck: Trachea is midline. No JVD. No adenopathy  Chest: Equal breath sounds and clear to auscultation bilaterally, no wheezing or crackles  Cardiac: S1-S2 regular. no murmur  Abdomen:  benign  Extremities: No edema  Neuro:  Normal speech and mentation    Diagnostic Data:  Labs:  I personally reviewed the most recent laboratory data pertinent to today's visit    Lab Results   Component Value Date    WBC 4.89 08/24/2024    HGB 15.0 08/24/2024    HCT 45.3 08/24/2024     (H) 08/24/2024     08/24/2024     Lab Results   Component Value Date    SODIUM 138 08/24/2024    K 4.4 08/24/2024    CO2 28 08/24/2024     08/24/2024    BUN 20 08/24/2024    CREATININE 1.44 (H) 08/24/2024    CALCIUM 9.6 08/24/2024       PFT results:  The most recent pulmonary function tests were reviewed.  Normal lung volumes.  Normal airflow on vital capacity.  Normal diffusion capacity.  Normal airway resistance as indicated by the specific airway conductance.  No bronchial hyperresponsiveness on the methacholine challenge test     Imaging:  I personally reviewed the images on the PAC system pertinent to today's visit    Chest CT scan reviewed on PACs:  Grossly clear and stable lung parenchyma, stable scattered nodularity with some calcifications secondary to sarcoidosis measuring 2-4 mm.  Mediastinal lymphadenopathy stable with some calcifications     Chest x-ray reviewed on PACs: Mild bilateral ground-glass changes.     Other studies:      Echocardiogram:  LVEF 55%, normal RV        Pathology:  Final " Diagnosis   A.  Right inguinal lymph node:     - Noncaseasting granulomatous inflammation consistent with sarcoidosis in the appropriate clinical setting..     - Special stains for acid fast bacilli, fungal organisms and spirochetes are negative.     - No malignancy identified.        - Flow cytometric analysis ([facility] report [id number]; interpreted by ALEXYS Robertson MD):       -- Interpretation:  There is no phenotypic evidence of non-Hodgkin lymphoma.       -- Immunophenotypic analysis:          - Viability 7AAD: 85%          - The B-cells (31% of total) are polytypic. The T-cells (55% of total) show no pan T-cell antigen deletion. The            CD4:CD8 ratio is within normal limits.          - The following antigens were evaluated by flow cytometry and found to be expressed by the             appropriate cells:  CD19, CD20, CD10, CD11c, CD23, FMC-7, kappa, lambda, CD11b, CD13,             CD14, CD16, CD33, CD64, CD2, CD3, CD4, CD5, CD7, CD8, CD56, CD57, CD38, CD34, ,            HLA-DR, and CD45.                 Austin Chaves MD  Answers submitted by the patient for this visit:  Pulmonology Questionnaire (Submitted on 8/26/2024)  Chief Complaint: Primary symptoms  Do you experience frequent throat clearing?: Yes  Chronicity: chronic  When did you first notice your symptoms?: more than 1 year ago  How often do your symptoms occur?: constantly  Since you first noticed this problem, how has it changed?: unchanged  Do you have shortness of breath that occurs with effort or exertion?: No  Do you have ear congestion?: No  Do you have heartburn?: No  Do you have fatigue?: No  Do you have nasal congestion?: No  Do you have shortness of breath when lying flat?: No  Do you have shortness of breath when you wake up?: No  Do you have sweats?: No  Have you experienced weight loss?: No  Which of the following makes your symptoms worse?: nothing  Which of the following makes your symptoms better?: nothing  Risk factors  for lung disease: travel

## 2024-08-27 NOTE — ASSESSMENT & PLAN NOTE
Continue for now and continue folic acid, if levels are normal not sure if he will benefit from leucovorin to be added given his increasing MCV

## 2024-09-03 LAB
APOB+LDLR+PCSK9 GENE MUT ANL BLD/T: NOT DETECTED
BRCA1+BRCA2 DEL+DUP + FULL MUT ANL BLD/T: NOT DETECTED
MLH1+MSH2+MSH6+PMS2 GN DEL+DUP+FUL M: NOT DETECTED

## 2024-09-09 DIAGNOSIS — D86.9 SARCOIDOSIS: ICD-10-CM

## 2024-09-10 RX ORDER — METHOTREXATE 2.5 MG/1
TABLET ORAL
Qty: 36 TABLET | Refills: 5 | Status: SHIPPED | OUTPATIENT
Start: 2024-09-10

## 2024-09-18 ENCOUNTER — OFFICE VISIT (OUTPATIENT)
Dept: ENDOCRINOLOGY | Facility: CLINIC | Age: 64
End: 2024-09-18
Payer: COMMERCIAL

## 2024-09-18 VITALS
BODY MASS INDEX: 22.66 KG/M2 | HEART RATE: 55 BPM | OXYGEN SATURATION: 98 % | SYSTOLIC BLOOD PRESSURE: 112 MMHG | WEIGHT: 171 LBS | HEIGHT: 73 IN | DIASTOLIC BLOOD PRESSURE: 64 MMHG

## 2024-09-18 DIAGNOSIS — E11.65 TYPE 2 DIABETES MELLITUS WITH HYPERGLYCEMIA, WITH LONG-TERM CURRENT USE OF INSULIN (HCC): Primary | ICD-10-CM

## 2024-09-18 DIAGNOSIS — E78.2 MIXED HYPERLIPIDEMIA: ICD-10-CM

## 2024-09-18 DIAGNOSIS — Z79.4 TYPE 2 DIABETES MELLITUS WITH HYPERGLYCEMIA, WITH LONG-TERM CURRENT USE OF INSULIN (HCC): Primary | ICD-10-CM

## 2024-09-18 PROCEDURE — 99214 OFFICE O/P EST MOD 30 MIN: CPT

## 2024-09-18 PROCEDURE — 95251 CONT GLUC MNTR ANALYSIS I&R: CPT

## 2024-09-18 NOTE — PROGRESS NOTES
Established Patient Progress Note      Chief Complaint   Patient presents with    Diabetes Type 2        Impression & Plan:    Problem List Items Addressed This Visit          Endocrine    Type 2 diabetes mellitus with hyperglycemia, with long-term current use of insulin (HCC) - Primary     HGA1C above goal but improving. BGs in range 68% of the time, GMI now 7.1%. occasional highs and lows.  Treatment regimen:   - Continue current medication regimen.   - Advised patient to only do 2-3 clicks prior to eating and click 15-20 minutes prior to eating to avoid spikes and then drops.  - continue with CGM  Discussed risks/complications associated with uncontrolled diabetes.    Advised to adhere to diabetic diet, and recommended staying active/exercising routinely.  Keep carbohydrates consistent to limit blood glucose fluctuations.  Advised to call if blood sugars less than 70 mg/dl or over 300 mg/dl.   Discussed symptoms and treatment of hypoglycemia.    Recommended routine follow-up with podiatry and ophthalmology.   Ordered blood work to complete prior to next visit.   Lab Results   Component Value Date    HGBA1C 7.8 (H) 08/24/2024            Relevant Orders    Hemoglobin A1C    Comprehensive metabolic panel       Other    Mixed hyperlipidemia     Lipid panel at goal. Continue statin.              History of Present Illness:   Juan Escobedo is a 63 y.o. male with type 2 diabetes seen in follow up. Reports complications of CKD. Denies recent severe hypoglycemic or severe hyperglycemic episodes. Denies any issues with his current regimen. Last A1C was 7.8. Home glucose monitoring: are performed regularly with CGM.     Juan Escobedo   Device used: DartPoints 3  Home use   Indication: Type 2 Diabetes  More than 72 hours of data was reviewed. Report to be scanned to chart.   Date Range: 9/5/24-9/18/24  Analysis of data:   Average Glucose: 160  Coefficient of Variation: 27.3%    Time in Target Range: 68%   Time Above Range:  29% high, 3% very high    Time Below Range: 0%   Interpretation of data:  BGs well controlled, occ. Highs and lows.      Current regimen:   V-go 20, 1-4 clicks with each meal   Farxiga 10 mg daily  Metformin 500 mg twice daily     Last Eye Exam: UTD   Last Foot Exam: UTD     Has hyperlipidemia: Taking simvastatin    Patient Active Problem List   Diagnosis    MVP (mitral valve prolapse)    Sarcoidosis    Type 2 diabetes mellitus with hyperglycemia, with long-term current use of insulin (HCC)    CKD (chronic kidney disease)    Hypercalcemia    Paresthesias    Nephrolithiasis    Venous insufficiency    Methotrexate, long term, current use    BPH (benign prostatic hyperplasia)    Venous stasis dermatitis of both lower extremities    Mixed hyperlipidemia    Weight loss    Macrocytosis      Past Medical History:   Diagnosis Date    Diabetes mellitus (HCC)     Hyperlipidemia     Pneumonia 2022    Sarcoidosis       Past Surgical History:   Procedure Laterality Date    ABDOMINAL SURGERY  1962    APPENDECTOMY  1962    COLON SURGERY  1962    COLONOSCOPY  2017    CYSTOSCOPY W/ URETERAL STENT PLACEMENT Left     INTESTINAL MALROTATION REPAIR      KNEE SURGERY Right 1997    LYMPH NODE BIOPSY  2018    [place] BX/EXC LYMPH NODE OPEN SUPERFICIAL Right 11/02/2017    Procedure: INGUINAL LYMPH NODE BIOPSY;  Surgeon: RIZWAN Mederos MD;  Location: [facility];  Service: General    SHOULDER SURGERY  2005      Social History     Tobacco Use    Smoking status: Never     Passive exposure: Past    Smokeless tobacco: Never   Substance Use Topics    Alcohol use: Never     Allergies   Allergen Reactions    Acetaminophen Swelling     tylenol    Dust Mite Extract     Molds & Smuts     Other     Percocet [Oxycodone-Acetaminophen]      Angioedema on finger tips           Current Outpatient Medications:     cholecalciferol (VITAMIN D3) 1,000 units tablet, Take 1,000 Units by mouth daily, Disp: , Rfl:     Continuous Glucose Sensor (FreeStyle Dimitris 3  "Sensor) MISC, Change once every 14 days, Disp: 6 each, Rfl: 0    dapagliflozin (Farxiga) 10 MG tablet, Take 1 tablet (10 mg total) by mouth daily, Disp: 30 tablet, Rfl: 5    folic acid (FOLVITE) 1 mg tablet, Take 1 tablet (1 mg total) by mouth daily, Disp: 90 tablet, Rfl: 3    Insulin Disposable Pump (V-Go 20) 20 UNIT/24HR KIT, CHANGE ONCE EVERY 24 HOURS. 1 CLICK WITH EACH MEAL, Disp: 1 kit, Rfl: 3    insulin lispro (HumALOG/ADMELOG) 100 units/mL injection, ADMINISTER 56 UNITS UNDER THE SKIN DAILY, Disp: 10 mL, Rfl: 5    metFORMIN (GLUCOPHAGE) 500 mg tablet, Take 1 tablet (500 mg total) by mouth 2 (two) times a day with meals, Disp: 180 tablet, Rfl: 1    methotrexate 2.5 MG tablet, TAKE 3 TABLETS BY MOUTH 1 TIME A WEEK, Disp: 36 tablet, Rfl: 5    simvastatin (ZOCOR) 10 mg tablet, TAKE 1 TABLET(10 MG) BY MOUTH DAILY, Disp: 100 tablet, Rfl: 1    Review of Systems   Constitutional:  Negative for chills, fatigue, fever and unexpected weight change.   Eyes:  Negative for visual disturbance.   Respiratory:  Negative for shortness of breath.    Cardiovascular:  Negative for palpitations.   Gastrointestinal:  Negative for abdominal pain, diarrhea, nausea and vomiting.   Endocrine: Negative for polydipsia and polyuria.   Skin:  Negative for wound.   Neurological:  Negative for syncope and weakness.   All other systems reviewed and are negative.      Physical Exam:  Body mass index is 22.56 kg/m².  /64 (BP Location: Right arm, Patient Position: Sitting, Cuff Size: Adult)   Pulse 55   Ht 6' 1\" (1.854 m)   Wt 77.6 kg (171 lb)   SpO2 98%   BMI 22.56 kg/m²    Wt Readings from Last 3 Encounters:   09/18/24 77.6 kg (171 lb)   08/27/24 76.7 kg (169 lb)   03/05/24 74.2 kg (163 lb 9.6 oz)       Physical Exam  Vitals reviewed.   Constitutional:       Appearance: Normal appearance. He is normal weight.   Cardiovascular:      Rate and Rhythm: Normal rate.   Pulmonary:      Effort: Pulmonary effort is normal.   Neurological:    "   Mental Status: He is alert and oriented to person, place, and time.   Psychiatric:         Mood and Affect: Mood normal.         Thought Content: Thought content normal.       Labs:   Lab Results   Component Value Date    HGBA1C 7.8 (H) 08/24/2024    HGBA1C 8.7 (H) 03/02/2024    HGBA1C 8.1 (H) 06/09/2023     Lab Results   Component Value Date    CREATININE 1.44 (H) 08/24/2024    CREATININE 1.28 03/02/2024    CREATININE 1.52 (H) 06/09/2023    BUN 20 08/24/2024     12/09/2017    K 4.4 08/24/2024     08/24/2024    CO2 28 08/24/2024     GFR, Calculated   Date Value Ref Range Status   05/17/2018 35 (L) >60 mL/min/1.73m2 Final     Comment:     Please refer to initial GFR, CALC footnote     EGFR   Date Value Ref Range Status   07/19/2018 49.7 (L) >60 Final     Comment:     If patient is , multiply estimated GFR by 1.159.     eGFR   Date Value Ref Range Status   08/24/2024 51 ml/min/1.73sq m Final     Lab Results   Component Value Date    HDL 52 03/02/2024    TRIG 45 03/02/2024     Lab Results   Component Value Date    ALT 15 08/24/2024    AST 20 08/24/2024    ALKPHOS 80 08/24/2024    BILITOT 0.5 12/09/2017     Lab Results   Component Value Date    TWU0EHPRADKH 1.701 06/09/2023    IRO0WXQPURCK 1.330 09/03/2022       There are no Patient Instructions on file for this visit.    Discussed with the patient and all questioned fully answered. He will call me if any problems arise.    GUSTAVO Arnold

## 2024-09-18 NOTE — ASSESSMENT & PLAN NOTE
HGA1C above goal but improving. BGs in range 68% of the time, GMI now 7.1%. occasional highs and lows.  Treatment regimen:   - Continue current medication regimen.   - Advised patient to only do 2-3 clicks prior to eating and click 15-20 minutes prior to eating to avoid spikes and then drops.  - continue with CGM  Discussed risks/complications associated with uncontrolled diabetes.    Advised to adhere to diabetic diet, and recommended staying active/exercising routinely.  Keep carbohydrates consistent to limit blood glucose fluctuations.  Advised to call if blood sugars less than 70 mg/dl or over 300 mg/dl.   Discussed symptoms and treatment of hypoglycemia.    Recommended routine follow-up with podiatry and ophthalmology.   Ordered blood work to complete prior to next visit.   Lab Results   Component Value Date    HGBA1C 7.8 (H) 08/24/2024

## 2024-10-07 ENCOUNTER — TELEPHONE (OUTPATIENT)
Age: 64
End: 2024-10-07

## 2024-10-07 NOTE — TELEPHONE ENCOUNTER
Patient called the RX Refill Line. Message is being forwarded to the office.     Patient is requesting a new rx for his freesyle amadeo 3 plus. To be sent to pharmacy    Pharmacy: [facility] #[id number] - [place] - [address]      Please contact patient at  [phone]

## 2024-10-08 DIAGNOSIS — E11.65 TYPE 2 DIABETES MELLITUS WITH HYPERGLYCEMIA, WITH LONG-TERM CURRENT USE OF INSULIN (HCC): Primary | ICD-10-CM

## 2024-10-08 DIAGNOSIS — Z79.4 TYPE 2 DIABETES MELLITUS WITH HYPERGLYCEMIA, WITH LONG-TERM CURRENT USE OF INSULIN (HCC): Primary | ICD-10-CM

## 2024-10-08 RX ORDER — BLOOD-GLUCOSE SENSOR
EACH MISCELLANEOUS
Qty: 2 EACH | Refills: 3 | Status: SHIPPED | OUTPATIENT
Start: 2024-10-08

## 2024-10-10 NOTE — TELEPHONE ENCOUNTER
PA for Continuous Glucose Sensor (FreeStyle Dimitris 3 Plus Sensor) MISC  NOT REQUIRED     Reason Prior Authorization not required for patient/medication           Patient advised by          [] MyChart Message  [] Phone call   []LMOM  []L/M to call office as no active Communication consent on file  []Unable to leave detailed message as VM not approved on Communication consent       Pharmacy advised by    [x]Fax  []Phone call

## 2024-10-14 DIAGNOSIS — E11.65 TYPE 2 DIABETES MELLITUS WITH HYPERGLYCEMIA, WITH LONG-TERM CURRENT USE OF INSULIN (HCC): ICD-10-CM

## 2024-10-14 DIAGNOSIS — Z79.4 TYPE 2 DIABETES MELLITUS WITH HYPERGLYCEMIA, WITH LONG-TERM CURRENT USE OF INSULIN (HCC): ICD-10-CM

## 2024-10-15 RX ORDER — INSULIN LISPRO 100 [IU]/ML
INJECTION, SOLUTION INTRAVENOUS; SUBCUTANEOUS
Qty: 10 ML | Refills: 5 | Status: SHIPPED | OUTPATIENT
Start: 2024-10-15

## 2024-10-27 DIAGNOSIS — Z79.4 TYPE 2 DIABETES MELLITUS WITH HYPERGLYCEMIA, WITH LONG-TERM CURRENT USE OF INSULIN (HCC): ICD-10-CM

## 2024-10-27 DIAGNOSIS — E11.65 TYPE 2 DIABETES MELLITUS WITH HYPERGLYCEMIA, WITH LONG-TERM CURRENT USE OF INSULIN (HCC): ICD-10-CM

## 2024-10-28 RX ORDER — DAPAGLIFLOZIN 10 MG/1
10 TABLET, FILM COATED ORAL DAILY
Qty: 30 TABLET | Refills: 5 | Status: SHIPPED | OUTPATIENT
Start: 2024-10-28

## 2024-11-25 DIAGNOSIS — Z79.631 METHOTREXATE, LONG TERM, CURRENT USE: ICD-10-CM

## 2024-11-25 NOTE — TELEPHONE ENCOUNTER
Pt is out of town and requesting for change in pharmacy     Reason for call:   [x] Refill   [] Prior Auth  [] Other:     Office:   [x] PCP/Provider - Pt requesting for refill request to be sent to Nicholle Elizabeht Shannon CRNP -Diab/[facility]     metFORMIN (GLUCOPHAGE) 500 mg tablet -: Take 1 tablet (500 mg total) by mouth 2 (two) times a day with meals    90 day supply     [facility]     Does the patient have enough for 3 days?   [x] Yes   [] No - Send as HP to POD

## 2024-12-04 ENCOUNTER — TELEPHONE (OUTPATIENT)
Dept: ENDOCRINOLOGY | Facility: CLINIC | Age: 64
End: 2024-12-04

## 2024-12-26 DIAGNOSIS — Z79.4 TYPE 2 DIABETES MELLITUS WITH HYPERGLYCEMIA, WITH LONG-TERM CURRENT USE OF INSULIN (HCC): ICD-10-CM

## 2024-12-26 DIAGNOSIS — E11.65 TYPE 2 DIABETES MELLITUS WITH HYPERGLYCEMIA, WITH LONG-TERM CURRENT USE OF INSULIN (HCC): ICD-10-CM

## 2024-12-26 RX ORDER — DAPAGLIFLOZIN 10 MG/1
10 TABLET, FILM COATED ORAL DAILY
Qty: 30 TABLET | Refills: 5 | Status: SHIPPED | OUTPATIENT
Start: 2024-12-26

## 2024-12-28 DIAGNOSIS — E83.52 HYPERCALCEMIA: ICD-10-CM

## 2024-12-28 DIAGNOSIS — N18.30 STAGE 3 CHRONIC KIDNEY DISEASE, UNSPECIFIED WHETHER STAGE 3A OR 3B CKD (HCC): ICD-10-CM

## 2024-12-28 DIAGNOSIS — D86.9 SARCOIDOSIS: ICD-10-CM

## 2024-12-30 DIAGNOSIS — Z79.4 TYPE 2 DIABETES MELLITUS WITH HYPERGLYCEMIA, WITH LONG-TERM CURRENT USE OF INSULIN (HCC): ICD-10-CM

## 2024-12-30 DIAGNOSIS — N18.30 STAGE 3 CHRONIC KIDNEY DISEASE, UNSPECIFIED WHETHER STAGE 3A OR 3B CKD (HCC): ICD-10-CM

## 2024-12-30 DIAGNOSIS — E83.52 HYPERCALCEMIA: ICD-10-CM

## 2024-12-30 DIAGNOSIS — D86.9 SARCOIDOSIS: ICD-10-CM

## 2024-12-30 DIAGNOSIS — E11.65 TYPE 2 DIABETES MELLITUS WITH HYPERGLYCEMIA, WITH LONG-TERM CURRENT USE OF INSULIN (HCC): ICD-10-CM

## 2024-12-30 RX ORDER — FOLIC ACID 1 MG/1
TABLET ORAL
Qty: 90 TABLET | Refills: 1 | Status: SHIPPED | OUTPATIENT
Start: 2024-12-30

## 2024-12-30 RX ORDER — FOLIC ACID 1 MG/1
1 TABLET ORAL DAILY
Qty: 90 TABLET | Refills: 0 | OUTPATIENT
Start: 2024-12-30

## 2024-12-30 NOTE — TELEPHONE ENCOUNTER
Reason for call:   [x] Refill   [] Prior Auth  [] Other:     Office:   [] PCP/Provider -   [x] Specialty/Provider -  Austin Chaves /delon ramirez    Medication:     folic acid (FOLVITE) 1 mg tablet       Dose/Frequency: Take 1 tablet (1 mg total) by mouth daily,     Quantity: 90    Pharmacy: [facility] #[id number] [place], [place] - [address]      Does the patient have enough for 3 days?   [] Yes   [x] No - Send as HP to POD

## 2024-12-31 RX ORDER — DAPAGLIFLOZIN 10 MG/1
10 TABLET, FILM COATED ORAL DAILY
Qty: 30 TABLET | Refills: 5 | Status: SHIPPED | OUTPATIENT
Start: 2024-12-31

## 2025-01-02 DIAGNOSIS — Z79.4 TYPE 2 DIABETES MELLITUS WITH HYPERGLYCEMIA, WITH LONG-TERM CURRENT USE OF INSULIN (HCC): ICD-10-CM

## 2025-01-02 DIAGNOSIS — E11.65 TYPE 2 DIABETES MELLITUS WITH HYPERGLYCEMIA, WITH LONG-TERM CURRENT USE OF INSULIN (HCC): ICD-10-CM

## 2025-01-02 RX ORDER — SUB-Q INSULIN DEVICE, 40 UNIT
EACH MISCELLANEOUS
Qty: 1 KIT | Refills: 5 | Status: SHIPPED | OUTPATIENT
Start: 2025-01-02

## 2025-01-02 RX ORDER — INSULIN LISPRO 100 [IU]/ML
INJECTION, SOLUTION INTRAVENOUS; SUBCUTANEOUS
Qty: 20 ML | Refills: 5 | Status: SHIPPED | OUTPATIENT
Start: 2025-01-02

## 2025-01-02 NOTE — TELEPHONE ENCOUNTER
Patient is leaving for [place] Monday for work    Reason for call:   [x] Refill   [] Prior Auth  [] Other:     Office:   [] PCP/Provider -   [x] Specialty/Provider - [facility] [place]  Authorized By: GUSTAVO Alston      Medication: Insulin Disposable Pump (V-Go 20) 20 UNIT/24HR KIT    Dose/Frequency: CHANGE ONCE EVERY 24 HOURS. 1 CLICK WITH EACH MEAL    Quantity: 1 kit    Pharmacy: [facility] #[id number] [address] [place] [address]     Does the patient have enough for 3 days?   [] Yes   [x] No - Send as HP to POD    Reason for call:   [x] Refill   [] Prior Auth  [] Other:     Office:   [] PCP/Provider -   [x] Specialty/Provider - [facility] [place]  Authorized By: GUSTAVO Alston      Medication:  insulin lispro (HumALOG/ADMELOG) 100 units/mL injection    Dose/Frequency: ADMINISTER 56 UNITS UNDER THE SKIN DAILY    Quantity: 10 mL    Pharmacy: [facility] #[id number] [address] [facility]     Does the patient have enough for 3 days?   [] Yes   [x] No - Send as HP to POD

## 2025-02-02 DIAGNOSIS — E11.29 TYPE 2 DIABETES MELLITUS WITH MICROALBUMINURIA (HCC): ICD-10-CM

## 2025-02-02 DIAGNOSIS — R80.9 TYPE 2 DIABETES MELLITUS WITH MICROALBUMINURIA (HCC): ICD-10-CM

## 2025-02-03 RX ORDER — SIMVASTATIN 10 MG
10 TABLET ORAL DAILY
Qty: 100 TABLET | Refills: 1 | Status: SHIPPED | OUTPATIENT
Start: 2025-02-03

## 2025-02-06 DIAGNOSIS — Z79.4 TYPE 2 DIABETES MELLITUS WITH HYPERGLYCEMIA, WITH LONG-TERM CURRENT USE OF INSULIN (HCC): ICD-10-CM

## 2025-02-06 DIAGNOSIS — E11.65 TYPE 2 DIABETES MELLITUS WITH HYPERGLYCEMIA, WITH LONG-TERM CURRENT USE OF INSULIN (HCC): ICD-10-CM

## 2025-02-06 RX ORDER — HYDROCHLOROTHIAZIDE 12.5 MG/1
CAPSULE ORAL
Qty: 2 EACH | Refills: 5 | Status: SHIPPED | OUTPATIENT
Start: 2025-02-06

## 2025-02-22 ENCOUNTER — APPOINTMENT (OUTPATIENT)
Dept: LAB | Facility: MEDICAL CENTER | Age: 65
End: 2025-02-22
Payer: COMMERCIAL

## 2025-02-22 DIAGNOSIS — Z79.4 TYPE 2 DIABETES MELLITUS WITH HYPERGLYCEMIA, WITH LONG-TERM CURRENT USE OF INSULIN (HCC): ICD-10-CM

## 2025-02-22 DIAGNOSIS — E11.65 TYPE 2 DIABETES MELLITUS WITH HYPERGLYCEMIA, WITH LONG-TERM CURRENT USE OF INSULIN (HCC): ICD-10-CM

## 2025-02-22 LAB
ALBUMIN SERPL BCG-MCNC: 4.1 G/DL (ref 3.5–5)
ALP SERPL-CCNC: 79 U/L (ref 34–104)
ALT SERPL W P-5'-P-CCNC: 19 U/L (ref 7–52)
ANION GAP SERPL CALCULATED.3IONS-SCNC: 7 MMOL/L (ref 4–13)
AST SERPL W P-5'-P-CCNC: 26 U/L (ref 13–39)
BILIRUB SERPL-MCNC: 0.59 MG/DL (ref 0.2–1)
BUN SERPL-MCNC: 21 MG/DL (ref 5–25)
CALCIUM SERPL-MCNC: 9.3 MG/DL (ref 8.4–10.2)
CHLORIDE SERPL-SCNC: 105 MMOL/L (ref 96–108)
CO2 SERPL-SCNC: 30 MMOL/L (ref 21–32)
CREAT SERPL-MCNC: 1.46 MG/DL (ref 0.6–1.3)
EST. AVERAGE GLUCOSE BLD GHB EST-MCNC: 171 MG/DL
GFR SERPL CREATININE-BSD FRML MDRD: 50 ML/MIN/1.73SQ M
GLUCOSE P FAST SERPL-MCNC: 127 MG/DL (ref 65–99)
HBA1C MFR BLD: 7.6 %
POTASSIUM SERPL-SCNC: 4.1 MMOL/L (ref 3.5–5.3)
PROT SERPL-MCNC: 6.6 G/DL (ref 6.4–8.4)
SODIUM SERPL-SCNC: 142 MMOL/L (ref 135–147)

## 2025-02-22 PROCEDURE — 36415 COLL VENOUS BLD VENIPUNCTURE: CPT

## 2025-02-22 PROCEDURE — 80053 COMPREHEN METABOLIC PANEL: CPT

## 2025-02-22 PROCEDURE — 83036 HEMOGLOBIN GLYCOSYLATED A1C: CPT

## 2025-02-25 ENCOUNTER — RESULTS FOLLOW-UP (OUTPATIENT)
Dept: ENDOCRINOLOGY | Facility: CLINIC | Age: 65
End: 2025-02-25

## 2025-02-25 ENCOUNTER — TELEPHONE (OUTPATIENT)
Dept: PULMONOLOGY | Facility: CLINIC | Age: 65
End: 2025-02-25

## 2025-03-06 ENCOUNTER — HOSPITAL ENCOUNTER (EMERGENCY)
Facility: HOSPITAL | Age: 65
Discharge: HOME | End: 2025-03-06
Attending: EMERGENCY MEDICINE
Payer: COMMERCIAL

## 2025-03-06 ENCOUNTER — TELEMEDICINE (OUTPATIENT)
Dept: ENDOCRINOLOGY | Facility: CLINIC | Age: 65
End: 2025-03-06
Payer: COMMERCIAL

## 2025-03-06 ENCOUNTER — OFFICE VISIT (OUTPATIENT)
Dept: URGENT CARE | Facility: MEDICAL CENTER | Age: 65
End: 2025-03-06
Payer: COMMERCIAL

## 2025-03-06 ENCOUNTER — APPOINTMENT (EMERGENCY)
Dept: CT IMAGING | Facility: HOSPITAL | Age: 65
End: 2025-03-06
Payer: COMMERCIAL

## 2025-03-06 VITALS
RESPIRATION RATE: 18 BRPM | SYSTOLIC BLOOD PRESSURE: 117 MMHG | TEMPERATURE: 97.4 F | HEART RATE: 70 BPM | DIASTOLIC BLOOD PRESSURE: 64 MMHG | OXYGEN SATURATION: 100 %

## 2025-03-06 VITALS
OXYGEN SATURATION: 97 % | TEMPERATURE: 97.8 F | BODY MASS INDEX: 22.69 KG/M2 | HEART RATE: 69 BPM | DIASTOLIC BLOOD PRESSURE: 73 MMHG | RESPIRATION RATE: 20 BRPM | WEIGHT: 171.96 LBS | SYSTOLIC BLOOD PRESSURE: 142 MMHG

## 2025-03-06 DIAGNOSIS — R10.32 LEFT LOWER QUADRANT ABDOMINAL PAIN: ICD-10-CM

## 2025-03-06 DIAGNOSIS — K52.9 COLITIS: ICD-10-CM

## 2025-03-06 DIAGNOSIS — J10.1 INFLUENZA A: Primary | ICD-10-CM

## 2025-03-06 DIAGNOSIS — J20.8 ACUTE BACTERIAL BRONCHITIS: Primary | ICD-10-CM

## 2025-03-06 DIAGNOSIS — B96.89 ACUTE BACTERIAL BRONCHITIS: Primary | ICD-10-CM

## 2025-03-06 DIAGNOSIS — R05.9 COUGH: ICD-10-CM

## 2025-03-06 DIAGNOSIS — Z79.4 TYPE 2 DIABETES MELLITUS WITH HYPERGLYCEMIA, WITH LONG-TERM CURRENT USE OF INSULIN (HCC): Primary | ICD-10-CM

## 2025-03-06 DIAGNOSIS — E11.65 TYPE 2 DIABETES MELLITUS WITH HYPERGLYCEMIA, WITH LONG-TERM CURRENT USE OF INSULIN (HCC): Primary | ICD-10-CM

## 2025-03-06 DIAGNOSIS — Z79.631 METHOTREXATE, LONG TERM, CURRENT USE: ICD-10-CM

## 2025-03-06 DIAGNOSIS — R10.9 ABDOMINAL PAIN: ICD-10-CM

## 2025-03-06 LAB
ALBUMIN SERPL BCG-MCNC: 3.6 G/DL (ref 3.5–5)
ALP SERPL-CCNC: 56 U/L (ref 34–104)
ALT SERPL W P-5'-P-CCNC: 24 U/L (ref 7–52)
ANION GAP SERPL CALCULATED.3IONS-SCNC: 6 MMOL/L (ref 4–13)
AST SERPL W P-5'-P-CCNC: 42 U/L (ref 13–39)
BASOPHILS # BLD AUTO: 0 THOUSANDS/ÂΜL (ref 0–0.1)
BASOPHILS NFR BLD AUTO: 0 % (ref 0–1)
BILIRUB SERPL-MCNC: 0.56 MG/DL (ref 0.2–1)
BILIRUB UR QL STRIP: NEGATIVE
BUN SERPL-MCNC: 16 MG/DL (ref 5–25)
CALCIUM SERPL-MCNC: 7.9 MG/DL (ref 8.4–10.2)
CHLORIDE SERPL-SCNC: 107 MMOL/L (ref 96–108)
CLARITY UR: CLEAR
CO2 SERPL-SCNC: 26 MMOL/L (ref 21–32)
COLOR UR: YELLOW
CREAT SERPL-MCNC: 1.22 MG/DL (ref 0.6–1.3)
EOSINOPHIL # BLD AUTO: 0.01 THOUSAND/ÂΜL (ref 0–0.61)
EOSINOPHIL NFR BLD AUTO: 1 % (ref 0–6)
ERYTHROCYTE [DISTWIDTH] IN BLOOD BY AUTOMATED COUNT: 13.2 % (ref 11.6–15.1)
FLUAV RNA RESP QL NAA+PROBE: POSITIVE
FLUBV RNA RESP QL NAA+PROBE: NEGATIVE
GFR SERPL CREATININE-BSD FRML MDRD: 62 ML/MIN/1.73SQ M
GLUCOSE SERPL-MCNC: 122 MG/DL (ref 65–140)
GLUCOSE UR STRIP-MCNC: ABNORMAL MG/DL
HCT VFR BLD AUTO: 43.8 % (ref 36.5–49.3)
HGB BLD-MCNC: 14.7 G/DL (ref 12–17)
HGB UR QL STRIP.AUTO: NEGATIVE
IMM GRANULOCYTES # BLD AUTO: 0.01 THOUSAND/UL (ref 0–0.2)
IMM GRANULOCYTES NFR BLD AUTO: 1 % (ref 0–2)
KETONES UR STRIP-MCNC: NEGATIVE MG/DL
LACTATE SERPL-SCNC: 1.2 MMOL/L (ref 0.5–2)
LEUKOCYTE ESTERASE UR QL STRIP: NEGATIVE
LIPASE SERPL-CCNC: 42 U/L (ref 11–82)
LYMPHOCYTES # BLD AUTO: 0.63 THOUSANDS/ÂΜL (ref 0.6–4.47)
LYMPHOCYTES NFR BLD AUTO: 29 % (ref 14–44)
MCH RBC QN AUTO: 31.3 PG (ref 26.8–34.3)
MCHC RBC AUTO-ENTMCNC: 33.6 G/DL (ref 31.4–37.4)
MCV RBC AUTO: 93 FL (ref 82–98)
MONOCYTES # BLD AUTO: 0.31 THOUSAND/ÂΜL (ref 0.17–1.22)
MONOCYTES NFR BLD AUTO: 14 % (ref 4–12)
NEUTROPHILS # BLD AUTO: 1.22 THOUSANDS/ÂΜL (ref 1.85–7.62)
NEUTS SEG NFR BLD AUTO: 55 % (ref 43–75)
NITRITE UR QL STRIP: NEGATIVE
NRBC BLD AUTO-RTO: 0 /100 WBCS
PH UR STRIP.AUTO: 6 [PH] (ref 4.5–8)
PLATELET # BLD AUTO: 149 THOUSANDS/UL (ref 149–390)
PMV BLD AUTO: 9.7 FL (ref 8.9–12.7)
POTASSIUM SERPL-SCNC: 4.9 MMOL/L (ref 3.5–5.3)
PROT SERPL-MCNC: 5.6 G/DL (ref 6.4–8.4)
PROT UR STRIP-MCNC: NEGATIVE MG/DL
RBC # BLD AUTO: 4.69 MILLION/UL (ref 3.88–5.62)
RSV RNA RESP QL NAA+PROBE: NEGATIVE
SARS-COV-2 RNA RESP QL NAA+PROBE: NEGATIVE
SODIUM SERPL-SCNC: 139 MMOL/L (ref 135–147)
SP GR UR STRIP.AUTO: 1.02 (ref 1–1.03)
UROBILINOGEN UR QL STRIP.AUTO: 0.2 E.U./DL
WBC # BLD AUTO: 2.18 THOUSAND/UL (ref 4.31–10.16)

## 2025-03-06 PROCEDURE — 80053 COMPREHEN METABOLIC PANEL: CPT | Performed by: EMERGENCY MEDICINE

## 2025-03-06 PROCEDURE — 83605 ASSAY OF LACTIC ACID: CPT | Performed by: EMERGENCY MEDICINE

## 2025-03-06 PROCEDURE — 36415 COLL VENOUS BLD VENIPUNCTURE: CPT | Performed by: EMERGENCY MEDICINE

## 2025-03-06 PROCEDURE — 96374 THER/PROPH/DIAG INJ IV PUSH: CPT

## 2025-03-06 PROCEDURE — 99214 OFFICE O/P EST MOD 30 MIN: CPT | Performed by: NURSE PRACTITIONER

## 2025-03-06 PROCEDURE — 99214 OFFICE O/P EST MOD 30 MIN: CPT | Performed by: INTERNAL MEDICINE

## 2025-03-06 PROCEDURE — 99285 EMERGENCY DEPT VISIT HI MDM: CPT | Performed by: EMERGENCY MEDICINE

## 2025-03-06 PROCEDURE — 83690 ASSAY OF LIPASE: CPT | Performed by: EMERGENCY MEDICINE

## 2025-03-06 PROCEDURE — 74177 CT ABD & PELVIS W/CONTRAST: CPT

## 2025-03-06 PROCEDURE — 81003 URINALYSIS AUTO W/O SCOPE: CPT

## 2025-03-06 PROCEDURE — 95251 CONT GLUC MNTR ANALYSIS I&R: CPT | Performed by: INTERNAL MEDICINE

## 2025-03-06 PROCEDURE — 71260 CT THORAX DX C+: CPT

## 2025-03-06 PROCEDURE — 96361 HYDRATE IV INFUSION ADD-ON: CPT

## 2025-03-06 PROCEDURE — 85025 COMPLETE CBC W/AUTO DIFF WBC: CPT | Performed by: EMERGENCY MEDICINE

## 2025-03-06 PROCEDURE — 0241U HB NFCT DS VIR RESP RNA 4 TRGT: CPT | Performed by: EMERGENCY MEDICINE

## 2025-03-06 PROCEDURE — 99284 EMERGENCY DEPT VISIT MOD MDM: CPT

## 2025-03-06 RX ORDER — OSELTAMIVIR PHOSPHATE 75 MG/1
75 CAPSULE ORAL ONCE
Status: COMPLETED | OUTPATIENT
Start: 2025-03-06 | End: 2025-03-06

## 2025-03-06 RX ORDER — FENTANYL CITRATE 50 UG/ML
50 INJECTION, SOLUTION INTRAMUSCULAR; INTRAVENOUS
Refills: 0 | Status: DISCONTINUED | OUTPATIENT
Start: 2025-03-06 | End: 2025-03-06 | Stop reason: HOSPADM

## 2025-03-06 RX ORDER — OSELTAMIVIR PHOSPHATE 75 MG/1
75 CAPSULE ORAL EVERY 12 HOURS SCHEDULED
Qty: 10 CAPSULE | Refills: 0 | Status: SHIPPED | OUTPATIENT
Start: 2025-03-06 | End: 2025-03-11

## 2025-03-06 RX ORDER — BENZONATATE 100 MG/1
200 CAPSULE ORAL 3 TIMES DAILY PRN
Qty: 20 CAPSULE | Refills: 0 | Status: SHIPPED | OUTPATIENT
Start: 2025-03-06 | End: 2025-03-13

## 2025-03-06 RX ORDER — AZITHROMYCIN 250 MG/1
TABLET, FILM COATED ORAL
Qty: 6 TABLET | Refills: 0 | Status: SHIPPED | OUTPATIENT
Start: 2025-03-06 | End: 2025-03-10

## 2025-03-06 RX ADMIN — FENTANYL CITRATE 50 MCG: 50 INJECTION INTRAMUSCULAR; INTRAVENOUS at 10:30

## 2025-03-06 RX ADMIN — OSELTAMIVIR 75 MG: 75 CAPSULE ORAL at 12:58

## 2025-03-06 RX ADMIN — IOHEXOL 100 ML: 350 INJECTION, SOLUTION INTRAVENOUS at 12:50

## 2025-03-06 RX ADMIN — SODIUM CHLORIDE 1000 ML: 0.9 INJECTION, SOLUTION INTRAVENOUS at 10:29

## 2025-03-06 NOTE — PROGRESS NOTES
"Virtual Regular VisitName: Juan Escobedo      : 1960      MRN: [id number]  Encounter Provider: Antonina Tapia MD  Encounter Date: 3/6/2025   Encounter department: [facility] [place]  :  Assessment & Plan  Type 2 diabetes mellitus with hyperglycemia, with long-term current use of insulin (HCC)    Bg control is fair and has not worsened markedly with  influenza. I recommend holding the Farxiga while on the Tamiflu and then reuming Will continue Vgo and metformin. Suggested using 3 clicks (6units) on V go though he was hesitant. Discussed using 4units for small meals, 6units for mediums size, and 8units for large. He will consider this.     Orders:  •  Hemoglobin A1C; Future  •  Albumin / creatinine urine ratio; Future        History of Present Illness     HPI  64yom with hx of T2DM. Formerly saw Dr. Mills and was alst in the office with Cathy CHEN in 2024. This is his initial visit with Dr. Tapia    He is Flu A positive today and arrived at the office for the visit. He is mask and place.  The visit was converted to virtual.  He was seen in the ED today and given tamiflu and Z pack.     Current regimen:  V Go 20 with lispro. 2-4 clicks per meals.  Farxiga 10mg daily  Metformin 500mg twice daily    Eye: 2024 no DR Juan Escobedo   Device used: [employer] 3  Home use   Indication: Type 2 Diabetes  More than 72 hours of data was reviewed. Report to be scanned to chart.   Date Range: 25-3/6/2025  Analysis of data:   Average Glucose: 164  Coefficient of Variation: 32.8%    Time in Target Range: 65%   Time Above Range: 27% high, % very high    Time Below Range: 0%   Interpretation of data:    Time in range slightly below goal and some elevations. He reports concerns about \"crashing Bgs\" one occurring on 3/5/2025     Review of Systems   Constitutional:  Positive for fatigue.   Respiratory:  Positive for cough.        Objective   There were no " vitals taken for this visit.    Physical Exam    Physical Exam   Gen: appears well-developed and well-nourished. No apparent distress.   Head: Normocephalic and atraumatic.   Eyes: no stare or proptosis, no periorbital edema  E/N/M mask in place, hearing grossly intact  Neck: range of motion nl   Pulmonary/Chest: breathing  comfortably, no accessory muscle use, effort normal.   Musculoskeletal: moves all 4 extremities  Neurological: alert and oriented to person, place, and time. No upper ext tremor appreciated  Skin: does not appear diaphoretic, no facial plethora  Psychiatric: normal mood and affect; behavior is normal; no gross lapses in memory, answer questions appropriately      DATA  Lab Results   Component Value Date    HGBA1C 7.6 (H) 02/22/2025     Lab Results   Component Value Date     12/09/2017    SODIUM 139 03/06/2025    K 4.9 03/06/2025     03/06/2025    CO2 26 03/06/2025    AGAP 6 03/06/2025    BUN 16 03/06/2025    CREATININE 1.22 03/06/2025    GLUC 122 03/06/2025    GLUF 127 (H) 02/22/2025    CALCIUM 7.9 (L) 03/06/2025    AST 42 (H) 03/06/2025    ALT 24 03/06/2025    ALKPHOS 56 03/06/2025    PROT 6.6 12/09/2017    TP 5.6 (L) 03/06/2025    BILITOT 0.5 12/09/2017    TBILI 0.56 03/06/2025    EGFR 62 03/06/2025         Administrative Statements   Encounter provider Antonina Tapia MD    The Patient is located at Other and in the following state in which I hold an active license [place].    The patient was identified by name and date of birth. Juan FRANCIE Escobedo was informed that this is a telemedicine visit and that the visit is being conducted through the Epic Embedded platform. He agrees to proceed..  My office door was closed. No one else was in the room.  He acknowledged consent and understanding of privacy and security of the video platform. The patient has agreed to participate and understands they can discontinue the visit at any time.

## 2025-03-06 NOTE — ED PROVIDER NOTES
Time reflects when diagnosis was documented in both MDM as applicable and the Disposition within this note       Time User Action Codes Description Comment    3/6/2025 12:44 PM Mana Fiore Add [J10.1] Influenza A     3/6/2025 12:44 PM Bendjorge, Mana L Add [R10.9] Abdominal pain     3/6/2025 12:44 PM Bendock, Mana L Add [R05.9] Cough     3/6/2025  5:39 PM Bendjorge, Mana L Add [K52.9] Colitis           ED Disposition       ED Disposition   Discharge    Condition   Stable    Date/Time   Thu Mar 6, 2025  2:00 PM    Comment   Juan Escobedo discharge to home/self care.                   Assessment & Plan       Medical Decision Making      Differential diagnosis includes but not limited to:  Appendicitis, viral syndrome, constipation, AMI, NSTEMI, pneumonia, pneuothorax, gerd, gastritis, PUD, mesenteric ischemia, mesenteric adenitis, pancreatitis, cholecystitis, choledocholithiasis, hepatitis, bowel obstruction, ileus, gastroenteritis, colitis, malignancy, AAA, perforation, toxicologic poisoning, renal infarct, acute kidney injury, splenic infarct, splenic injury, nephrolithiasis, diverticulitis, food poisoning, epiploic appendagitis, irritable bowel disease, inflammatory bowel disease, volvulus, UTI, muscular strain, intra-abdominal hematoma, hernia, tumor, splenic etiology, constipation, testicular torsion, epididymitis, varicocele, hydrocele, phimosis, paraphimosis, balanitis proctitis, rectal pain, rectal prolapse, hemorrhoids, perirectal abscess, anorectal fistula; doubt cardiac etiology      Will check labs to evaluate for electrolyte abnormality, BJ, anemia, significant leukocytosis, pancreatitis, hepatitis and biliary etiology.      Problems Addressed:  Abdominal pain: acute illness or injury  Cough: acute illness or injury  Influenza A: acute illness or injury    Amount and/or Complexity of Data Reviewed  External Data Reviewed: notes.     Details:   Patient follows closely with endocrine as well as  "pulmonology  Labs: ordered. Decision-making details documented in ED Course.     Details:   Influenza A positive.  COVID and influenza B-  No anemia, thrombocytopenia or leukocytosis.  Noted mild decrease in WBC  No acute kidney injury electrolyte abnormality  Lactic acid within normal range  Leukosis 1 in urine otherwise no evidence of DKA  Lipase within normal range    Radiology: ordered. Decision-making details documented in ED Course.     Details:     CT Chest ABD pelvis interpreted by radiologist as       Risk  Prescription drug management.        ED Course as of 03/06/25 1739   Thu Mar 06, 2025   1007 Patient is a 64-year-old male coming in today with lower abdominal pain that started several days ago.  On exam he is tender to palpation to the left lower quadrant but nonperitoneal.  He also has a cough with increasing shortness of breath.  He has no conversational dyspnea  Will check abdominal labs including CT however will also add CT chest given persistently worsening cough.    Disclosure: Voice to text software was used in the preparation of this document and could have resulted in translational errors.      Occasional wrong word or \"sound a like\" substitutions may have occurred due to the inherent limitations of voice recognition software.  Read the chart carefully and recognize, using context, where substitutions have occurred.       I have independently reviewed external records are available to me to the level of detail possible within the time constraints of my patient care responsibilities in the ED.       1121 Lactic acid, plasma (w/reflex if result > 2.0)  WNL       1121 CBC and differential(!)  WBC mildly low        1217 FLU/RSV/COVID - if FLU/RSV clinically relevant (2hr TAT)(!)  FLU A +       1223 Patient resting in bed and updated on labs. Given he is on methotrexate/immunosuppressed will start Tamiflu.    Patient was given Z-Geronimo, Tessalon Perles at urgent center.       1400 Called into the room " as patient has an appointment with endocrine and wishes to keep that appointment.  Patient fully understands that the CT is not read at this time.  He is hemodynamically stable and nonseptic appearing.  Is flu positive and received Tamiflu here.  Will plan for DC home with Tamiflu and discussed with him call with results and plan.  He is agreeable plan of care      Counseling: I had a detailed discussion with the patient and/or guardian regarding: the historical points, exam findings, and any diagnostic results supporting the discharge diagnosis, lab results, radiology results, discharge instructions reviewed with patient and/or family/caregiver and understanding was verbalized. Instructions given to return to the emergency department if symptoms worsen or persist, or if there are any questions or concerns that arise at home.     All imaging and/or lab testing discussed with patient, strict return to ED precautions discussed. Patient recommended to follow up promptly with appropriate outpatient provider. Patient and/or family members verbalizes understanding and agrees with plan. Patient and/or family members were given opportunity to ask questions, all questions were answered at this time. Patient is stable for discharge           5:39 PM    Called patient at confirmed number and discussed with him regarding his CT findings of lung nodules and colitis.  Augmentin sent in.  Patient aware to complete his antibiotics that were prescribed to him outpatient as well as this.  No surgical intervention required    Medications   sodium chloride 0.9 % bolus 1,000 mL (0 mL Intravenous Stopped 3/6/25 1408)   oseltamivir (TAMIFLU) capsule 75 mg (75 mg Oral Given 3/6/25 1258)   iohexol (OMNIPAQUE) 350 MG/ML injection (MULTI-DOSE) 100 mL (100 mL Intravenous Given 3/6/25 1250)       ED Risk Strat Scores                              SBIRT 20yo+      Flowsheet Row Most Recent Value   Initial Alcohol Screen: US AUDIT-C     1. How  "often do you have a drink containing alcohol? 0 Filed at: 03/06/2025 1100   2. How many drinks containing alcohol do you have on a typical day you are drinking?  0 Filed at: 03/06/2025 1100   3a. Male UNDER 65: How often do you have five or more drinks on one occasion? 0 Filed at: 03/06/2025 1100   3b. FEMALE Any Age, or MALE 65+: How often do you have 4 or more drinks on one occassion? 0 Filed at: 03/06/2025 1100   Audit-C Score 0 Filed at: 03/06/2025 1100   BLANK: How many times in the past year have you...    Used an illegal drug or used a prescription medication for non-medical reasons? Never Filed at: 03/06/2025 1100                            History of Present Illness       Chief Complaint   Patient presents with    Abdominal Pain     Pt c/ LLQ pain ans diarrhea.  states \" I think I have a hernia.\"        Past Medical History:   Diagnosis Date    Diabetes mellitus (HCC)     Hyperlipidemia     Pneumonia 2022    Sarcoidosis       Past Surgical History:   Procedure Laterality Date    ABDOMINAL SURGERY  1962    APPENDECTOMY  1962    COLON SURGERY  1962    COLONOSCOPY  2017    CYSTOSCOPY W/ URETERAL STENT PLACEMENT Left     INTESTINAL MALROTATION REPAIR      KNEE SURGERY Right 1997    LYMPH NODE BIOPSY  2018    [place] BX/EXC LYMPH NODE OPEN SUPERFICIAL Right 11/02/2017    Procedure: INGUINAL LYMPH NODE BIOPSY;  Surgeon: RIZWAN Mederos MD;  Location: [facility];  Service: General    SHOULDER SURGERY  2005      Family History   Problem Relation Age of Onset    Heart disease Father     Diabetes Father     Hyperlipidemia Father     Heart failure Father         Congestive    Cancer Mother         Breast Cancer    Arthritis Mother     Diabetes Sister     Cancer Sister     Hyperlipidemia Brother       Social History     Tobacco Use    Smoking status: Never     Passive exposure: Past    Smokeless tobacco: Never   Vaping Use    Vaping status: Never Used   Substance Use Topics    Alcohol use: Never    Drug use: No    "   E-Cigarette/Vaping    E-Cigarette Use Never User       E-Cigarette/Vaping Substances    Nicotine No     THC No     CBD No     Flavoring No     Other No     Unknown No       I have reviewed and agree with the history as documented.     Patient is a 64-year-old male sent in from care now.  Patient has a history of diabetes, hyperlipidemia, chronic kidney disease, sarcoidosis, coming in today with abdominal pain.  Patient states that several days ago he started with progressively worsening cough.  He has no fevers or productive cough.  One of the times he had immediate sharp knifelike pain in the left lower quadrant concerning that he has a hernia.  He states that since that time any times he coughs or tries to move specific ways the pain worsens and intensifies.  He has no nausea, vomiting he has decreased p.o. intake.  He states that he has had changes in his bowel movements with 1 point was looser.  He has no melena or bright red blood per rectum.  He did not take anything for the pain.  He went to urgent center for his cough.  He states that they gave him some cough suppressants    Patient reports that he has had abdominal surgeries in the past primarily as an infant where he had a [place] procedure      History provided by:  Medical records and patient  Abdominal Cramping  Pain location:  LLQ  Pain quality: sharp and stabbing    Pain radiates to:  Does not radiate  Pain severity:  Moderate  Onset quality:  Sudden  Timing:  Constant  Progression:  Unchanged  Chronicity:  New  Context: not alcohol use, not awakening from sleep, not diet changes, not eating, not laxative use, not medication withdrawal, not previous surgeries, not recent illness, not recent sexual activity, not recent travel, not retching, not sick contacts, not suspicious food intake and not trauma    Relieved by:  Nothing  Worsened by:  Coughing, movement and position changes  Ineffective treatments:  None tried  Associated symptoms: anorexia and  diarrhea    Associated symptoms: no belching, no chest pain, no chills, no constipation, no cough, no dysuria, no fatigue, no fever, no flatus, no hematemesis, no hematochezia, no hematuria, no melena, no nausea, no shortness of breath, no sore throat and no vomiting    Risk factors: multiple surgeries    Risk factors: no alcohol abuse, no aspirin use, not elderly, no NSAID use, not obese and not pregnant        Review of Systems   Constitutional: Negative.  Negative for chills, fatigue and fever.   HENT: Negative.  Negative for ear pain and sore throat.    Eyes: Negative.  Negative for pain and visual disturbance.   Respiratory: Negative.  Negative for cough and shortness of breath.    Cardiovascular: Negative.  Negative for chest pain and palpitations.   Gastrointestinal:  Positive for anorexia and diarrhea. Negative for abdominal pain, constipation, flatus, hematemesis, hematochezia, melena, nausea and vomiting.   Genitourinary: Negative.  Negative for dysuria and hematuria.   Musculoskeletal: Negative.  Negative for arthralgias and back pain.   Skin: Negative.  Negative for color change and rash.   Neurological:  Negative for seizures and syncope.   Hematological: Negative.    Psychiatric/Behavioral: Negative.     All other systems reviewed and are negative.          Objective       ED Triage Vitals [03/06/25 0949]   Temperature Pulse Blood Pressure Respirations SpO2 Patient Position - Orthostatic VS   97.8 °F (36.6 °C) 70 125/76 18 100 % Sitting      Temp Source Heart Rate Source BP Location FiO2 (%) Pain Score    Oral Monitor Right arm -- 4      Vitals      Date and Time Temp Pulse SpO2 Resp BP Pain Score FACES Pain Rating User   03/06/25 1300 -- 69 97 % 20 142/73 -- -- BLAIR   03/06/25 1149 -- -- -- -- -- No Pain -- CF   03/06/25 1030 -- -- -- -- -- 9 -- CF   03/06/25 0949 97.8 °F (36.6 °C) 70 100 % 18 125/76 4 -- CO            Physical Exam  Vitals and nursing note reviewed.   Constitutional:       General: He  is awake. He is not in acute distress.     Appearance: Normal appearance. He is well-developed and normal weight.   HENT:      Head: Normocephalic and atraumatic.   Eyes:      Conjunctiva/sclera: Conjunctivae normal.   Cardiovascular:      Rate and Rhythm: Normal rate and regular rhythm.      Pulses:           Radial pulses are 2+ on the right side and 2+ on the left side.        Dorsalis pedis pulses are 2+ on the right side and 2+ on the left side.      Heart sounds: Normal heart sounds, S1 normal and S2 normal. No murmur heard.  Pulmonary:      Effort: Pulmonary effort is normal. No respiratory distress.      Breath sounds: Normal breath sounds.   Abdominal:      Palpations: Abdomen is soft.      Tenderness: There is abdominal tenderness in the periumbilical area, suprapubic area and left lower quadrant.       Musculoskeletal:         General: No swelling.      Cervical back: Neck supple.      Right lower leg: No edema.      Left lower leg: No edema.   Skin:     General: Skin is warm and dry.      Capillary Refill: Capillary refill takes less than 2 seconds.   Neurological:      General: No focal deficit present.      Mental Status: He is alert and oriented to person, place, and time.      GCS: GCS eye subscore is 4. GCS verbal subscore is 5. GCS motor subscore is 6.      Cranial Nerves: Cranial nerves 2-12 are intact.      Sensory: Sensation is intact.      Coordination: Coordination is intact.   Psychiatric:         Mood and Affect: Mood normal.         Behavior: Behavior is cooperative.         Results Reviewed       Procedure Component Value Units Date/Time    Comprehensive metabolic panel [[id number]]  (Abnormal) Collected: 03/06/25 1155    Lab Status: Final result Specimen: Blood from Arm, Left Updated: 03/06/25 1218     Sodium 139 mmol/L      Potassium 4.9 mmol/L      Chloride 107 mmol/L      CO2 26 mmol/L      ANION GAP 6 mmol/L      BUN 16 mg/dL      Creatinine 1.22 mg/dL      Glucose 122 mg/dL       Calcium 7.9 mg/dL      AST 42 U/L      ALT 24 U/L      Alkaline Phosphatase 56 U/L      Total Protein 5.6 g/dL      Albumin 3.6 g/dL      Total Bilirubin 0.56 mg/dL      eGFR 62 ml/min/1.73sq m     Narrative:      National Kidney Disease Foundation guidelines for Chronic Kidney Disease (CKD):     Stage 1 with normal or high GFR (GFR > 90 mL/min/1.73 square meters)    Stage 2 Mild CKD (GFR = 60-89 mL/min/1.73 square meters)    Stage 3A Moderate CKD (GFR = 45-59 mL/min/1.73 square meters)    Stage 3B Moderate CKD (GFR = 30-44 mL/min/1.73 square meters)    Stage 4 Severe CKD (GFR = 15-29 mL/min/1.73 square meters)    Stage 5 End Stage CKD (GFR <15 mL/min/1.73 square meters)  Note: GFR calculation is accurate only with a steady state creatinine    Lipase [[id number]]  (Normal) Collected: 03/06/25 1155    Lab Status: Final result Specimen: Blood from Arm, Left Updated: 03/06/25 1218     Lipase 42 u/L     FLU/RSV/COVID - if FLU/RSV clinically relevant (2hr TAT) [[id number]]  (Abnormal) Collected: 03/06/25 1129    Lab Status: Final result Specimen: Nares from Nose Updated: 03/06/25 1215     SARS-CoV-2 Negative     INFLUENZA A PCR Positive     INFLUENZA B PCR Negative     RSV PCR Negative    Narrative:      This test has been performed using the CoV-2/Flu/RSV plus assay on the [employer] GeneXpert platform. This test has been validated by the  and verified by the performing laboratory.     This test is designed to amplify and detect the following: nucleocapsid (N), envelope (E), and RNA-dependent RNA polymerase (RdRP) genes of the SARS-CoV-2 genome; matrix (M), basic polymerase (PB2), and acidic protein (PA) segments of the influenza A genome; matrix (M) and non-structural protein (NS) segments of the influenza B genome, and the nucleocapsid genes of RSV A and RSV B.     Positive results are indicative of the presence of Flu A, Flu B, RSV, and/or SARS-CoV-2 RNA. Positive results for SARS-CoV-2 or suspected novel  influenza should be reported to state, local, or federal health departments according to local reporting requirements.      All results should be assessed in conjunction with clinical presentation and other laboratory markers for clinical management.     FOR PEDIATRIC PATIENTS - copy/paste COVID Guidelines URL to browser: [email or web address]       Urine Macroscopic, POC [[id number]]  (Abnormal) Collected: 03/06/25 1156    Lab Status: Final result Specimen: Urine Updated: 03/06/25 1158     Color, UA Yellow     Clarity, UA Clear     pH, UA 6.0     Leukocytes, UA Negative     Nitrite, UA Negative     Protein, UA Negative mg/dl      Glucose,  (1/2%) mg/dl      Ketones, UA Negative mg/dl      Urobilinogen, UA 0.2 E.U./dl      Bilirubin, UA Negative     Occult Blood, UA Negative     Specific Gravity, UA 1.020    Narrative:      CLINITEK RESULT    Lactic acid, plasma (w/reflex if result > 2.0) [[id number]]  (Normal) Collected: 03/06/25 1027    Lab Status: Final result Specimen: Blood from Arm, Left Updated: 03/06/25 1104     LACTIC ACID 1.2 mmol/L     Narrative:      Result may be elevated if tourniquet was used during collection.    CBC and differential [[id number]]  (Abnormal) Collected: 03/06/25 1027    Lab Status: Final result Specimen: Blood from Arm, Left Updated: 03/06/25 1044     WBC 2.18 Thousand/uL      RBC 4.69 Million/uL      Hemoglobin 14.7 g/dL      Hematocrit 43.8 %      MCV 93 fL      MCH 31.3 pg      MCHC 33.6 g/dL      RDW 13.2 %      MPV 9.7 fL      Platelets 149 Thousands/uL      nRBC 0 /100 WBCs      Segmented % 55 %      Immature Grans % 1 %      Lymphocytes % 29 %      Monocytes % 14 %      Eosinophils Relative 1 %      Basophils Relative 0 %      Absolute Neutrophils 1.22 Thousands/µL      Absolute Immature Grans 0.01 Thousand/uL      Absolute Lymphocytes 0.63 Thousands/µL      Absolute Monocytes 0.31 Thousand/µL      Eosinophils Absolute  0.01 Thousand/µL      Basophils Absolute 0.00 Thousands/µL             CT chest abdomen pelvis w contrast   Final Interpretation by Katya Pritchard MD (03/06 5285)      Mild thickening of the proximal sigmoid colon and distal descending colon, raise concern for colitis in the appropriate clinical setting      No intra-abdominal fluid collection      No free air      Scattered lung nodules, stable      Partly calcified hilar, mediastinal lymph nodes, stable      Bibasilar densities seen may be related to mild dependent atelectasis or mild evolving interstitial lung disease.      The study was marked in EPIC for immediate notification.         Workstation performed: [id number]             Procedures    ED Medication and Procedure Management   Prior to Admission Medications   Prescriptions Last Dose Informant Patient Reported? Taking?   Continuous Glucose Sensor (FreeStyle Dimitris 3 Plus Sensor) MISC   No No   Sig: USE AS DIRECTED AND CHANGE SENSOR EVERY 15 DAYS   Insulin Disposable Pump (V-Go 20) 20 UNIT/24HR KIT   No No   Sig: Change once every 24 hours. 1 click with each meal.   azithromycin (ZITHROMAX) 250 mg tablet   No No   Sig: Take 2 tablets today then 1 tablet daily x 4 days   benzonatate (TESSALON PERLES) 100 mg capsule   No No   Sig: Take 2 capsules (200 mg total) by mouth 3 (three) times a day as needed for cough for up to 7 days   cholecalciferol (VITAMIN D3) 1,000 units tablet  Self Yes No   Sig: Take 1,000 Units by mouth daily   dapagliflozin (Farxiga) 10 MG tablet   No No   Sig: TAKE 1 TABLET(10 MG) BY MOUTH DAILY   folic acid (FOLVITE) 1 mg tablet   No No   Sig: TAKE 1 TABLET(1 MG) BY MOUTH DAILY   insulin lispro (HumALOG/ADMELOG) 100 units/mL injection   No No   Sig: ADMINISTER 56 UNITS UNDER THE SKIN DAILY   methotrexate 2.5 MG tablet  Self No No   Sig: TAKE 3 TABLETS BY MOUTH 1 TIME A WEEK   simvastatin (ZOCOR) 10 mg tablet   No No   Sig: TAKE 1 TABLET(10 MG) BY MOUTH DAILY       Facility-Administered Medications: None     Discharge Medication List as of 3/6/2025  2:01 PM        START taking these medications    Details   oseltamivir (TAMIFLU) 75 mg capsule Take 1 capsule (75 mg total) by mouth every 12 (twelve) hours for 5 days, Starting Thu 3/6/2025, Until Tue 3/11/2025, Normal           CONTINUE these medications which have NOT CHANGED    Details   azithromycin (ZITHROMAX) 250 mg tablet Take 2 tablets today then 1 tablet daily x 4 days, Normal      benzonatate (TESSALON PERLES) 100 mg capsule Take 2 capsules (200 mg total) by mouth 3 (three) times a day as needed for cough for up to 7 days, Starting Thu 3/6/2025, Until Thu 3/13/2025 at 2359, Normal      cholecalciferol (VITAMIN D3) 1,000 units tablet Take 1,000 Units by mouth daily, Historical Med      Continuous Glucose Sensor (FreeStyle Dimitris 3 Plus Sensor) MISC USE AS DIRECTED AND CHANGE SENSOR EVERY 15 DAYS, Normal      dapagliflozin (Farxiga) 10 MG tablet TAKE 1 TABLET(10 MG) BY MOUTH DAILY, Starting Tue 12/31/2024, Normal      folic acid (FOLVITE) 1 mg tablet TAKE 1 TABLET(1 MG) BY MOUTH DAILY, Normal      Insulin Disposable Pump (V-Go 20) 20 UNIT/24HR KIT Change once every 24 hours. 1 click with each meal., Normal      insulin lispro (HumALOG/ADMELOG) 100 units/mL injection ADMINISTER 56 UNITS UNDER THE SKIN DAILY, Normal      methotrexate 2.5 MG tablet TAKE 3 TABLETS BY MOUTH 1 TIME A WEEK, Normal      simvastatin (ZOCOR) 10 mg tablet TAKE 1 TABLET(10 MG) BY MOUTH DAILY, Starting Mon 2/3/2025, Normal      metFORMIN (GLUCOPHAGE) 500 mg tablet Take 1 tablet (500 mg total) by mouth 2 (two) times a day with meals, Starting Tue 11/26/2024, Normal           No discharge procedures on file.  ED SEPSIS DOCUMENTATION   Time reflects when diagnosis was documented in both MDM as applicable and the Disposition within this note       Time User Action Codes Description Comment    3/6/2025 12:44 PM Mana Fiore Add [J10.1] Influenza A      3/6/2025 12:44 PM Mana Fiore [R10.9] Abdominal pain     3/6/2025 12:44 PM Mana Fiore [R05.9] Cough     3/6/2025  5:39 PM Mana Fiore [K52.9] Colitis                  Mana Fiore,   03/06/25 1741

## 2025-03-06 NOTE — LETTER
March 6, 2025     Patient: Juan Escobedo  YOB: 1960  Date of Visit: 3/6/2025      To Whom it May Concern:    Juan Escobedo is under my professional care. Juan was seen in my office on 3/6/2025. Juan may return to work on 03/07/2025 if symptoms improve and no fever present.        Sincerely,          GUSTAVO Riley        CC: No Recipients

## 2025-03-06 NOTE — DISCHARGE INSTRUCTIONS
As discussed, your CAT scan did not result upon your discharge.  I will call you with the results    You received the first dose of Tamiflu in the ER.  Please continue to take this and follow-up with your PCP

## 2025-03-06 NOTE — ASSESSMENT & PLAN NOTE
Bg control is fair and has not worsened markedly with  influenza. I recommend holding the Farxiga while on the Tamiflu and then reuming Will continue Vgo and metformin. Suggested using 3 clicks (6units) on V go though he was hesitant. Discussed using 4units for small meals, 6units for mediums size, and 8units for large. He will consider this.     Orders:  •  Hemoglobin A1C; Future  •  Albumin / creatinine urine ratio; Future

## 2025-03-06 NOTE — PROGRESS NOTES
"NAME: Juan Escobedo is a 64 y.o. male  : 1960    MRN: [id number]    /64   Pulse 70   Temp (!) 97.4 °F (36.3 °C)   Resp 18   SpO2 100%     9:27 AM    Assessment and Plan   Acute bacterial bronchitis [J20.8, B96.89]  1. Acute bacterial bronchitis  benzonatate (TESSALON PERLES) 100 mg capsule    azithromycin (ZITHROMAX) 250 mg tablet      2. Left lower quadrant abdominal pain  Transfer to other facility          Brandon was seen today for cough.    Diagnoses and all orders for this visit:    Acute bacterial bronchitis  -     benzonatate (TESSALON PERLES) 100 mg capsule; Take 2 capsules (200 mg total) by mouth 3 (three) times a day as needed for cough for up to 7 days  -     azithromycin (ZITHROMAX) 250 mg tablet; Take 2 tablets today then 1 tablet daily x 4 days    Left lower quadrant abdominal pain  -     Transfer to other facility        Patient Instructions     Patient Instructions   Take zyrtec or allegra daily  Use flonase 1-2 sprays in each nare daily   Use nasal saline to the nose,   Use humidifer in room  Symptoms worsen go to ER  Rest    Take meds as directed.     Aware not to take DM products for it will raise your BP and can cause HA and dizziness.     Pt going to the ER for further evaluation of the LLQ today.      Proceed to the nearest ER if symptoms worsen, Follow up with your PCP  Continue to social distance, wash your hands, and wear your masks. Please continue to follow the CDC.gov guidelines daily for they are subject to change on COVID-19    Chief Complaint     Chief Complaint   Patient presents with    Cough     Patient reports dry cough, chest congestion, \"brain fog\", fatigue started . Also reports LLQ sharp pain when coughing         History of Present Illness     64 yr old M here today with one week of symptoms of a cough, feeling fatigue and left sided quadrant pain. Pt has some SOB and wheezing more so at night. He also has c/o of worsening left quadrant pain.  Pt feels " no edema,  no murmurs,  regular rate and rhythm , no edema. "that he may have a hernia, recalls coughing hard in the past few days and the pain has worsened, area is more firm, is having Bms, no blood noted, last one one day ago, more like diarrhea per patient, no vomiting. Pt had abdominal surgery as a child and states \"my intestines are not like other peoples.\" He is able to urinate without an issue.             Review of Systems   Review of Systems   Constitutional:  Positive for activity change, appetite change (decreased) and fatigue. Negative for fever.   HENT:  Positive for postnasal drip. Negative for congestion, ear discharge, ear pain and sinus pressure.    Eyes: Negative.    Respiratory:  Positive for cough, shortness of breath and wheezing. Negative for chest tightness.    Cardiovascular:  Negative for chest pain and palpitations.   Gastrointestinal:  Positive for abdominal pain (LLQ), diarrhea and nausea. Negative for blood in stool, constipation and vomiting.   Endocrine: Negative.    Genitourinary:  Negative for difficulty urinating, dysuria, hematuria, penile swelling, scrotal swelling, testicular pain and urgency.   Musculoskeletal: Negative.  Negative for myalgias.        Denies body aches   Skin: Negative.    Neurological:  Positive for dizziness (at times not present right now), weakness (overall body) and light-headedness.   Psychiatric/Behavioral:  Negative for confusion and decreased concentration.          Current Medications       Current Outpatient Medications:     azithromycin (ZITHROMAX) 250 mg tablet, Take 2 tablets today then 1 tablet daily x 4 days, Disp: 6 tablet, Rfl: 0    benzonatate (TESSALON PERLES) 100 mg capsule, Take 2 capsules (200 mg total) by mouth 3 (three) times a day as needed for cough for up to 7 days, Disp: 20 capsule, Rfl: 0    cholecalciferol (VITAMIN D3) 1,000 units tablet, Take 1,000 Units by mouth daily, Disp: , Rfl:     Continuous Glucose Sensor (FreeStyle Dimitris 3 Plus Sensor) MISC, USE AS DIRECTED AND CHANGE SENSOR EVERY " 15 DAYS, Disp: 2 each, Rfl: 5    dapagliflozin (Farxiga) 10 MG tablet, TAKE 1 TABLET(10 MG) BY MOUTH DAILY, Disp: 30 tablet, Rfl: 5    folic acid (FOLVITE) 1 mg tablet, TAKE 1 TABLET(1 MG) BY MOUTH DAILY, Disp: 90 tablet, Rfl: 1    Insulin Disposable Pump (V-Go 20) 20 UNIT/24HR KIT, Change once every 24 hours. 1 click with each meal., Disp: 1 kit, Rfl: 5    insulin lispro (HumALOG/ADMELOG) 100 units/mL injection, ADMINISTER 56 UNITS UNDER THE SKIN DAILY, Disp: 20 mL, Rfl: 5    metFORMIN (GLUCOPHAGE) 500 mg tablet, Take 1 tablet (500 mg total) by mouth 2 (two) times a day with meals, Disp: 180 tablet, Rfl: 1    methotrexate 2.5 MG tablet, TAKE 3 TABLETS BY MOUTH 1 TIME A WEEK, Disp: 36 tablet, Rfl: 5    simvastatin (ZOCOR) 10 mg tablet, TAKE 1 TABLET(10 MG) BY MOUTH DAILY, Disp: 100 tablet, Rfl: 1    Current Allergies     Allergies as of 03/06/2025 - Reviewed 03/06/2025   Allergen Reaction Noted    Acetaminophen Swelling 07/17/2018    Dust mite extract  07/17/2018    Molds & smuts  07/17/2018    Other  07/17/2018    Percocet [oxycodone-acetaminophen]  11/01/2017              Past Medical History:   Diagnosis Date    Diabetes mellitus (HCC)     Hyperlipidemia     Pneumonia 2022    Sarcoidosis        Past Surgical History:   Procedure Laterality Date    ABDOMINAL SURGERY  1962    APPENDECTOMY  1962    COLON SURGERY  1962    COLONOSCOPY  2017    CYSTOSCOPY W/ URETERAL STENT PLACEMENT Left     INTESTINAL MALROTATION REPAIR      KNEE SURGERY Right 1997    LYMPH NODE BIOPSY  2018    IA BX/EXC LYMPH NODE OPEN SUPERFICIAL Right 11/02/2017    Procedure: INGUINAL LYMPH NODE BIOPSY;  Surgeon: RIZWAN Mederos MD;  Location: [facility];  Service: General    SHOULDER SURGERY  2005       Family History   Problem Relation Age of Onset    Heart disease Father     Diabetes Father     Hyperlipidemia Father     Heart failure Father         Congestive    Cancer Mother         Breast Cancer    Arthritis Mother     Diabetes Sister      Cancer Sister     Hyperlipidemia Brother          Medications have been verified.    The following portions of the patient's history were reviewed and updated as appropriate: allergies, current medications, past family history, past medical history, past social history, past surgical history and problem list.    Objective   /64   Pulse 70   Temp (!) 97.4 °F (36.3 °C)   Resp 18   SpO2 100%      Physical Exam     Physical Exam  Constitutional:       General: He is not in acute distress.     Appearance: Normal appearance.   HENT:      Head: Normocephalic.      Right Ear: Tympanic membrane, ear canal and external ear normal. There is no impacted cerumen.      Left Ear: Tympanic membrane, ear canal and external ear normal. There is no impacted cerumen.      Nose: No congestion or rhinorrhea.      Mouth/Throat:      Mouth: Mucous membranes are moist.      Pharynx: No oropharyngeal exudate or posterior oropharyngeal erythema.   Cardiovascular:      Rate and Rhythm: Normal rate and regular rhythm.      Pulses: Normal pulses.      Heart sounds: Normal heart sounds. No murmur heard.     No friction rub.   Pulmonary:      Effort: Pulmonary effort is normal. No respiratory distress.      Breath sounds: Normal breath sounds. No stridor. No wheezing or rhonchi.   Abdominal:      Palpations: Abdomen is soft. There is no fluid wave or hepatomegaly.      Tenderness: There is abdominal tenderness (LLQ noted) in the left lower quadrant. There is rebound. There is no right CVA tenderness, left CVA tenderness or guarding.      Hernia: A hernia (possible) is present.   Musculoskeletal:         General: Normal range of motion.      Comments: Pain to the LLQ with changing positions     Skin:     General: Skin is warm.      Capillary Refill: Capillary refill takes less than 2 seconds.   Neurological:      General: No focal deficit present.      Mental Status: He is alert.      Gait: Gait normal.   Psychiatric:         Mood and  "Affect: Mood normal.               Note: Portions of this record may have been created with voice recognition software. Occasional wrong word or \"sound a like\" substitutions may have occurred due to the inherent limitations of voice recognition software. Please read the chart carefully and recognize, using context, where substitutions have occurred.*    GUSTAVO Riley  "

## 2025-03-06 NOTE — PATIENT INSTRUCTIONS
Take zyrtec or allegra daily  Use flonase 1-2 sprays in each nare daily   Use nasal saline to the nose,   Use humidifer in room  Symptoms worsen go to ER  Rest    Take meds as directed.     Aware not to take DM products for it will raise your BP and can cause HA and dizziness.     Pt going to the ER for further evaluation of the LLQ today.

## 2025-04-09 DIAGNOSIS — Z79.4 TYPE 2 DIABETES MELLITUS WITH HYPERGLYCEMIA, WITH LONG-TERM CURRENT USE OF INSULIN (HCC): ICD-10-CM

## 2025-04-09 DIAGNOSIS — E11.65 TYPE 2 DIABETES MELLITUS WITH HYPERGLYCEMIA, WITH LONG-TERM CURRENT USE OF INSULIN (HCC): ICD-10-CM

## 2025-04-09 RX ORDER — INSULIN LISPRO 100 [IU]/ML
INJECTION, SOLUTION INTRAVENOUS; SUBCUTANEOUS
Qty: 20 ML | Refills: 5 | Status: SHIPPED | OUTPATIENT
Start: 2025-04-09

## 2025-05-10 DIAGNOSIS — Z79.4 TYPE 2 DIABETES MELLITUS WITH HYPERGLYCEMIA, WITH LONG-TERM CURRENT USE OF INSULIN (HCC): ICD-10-CM

## 2025-05-10 DIAGNOSIS — E11.65 TYPE 2 DIABETES MELLITUS WITH HYPERGLYCEMIA, WITH LONG-TERM CURRENT USE OF INSULIN (HCC): ICD-10-CM

## 2025-05-11 RX ORDER — DAPAGLIFLOZIN 10 MG/1
10 TABLET, FILM COATED ORAL DAILY
Qty: 30 TABLET | Refills: 5 | Status: SHIPPED | OUTPATIENT
Start: 2025-05-11

## 2025-05-12 DIAGNOSIS — Z79.4 TYPE 2 DIABETES MELLITUS WITH HYPERGLYCEMIA, WITH LONG-TERM CURRENT USE OF INSULIN (HCC): ICD-10-CM

## 2025-05-12 DIAGNOSIS — E11.65 TYPE 2 DIABETES MELLITUS WITH HYPERGLYCEMIA, WITH LONG-TERM CURRENT USE OF INSULIN (HCC): ICD-10-CM

## 2025-05-12 RX ORDER — HYDROCHLOROTHIAZIDE 12.5 MG/1
CAPSULE ORAL
Qty: 2 EACH | Refills: 0 | Status: SHIPPED | OUTPATIENT
Start: 2025-05-12

## 2025-05-12 NOTE — TELEPHONE ENCOUNTER
Pt called reporting that he is on vacation and forgot his freestyle amadeo 3 sensors. Pt is asking for a prescription to be sent to [facility] pharmacy in [place]. Refill encounter pending , pharmacy is updated. Pt also reported that his [facility] pharmacy at home didn't have V-Go pump and so the pharmacist dispensed the humalog vial and provided syringes instead for pt to administered the insulin. Pt wanted to confirm directions. Noted is says 56 units daily. Please confirm . Pt reported that with the V-Go, it was 56 units max in a daily use not in one sitting. Pt now has the humalog vial and 30 unit syringes. Please advise

## 2025-05-24 ENCOUNTER — APPOINTMENT (OUTPATIENT)
Dept: LAB | Facility: MEDICAL CENTER | Age: 65
End: 2025-05-24
Payer: COMMERCIAL

## 2025-05-24 DIAGNOSIS — Z79.631 METHOTREXATE, LONG TERM, CURRENT USE: ICD-10-CM

## 2025-05-24 DIAGNOSIS — E11.65 TYPE 2 DIABETES MELLITUS WITH HYPERGLYCEMIA, WITH LONG-TERM CURRENT USE OF INSULIN (HCC): ICD-10-CM

## 2025-05-24 DIAGNOSIS — D86.9 SARCOIDOSIS: ICD-10-CM

## 2025-05-24 DIAGNOSIS — D75.89 MACROCYTOSIS: ICD-10-CM

## 2025-05-24 DIAGNOSIS — Z79.4 TYPE 2 DIABETES MELLITUS WITH HYPERGLYCEMIA, WITH LONG-TERM CURRENT USE OF INSULIN (HCC): ICD-10-CM

## 2025-05-24 DIAGNOSIS — E83.52 HYPERCALCEMIA: ICD-10-CM

## 2025-05-24 LAB
ANION GAP SERPL CALCULATED.3IONS-SCNC: 8 MMOL/L (ref 4–13)
BUN SERPL-MCNC: 19 MG/DL (ref 5–25)
CALCIUM SERPL-MCNC: 9.8 MG/DL (ref 8.4–10.2)
CHLORIDE SERPL-SCNC: 106 MMOL/L (ref 96–108)
CO2 SERPL-SCNC: 29 MMOL/L (ref 21–32)
CREAT SERPL-MCNC: 1.37 MG/DL (ref 0.6–1.3)
CREAT UR-MCNC: 73.7 MG/DL
EST. AVERAGE GLUCOSE BLD GHB EST-MCNC: 163 MG/DL
FOLATE SERPL-MCNC: 16.7 NG/ML
GFR SERPL CREATININE-BSD FRML MDRD: 54 ML/MIN/1.73SQ M
GLUCOSE P FAST SERPL-MCNC: 185 MG/DL (ref 65–99)
HBA1C MFR BLD: 7.3 %
MICROALBUMIN UR-MCNC: 7.8 MG/L
MICROALBUMIN/CREAT 24H UR: 11 MG/G CREATININE (ref 0–30)
POTASSIUM SERPL-SCNC: 4.5 MMOL/L (ref 3.5–5.3)
SODIUM SERPL-SCNC: 143 MMOL/L (ref 135–147)
TSH SERPL DL<=0.05 MIU/L-ACNC: 1.4 UIU/ML (ref 0.45–4.5)

## 2025-05-24 PROCEDURE — 84443 ASSAY THYROID STIM HORMONE: CPT

## 2025-05-24 PROCEDURE — 82746 ASSAY OF FOLIC ACID SERUM: CPT

## 2025-05-24 PROCEDURE — 82570 ASSAY OF URINE CREATININE: CPT

## 2025-05-24 PROCEDURE — 83036 HEMOGLOBIN GLYCOSYLATED A1C: CPT

## 2025-05-24 PROCEDURE — 80048 BASIC METABOLIC PNL TOTAL CA: CPT

## 2025-05-24 PROCEDURE — 83918 ORGANIC ACIDS TOTAL QUANT: CPT

## 2025-05-24 PROCEDURE — 36415 COLL VENOUS BLD VENIPUNCTURE: CPT

## 2025-05-24 PROCEDURE — 82043 UR ALBUMIN QUANTITATIVE: CPT

## 2025-05-25 ENCOUNTER — RESULTS FOLLOW-UP (OUTPATIENT)
Age: 65
End: 2025-05-25

## 2025-05-28 ENCOUNTER — OFFICE VISIT (OUTPATIENT)
Dept: PULMONOLOGY | Facility: CLINIC | Age: 65
End: 2025-05-28
Payer: COMMERCIAL

## 2025-05-28 ENCOUNTER — CONSULT (OUTPATIENT)
Dept: CARDIOLOGY CLINIC | Facility: CLINIC | Age: 65
End: 2025-05-28
Attending: INTERNAL MEDICINE
Payer: COMMERCIAL

## 2025-05-28 VITALS
WEIGHT: 178 LBS | OXYGEN SATURATION: 97 % | SYSTOLIC BLOOD PRESSURE: 110 MMHG | BODY MASS INDEX: 23.48 KG/M2 | TEMPERATURE: 96.6 F | DIASTOLIC BLOOD PRESSURE: 70 MMHG | HEART RATE: 67 BPM

## 2025-05-28 VITALS
BODY MASS INDEX: 24.06 KG/M2 | HEIGHT: 72 IN | DIASTOLIC BLOOD PRESSURE: 64 MMHG | SYSTOLIC BLOOD PRESSURE: 98 MMHG | HEART RATE: 69 BPM | WEIGHT: 177.6 LBS | OXYGEN SATURATION: 98 %

## 2025-05-28 DIAGNOSIS — R55 SYNCOPE, UNSPECIFIED SYNCOPE TYPE: ICD-10-CM

## 2025-05-28 DIAGNOSIS — N18.31 STAGE 3A CHRONIC KIDNEY DISEASE (HCC): ICD-10-CM

## 2025-05-28 DIAGNOSIS — N18.30 STAGE 3 CHRONIC KIDNEY DISEASE, UNSPECIFIED WHETHER STAGE 3A OR 3B CKD (HCC): ICD-10-CM

## 2025-05-28 DIAGNOSIS — D86.9 SARCOIDOSIS: Primary | ICD-10-CM

## 2025-05-28 DIAGNOSIS — N20.0 NEPHROLITHIASIS: ICD-10-CM

## 2025-05-28 DIAGNOSIS — R07.89 OTHER CHEST PAIN: Primary | ICD-10-CM

## 2025-05-28 DIAGNOSIS — E11.65 TYPE 2 DIABETES MELLITUS WITH HYPERGLYCEMIA, WITH LONG-TERM CURRENT USE OF INSULIN (HCC): ICD-10-CM

## 2025-05-28 DIAGNOSIS — R07.89 OTHER CHEST PAIN: ICD-10-CM

## 2025-05-28 DIAGNOSIS — E83.52 HYPERCALCEMIA: ICD-10-CM

## 2025-05-28 DIAGNOSIS — D86.9 SARCOIDOSIS: ICD-10-CM

## 2025-05-28 DIAGNOSIS — Z79.4 TYPE 2 DIABETES MELLITUS WITH HYPERGLYCEMIA, WITH LONG-TERM CURRENT USE OF INSULIN (HCC): ICD-10-CM

## 2025-05-28 DIAGNOSIS — Z79.631 METHOTREXATE, LONG TERM, CURRENT USE: ICD-10-CM

## 2025-05-28 PROCEDURE — 99204 OFFICE O/P NEW MOD 45 MIN: CPT | Performed by: INTERNAL MEDICINE

## 2025-05-28 PROCEDURE — 99214 OFFICE O/P EST MOD 30 MIN: CPT | Performed by: INTERNAL MEDICINE

## 2025-05-28 NOTE — PROGRESS NOTES
Cardiology - New Patient Clinic Visit Note  Juan Escobedo 64 y.o. male   MRN: [id number]    Assessment and Plan      Assessment & Plan  Chest pain  He states that he has been having chest pain episodes over the course of last few months and while they have not progressively become worse, he continues to have exercise or activity limiting chest pain because of that she has to sit down to rest.  He states that the chest pain is not positional in tends to come and goes throughout the day.  Denies any diaphoresis.  As per the patient, the chest pain is more pressure-like, midsternal with radiation to the left arm/shoulder. We also spoke about stress testing in regards to his chest pain and we will proceed with the pharmacologic nuclear scan for further workup.  Patient was provided with phone number for scheduling the studies and that I will call him to discuss the results once available.  Orders:    POCT ECG    Lipid Panel with Direct LDL reflex; Future    NM myocardial perfusion spect (rx stress and/or rest); Future    Echo complete w/ contrast if indicated; Future    Syncope, unspecified syncope type  I spoke with him at length about his history of sarcoidosis and mitral valve prolapse and had baseline, he should get an echocardiogram to reassess the mitral valve prolapse and check for cardiac sarcoidosis.    Orders:    Echo complete w/ contrast if indicated; Future  -I will call the patient to discuss lab/imaging results once available  - Patient provided with phone number to schedule the studies  Stage 3a chronic kidney disease (HCC)  CKD stage IIIa likely in the setting of diabetic nephropathy.  Baseline serum creatinine appears to be between 1.2-1.6 with GFR's in the ranges of 50-70.  Type 2 diabetes mellitus with hyperglycemia, with long-term current use of insulin ([facility])  - Last A1c 7.3, management as per patient's PCP  - Currently on simvastatin 10 mg daily    Subjective     History of Present  Illness:  64-year-old male with history of mitral valve prolapse that was previously diagnosed in 1980s, history of sarcoidosis currently on methotrexate, diabetes mellitus type 2 with last A1c 7.3, hyperlipidemia.  He follows with pulmonology for sarcoidosis involving the lungs and had reported at today's clinic visit that he was having chest pain and was subsequently referred to cardiology clinic.  He states that he has been having chest pain episodes over the course of last few months and while they have not progressively become worse, he continues to have exercise or activity limiting chest pain because of that she has to sit down to rest.  He states that the chest pain is not positional in tends to come and goes throughout the day.  Denies any diaphoresis.  As per the patient, the chest pain is more pressure-like, midsternal with radiation to the left arm/shoulder.  He also reports of a single syncopal episode that happened a few months ago of unclear etiology.  He denies any presyncopal feeling or as if his heart was racing/skipped a beat.  Due to recent syncopal episode and his medical history, I spoke with him at length about his history of sarcoidosis and mitral valve prolapse and had baseline, he should get an echocardiogram to reassess the mitral valve prolapse and check for cardiac sarcoidosis.  We also spoke about stress testing in regards to his chest pain and we will proceed with the pharmacologic nuclear scan for further workup.  Patient was provided with phone number for scheduling the studies and that I will call him to discuss the results once available.  He also has a follow-up visit arranged in 6 weeks.     Relevant cardiac/lab studies:  Stress echo 2017 - reported normal (done at [facility])  Holter 2017 - reported normal (done at [facility])  Lipid panel 03/2024: , trig 45, hdl 52, ldl 71  Echo 2021: EF 55%, bileaflet prolapse, mild to mod MR  Recent CT chest 03/2025 with  coronary artery calcifications    Review of Systems   Constitutional:  Negative for chills and fever.   Respiratory:  Positive for chest tightness. Negative for cough.    Cardiovascular:  Positive for chest pain. Negative for palpitations.   Neurological:  Positive for syncope. Negative for light-headedness and headaches.       Objective     Vitals:    05/28/25 1338   BP: 98/64   BP Location: Right arm   Patient Position: Sitting   Cuff Size: Large   Pulse: 69   SpO2: 98%   Weight: 80.6 kg (177 lb 9.6 oz)   Height: 6' (1.829 m)       Physical Exam  Vitals reviewed.     Cardiovascular:      Rate and Rhythm: Normal rate and regular rhythm.      Pulses: Normal pulses.      Heart sounds: Normal heart sounds.      Comments: No edema in lower extremities.   Pulmonary:      Effort: Pulmonary effort is normal.      Breath sounds: Normal breath sounds.     Skin:     General: Skin is warm and dry.      Capillary Refill: Capillary refill takes less than 2 seconds.           Care Time Delivered:   I have spent 46 minutes with patient today in which greater than 50% of this time was spent in counseling/coordination of care.      Ira Jha MD  Cardiovascular Disease Fellow, FY-1  [facility], [place]    ==  PLEASE NOTE:  This encounter was completed utilizing the Dragon [employer] Voice Recognition Software. Grammatical errors, random word insertions, pronoun errors and incomplete sentences are occasional consequences of the system due to software limitations, ambient noise and hardware issues.These may be missed by proof reading prior to affixing electronic signature. Any questions or concerns about the content, text or information contained within the body of this dictation should be directly addressed to the physician for clarification. Please do not hesitate to call me directly if you have any any questions or concerns.

## 2025-05-28 NOTE — ASSESSMENT & PLAN NOTE
CKD stage IIIa likely in the setting of diabetic nephropathy.  Baseline serum creatinine appears to be between 1.2-1.6 with GFR's in the ranges of 50-70.

## 2025-05-28 NOTE — PROGRESS NOTES
Progress note - Pulmonary Medicine   Juan Escobedo 64 y.o. male MRN: [id number]       Impression & Plan:     Sarcoidosis  Appears in remission, reviewed CT scan done in the ER from 2 months ago and it.  Stable.  Will continue methotrexate 7.5 mg weekly with folic acid.    Other chest pain  Based on the description of the chest pain it appeared to be as stable angina but new onset, currently denies any chest pain.  I encouraged him to go to the emergency room if he has significant chest pain but at the same time I will refer him to cardiology.  We called the cardiology office and fortunately they have an opening today to see the patient.  Probably needs workup including nuclear stress test and echocardiogram and EKG definitely.    Methotrexate, long term, current use  No evidence of methotrexate toxicity on recent CT scan.  Otherwise he is on folic acid daily.  Last CBC showed mild leukopenia in March but at that time he had the influenza.  No other abnormalities.  Will continue to check CBC in the future    Nephrolithiasis  Probably due to hypercalcemia from sarcoidosis.  No recent issues with kidney stones.  Continue hydration    CKD (chronic kidney disease)  Lab Results   Component Value Date    EGFR 54 05/24/2025    EGFR 62 03/06/2025    EGFR 50 02/22/2025    CREATININE 1.37 (H) 05/24/2025    CREATININE 1.22 03/06/2025    CREATININE 1.46 (H) 02/22/2025   His kidney function appears stable, it always fluctuates around the same baseline.  Encourage patient to stay hydrated and we will continue to monitor BMP.    Hypercalcemia  Currently stable, continue methotrexate and hydration and avoid supplements      Return in about 6 months (around 11/28/2025).    Diagnoses and all orders for this visit:    Sarcoidosis  -     CBC and differential; Future  -     Basic metabolic panel; Future  -     C-reactive protein; Future    Other chest pain  -     Ambulatory Referral to Cardiology; Future    Stage 3a chronic kidney  disease (HCC)  -     Basic metabolic panel; Future    Methotrexate, long term, current use  -     CBC and differential; Future    Nephrolithiasis    Hypercalcemia      ______________________________________________________________________    HPI:    Juan Escobedo presents today for follow-up of sarcoidosis    Brandon has sarcoidosis involving mainly lymph nodes, bone and somehow lungs associated with hypercalcemia and nephrolithiasis and mild renal failure in the past.  He was diagnosed based on biopsy from inguinal lymph node.  He was on multiple therapeutic regimens initially with steroids and then multiple other immunosuppressants without success or with some side effects, then finally he has been controlled with methotrexate 7.5 mg weekly with folic acid.  He presents today for follow-up.  From pulmonary standpoint he denies any chronic cough or sputum production, denies shortness of breath or dyspnea on exertion, denies wheezing, did not have any benefit from inhalers in the past except when he had some chronic cough but that resolved long time ago.    Recently patient has been complaining of central chest heaviness, sometimes radiating to his right or left axilla, rarely to the jaw, only on exertion with moderate exertion, can last few minutes each time, not associate with any nausea or vomiting or sweating, not associated with shortness of breath.    In March she had some illness and went to the emergency room and was found to have influenza.  He had some workup that was negative otherwise.    Patient has diabetes, nephrolithiasis with hypercalcemia and CKD.  No recent passing of kidney stones.        Current Medications:    Current Outpatient Medications:     cholecalciferol (VITAMIN D3) 1,000 units tablet, Take 1,000 Units by mouth in the morning., Disp: , Rfl:     Continuous Glucose Sensor (FreeStyle Dimitris 3 Plus Sensor) MISC, USE AS DIRECTED AND CHANGE SENSOR EVERY 15 DAYS, Disp: 2 each, Rfl: 0    Farxiga  10 MG tablet, TAKE 1 TABLET BY MOUTH EVERY DAY, Disp: 30 tablet, Rfl: 5    folic acid (FOLVITE) 1 mg tablet, TAKE 1 TABLET(1 MG) BY MOUTH DAILY, Disp: 90 tablet, Rfl: 1    Insulin Disposable Pump (V-Go 20) 20 UNIT/24HR KIT, Change once every 24 hours. 1 click with each meal., Disp: 1 kit, Rfl: 5    insulin lispro (HumALOG/ADMELOG) 100 units/mL injection, ADMINISTER 56 UNITS UNDER THE SKIN DAILY, Disp: 20 mL, Rfl: 5    metFORMIN (GLUCOPHAGE) 500 mg tablet, TAKE 1 TABLET(500 MG) BY MOUTH TWICE DAILY WITH MEALS, Disp: 180 tablet, Rfl: 1    methotrexate 2.5 MG tablet, TAKE 3 TABLETS BY MOUTH 1 TIME A WEEK, Disp: 36 tablet, Rfl: 5    simvastatin (ZOCOR) 10 mg tablet, TAKE 1 TABLET(10 MG) BY MOUTH DAILY, Disp: 100 tablet, Rfl: 1    Review of Systems:  Review of Systems   Constitutional: Negative.  Negative for appetite change and fever.   HENT: Negative.  Negative for ear pain, postnasal drip, rhinorrhea, sneezing, sore throat and trouble swallowing.    Eyes: Negative.    Respiratory: Negative.     Cardiovascular:  Positive for chest pain.   Gastrointestinal: Negative.    Endocrine: Negative.    Genitourinary: Negative.    Musculoskeletal:  Positive for myalgias.   Skin: Negative.    Allergic/Immunologic: Negative.    Neurological: Negative.  Negative for headaches.   Hematological: Negative.    Psychiatric/Behavioral: Negative.       Aside from what is mentioned in the HPI, the review of systems is otherwise negative    Past medical history, surgical history, and family history were reviewed and updated as appropriate    Social history updates:  Tobacco Use History[1]    PhysicalExamination:  Vitals:   /70   Pulse 67   Temp (!) 96.6 °F (35.9 °C)   Wt 80.7 kg (178 lb)   SpO2 97%   BMI 23.48 kg/m²     Gen:  Comfortable on room air.  No conversational dyspnea  HEENT:  Conjugate gaze.  sclerae anicteric.  Oropharynx moist  Neck: Trachea is midline. No JVD. No adenopathy  Chest: Equal breath sounds and clear to  auscultation bilaterally, no wheezing or crackles  Cardiac: S1-S2 regular. no murmur  Abdomen:  benign  Extremities: No edema  Neuro:  Normal speech and mentation    Diagnostic Data:  Labs:  I personally reviewed the most recent laboratory data pertinent to today's visit    Lab Results   Component Value Date    WBC 2.18 (L) 03/06/2025    HGB 14.7 03/06/2025    HCT 43.8 03/06/2025    MCV 93 03/06/2025     03/06/2025     Lab Results   Component Value Date    SODIUM 143 05/24/2025    K 4.5 05/24/2025    CO2 29 05/24/2025     05/24/2025    BUN 19 05/24/2025    CREATININE 1.37 (H) 05/24/2025    CALCIUM 9.8 05/24/2025       PFT results:  The most recent pulmonary function tests were reviewed.  Normal lung volumes.  Normal airflow on vital capacity.  Normal diffusion capacity.  Normal airway resistance as indicated by the specific airway conductance.  No bronchial hyperresponsiveness on the methacholine challenge test     Imaging:  I personally reviewed the images on the PAC system pertinent to today's visit  Chest CT scan reviewed on PACs:  Grossly clear and stable lung parenchyma, stable scattered nodularity with some calcifications secondary to sarcoidosis measuring 2-4 mm.  Mediastinal lymphadenopathy stable with some calcifications     Chest x-ray reviewed on PACs: Mild bilateral ground-glass changes.     Other studies:      Echocardiogram:  LVEF 55%, normal RV     Pathology: Noncaseating granulomatous inflammation consistent with sarcoidosis from right inguinal lymph node      Austin Chaves MD  Answers submitted by the patient for this visit:  Pulmonology Questionnaire (Submitted on 5/23/2025)  Chief Complaint: Primary symptoms  Do you have chest tightness?: Yes  Chronicity: new  When did you first notice your symptoms?: 1 to 4 weeks ago  How often do your symptoms occur?: intermittently  Since you first noticed this problem, how has it changed?: unchanged  Do you have shortness of breath that  occurs with effort or exertion?: Yes  Do you have ear congestion?: No  Do you have heartburn?: No  Do you have fatigue?: Yes  Do you have nasal congestion?: No  Do you have shortness of breath when lying flat?: No  Do you have shortness of breath when you wake up?: No  Do you have sweats?: No  Have you experienced weight loss?: No  Which of the following makes your symptoms worse?: climbing stairs, exercise, strenuous activity  Which of the following makes your symptoms better?: rest  Risk factors for lung disease: travel         [1]   Social History  Tobacco Use   Smoking Status Never    Passive exposure: Past   Smokeless Tobacco Never

## 2025-05-28 NOTE — ASSESSMENT & PLAN NOTE
Probably due to hypercalcemia from sarcoidosis.  No recent issues with kidney stones.  Continue hydration

## 2025-05-28 NOTE — ASSESSMENT & PLAN NOTE
Based on the description of the chest pain it appeared to be as stable angina but new onset, currently denies any chest pain.  I encouraged him to go to the emergency room if he has significant chest pain but at the same time I will refer him to cardiology.  We called the cardiology office and fortunately they have an opening today to see the patient.  Probably needs workup including nuclear stress test and echocardiogram and EKG definitely.

## 2025-05-28 NOTE — ASSESSMENT & PLAN NOTE
No evidence of methotrexate toxicity on recent CT scan.  Otherwise he is on folic acid daily.  Last CBC showed mild leukopenia in March but at that time he had the influenza.  No other abnormalities.  Will continue to check CBC in the future

## 2025-05-28 NOTE — ASSESSMENT & PLAN NOTE
He states that he has been having chest pain episodes over the course of last few months and while they have not progressively become worse, he continues to have exercise or activity limiting chest pain because of that she has to sit down to rest.  He states that the chest pain is not positional in tends to come and goes throughout the day.  Denies any diaphoresis.  As per the patient, the chest pain is more pressure-like, midsternal with radiation to the left arm/shoulder. We also spoke about stress testing in regards to his chest pain and we will proceed with the pharmacologic nuclear scan for further workup.  Patient was provided with phone number for scheduling the studies and that I will call him to discuss the results once available.  Orders:    POCT ECG    Lipid Panel with Direct LDL reflex; Future    NM myocardial perfusion spect (rx stress and/or rest); Future    Echo complete w/ contrast if indicated; Future

## 2025-05-28 NOTE — PATIENT INSTRUCTIONS
Thank you for visiting our clinic! It was nice seeing you!    Today, we discussed-  - Please follow up on your lab work, we will give you a call with results once lab results are available  - Please follow up with us in 6 weeks to review your chest pain, discuss testing results

## 2025-05-28 NOTE — ASSESSMENT & PLAN NOTE
Appears in remission, reviewed CT scan done in the ER from 2 months ago and it.  Stable.  Will continue methotrexate 7.5 mg weekly with folic acid.

## 2025-05-28 NOTE — ASSESSMENT & PLAN NOTE
Lab Results   Component Value Date    EGFR 54 05/24/2025    EGFR 62 03/06/2025    EGFR 50 02/22/2025    CREATININE 1.37 (H) 05/24/2025    CREATININE 1.22 03/06/2025    CREATININE 1.46 (H) 02/22/2025   His kidney function appears stable, it always fluctuates around the same baseline.  Encourage patient to stay hydrated and we will continue to monitor BMP.

## 2025-05-29 LAB — METHYLMALONATE SERPL-SCNC: 407 NMOL/L (ref 0–378)

## 2025-05-29 RX ORDER — FOLIC ACID 1 MG/1
1000 TABLET ORAL DAILY
Qty: 90 TABLET | Refills: 1 | Status: SHIPPED | OUTPATIENT
Start: 2025-05-29

## 2025-05-30 LAB
ATRIAL RATE: 62 BPM
P AXIS: 39 DEGREES
PR INTERVAL: 160 MS
QRS AXIS: 21 DEGREES
QRSD INTERVAL: 82 MS
QT INTERVAL: 376 MS
QTC INTERVAL: 381 MS
T WAVE AXIS: -44 DEGREES
VENTRICULAR RATE: 62 BPM

## 2025-05-30 PROCEDURE — 93000 ELECTROCARDIOGRAM COMPLETE: CPT | Performed by: INTERNAL MEDICINE

## 2025-05-31 ENCOUNTER — APPOINTMENT (OUTPATIENT)
Dept: LAB | Facility: MEDICAL CENTER | Age: 65
End: 2025-05-31
Payer: COMMERCIAL

## 2025-05-31 DIAGNOSIS — Z79.631 METHOTREXATE, LONG TERM, CURRENT USE: ICD-10-CM

## 2025-05-31 DIAGNOSIS — N18.31 STAGE 3A CHRONIC KIDNEY DISEASE (HCC): ICD-10-CM

## 2025-05-31 DIAGNOSIS — D86.9 SARCOIDOSIS: ICD-10-CM

## 2025-05-31 LAB
ANION GAP SERPL CALCULATED.3IONS-SCNC: 6 MMOL/L (ref 4–13)
BASOPHILS # BLD AUTO: 0.05 THOUSANDS/ÂΜL (ref 0–0.1)
BASOPHILS NFR BLD AUTO: 1 % (ref 0–1)
BUN SERPL-MCNC: 18 MG/DL (ref 5–25)
CALCIUM SERPL-MCNC: 9.4 MG/DL (ref 8.4–10.2)
CHLORIDE SERPL-SCNC: 105 MMOL/L (ref 96–108)
CO2 SERPL-SCNC: 29 MMOL/L (ref 21–32)
CREAT SERPL-MCNC: 1.43 MG/DL (ref 0.6–1.3)
CRP SERPL QL: <1 MG/L
EOSINOPHIL # BLD AUTO: 0.14 THOUSAND/ÂΜL (ref 0–0.61)
EOSINOPHIL NFR BLD AUTO: 3 % (ref 0–6)
ERYTHROCYTE [DISTWIDTH] IN BLOOD BY AUTOMATED COUNT: 13.8 % (ref 11.6–15.1)
GFR SERPL CREATININE-BSD FRML MDRD: 51 ML/MIN/1.73SQ M
GLUCOSE P FAST SERPL-MCNC: 179 MG/DL (ref 65–99)
HCT VFR BLD AUTO: 48.6 % (ref 36.5–49.3)
HGB BLD-MCNC: 15.3 G/DL (ref 12–17)
IMM GRANULOCYTES # BLD AUTO: 0.01 THOUSAND/UL (ref 0–0.2)
IMM GRANULOCYTES NFR BLD AUTO: 0 % (ref 0–2)
LYMPHOCYTES # BLD AUTO: 1.26 THOUSANDS/ÂΜL (ref 0.6–4.47)
LYMPHOCYTES NFR BLD AUTO: 27 % (ref 14–44)
MCH RBC QN AUTO: 31.5 PG (ref 26.8–34.3)
MCHC RBC AUTO-ENTMCNC: 31.5 G/DL (ref 31.4–37.4)
MCV RBC AUTO: 100 FL (ref 82–98)
MONOCYTES # BLD AUTO: 0.46 THOUSAND/ÂΜL (ref 0.17–1.22)
MONOCYTES NFR BLD AUTO: 10 % (ref 4–12)
NEUTROPHILS # BLD AUTO: 2.78 THOUSANDS/ÂΜL (ref 1.85–7.62)
NEUTS SEG NFR BLD AUTO: 59 % (ref 43–75)
NRBC BLD AUTO-RTO: 0 /100 WBCS
PLATELET # BLD AUTO: 218 THOUSANDS/UL (ref 149–390)
PMV BLD AUTO: 10.7 FL (ref 8.9–12.7)
POTASSIUM SERPL-SCNC: 4.3 MMOL/L (ref 3.5–5.3)
RBC # BLD AUTO: 4.85 MILLION/UL (ref 3.88–5.62)
SODIUM SERPL-SCNC: 140 MMOL/L (ref 135–147)
WBC # BLD AUTO: 4.7 THOUSAND/UL (ref 4.31–10.16)

## 2025-05-31 PROCEDURE — 85025 COMPLETE CBC W/AUTO DIFF WBC: CPT

## 2025-05-31 PROCEDURE — 80048 BASIC METABOLIC PNL TOTAL CA: CPT

## 2025-05-31 PROCEDURE — 86140 C-REACTIVE PROTEIN: CPT

## 2025-05-31 PROCEDURE — 36415 COLL VENOUS BLD VENIPUNCTURE: CPT

## 2025-06-17 DIAGNOSIS — E11.65 TYPE 2 DIABETES MELLITUS WITH HYPERGLYCEMIA, WITH LONG-TERM CURRENT USE OF INSULIN (HCC): ICD-10-CM

## 2025-06-17 DIAGNOSIS — Z79.4 TYPE 2 DIABETES MELLITUS WITH HYPERGLYCEMIA, WITH LONG-TERM CURRENT USE OF INSULIN (HCC): ICD-10-CM

## 2025-06-17 RX ORDER — HYDROCHLOROTHIAZIDE 12.5 MG/1
CAPSULE ORAL
Qty: 6 EACH | Refills: 1 | Status: SHIPPED | OUTPATIENT
Start: 2025-06-17

## 2025-06-17 NOTE — TELEPHONE ENCOUNTER
Reason for call:   [x] Refill   [] Prior Auth  [] Other:     Office:   [] PCP/Provider -   [x] Specialty/Provider -     Medication: Continuous Glucose Sensor (FreeStyle Dimitris 3 Plus Sensor) MISC     Dose/Frequency: USE AS DIRECTED AND CHANGE SENSOR EVERY 15 DAYS     Quantity: 6    Pharmacy: [facility]    Local Pharmacy   Does the patient have enough for 3 days?   [] Yes   [x] No - Send as HP to POD

## 2025-07-07 ENCOUNTER — OFFICE VISIT (OUTPATIENT)
Dept: ENDOCRINOLOGY | Facility: CLINIC | Age: 65
End: 2025-07-07
Payer: COMMERCIAL

## 2025-07-07 VITALS
BODY MASS INDEX: 24.03 KG/M2 | HEIGHT: 72 IN | SYSTOLIC BLOOD PRESSURE: 98 MMHG | DIASTOLIC BLOOD PRESSURE: 68 MMHG | HEART RATE: 71 BPM | WEIGHT: 177.4 LBS | OXYGEN SATURATION: 97 %

## 2025-07-07 DIAGNOSIS — E11.65 TYPE 2 DIABETES MELLITUS WITH HYPERGLYCEMIA, WITH LONG-TERM CURRENT USE OF INSULIN (HCC): Primary | ICD-10-CM

## 2025-07-07 DIAGNOSIS — E78.2 MIXED HYPERLIPIDEMIA: ICD-10-CM

## 2025-07-07 DIAGNOSIS — Z79.4 TYPE 2 DIABETES MELLITUS WITH HYPERGLYCEMIA, WITH LONG-TERM CURRENT USE OF INSULIN (HCC): Primary | ICD-10-CM

## 2025-07-07 PROCEDURE — 99214 OFFICE O/P EST MOD 30 MIN: CPT | Performed by: PHYSICIAN ASSISTANT

## 2025-07-07 RX ORDER — INSULIN LISPRO 100 [IU]/ML
INJECTION, SOLUTION INTRAVENOUS; SUBCUTANEOUS
Qty: 20 ML | Refills: 5 | Status: SHIPPED | OUTPATIENT
Start: 2025-07-07

## 2025-07-07 NOTE — ASSESSMENT & PLAN NOTE
Lab Results   Component Value Date    HGBA1C 7.3 (H) 05/24/2025   Current HbA1c represents acceptable control. Blood sugars demonstrating hyperglycemia likely due to ineffective insulin from heat exposure.   Continue current regimen with the following adjustments:  Advised patient to bring insulin with him, out of car and keep in climate-controlled environtments  Advised to adhere to diabetic diet, and recommended staying active/exercising routinely.  Discussed symptoms and treatment of hypoglycemia.    Recommended routine follow-up with Ophthalmology and foot exams.   Ordered blood work to complete prior to next visit.  Orders:  •  insulin lispro (HumALOG/ADMELOG) 100 units/mL injection; ADMINISTER 56 UNITS IN TO V-GO PUMP DAILY  •  Hemoglobin A1C; Future  •  Comprehensive metabolic panel; Future  •  Albumin / creatinine urine ratio; Future  •  Lipid panel; Future

## 2025-07-07 NOTE — PROGRESS NOTES
Name: Juan Escobedo      : 1960      MRN: [id number]  Encounter Provider: Kimi Gibson PA-C  Encounter Date: 2025   Encounter department: [facility] [place]    Chief Complaint   Patient presents with   • Diabetes Type 2   :  Assessment & Plan  Type 2 diabetes mellitus with hyperglycemia, with long-term current use of insulin (HCC)    Lab Results   Component Value Date    HGBA1C 7.3 (H) 2025   Current HbA1c represents acceptable control. Blood sugars demonstrating hyperglycemia likely due to ineffective insulin from heat exposure.   Continue current regimen with the following adjustments:  Advised patient to bring insulin with him, out of car and keep in climate-controlled environtments  Advised to adhere to diabetic diet, and recommended staying active/exercising routinely.  Discussed symptoms and treatment of hypoglycemia.    Recommended routine follow-up with Ophthalmology and foot exams.   Ordered blood work to complete prior to next visit.  Orders:  •  insulin lispro (HumALOG/ADMELOG) 100 units/mL injection; ADMINISTER 56 UNITS IN TO V-GO PUMP DAILY  •  Hemoglobin A1C; Future  •  Comprehensive metabolic panel; Future  •  Albumin / creatinine urine ratio; Future  •  Lipid panel; Future    Mixed hyperlipidemia  Continue statin.   Check routine lipid panel.                History of Present Illness {?Quick Links Encounters * My Last Note * Last Note in Specialty * Snapshot * Since Last Visit * History :56256}  History of Present Illness  Brandon Escobedo is a 64-year-old male with hyperlipidemia, type 2 diabetes, and CKD, here for a follow-up visit.    The patient is experiencing elevated blood sugar levels, likely due to heat affecting insulin stored in the car during work. He travels frequently, being on the road five days a week. He administers 2-4 units of insulin per meal, 4 units at breakfast, and 6-8 units at dinner, but does not have lunch. His  daily routine includes breakfast at 6:00 AM, a snack at 9:30-10:00 AM, and dinner after work or before driving.  He has had no recent hospitalizations, illnesses, or steroid use. There have been no episodes of concerning hyperglycemia or hypoglycemia, and no hypoglycemic episodes.     The patient has not visited a podiatrist but has regular eye check-ups, with the last one in January 2025. He reports no neuropathy.    Diet Recall:  Breakfast: 6:00 AM - 4 units insulin  Dinner: 6-8 units insulin after work or before driving  Snacks: 9:30-10:00 AM        CGM Interpretation:  Date Range: 5/24 - 7/7  Device used: [employer] 3+  Type: Type 2 Diabetes  Home use     Analysis of data:   Average Glucose: 186  GMI: 7.8%  Glucose Variability: 25.7%  Time in Target Range: 45%   Time Above Range: 47% high, 8% very high  Time Below Range: 0% low, 0% very low  Interpretation of data: Hyperglycemia correlating with mealtimes.   More than 72 hours of data was reviewed. Report to be scanned to chart.     Current diabetic regimen:   V-go 20, 2-4 clicks with each meal   Farxiga 10 mg daily  Metformin 500 mg twice daily      Review of Systems as per HPI         Medical History Reviewed by provider this encounter:     .    Objective {?Quick Links Trend Vitals * Enter New Vitals * Results Review * Timeline (Adult) * Labs * Imaging * Cardiology * Procedures * Lung Cancer Screening * Surgical eConsent :45868}  BP 98/68   Pulse 71   Ht 6' (1.829 m)   Wt 80.5 kg (177 lb 6.4 oz)   SpO2 97%   BMI 24.06 kg/m²      Body mass index is 24.06 kg/m².  Wt Readings from Last 3 Encounters:   07/07/25 80.5 kg (177 lb 6.4 oz)   05/28/25 80.6 kg (177 lb 9.6 oz)   05/28/25 80.7 kg (178 lb)     Physical Exam  General Appearance: well-developed, well-appearing, in no acute distress.  Vital signs: Within normal limits.  HEENT: Normocephalic and atraumatic.  Eyes: No scleral icterus. Conjunctivae normal.  Respiratory: Pulmonary effort is normal.  Skin: Warm and  dry, no rash.  Neurological: Sensation intact to light touch and vibration bilaterally in feet.  Psychiatric: Attention normal.  Extremities: Pulses palpable bilaterally in feet.  Physical Exam    Cardiovascular:      Pulses: no weak pulses.           Dorsalis pedis pulses are 2+ on the right side and 2+ on the left side.   Feet:      Right foot:      Skin integrity: No ulcer, skin breakdown, erythema, warmth, callus or dry skin.      Left foot:      Skin integrity: No ulcer, skin breakdown, erythema, warmth, callus or dry skin.     Patient's shoes and socks removed.    Right Foot/Ankle   Right Foot Inspection  Skin Exam: skin normal and skin intact. No dry skin, no warmth, no callus, no erythema, no maceration, no abnormal color, no pre-ulcer, no ulcer and no callus.     Toe Exam: ROM and strength within normal limits.     Sensory   Vibration: intact  Monofilament testing: intact    Vascular  The right DP pulse is 2+.     Left Foot/Ankle  Left Foot Inspection  Skin Exam: skin normal and skin intact. No dry skin, no warmth, no erythema, no maceration, normal color, no pre-ulcer, no ulcer and no callus.     Toe Exam: ROM and strength within normal limits.     Sensory   Vibration: intact  Monofilament testing: intact    Vascular  The left DP pulse is 2+.     Assign Risk Category  No deformity present  No loss of protective sensation  No weak pulses  Risk: 0    Last Eye Exam: 05/20/2024  Last Foot Exam: 03/05/2024  Health Maintenance   Topic Date Due   • Diabetic Eye Exam  05/20/2026   • Diabetic Foot Exam  07/07/2026       Results    Labs: I have reviewed pertinent labs including:   Lab Results   Component Value Date    HGBA1C 7.3 (H) 05/24/2025    HGBA1C 7.6 (H) 02/22/2025    HGBA1C 7.8 (H) 08/24/2024      Lab Results   Component Value Date    CREATININE 1.43 (H) 05/31/2025    CREATININE 1.37 (H) 05/24/2025    CREATININE 1.22 03/06/2025    BUN 18 05/31/2025     12/09/2017    K 4.3 05/31/2025      05/31/2025    CO2 29 05/31/2025      GFR, Calculated   Date Value Ref Range Status   05/17/2018 35 (L) >60 mL/min/1.73m2 Final     Comment:     Please refer to initial GFR, CALC footnote     EGFR   Date Value Ref Range Status   07/19/2018 49.7 (L) >60 Final     Comment:     If patient is , multiply estimated GFR by 1.159.     eGFR   Date Value Ref Range Status   05/31/2025 51 ml/min/1.73sq m Final      HDL, Direct   Date Value Ref Range Status   03/02/2024 52 >=40 mg/dL Final     Triglycerides   Date Value Ref Range Status   03/02/2024 45 See Comment mg/dL Final     Comment:     Triglyceride:     0-9Y            <75mg/dL     10Y-17Y         <90 mg/dL       >=18Y     Normal          <150 mg/dL     Borderline High 150-199 mg/dL     High            200-499 mg/dL        Very High       >499 mg/dL    Specimen collection should occur prior to Metamizole administration due to the potential for falsely depressed results.      ALT   Date Value Ref Range Status   03/06/2025 24 7 - 52 U/L Final     Comment:     Specimen collection should occur prior to Sulfasalazine administration due to the potential for falsely depressed results.    03/19/2022 33 9 - 46 U/L Final   07/19/2018 14 10 - 50 U/L Final     AST   Date Value Ref Range Status   03/06/2025 42 (H) 13 - 39 U/L Final     Comment:     Slightly Hemolyzed:Results may be affected.   03/19/2022 13 10 - 35 U/L Final   07/19/2018 13 10 - 50 U/L Final     Alkaline Phosphatase   Date Value Ref Range Status   03/06/2025 56 34 - 104 U/L Final   03/19/2022 78 35 - 144 U/L Final   07/19/2018 59 0 - 153 U/L Final     Total Bilirubin   Date Value Ref Range Status   12/09/2017 0.5 0.2 - 1.2 mg/dL Final      Lab Results   Component Value Date    [id number] 1.402 05/24/2025        There are no Patient Instructions on file for this visit.    Discussed with the patient and all questioned fully answered. He will call me if any problems arise.

## 2025-07-09 DIAGNOSIS — E11.65 TYPE 2 DIABETES MELLITUS WITH HYPERGLYCEMIA, WITH LONG-TERM CURRENT USE OF INSULIN (HCC): ICD-10-CM

## 2025-07-09 DIAGNOSIS — Z79.4 TYPE 2 DIABETES MELLITUS WITH HYPERGLYCEMIA, WITH LONG-TERM CURRENT USE OF INSULIN (HCC): ICD-10-CM

## 2025-07-10 ENCOUNTER — HOSPITAL ENCOUNTER (OUTPATIENT)
Dept: NON INVASIVE DIAGNOSTICS | Facility: HOSPITAL | Age: 65
Discharge: HOME | End: 2025-07-10
Attending: STUDENT IN AN ORGANIZED HEALTH CARE EDUCATION/TRAINING PROGRAM
Payer: COMMERCIAL

## 2025-07-10 ENCOUNTER — HOSPITAL ENCOUNTER (OUTPATIENT)
Dept: NUCLEAR MEDICINE | Facility: HOSPITAL | Age: 65
Discharge: HOME | End: 2025-07-10
Attending: STUDENT IN AN ORGANIZED HEALTH CARE EDUCATION/TRAINING PROGRAM
Payer: COMMERCIAL

## 2025-07-10 VITALS
SYSTOLIC BLOOD PRESSURE: 98 MMHG | DIASTOLIC BLOOD PRESSURE: 68 MMHG | WEIGHT: 177 LBS | HEART RATE: 71 BPM | BODY MASS INDEX: 23.98 KG/M2 | HEIGHT: 72 IN

## 2025-07-10 DIAGNOSIS — R07.89 OTHER CHEST PAIN: ICD-10-CM

## 2025-07-10 DIAGNOSIS — R55 SYNCOPE, UNSPECIFIED SYNCOPE TYPE: ICD-10-CM

## 2025-07-10 LAB
AORTIC ROOT: 4.2 CM
AORTIC VALVE MEAN VELOCITY: 6.5 M/S
ASCENDING AORTA: 3.2 CM
AV LVOT MEAN GRADIENT: 2 MMHG
AV LVOT PEAK GRADIENT: 4 MMHG
AV MEAN PRESS GRAD SYS DOP V1V2: 2 MMHG
AV PEAK GRADIENT: 4 MMHG
AV VELOCITY RATIO: 0.98
AV VMAX SYS DOP: 0.98 M/S
CHEST PAIN STATEMENT: NORMAL
DOP CALC AO VTI: 19.68 CM
DOP CALC LVOT PEAK VEL VTI: 19.25 CM
DOP CALC LVOT PEAK VEL: 0.96 M/S
E WAVE DECELERATION TIME: 169 MS
E/A RATIO: 1.19
FRACTIONAL SHORTENING: 29 (ref 28–44)
INTERVENTRICULAR SEPTUM IN DIASTOLE (PARASTERNAL SHORT AXIS VIEW): 1.2 CM
INTERVENTRICULAR SEPTUM: 1.2 CM (ref 0.6–1.1)
LAAS-AP2: 22.3 CM2
LAAS-AP4: 17.5 CM2
LEFT ATRIUM SIZE: 3.8 CM
LEFT ATRIUM VOLUME (MOD BIPLANE): 62 ML
LEFT ATRIUM VOLUME INDEX (MOD BIPLANE): 30.7 ML/M2
LEFT INTERNAL DIMENSION IN SYSTOLE: 3.4 CM (ref 2.1–4)
LEFT VENTRICULAR INTERNAL DIMENSION IN DIASTOLE: 4.8 CM (ref 3.5–6)
LEFT VENTRICULAR POSTERIOR WALL IN END DIASTOLE: 1 CM
LEFT VENTRICULAR STROKE VOLUME: 59 ML
LV EF US.2D.A4C+ESTIMATED: 62 %
LVSV (TEICH): 59 ML
MAX DIASTOLIC BP: 66 MMHG
MAX HR PERCENT: 77 %
MAX HR: 121 BPM
MAX PREDICTED HEART RATE: 157 BPM
MV E'TISSUE VEL-SEP: 8 CM/S
MV PEAK A VEL: 0.67 M/S
MV PEAK E VEL: 80 CM/S
MV STENOSIS PRESSURE HALF TIME: 49 MS
MV VALVE AREA P 1/2 METHOD: 4.49
PROTOCOL NAME: NORMAL
RA PRESSURE ESTIMATED: 3 MMHG
RATE PRESSURE PRODUCT: NORMAL
REASON FOR TERMINATION: NORMAL
RIGHT ATRIAL 2D VOLUME: 39 ML
RIGHT ATRIUM AREA SYSTOLE A4C: 14.9 CM2
RIGHT VENTRICLE ID DIMENSION: 3.8 CM
RV PSP: 17 MMHG
SINOTUBULAR JUNCTION: 2.8 CM
SL CV LEFT ATRIUM LENGTH A2C: 5.1 CM
SL CV LV EF: 60
SL CV PED ECHO LEFT VENTRICLE DIASTOLIC VOLUME (MOD BIPLANE) 2D: 106 ML
SL CV PED ECHO LEFT VENTRICLE SYSTOLIC VOLUME (MOD BIPLANE) 2D: 47 ML
SL CV REST NUCLEAR ISOTOPE DOSE: 9.6 MCI
SL CV SINUS OF VALSALVA 2D: 4.2 CM
SL CV STRESS NUCLEAR ISOTOPE DOSE: 29.5 MCI
SL CV STRESS RECOVERY BP: NORMAL MMHG
SL CV STRESS RECOVERY HR: 73 BPM
SL CV STRESS RECOVERY O2 SAT: 99 %
SPECT HRT GATED+EF W RNC IV: 55 %
STJ: 2.8 CM
STRESS ANGINA INDEX: 0
STRESS BASELINE BP: NORMAL MMHG
STRESS BASELINE HR: 65 BPM
STRESS O2 SAT REST: 99 %
STRESS PEAK HR: 104 BPM
STRESS POST ESTIMATED WORKLOAD: 1.5 METS
STRESS POST EXERCISE DUR MIN: 3 MIN
STRESS POST EXERCISE DUR SEC: 0 SEC
STRESS POST O2 SAT PEAK: 99 %
STRESS POST PEAK BP: 128 MMHG
STRESS POST PEAK HR: 121 BPM
STRESS POST PEAK SYSTOLIC BP: 132 MMHG
STRESS/REST PERFUSION RATIO: 0.98
TARGET HR FORMULA: NORMAL
TEST INDICATION: NORMAL
TR MAX PG: 14 MMHG
TR PEAK VELOCITY: 1.9 M/S
TRICUSPID ANNULAR PLANE SYSTOLIC EXCURSION: 2.4 CM
TRICUSPID VALVE PEAK REGURGITATION VELOCITY: 1.85 M/S

## 2025-07-10 PROCEDURE — 93018 CV STRESS TEST I&R ONLY: CPT | Performed by: INTERNAL MEDICINE

## 2025-07-10 PROCEDURE — 93306 TTE W/DOPPLER COMPLETE: CPT | Performed by: INTERNAL MEDICINE

## 2025-07-10 PROCEDURE — 93306 TTE W/DOPPLER COMPLETE: CPT

## 2025-07-10 PROCEDURE — A9502 TC99M TETROFOSMIN: HCPCS

## 2025-07-10 PROCEDURE — 78452 HT MUSCLE IMAGE SPECT MULT: CPT

## 2025-07-10 PROCEDURE — 93017 CV STRESS TEST TRACING ONLY: CPT

## 2025-07-10 PROCEDURE — 93016 CV STRESS TEST SUPVJ ONLY: CPT | Performed by: INTERNAL MEDICINE

## 2025-07-10 RX ORDER — REGADENOSON 0.08 MG/ML
0.4 INJECTION, SOLUTION INTRAVENOUS ONCE
Status: COMPLETED | OUTPATIENT
Start: 2025-07-10 | End: 2025-07-10

## 2025-07-10 RX ORDER — SUB-Q INSULIN DEVICE, 40 UNIT
EACH MISCELLANEOUS
Qty: 30 KIT | Refills: 2 | Status: SHIPPED | OUTPATIENT
Start: 2025-07-10

## 2025-07-10 RX ADMIN — REGADENOSON 0.4 MG: 0.08 INJECTION, SOLUTION INTRAVENOUS at 10:49

## 2025-07-14 LAB
AORTIC ROOT: 4.2 CM
AORTIC VALVE MEAN VELOCITY: 6.5 M/S
ASCENDING AORTA: 3.2 CM
AV LVOT MEAN GRADIENT: 2 MMHG
AV LVOT PEAK GRADIENT: 4 MMHG
AV MEAN PRESS GRAD SYS DOP V1V2: 2 MMHG
AV PEAK GRADIENT: 4 MMHG
AV VELOCITY RATIO: 0.98
AV VMAX SYS DOP: 0.98 M/S
DOP CALC AO VTI: 19.68 CM
DOP CALC LVOT PEAK VEL VTI: 19.25 CM
DOP CALC LVOT PEAK VEL: 0.96 M/S
E WAVE DECELERATION TIME: 169 MS
E/A RATIO: 1.19
FRACTIONAL SHORTENING: 29 (ref 28–44)
INTERVENTRICULAR SEPTUM IN DIASTOLE (PARASTERNAL SHORT AXIS VIEW): 1.2 CM
INTERVENTRICULAR SEPTUM: 1.2 CM (ref 0.6–1.1)
LAAS-AP2: 22.3 CM2
LAAS-AP4: 17.5 CM2
LEFT ATRIUM SIZE: 3.8 CM
LEFT ATRIUM VOLUME (MOD BIPLANE): 62 ML
LEFT ATRIUM VOLUME INDEX (MOD BIPLANE): 30.7 ML/M2
LEFT INTERNAL DIMENSION IN SYSTOLE: 3.4 CM (ref 2.1–4)
LEFT VENTRICULAR INTERNAL DIMENSION IN DIASTOLE: 4.8 CM (ref 3.5–6)
LEFT VENTRICULAR POSTERIOR WALL IN END DIASTOLE: 1 CM
LEFT VENTRICULAR STROKE VOLUME: 59 ML
LV EF US.2D.A4C+ESTIMATED: 62 %
LVSV (TEICH): 59 ML
MV E'TISSUE VEL-SEP: 8 CM/S
MV PEAK A VEL: 0.67 M/S
MV PEAK E VEL: 80 CM/S
MV STENOSIS PRESSURE HALF TIME: 49 MS
MV VALVE AREA P 1/2 METHOD: 4.49
RA PRESSURE ESTIMATED: 3 MMHG
RIGHT ATRIAL 2D VOLUME: 39 ML
RIGHT ATRIUM AREA SYSTOLE A4C: 14.9 CM2
RIGHT VENTRICLE ID DIMENSION: 3.8 CM
RV PSP: 17 MMHG
SINOTUBULAR JUNCTION: 2.8 CM
SL CV LEFT ATRIUM LENGTH A2C: 5.1 CM
SL CV LV EF: 60
SL CV PED ECHO LEFT VENTRICLE DIASTOLIC VOLUME (MOD BIPLANE) 2D: 106 ML
SL CV PED ECHO LEFT VENTRICLE SYSTOLIC VOLUME (MOD BIPLANE) 2D: 47 ML
SL CV SINUS OF VALSALVA 2D: 4.2 CM
STJ: 2.8 CM
TR MAX PG: 14 MMHG
TR PEAK VELOCITY: 1.9 M/S
TRICUSPID ANNULAR PLANE SYSTOLIC EXCURSION: 2.4 CM
TRICUSPID VALVE PEAK REGURGITATION VELOCITY: 1.85 M/S

## 2025-07-16 ENCOUNTER — TELEPHONE (OUTPATIENT)
Age: 65
End: 2025-07-16

## 2025-07-16 DIAGNOSIS — R07.89 OTHER CHEST PAIN: Primary | ICD-10-CM

## 2025-07-16 RX ORDER — ASPIRIN 81 MG/1
81 TABLET, CHEWABLE ORAL DAILY
Qty: 90 TABLET | Refills: 1 | Status: SHIPPED | OUTPATIENT
Start: 2025-07-16

## 2025-07-16 RX ORDER — ATORVASTATIN CALCIUM 40 MG/1
40 TABLET, FILM COATED ORAL DAILY
Qty: 90 TABLET | Refills: 1 | Status: ON HOLD | OUTPATIENT
Start: 2025-07-16 | End: 2025-08-01

## 2025-07-16 NOTE — TELEPHONE ENCOUNTER
Received call back from pt.  He said his phone is blocking the call from Dr. Jha and sending it to [employer] automatically. Pt said if he knew the phone number he could possibly add it to his contacts so it would not block it.  I attempted to contact office, no answer.    Please return pt's call.

## 2025-07-16 NOTE — TELEPHONE ENCOUNTER
Caller: Brandon Escobedo    Doctor: Dr. Costa    Reason for call: Patient returning Dr Jha call Please call patient back.    Call back#: [phone]

## 2025-07-17 ENCOUNTER — PREP FOR PROCEDURE (OUTPATIENT)
Dept: CARDIOLOGY CLINIC | Facility: CLINIC | Age: 65
End: 2025-07-17

## 2025-07-17 DIAGNOSIS — R07.9 CHEST PAIN, UNSPECIFIED TYPE: Primary | ICD-10-CM

## 2025-07-17 RX ORDER — SODIUM CHLORIDE 9 MG/ML
75 INJECTION, SOLUTION INTRAVENOUS CONTINUOUS
Status: CANCELLED | OUTPATIENT
Start: 2025-07-17 | End: 2025-07-17

## 2025-07-19 ENCOUNTER — APPOINTMENT (OUTPATIENT)
Dept: LAB | Facility: MEDICAL CENTER | Age: 65
End: 2025-07-19
Payer: COMMERCIAL

## 2025-07-19 DIAGNOSIS — R07.9 CHEST PAIN, UNSPECIFIED TYPE: ICD-10-CM

## 2025-07-19 LAB
BASOPHILS # BLD AUTO: 0.04 THOUSANDS/ÂΜL (ref 0–0.1)
BASOPHILS NFR BLD AUTO: 1 % (ref 0–1)
EOSINOPHIL # BLD AUTO: 0.14 THOUSAND/ÂΜL (ref 0–0.61)
EOSINOPHIL NFR BLD AUTO: 3 % (ref 0–6)
ERYTHROCYTE [DISTWIDTH] IN BLOOD BY AUTOMATED COUNT: 13.2 % (ref 11.6–15.1)
HCT VFR BLD AUTO: 46.1 % (ref 36.5–49.3)
HGB BLD-MCNC: 15.5 G/DL (ref 12–17)
IMM GRANULOCYTES # BLD AUTO: 0.02 THOUSAND/UL (ref 0–0.2)
IMM GRANULOCYTES NFR BLD AUTO: 0 % (ref 0–2)
INR PPP: 0.89 (ref 0.85–1.19)
LYMPHOCYTES # BLD AUTO: 1.31 THOUSANDS/ÂΜL (ref 0.6–4.47)
LYMPHOCYTES NFR BLD AUTO: 28 % (ref 14–44)
MCH RBC QN AUTO: 32.4 PG (ref 26.8–34.3)
MCHC RBC AUTO-ENTMCNC: 33.6 G/DL (ref 31.4–37.4)
MCV RBC AUTO: 96 FL (ref 82–98)
MONOCYTES # BLD AUTO: 0.45 THOUSAND/ÂΜL (ref 0.17–1.22)
MONOCYTES NFR BLD AUTO: 10 % (ref 4–12)
NEUTROPHILS # BLD AUTO: 2.71 THOUSANDS/ÂΜL (ref 1.85–7.62)
NEUTS SEG NFR BLD AUTO: 58 % (ref 43–75)
NRBC BLD AUTO-RTO: 0 /100 WBCS
PLATELET # BLD AUTO: 240 THOUSANDS/UL (ref 149–390)
PMV BLD AUTO: 10.3 FL (ref 8.9–12.7)
PROTHROMBIN TIME: 12.3 SECONDS (ref 12.3–15)
RBC # BLD AUTO: 4.79 MILLION/UL (ref 3.88–5.62)
WBC # BLD AUTO: 4.67 THOUSAND/UL (ref 4.31–10.16)

## 2025-07-19 PROCEDURE — 85610 PROTHROMBIN TIME: CPT

## 2025-07-19 PROCEDURE — 85025 COMPLETE CBC W/AUTO DIFF WBC: CPT

## 2025-07-19 PROCEDURE — 36415 COLL VENOUS BLD VENIPUNCTURE: CPT

## 2025-07-22 ENCOUNTER — TELEPHONE (OUTPATIENT)
Dept: CARDIOLOGY CLINIC | Facility: CLINIC | Age: 65
End: 2025-07-22

## 2025-07-22 DIAGNOSIS — R07.89 OTHER CHEST PAIN: ICD-10-CM

## 2025-07-22 DIAGNOSIS — R55 SYNCOPE, UNSPECIFIED SYNCOPE TYPE: Primary | ICD-10-CM

## 2025-07-22 NOTE — TELEPHONE ENCOUNTER
Patient scheduled for LEFT Heart Cath on 8/1/25 at [facility] with Dr. GRIFFITHS.      Instructions sent to patient through [employer].      Patient aware of all general instructions.    Medication holds:     Farxiga - Do not take for 4 days before procedure and morning of procedure.      Metformin - Take your regular dose day/evening before procedure and do not take morning of procedure.      HumaLog - Take your regular dose the day/evening before your procedure and do not take the morning of your procedure.      Labs to be done NO LATER THAN 7/28/25   CMP / CBC (fasting 8 hours)

## 2025-07-24 NOTE — TELEPHONE ENCOUNTER
Called pt to remind him to have his blood work done for his procedure on 8/1/25. Pt stated he will be going tomorrow 7/25/25.

## 2025-07-25 ENCOUNTER — APPOINTMENT (OUTPATIENT)
Dept: LAB | Facility: MEDICAL CENTER | Age: 65
End: 2025-07-25
Attending: STUDENT IN AN ORGANIZED HEALTH CARE EDUCATION/TRAINING PROGRAM
Payer: COMMERCIAL

## 2025-07-25 DIAGNOSIS — R55 SYNCOPE, UNSPECIFIED SYNCOPE TYPE: ICD-10-CM

## 2025-07-25 DIAGNOSIS — R07.89 OTHER CHEST PAIN: ICD-10-CM

## 2025-07-25 LAB
ALBUMIN SERPL BCG-MCNC: 4.2 G/DL (ref 3.5–5)
ALP SERPL-CCNC: 119 U/L (ref 34–104)
ALT SERPL W P-5'-P-CCNC: 19 U/L (ref 7–52)
ANION GAP SERPL CALCULATED.3IONS-SCNC: 4 MMOL/L (ref 4–13)
AST SERPL W P-5'-P-CCNC: 25 U/L (ref 13–39)
BASOPHILS # BLD AUTO: 0.02 THOUSANDS/ÂΜL (ref 0–0.1)
BASOPHILS NFR BLD AUTO: 0 % (ref 0–1)
BILIRUB SERPL-MCNC: 0.59 MG/DL (ref 0.2–1)
BUN SERPL-MCNC: 18 MG/DL (ref 5–25)
CALCIUM SERPL-MCNC: 9.4 MG/DL (ref 8.4–10.2)
CHLORIDE SERPL-SCNC: 105 MMOL/L (ref 96–108)
CO2 SERPL-SCNC: 31 MMOL/L (ref 21–32)
CREAT SERPL-MCNC: 1.34 MG/DL (ref 0.6–1.3)
EOSINOPHIL # BLD AUTO: 0.15 THOUSAND/ÂΜL (ref 0–0.61)
EOSINOPHIL NFR BLD AUTO: 3 % (ref 0–6)
ERYTHROCYTE [DISTWIDTH] IN BLOOD BY AUTOMATED COUNT: 13.3 % (ref 11.6–15.1)
GFR SERPL CREATININE-BSD FRML MDRD: 55 ML/MIN/1.73SQ M
GLUCOSE P FAST SERPL-MCNC: 80 MG/DL (ref 65–99)
HCT VFR BLD AUTO: 46.9 % (ref 36.5–49.3)
HGB BLD-MCNC: 15.2 G/DL (ref 12–17)
IMM GRANULOCYTES # BLD AUTO: 0.01 THOUSAND/UL (ref 0–0.2)
IMM GRANULOCYTES NFR BLD AUTO: 0 % (ref 0–2)
LYMPHOCYTES # BLD AUTO: 1.61 THOUSANDS/ÂΜL (ref 0.6–4.47)
LYMPHOCYTES NFR BLD AUTO: 29 % (ref 14–44)
MCH RBC QN AUTO: 32.2 PG (ref 26.8–34.3)
MCHC RBC AUTO-ENTMCNC: 32.4 G/DL (ref 31.4–37.4)
MCV RBC AUTO: 99 FL (ref 82–98)
MONOCYTES # BLD AUTO: 0.59 THOUSAND/ÂΜL (ref 0.17–1.22)
MONOCYTES NFR BLD AUTO: 11 % (ref 4–12)
NEUTROPHILS # BLD AUTO: 3.25 THOUSANDS/ÂΜL (ref 1.85–7.62)
NEUTS SEG NFR BLD AUTO: 57 % (ref 43–75)
NRBC BLD AUTO-RTO: 0 /100 WBCS
PLATELET # BLD AUTO: 231 THOUSANDS/UL (ref 149–390)
PMV BLD AUTO: 10.6 FL (ref 8.9–12.7)
POTASSIUM SERPL-SCNC: 4.3 MMOL/L (ref 3.5–5.3)
PROT SERPL-MCNC: 6.6 G/DL (ref 6.4–8.4)
RBC # BLD AUTO: 4.72 MILLION/UL (ref 3.88–5.62)
SODIUM SERPL-SCNC: 140 MMOL/L (ref 135–147)
WBC # BLD AUTO: 5.63 THOUSAND/UL (ref 4.31–10.16)

## 2025-07-25 PROCEDURE — 36415 COLL VENOUS BLD VENIPUNCTURE: CPT

## 2025-07-25 PROCEDURE — 85025 COMPLETE CBC W/AUTO DIFF WBC: CPT

## 2025-07-25 PROCEDURE — 80053 COMPREHEN METABOLIC PANEL: CPT

## 2025-08-01 ENCOUNTER — HOSPITAL ENCOUNTER (OUTPATIENT)
Facility: HOSPITAL | Age: 65
Setting detail: OUTPATIENT SURGERY
Discharge: HOME | End: 2025-08-01
Attending: INTERNAL MEDICINE | Admitting: INTERNAL MEDICINE
Payer: COMMERCIAL

## 2025-08-01 VITALS
HEART RATE: 51 BPM | OXYGEN SATURATION: 96 % | RESPIRATION RATE: 14 BRPM | BODY MASS INDEX: 24.19 KG/M2 | TEMPERATURE: 97.2 F | WEIGHT: 178.35 LBS | SYSTOLIC BLOOD PRESSURE: 110 MMHG | DIASTOLIC BLOOD PRESSURE: 64 MMHG

## 2025-08-01 DIAGNOSIS — R07.9 CHEST PAIN, UNSPECIFIED TYPE: ICD-10-CM

## 2025-08-01 DIAGNOSIS — I25.10 CAD (CORONARY ARTERY DISEASE): Primary | ICD-10-CM

## 2025-08-01 DIAGNOSIS — R07.89 OTHER CHEST PAIN: ICD-10-CM

## 2025-08-01 LAB
ANION GAP SERPL CALCULATED.3IONS-SCNC: 4 MMOL/L (ref 4–13)
ATRIAL RATE: 60 BPM
BUN SERPL-MCNC: 19 MG/DL (ref 5–25)
CALCIUM SERPL-MCNC: 9 MG/DL (ref 8.4–10.2)
CHLORIDE SERPL-SCNC: 108 MMOL/L (ref 96–108)
CHOLEST SERPL-MCNC: 98 MG/DL (ref ?–200)
CO2 SERPL-SCNC: 25 MMOL/L (ref 21–32)
CREAT SERPL-MCNC: 1.26 MG/DL (ref 0.6–1.3)
GFR SERPL CREATININE-BSD FRML MDRD: 60 ML/MIN/1.73SQ M
GLUCOSE P FAST SERPL-MCNC: 301 MG/DL (ref 65–99)
GLUCOSE SERPL-MCNC: 301 MG/DL (ref 65–140)
HDLC SERPL-MCNC: 47 MG/DL
KCT BLD-ACNC: 293 SEC (ref 89–137)
LDLC SERPL CALC-MCNC: 40 MG/DL (ref 0–100)
NONHDLC SERPL-MCNC: 51 MG/DL
P AXIS: 50 DEGREES
POTASSIUM SERPL-SCNC: 4.2 MMOL/L (ref 3.5–5.3)
PR INTERVAL: 170 MS
QRS AXIS: 2 DEGREES
QRSD INTERVAL: 90 MS
QT INTERVAL: 392 MS
QTC INTERVAL: 392 MS
SODIUM SERPL-SCNC: 137 MMOL/L (ref 135–147)
SPECIMEN SOURCE: ABNORMAL
T WAVE AXIS: -1 DEGREES
TRIGL SERPL-MCNC: 53 MG/DL (ref ?–150)
VENTRICULAR RATE: 60 BPM

## 2025-08-01 PROCEDURE — C1887 CATHETER, GUIDING: HCPCS | Performed by: INTERNAL MEDICINE

## 2025-08-01 PROCEDURE — 93010 ELECTROCARDIOGRAM REPORT: CPT

## 2025-08-01 PROCEDURE — 99153 MOD SED SAME PHYS/QHP EA: CPT | Performed by: INTERNAL MEDICINE

## 2025-08-01 PROCEDURE — 93005 ELECTROCARDIOGRAM TRACING: CPT

## 2025-08-01 PROCEDURE — 85347 COAGULATION TIME ACTIVATED: CPT

## 2025-08-01 PROCEDURE — 80061 LIPID PANEL: CPT | Performed by: INTERNAL MEDICINE

## 2025-08-01 PROCEDURE — C1769 GUIDE WIRE: HCPCS | Performed by: INTERNAL MEDICINE

## 2025-08-01 PROCEDURE — C1894 INTRO/SHEATH, NON-LASER: HCPCS | Performed by: INTERNAL MEDICINE

## 2025-08-01 PROCEDURE — C1725 CATH, TRANSLUMIN NON-LASER: HCPCS | Performed by: INTERNAL MEDICINE

## 2025-08-01 PROCEDURE — C9600 PERC DRUG-EL COR STENT SING: HCPCS | Performed by: INTERNAL MEDICINE

## 2025-08-01 PROCEDURE — NC001 PR NO CHARGE: Performed by: INTERNAL MEDICINE

## 2025-08-01 PROCEDURE — 93458 L HRT ARTERY/VENTRICLE ANGIO: CPT | Performed by: INTERNAL MEDICINE

## 2025-08-01 PROCEDURE — C1874 STENT, COATED/COV W/DEL SYS: HCPCS | Performed by: INTERNAL MEDICINE

## 2025-08-01 PROCEDURE — 99152 MOD SED SAME PHYS/QHP 5/>YRS: CPT | Performed by: INTERNAL MEDICINE

## 2025-08-01 PROCEDURE — 80048 BASIC METABOLIC PNL TOTAL CA: CPT | Performed by: STUDENT IN AN ORGANIZED HEALTH CARE EDUCATION/TRAINING PROGRAM

## 2025-08-01 PROCEDURE — 92943 PRQ TRLUML REVSC CH OCC ANT: CPT | Performed by: INTERNAL MEDICINE

## 2025-08-01 DEVICE — IMPLANTABLE DEVICE: Type: IMPLANTABLE DEVICE | Site: CORONARY | Status: FUNCTIONAL

## 2025-08-01 RX ORDER — HEPARIN SODIUM 1000 [USP'U]/ML
INJECTION, SOLUTION INTRAVENOUS; SUBCUTANEOUS CODE/TRAUMA/SEDATION MEDICATION
Status: DISCONTINUED | OUTPATIENT
Start: 2025-08-01 | End: 2025-08-01 | Stop reason: HOSPADM

## 2025-08-01 RX ORDER — SODIUM CHLORIDE 9 MG/ML
75 INJECTION, SOLUTION INTRAVENOUS CONTINUOUS
Status: DISPENSED | OUTPATIENT
Start: 2025-08-01 | End: 2025-08-01

## 2025-08-01 RX ORDER — LIDOCAINE HYDROCHLORIDE 10 MG/ML
INJECTION, SOLUTION EPIDURAL; INFILTRATION; INTRACAUDAL; PERINEURAL CODE/TRAUMA/SEDATION MEDICATION
Status: DISCONTINUED | OUTPATIENT
Start: 2025-08-01 | End: 2025-08-01 | Stop reason: HOSPADM

## 2025-08-01 RX ORDER — FENTANYL CITRATE 50 UG/ML
INJECTION, SOLUTION INTRAMUSCULAR; INTRAVENOUS CODE/TRAUMA/SEDATION MEDICATION
Status: DISCONTINUED | OUTPATIENT
Start: 2025-08-01 | End: 2025-08-01 | Stop reason: HOSPADM

## 2025-08-01 RX ORDER — NITROGLYCERIN 20 MG/100ML
INJECTION INTRAVENOUS CODE/TRAUMA/SEDATION MEDICATION
Status: DISCONTINUED | OUTPATIENT
Start: 2025-08-01 | End: 2025-08-01 | Stop reason: HOSPADM

## 2025-08-01 RX ORDER — SODIUM CHLORIDE 9 MG/ML
125 INJECTION, SOLUTION INTRAVENOUS CONTINUOUS
Status: DISCONTINUED | OUTPATIENT
Start: 2025-08-01 | End: 2025-08-01 | Stop reason: HOSPADM

## 2025-08-01 RX ORDER — ATORVASTATIN CALCIUM 40 MG/1
80 TABLET, FILM COATED ORAL DAILY
Qty: 90 TABLET | Refills: 3 | Status: SHIPPED | OUTPATIENT
Start: 2025-08-01

## 2025-08-01 RX ORDER — ASPIRIN 81 MG/1
324 TABLET, CHEWABLE ORAL ONCE
Status: COMPLETED | OUTPATIENT
Start: 2025-08-01 | End: 2025-08-01

## 2025-08-01 RX ORDER — VERAPAMIL HYDROCHLORIDE 2.5 MG/ML
INJECTION INTRAVENOUS CODE/TRAUMA/SEDATION MEDICATION
Status: DISCONTINUED | OUTPATIENT
Start: 2025-08-01 | End: 2025-08-01 | Stop reason: HOSPADM

## 2025-08-01 RX ORDER — CLOPIDOGREL BISULFATE 75 MG/1
75 TABLET ORAL DAILY
Qty: 90 TABLET | Refills: 3 | Status: SHIPPED | OUTPATIENT
Start: 2025-08-01

## 2025-08-01 RX ORDER — MIDAZOLAM HYDROCHLORIDE 2 MG/2ML
INJECTION, SOLUTION INTRAMUSCULAR; INTRAVENOUS CODE/TRAUMA/SEDATION MEDICATION
Status: DISCONTINUED | OUTPATIENT
Start: 2025-08-01 | End: 2025-08-01 | Stop reason: HOSPADM

## 2025-08-01 RX ORDER — PRASUGREL 10 MG/1
TABLET, FILM COATED ORAL CODE/TRAUMA/SEDATION MEDICATION
Status: DISCONTINUED | OUTPATIENT
Start: 2025-08-01 | End: 2025-08-01 | Stop reason: HOSPADM

## 2025-08-01 RX ADMIN — ASPIRIN 81 MG CHEWABLE TABLET 324 MG: 81 TABLET CHEWABLE at 08:20

## 2025-08-01 RX ADMIN — SODIUM CHLORIDE 120.4 ML: 0.9 INJECTION, SOLUTION INTRAVENOUS at 08:17

## 2025-08-08 LAB
LEFT EYE DIABETIC RETINOPATHY: NORMAL
RIGHT EYE DIABETIC RETINOPATHY: NORMAL

## (undated) DEVICE — CATH GUIDE LAUNCHER 6FR JR4 110CM

## (undated) DEVICE — TUBING SUCTION 5MM X 12 FT

## (undated) DEVICE — Device

## (undated) DEVICE — GLOVE INDICATOR PI UNDERGLOVE SZ 7.5 BLUE

## (undated) DEVICE — ADHESIVE SKN CLSR HISTOACRYL FLEX 0.5ML LF

## (undated) DEVICE — SCD SEQUENTIAL COMPRESSION COMFORT SLEEVE MEDIUM KNEE LENGTH: Brand: KENDALL SCD

## (undated) DEVICE — GAUZE SPONGES,16 PLY: Brand: CURITY

## (undated) DEVICE — STRL UNIVERSAL MINOR GENERAL: Brand: CARDINAL HEALTH

## (undated) DEVICE — LIGHT HANDLE COVER SLEEVE DISP BLUE STELLAR

## (undated) DEVICE — PLUMEPEN PRO 10FT

## (undated) DEVICE — POOLE SUCTION HANDLE: Brand: CARDINAL HEALTH

## (undated) DEVICE — CHLORAPREP HI-LITE 26ML ORANGE

## (undated) DEVICE — 3000CC GUARDIAN II: Brand: GUARDIAN

## (undated) DEVICE — REM POLYHESIVE ADULT PATIENT RETURN ELECTRODE: Brand: VALLEYLAB

## (undated) DEVICE — VIAL DECANTER

## (undated) DEVICE — INTENDED FOR TISSUE SEPARATION, AND OTHER PROCEDURES THAT REQUIRE A SHARP SURGICAL BLADE TO PUNCTURE OR CUT.: Brand: BARD-PARKER SAFETY BLADES SIZE 15, STERILE

## (undated) DEVICE — GLOVE SRG BIOGEL 7.5

## (undated) DEVICE — LIGACLIP MCA MULTIPLE CLIP APPLIERS, 20 MEDIUM CLIPS: Brand: LIGACLIP